# Patient Record
Sex: FEMALE | Race: WHITE | NOT HISPANIC OR LATINO | URBAN - METROPOLITAN AREA
[De-identification: names, ages, dates, MRNs, and addresses within clinical notes are randomized per-mention and may not be internally consistent; named-entity substitution may affect disease eponyms.]

---

## 2017-01-09 ENCOUNTER — OUTPATIENT (OUTPATIENT)
Dept: OUTPATIENT SERVICES | Facility: HOSPITAL | Age: 72
LOS: 1 days | Discharge: HOME | End: 2017-01-09

## 2017-06-27 DIAGNOSIS — R05 COUGH: ICD-10-CM

## 2018-01-10 ENCOUNTER — OUTPATIENT (OUTPATIENT)
Dept: OUTPATIENT SERVICES | Facility: HOSPITAL | Age: 73
LOS: 1 days | Discharge: HOME | End: 2018-01-10

## 2018-01-10 DIAGNOSIS — R05 COUGH: ICD-10-CM

## 2018-06-26 ENCOUNTER — OUTPATIENT (OUTPATIENT)
Dept: OUTPATIENT SERVICES | Facility: HOSPITAL | Age: 73
LOS: 1 days | Discharge: HOME | End: 2018-06-26

## 2018-06-26 DIAGNOSIS — J40 BRONCHITIS, NOT SPECIFIED AS ACUTE OR CHRONIC: ICD-10-CM

## 2018-10-20 PROBLEM — Z00.00 ENCOUNTER FOR PREVENTIVE HEALTH EXAMINATION: Status: ACTIVE | Noted: 2018-10-20

## 2018-11-19 ENCOUNTER — APPOINTMENT (OUTPATIENT)
Dept: BREAST CENTER | Facility: CLINIC | Age: 73
End: 2018-11-19
Payer: MEDICARE

## 2018-11-19 VITALS
OXYGEN SATURATION: 97 % | HEIGHT: 62 IN | DIASTOLIC BLOOD PRESSURE: 84 MMHG | BODY MASS INDEX: 39.2 KG/M2 | HEART RATE: 82 BPM | WEIGHT: 213 LBS | SYSTOLIC BLOOD PRESSURE: 138 MMHG

## 2018-11-19 DIAGNOSIS — Z80.49 FAMILY HISTORY OF MALIGNANT NEOPLASM OF OTHER GENITAL ORGANS: ICD-10-CM

## 2018-11-19 DIAGNOSIS — Z86.79 PERSONAL HISTORY OF OTHER DISEASES OF THE CIRCULATORY SYSTEM: ICD-10-CM

## 2018-11-19 DIAGNOSIS — Z86.39 PERSONAL HISTORY OF OTHER ENDOCRINE, NUTRITIONAL AND METABOLIC DISEASE: ICD-10-CM

## 2018-11-19 DIAGNOSIS — Z87.891 PERSONAL HISTORY OF NICOTINE DEPENDENCE: ICD-10-CM

## 2018-11-19 PROCEDURE — 99203 OFFICE O/P NEW LOW 30 MIN: CPT

## 2018-11-20 PROBLEM — Z86.39 HISTORY OF HYPOTHYROIDISM: Status: RESOLVED | Noted: 2018-11-20 | Resolved: 2018-11-20

## 2018-11-20 PROBLEM — Z86.79 HISTORY OF HYPERTENSION: Status: RESOLVED | Noted: 2018-11-20 | Resolved: 2018-11-20

## 2018-11-20 PROBLEM — Z87.891 FORMER SMOKER: Status: ACTIVE | Noted: 2018-11-20

## 2018-11-20 PROBLEM — Z80.49 FAMILY HISTORY OF MALIGNANT NEOPLASM OF UTERUS: Status: ACTIVE | Noted: 2018-11-20

## 2019-06-05 ENCOUNTER — APPOINTMENT (OUTPATIENT)
Dept: BREAST CENTER | Facility: CLINIC | Age: 74
End: 2019-06-05
Payer: MEDICARE

## 2019-06-05 VITALS
SYSTOLIC BLOOD PRESSURE: 150 MMHG | WEIGHT: 213 LBS | DIASTOLIC BLOOD PRESSURE: 82 MMHG | TEMPERATURE: 97.8 F | HEIGHT: 62 IN | BODY MASS INDEX: 39.2 KG/M2

## 2019-06-05 DIAGNOSIS — Z80.3 FAMILY HISTORY OF MALIGNANT NEOPLASM OF BREAST: ICD-10-CM

## 2019-06-05 DIAGNOSIS — N64.4 MASTODYNIA: ICD-10-CM

## 2019-06-05 PROCEDURE — 99213 OFFICE O/P EST LOW 20 MIN: CPT
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
## 2019-06-05 NOTE — PHYSICAL EXAM
[Normocephalic] : normocephalic [EOMI] : extra ocular movement intact [No Supraclavicular Adenopathy] : no supraclavicular adenopathy [No Cervical Adenopathy] : no cervical adenopathy [No dominant masses] : no dominant masses left breast [Examined in the supine and seated position] : examined in the supine and seated position [No dominant masses] : no dominant masses in right breast  [No Nipple Retraction] : no right nipple retraction [No Nipple Discharge] : no left nipple discharge [Soft] : abdomen soft [No Axillary Lymphadenopathy] : no left axillary lymphadenopathy [Not Tender] : non-tender [No Edema] : no edema [No Rashes] : no rashes [No Ulceration] : no ulceration [de-identified] : no suspicious masses were palpated in either breast

## 2019-06-05 NOTE — PAST MEDICAL HISTORY
[Menarche Age ____] : age at menarche was [unfilled] [Total Preg ___] : G[unfilled] [Menopause Age____] : age at menopause was [unfilled] [FreeTextEntry6] : denies [FreeTextEntry5] : R ovarian cystectomy  [History of Hormone Replacement Treatment] : has no history of hormone replacement treatment [FreeTextEntry8] : n.a [FreeTextEntry7] : denies

## 2019-06-05 NOTE — REVIEW OF SYSTEMS
[As Noted in HPI] : as noted in HPI [Breast Pain] : breast pain [Negative] : Heme/Lymph [Skin Lesions] : no skin lesions [Breast Lump] : no breast lump [Skin Wound] : no skin wound [Hot Flashes] : no hot flashes
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
## 2019-10-16 ENCOUNTER — APPOINTMENT (OUTPATIENT)
Dept: BREAST CENTER | Facility: CLINIC | Age: 74
End: 2019-10-16

## 2020-09-13 ENCOUNTER — OUTPATIENT (OUTPATIENT)
Dept: OUTPATIENT SERVICES | Facility: HOSPITAL | Age: 75
LOS: 1 days | Discharge: HOME | End: 2020-09-13

## 2020-09-13 DIAGNOSIS — Z11.59 ENCOUNTER FOR SCREENING FOR OTHER VIRAL DISEASES: ICD-10-CM

## 2020-09-16 ENCOUNTER — OUTPATIENT (OUTPATIENT)
Dept: OUTPATIENT SERVICES | Facility: HOSPITAL | Age: 75
LOS: 1 days | Discharge: HOME | End: 2020-09-16

## 2020-09-16 VITALS
DIASTOLIC BLOOD PRESSURE: 63 MMHG | WEIGHT: 199.96 LBS | TEMPERATURE: 96 F | SYSTOLIC BLOOD PRESSURE: 138 MMHG | OXYGEN SATURATION: 99 % | HEIGHT: 62 IN | HEART RATE: 72 BPM | RESPIRATION RATE: 17 BRPM

## 2020-09-16 VITALS — RESPIRATION RATE: 15 BRPM | DIASTOLIC BLOOD PRESSURE: 62 MMHG | HEART RATE: 73 BPM | SYSTOLIC BLOOD PRESSURE: 142 MMHG

## 2020-09-16 DIAGNOSIS — Z98.890 OTHER SPECIFIED POSTPROCEDURAL STATES: Chronic | ICD-10-CM

## 2020-09-16 DIAGNOSIS — Z87.19 PERSONAL HISTORY OF OTHER DISEASES OF THE DIGESTIVE SYSTEM: Chronic | ICD-10-CM

## 2020-09-16 DIAGNOSIS — Z90.49 ACQUIRED ABSENCE OF OTHER SPECIFIED PARTS OF DIGESTIVE TRACT: Chronic | ICD-10-CM

## 2020-09-16 NOTE — ASU PATIENT PROFILE, ADULT - PMH
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

## 2020-09-18 DIAGNOSIS — E03.9 HYPOTHYROIDISM, UNSPECIFIED: ICD-10-CM

## 2020-09-18 DIAGNOSIS — H25.12 AGE-RELATED NUCLEAR CATARACT, LEFT EYE: ICD-10-CM

## 2020-09-18 DIAGNOSIS — C81.77 OTHER HODGKIN LYMPHOMA, SPLEEN: ICD-10-CM

## 2020-12-29 PROBLEM — Z87.19 PERSONAL HISTORY OF OTHER DISEASES OF THE DIGESTIVE SYSTEM: Chronic | Status: ACTIVE | Noted: 2020-09-16

## 2020-12-29 PROBLEM — Z86.79 PERSONAL HISTORY OF OTHER DISEASES OF THE CIRCULATORY SYSTEM: Chronic | Status: ACTIVE | Noted: 2020-09-16

## 2020-12-29 PROBLEM — C81.77: Chronic | Status: ACTIVE | Noted: 2020-09-16

## 2020-12-29 PROBLEM — E03.9 HYPOTHYROIDISM, UNSPECIFIED: Chronic | Status: ACTIVE | Noted: 2020-09-16

## 2021-01-10 ENCOUNTER — OUTPATIENT (OUTPATIENT)
Dept: OUTPATIENT SERVICES | Facility: HOSPITAL | Age: 76
LOS: 1 days | Discharge: HOME | End: 2021-01-10

## 2021-01-10 DIAGNOSIS — Z87.19 PERSONAL HISTORY OF OTHER DISEASES OF THE DIGESTIVE SYSTEM: Chronic | ICD-10-CM

## 2021-01-10 DIAGNOSIS — Z90.49 ACQUIRED ABSENCE OF OTHER SPECIFIED PARTS OF DIGESTIVE TRACT: Chronic | ICD-10-CM

## 2021-01-10 DIAGNOSIS — Z98.890 OTHER SPECIFIED POSTPROCEDURAL STATES: Chronic | ICD-10-CM

## 2021-01-10 DIAGNOSIS — Z11.59 ENCOUNTER FOR SCREENING FOR OTHER VIRAL DISEASES: ICD-10-CM

## 2021-01-12 NOTE — ASU PATIENT PROFILE, ADULT - PMH
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

## 2021-01-12 NOTE — ASU PATIENT PROFILE, ADULT - PSH
H/O carpal tunnel repair  b/l  H/O cholecystitis  lap pedro  S/P appendectomy     H/O carpal tunnel repair  b/l  H/O cholecystitis  lap pedro  S/P appendectomy    S/P trigger finger release  2002

## 2021-01-13 ENCOUNTER — OUTPATIENT (OUTPATIENT)
Dept: OUTPATIENT SERVICES | Facility: HOSPITAL | Age: 76
LOS: 1 days | Discharge: HOME | End: 2021-01-13

## 2021-01-13 VITALS — DIASTOLIC BLOOD PRESSURE: 59 MMHG | SYSTOLIC BLOOD PRESSURE: 127 MMHG | RESPIRATION RATE: 17 BRPM | HEART RATE: 78 BPM

## 2021-01-13 VITALS
HEIGHT: 62 IN | RESPIRATION RATE: 18 BRPM | WEIGHT: 220.02 LBS | DIASTOLIC BLOOD PRESSURE: 64 MMHG | OXYGEN SATURATION: 98 % | HEART RATE: 87 BPM | TEMPERATURE: 97 F | SYSTOLIC BLOOD PRESSURE: 146 MMHG

## 2021-01-13 DIAGNOSIS — Z98.890 OTHER SPECIFIED POSTPROCEDURAL STATES: Chronic | ICD-10-CM

## 2021-01-13 DIAGNOSIS — Z90.49 ACQUIRED ABSENCE OF OTHER SPECIFIED PARTS OF DIGESTIVE TRACT: Chronic | ICD-10-CM

## 2021-01-13 DIAGNOSIS — Z87.19 PERSONAL HISTORY OF OTHER DISEASES OF THE DIGESTIVE SYSTEM: Chronic | ICD-10-CM

## 2021-01-13 RX ORDER — DULOXETINE HYDROCHLORIDE 30 MG/1
1 CAPSULE, DELAYED RELEASE ORAL
Qty: 0 | Refills: 0 | DISCHARGE

## 2021-01-13 RX ORDER — LEVOTHYROXINE SODIUM 125 MCG
1 TABLET ORAL
Qty: 0 | Refills: 0 | DISCHARGE

## 2021-01-13 NOTE — CHART NOTE - NSCHARTNOTEFT_GEN_A_CORE
PACU ANESTHESIA ADMISSION NOTE      Procedure: Cataract extraction   Post op diagnosis:  cataract    ____  Intubated  TV:______       Rate: ______      FiO2: ______    __x__  Patent Airway    __x__  Full return of protective reflexes    __x__  Full recovery from anesthesia / back to baseline status    Vitals:  T(C): 36.1 (01-13-21 @ 08:31), Max: 36.1 (01-13-21 @ 07:40)  HR: 87 (01-13-21 @ 08:31) (87 - 87)  BP: 146/64 (01-13-21 @ 08:31) (146/64 - 146/64)  RR: 18 (01-13-21 @ 08:31) (18 - 18)  SpO2: 98% (01-13-21 @ 08:31) (98% - 98%)    Mental Status:  __x__ Awake   ___x__ Alert   _____ Drowsy   _____ Sedated    Nausea/Vomiting:  __x__ NO  ______Yes,   See Post - Op Orders          Pain Scale (0-10):  __0___    Treatment: ____ None    __x__ See Post - Op/PCA Orders    Post - Operative Fluids:   ____ Oral   __x__ See Post - Op Orders    Plan: Discharge:   __x__Home       _____Floor     _____Critical Care    _____  Other:_________________    Comments: Patient had smooth intraoperative event, no anesthesia complication.  PACU Vital signs: HR:     71        BP:  98      /    47      RR:  18           O2 Sat:   100    %     Temp 36

## 2021-01-16 DIAGNOSIS — Z88.2 ALLERGY STATUS TO SULFONAMIDES: ICD-10-CM

## 2021-01-16 DIAGNOSIS — H25.11 AGE-RELATED NUCLEAR CATARACT, RIGHT EYE: ICD-10-CM

## 2021-01-16 DIAGNOSIS — Z85.71 PERSONAL HISTORY OF HODGKIN LYMPHOMA: ICD-10-CM

## 2021-01-16 DIAGNOSIS — Z87.891 PERSONAL HISTORY OF NICOTINE DEPENDENCE: ICD-10-CM

## 2021-01-16 DIAGNOSIS — H57.03 MIOSIS: ICD-10-CM

## 2021-01-16 DIAGNOSIS — K21.9 GASTRO-ESOPHAGEAL REFLUX DISEASE WITHOUT ESOPHAGITIS: ICD-10-CM

## 2021-01-16 DIAGNOSIS — I10 ESSENTIAL (PRIMARY) HYPERTENSION: ICD-10-CM

## 2021-01-16 DIAGNOSIS — E03.9 HYPOTHYROIDISM, UNSPECIFIED: ICD-10-CM

## 2021-03-18 NOTE — ASU PATIENT PROFILE, ADULT - FALL HARM RISK
Message fr yuanera      Caller's first and last name and relationship (if not the patient): Pt     Best contact number(s): [phone]     Whose call is being returned: unknown     Details to clarify the request: [address]

## 2022-04-10 ENCOUNTER — INPATIENT (INPATIENT)
Facility: HOSPITAL | Age: 77
LOS: 8 days | Discharge: NURSING, REHAB OR LONG-TERM CARE | End: 2022-04-19
Attending: PSYCHIATRY & NEUROLOGY | Admitting: PSYCHIATRY & NEUROLOGY
Payer: MEDICARE

## 2022-04-10 VITALS
HEIGHT: 62 IN | TEMPERATURE: 99 F | SYSTOLIC BLOOD PRESSURE: 155 MMHG | WEIGHT: 179.9 LBS | DIASTOLIC BLOOD PRESSURE: 81 MMHG | RESPIRATION RATE: 17 BRPM | HEART RATE: 97 BPM | OXYGEN SATURATION: 98 %

## 2022-04-10 DIAGNOSIS — Z98.890 OTHER SPECIFIED POSTPROCEDURAL STATES: Chronic | ICD-10-CM

## 2022-04-10 DIAGNOSIS — Z90.49 ACQUIRED ABSENCE OF OTHER SPECIFIED PARTS OF DIGESTIVE TRACT: Chronic | ICD-10-CM

## 2022-04-10 DIAGNOSIS — Z87.19 PERSONAL HISTORY OF OTHER DISEASES OF THE DIGESTIVE SYSTEM: Chronic | ICD-10-CM

## 2022-04-10 LAB
ALBUMIN SERPL ELPH-MCNC: 4.1 G/DL — SIGNIFICANT CHANGE UP (ref 3.5–5.2)
ALP SERPL-CCNC: 85 U/L — SIGNIFICANT CHANGE UP (ref 30–115)
ALT FLD-CCNC: 16 U/L — SIGNIFICANT CHANGE UP (ref 0–41)
ANION GAP SERPL CALC-SCNC: 11 MMOL/L — SIGNIFICANT CHANGE UP (ref 7–14)
APTT BLD: 26.3 SEC — LOW (ref 27–39.2)
AST SERPL-CCNC: 15 U/L — SIGNIFICANT CHANGE UP (ref 0–41)
BASOPHILS # BLD AUTO: 0.02 K/UL — SIGNIFICANT CHANGE UP (ref 0–0.2)
BASOPHILS NFR BLD AUTO: 0.3 % — SIGNIFICANT CHANGE UP (ref 0–1)
BILIRUB SERPL-MCNC: 0.6 MG/DL — SIGNIFICANT CHANGE UP (ref 0.2–1.2)
BLD GP AB SCN SERPL QL: SIGNIFICANT CHANGE UP
BUN SERPL-MCNC: 17 MG/DL — SIGNIFICANT CHANGE UP (ref 10–20)
CALCIUM SERPL-MCNC: 9.3 MG/DL — SIGNIFICANT CHANGE UP (ref 8.5–10.1)
CHLORIDE SERPL-SCNC: 99 MMOL/L — SIGNIFICANT CHANGE UP (ref 98–110)
CO2 SERPL-SCNC: 25 MMOL/L — SIGNIFICANT CHANGE UP (ref 17–32)
CREAT SERPL-MCNC: 0.9 MG/DL — SIGNIFICANT CHANGE UP (ref 0.7–1.5)
EGFR: 67 ML/MIN/1.73M2 — SIGNIFICANT CHANGE UP
EOSINOPHIL # BLD AUTO: 0.06 K/UL — SIGNIFICANT CHANGE UP (ref 0–0.7)
EOSINOPHIL NFR BLD AUTO: 0.9 % — SIGNIFICANT CHANGE UP (ref 0–8)
GLUCOSE SERPL-MCNC: 192 MG/DL — HIGH (ref 70–99)
HCT VFR BLD CALC: 36.7 % — LOW (ref 37–47)
HGB BLD-MCNC: 11.9 G/DL — LOW (ref 12–16)
IMM GRANULOCYTES NFR BLD AUTO: 0.3 % — SIGNIFICANT CHANGE UP (ref 0.1–0.3)
INR BLD: 1.03 RATIO — SIGNIFICANT CHANGE UP (ref 0.65–1.3)
LYMPHOCYTES # BLD AUTO: 0.83 K/UL — LOW (ref 1.2–3.4)
LYMPHOCYTES # BLD AUTO: 12.6 % — LOW (ref 20.5–51.1)
MCHC RBC-ENTMCNC: 26.3 PG — LOW (ref 27–31)
MCHC RBC-ENTMCNC: 32.4 G/DL — SIGNIFICANT CHANGE UP (ref 32–37)
MCV RBC AUTO: 81 FL — SIGNIFICANT CHANGE UP (ref 81–99)
MONOCYTES # BLD AUTO: 0.25 K/UL — SIGNIFICANT CHANGE UP (ref 0.1–0.6)
MONOCYTES NFR BLD AUTO: 3.8 % — SIGNIFICANT CHANGE UP (ref 1.7–9.3)
NEUTROPHILS # BLD AUTO: 5.41 K/UL — SIGNIFICANT CHANGE UP (ref 1.4–6.5)
NEUTROPHILS NFR BLD AUTO: 82.1 % — HIGH (ref 42.2–75.2)
NRBC # BLD: 0 /100 WBCS — SIGNIFICANT CHANGE UP (ref 0–0)
PLATELET # BLD AUTO: 205 K/UL — SIGNIFICANT CHANGE UP (ref 130–400)
POTASSIUM SERPL-MCNC: 4.5 MMOL/L — SIGNIFICANT CHANGE UP (ref 3.5–5)
POTASSIUM SERPL-SCNC: 4.5 MMOL/L — SIGNIFICANT CHANGE UP (ref 3.5–5)
PROT SERPL-MCNC: 5.8 G/DL — LOW (ref 6–8)
PROTHROM AB SERPL-ACNC: 11.8 SEC — SIGNIFICANT CHANGE UP (ref 9.95–12.87)
RBC # BLD: 4.53 M/UL — SIGNIFICANT CHANGE UP (ref 4.2–5.4)
RBC # FLD: 13.7 % — SIGNIFICANT CHANGE UP (ref 11.5–14.5)
SARS-COV-2 RNA SPEC QL NAA+PROBE: SIGNIFICANT CHANGE UP
SODIUM SERPL-SCNC: 135 MMOL/L — SIGNIFICANT CHANGE UP (ref 135–146)
TROPONIN T SERPL-MCNC: <0.01 NG/ML — SIGNIFICANT CHANGE UP
WBC # BLD: 6.59 K/UL — SIGNIFICANT CHANGE UP (ref 4.8–10.8)
WBC # FLD AUTO: 6.59 K/UL — SIGNIFICANT CHANGE UP (ref 4.8–10.8)

## 2022-04-10 PROCEDURE — 0042T: CPT

## 2022-04-10 PROCEDURE — 36224 PLACE CATH CAROTD ART: CPT | Mod: LT

## 2022-04-10 PROCEDURE — G0425: CPT | Mod: 95

## 2022-04-10 PROCEDURE — 70496 CT ANGIOGRAPHY HEAD: CPT | Mod: 26,MA

## 2022-04-10 PROCEDURE — 70450 CT HEAD/BRAIN W/O DYE: CPT | Mod: 26,59,MA

## 2022-04-10 PROCEDURE — 70498 CT ANGIOGRAPHY NECK: CPT | Mod: 26,MA

## 2022-04-10 PROCEDURE — 99291 CRITICAL CARE FIRST HOUR: CPT

## 2022-04-10 PROCEDURE — 70450 CT HEAD/BRAIN W/O DYE: CPT | Mod: 26,59,77

## 2022-04-10 RX ORDER — LABETALOL HCL 100 MG
5 TABLET ORAL ONCE
Refills: 0 | Status: COMPLETED | OUTPATIENT
Start: 2022-04-10 | End: 2022-04-10

## 2022-04-10 RX ORDER — LOSARTAN POTASSIUM 100 MG/1
1 TABLET, FILM COATED ORAL
Qty: 0 | Refills: 0 | DISCHARGE

## 2022-04-10 RX ORDER — FAMOTIDINE 10 MG/ML
40 INJECTION INTRAVENOUS AT BEDTIME
Refills: 0 | Status: DISCONTINUED | OUTPATIENT
Start: 2022-04-10 | End: 2022-04-19

## 2022-04-10 RX ORDER — DOCUSATE SODIUM 100 MG
1 CAPSULE ORAL
Qty: 0 | Refills: 0 | DISCHARGE

## 2022-04-10 RX ORDER — ALTEPLASE 100 MG
81 KIT INTRAVENOUS ONCE
Refills: 0 | Status: COMPLETED | OUTPATIENT
Start: 2022-04-10 | End: 2022-04-10

## 2022-04-10 RX ORDER — FAMOTIDINE 10 MG/ML
1 INJECTION INTRAVENOUS
Qty: 0 | Refills: 0 | DISCHARGE

## 2022-04-10 RX ORDER — LEVOTHYROXINE SODIUM 125 MCG
112 TABLET ORAL DAILY
Refills: 0 | Status: DISCONTINUED | OUTPATIENT
Start: 2022-04-10 | End: 2022-04-19

## 2022-04-10 RX ORDER — ALTEPLASE 100 MG
9 KIT INTRAVENOUS ONCE
Refills: 0 | Status: COMPLETED | OUTPATIENT
Start: 2022-04-10 | End: 2022-04-10

## 2022-04-10 RX ADMIN — Medication 5 MILLIGRAM(S): at 20:10

## 2022-04-10 RX ADMIN — ALTEPLASE 81 MILLIGRAM(S): KIT at 20:16

## 2022-04-10 RX ADMIN — ALTEPLASE 540 MILLIGRAM(S): KIT at 20:15

## 2022-04-10 RX ADMIN — Medication 5 MILLIGRAM(S): at 20:05

## 2022-04-10 NOTE — ED PROVIDER NOTE - CLINICAL SUMMARY MEDICAL DECISION MAKING FREE TEXT BOX
I personally evaluated the patient. I reviewed the Resident’s or Physician Assistant’s note (as assigned above), and agree with the findings and plan except as documented in my note. Patient evaluated for fall. Stroke code called upon initial provider assessment 2/2 R extremity weakness. Labs, ekg, cxr, CT head, ct perfusion, ct angio head/neck performed in the ED. Pt within tpa window, lkw 430 pm. TPA pushed, pt symptoms improving. Approved for stroke unit. Pt admitted to stroke unit for further evaluation and treatment.

## 2022-04-10 NOTE — ED ADULT NURSE NOTE - NSICDXPASTSURGICALHX_GEN_ALL_CORE_FT
PAST SURGICAL HISTORY:  H/O carpal tunnel repair b/l    H/O cholecystitis lap pedro    S/P appendectomy     S/P trigger finger release 2002

## 2022-04-10 NOTE — ED PROVIDER NOTE - PHYSICAL EXAMINATION
GENERAL: NAD but vomitus noted on shirt   SKIN: warm, dry  HEAD: Normocephalic; atraumatic.  EYES: PERRLA, EOMI  CARD: S1, S2 normal; no murmurs, gallops, or rubs. Regular rate and rhythm.   RESP: LCTAB; No wheezes, rales, rhonchi, or stridor.  ABD: soft, nontender, and nondistended  NEURO: A&O x 4 but noted difficulty finding words. Decreased strength RUE and RLE with drift in extremities. No facial droop. Finger nose finger and rapid alternating movements WNL.   MSK: No UE and LE TTP. No chest wall TTP or crepitus. No cervical, thoracic, or lumbar midline TTP.

## 2022-04-10 NOTE — ED ADULT NURSE NOTE - NSICDXPASTMEDICALHX_GEN_ALL_CORE_FT
PAST MEDICAL HISTORY:  Adult hypothyroidism     Cataract Right IOL    H/O gastroesophageal reflux (GERD)     H/O: HTN (hypertension)     Hodgkin's granuloma of spleen and non- hodgkins

## 2022-04-10 NOTE — ED ADULT NURSE REASSESSMENT NOTE - NS ED NURSE REASSESS COMMENT FT1
Pt left for IR @ raman 2210. Bedside report given to ORQUIDEA Kinsey. Remaining TPA infusion completed @raman 2209

## 2022-04-10 NOTE — ED ADULT NURSE REASSESSMENT NOTE - NSIMPLEMENTINTERV_GEN_ALL_ED
Implemented All Fall with Harm Risk Interventions:  [place] to call system. Call bell, personal items and telephone within reach. Instruct patient to call for assistance. Room bathroom lighting operational. Non-slip footwear when patient is off stretcher. Physically safe environment: no spills, clutter or unnecessary equipment. Stretcher in lowest position, wheels locked, appropriate side rails in place. Provide visual cue, wrist band, yellow gown, etc. Monitor gait and stability. Monitor for mental status changes and reorient to person, place, and time. Review medications for side effects contributing to fall risk. Reinforce activity limits and safety measures with patient and family. Provide visual clues: red socks.
Implemented All Fall Risk Interventions:  [place] to call system. Call bell, personal items and telephone within reach. Instruct patient to call for assistance. Room bathroom lighting operational. Non-slip footwear when patient is off stretcher. Physically safe environment: no spills, clutter or unnecessary equipment. Stretcher in lowest position, wheels locked, appropriate side rails in place. Provide visual cue, wrist band, yellow gown, etc. Monitor gait and stability. Monitor for mental status changes and reorient to person, place, and time. Review medications for side effects contributing to fall risk. Reinforce activity limits and safety measures with patient and family.

## 2022-04-10 NOTE — H&P ADULT - HISTORY OF PRESENT ILLNESS
75 yo female w/ PMH of HTN, hypothyroidism presents after fall this afternoon. At 16:30, started having R sided arm and leg weakness, and difficulty finding words.  Does not recall if sxs started before or after fall but was bending over, fell over, denies head trauma, LOC, blood thinners. Has vomited a couple times. Denies pain anywhere. Stroke code called upon presentation. CTA showed a left M2 occlusion. Patient received tPA but neurologically declined shortly after. CODE NI actual called and patient taken for emergent thrombectomy and to be transferred to NCCU following procedure.

## 2022-04-10 NOTE — CHART NOTE - NSCHARTNOTEFT_GEN_A_CORE
Evaluated patient during activation of stroke code. Patient is a 76 year old RHF with pmhx of HTN, HLD, Non-hodgkin's lymphoma in 2019 s/p chemo now in remission, last chemo was feb 2020 presented s/p fall at home. Patient states that she was having a usual day when she bent down to get something , felt dizzy and fell forward hitting her head without any LOC. Came to ED and was noted to have a right sided weakness and dysarthria and stroke code was activated at 7.08pm. NIHSS 7 on arrival. BP on arrival 155/80  . Stat CTH was negative for acute findings, there was no bleed. Patient was evaluated via telestroke by CTS attending on call Dr Villeda and patient is currently within the window for IV TPA. NIHSS at this time is 4. Patient was explained in detail about the risks/benefits and alternatives to treatment with tpa Including the 6% chance of bleeding and death. Patient consented for IV TPA but noted to have high bp of 189/100 . Labetolol 10mg total administered and patient responded with a BP of 170/80. Patient weight >220 lbs thus received the max dose. TPA bolus dose of 9ml administered over 1 min at 8.15pm followed by the infusion of 81ml initiated at 8.17pm. CTA H/N and perfusion scan resulted which revealed a left M2 occlusion with a core infarct of 7ml and ischemic pneumbra of 38 cc and a mismatch of 31 cc. Code NI alert activated at 9.10pm spoke to NI interventionalist Dr Butler at 9.42pm and code NI actual activated at 9.55pm. Patient and family consented for cerebral angiogram and possible left MCA m2 thrombectomy. Since arrival patients stroke scale fluctuated from 7--->4---->8---->6. Post procedurally patient will be taken care of in the ICU under neurocritical care team      NIHSS (on arrival)  7  LOC:       1a: 0    1b(Questions):   0        1c(Instructions): 0            Best Gaze: 0  Visual:  0  Motor:                 RUE: 1    RLE: 2    LUE: 0    LLE: 0    FACE: 1    Limb Ataxia: 2  Sensory:    0   Language:   0    Dysarthria:    1      Extinction and Inattention: 0        NIHSS (on telestroke evaluation with Dr Villeda)  4  LOC:       1a: 0    1b(Questions):   0        1c(Instructions): 0            Best Gaze: 0  Visual:  0  Motor:                 RUE: 1    RLE: 1    LUE: 0    LLE: 0    FACE: 1    Limb Ataxia: 0  Sensory:    0   Language:   0    Dysarthria:    1      Extinction and Inattention: 0        NIHSS (on re-exam post IV Thrombolysis)  8 -------*****stat cth obtained for worsening NIHSS at 9pm which was negative for bleed)  LOC:       1a: 0    1b(Questions):   0        1c(Instructions): 0            Best Gaze: 0  Visual:  0  Motor:                 RUE: 1    RLE: 1    LUE: 0    LLE: 0    FACE: 1    Limb Ataxia: 0  Sensory:    0   Language:   0    Dysarthria:    1      Extinction and Inattention: 0        NIHSS (Right prior to IR)  6  LOC:       1a: 0    1b(Questions):   0        1c(Instructions): 0            Best Gaze: 0  Visual:  0  Motor:                 RUE: 1    RLE: 1    LUE: 0    LLE: 0    FACE: 1    Limb Ataxia: 0  Sensory:    1   Language:   0    Dysarthria:    1      Extinction and Inattention: 1 R Evaluated patient during activation of stroke code. Patient is a 76 year old RHF with pmhx of HTN, HLD, Non-hodgkin's lymphoma in 2019 s/p chemo now in remission, last chemo was feb 2020 presented s/p fall at home. Patient states that she was having a usual day when she bent down to get something , felt dizzy and fell forward hitting her head without any LOC. Came to ED and was noted to have a right sided weakness and dysarthria and stroke code was activated at 7.08pm. NIHSS 7 on arrival. BP on arrival 155/80  . Stat CTH was negative for acute findings, there was no bleed. Patient was evaluated via telestroke by CTS attending on call Dr Villeda and patient is currently within the window for IV TPA. NIHSS at this time is 4. Patient was explained in detail about the risks/benefits and alternatives to treatment with tpa Including the 6% chance of bleeding and death. Patient consented for IV TPA but noted to have high bp of 189/100 . Labetolol 10mg total administered and patient responded with a BP of 170/80. Patient weight >220 lbs thus received the max dose. TPA bolus dose of 9ml administered over 1 min at 8.15pm followed by the infusion of 81ml initiated at 8.17pm. CTA H/N and perfusion scan resulted which revealed a left M2 occlusion with a core infarct of 7ml and ischemic pneumbra of 38 cc and a mismatch of 31 cc. Code NI alert activated at 9.10pm spoke to NI interventionalist Dr Butler at 9.42pm and code NI actual activated at 9.55pm. Patient and family consented for cerebral angiogram and possible left MCA m2 thrombectomy. Since arrival patients stroke scale fluctuated from 7--->4---->8---->6. Post procedurally patient will be taken care of in the ICU under neurocritical care team. Patient was endorsed to Neurocritical care ACP rima.      NIHSS (on arrival)  7  LOC:       1a: 0    1b(Questions):   0        1c(Instructions): 0            Best Gaze: 0  Visual:  0  Motor:                 RUE: 1    RLE: 2    LUE: 0    LLE: 0    FACE: 1    Limb Ataxia: 2  Sensory:    0   Language:   0    Dysarthria:    1      Extinction and Inattention: 0        NIHSS (on telestroke evaluation with Dr Villeda)  4  LOC:       1a: 0    1b(Questions):   0        1c(Instructions): 0            Best Gaze: 0  Visual:  0  Motor:                 RUE: 1    RLE: 1    LUE: 0    LLE: 0    FACE: 1    Limb Ataxia: 0  Sensory:    0   Language:   0    Dysarthria:    1      Extinction and Inattention: 0        NIHSS (on re-exam post IV Thrombolysis)  8 -------*****stat cth obtained for worsening NIHSS at 9pm which was negative for bleed)  LOC:       1a: 0    1b(Questions):   0        1c(Instructions): 0            Best Gaze: 0  Visual:  0  Motor:                 RUE: 1    RLE: 1    LUE: 0    LLE: 0    FACE: 1    Limb Ataxia: 0  Sensory:    0   Language:   0    Dysarthria:    1      Extinction and Inattention: 0        NIHSS (Right prior to IR)  6  LOC:       1a: 0    1b(Questions):   0        1c(Instructions): 0            Best Gaze: 0  Visual:  0  Motor:                 RUE: 1    RLE: 1    LUE: 0    LLE: 0    FACE: 1    Limb Ataxia: 0  Sensory:    1   Language:   0    Dysarthria:    1      Extinction and Inattention: 1 R Evaluated patient during activation of stroke code. Patient is a 76 year old RHF with pmhx of HTN, HLD, Non-hodgkin's lymphoma in 2019 s/p chemo now in remission, last chemo was feb 2020 presented s/p fall at home. Patient states that she was having a usual day when at 4.30pm she bent down to get something , felt dizzy and fell forward hitting her head without any LOC. Came to ED and was noted to have a right sided weakness and dysarthria and stroke code was activated at 7.08pm. NIHSS 7 on arrival. BP on arrival 155/80  . Stat CTH was negative for acute findings, there was no bleed. Patient was evaluated via telestroke by CTS attending on call Dr Villeda and patient is currently within the window for IV TPA. NIHSS at this time is 4. Patient was explained in detail about the risks/benefits and alternatives to treatment with tpa Including the 6% chance of bleeding and death. Patient consented for IV TPA but noted to have high bp of 189/100 . Labetolol 10mg total administered and patient responded with a BP of 170/80. Patient weight >220 lbs thus received the max dose. TPA bolus dose of 9ml administered over 1 min at 8.15pm at 4-4.5hour window, followed by the infusion of 81ml initiated at 8.17pm. CTA H/N and perfusion scan resulted which revealed a left M2 occlusion with a core infarct of 7ml and ischemic penumbra of 38 cc and a mismatch of 31 cc. Code NI alert activated at 9.10pm spoke to NI interventionalist Dr Butler at 9.42pm and code NI actual activated at 9.55pm. Patient and family consented for cerebral angiogram and possible left MCA m2 thrombectomy. Since arrival patients stroke scale fluctuated from 7--->4---->8---->6. Post procedurally patient will be taken care of in the ICU under neurocritical care team. Patient was endorsed to Neurocritical care ACP rima.         Delays: There was a delay in obtaining CTA/ P since team was unable to establish an IV line usg guided IV inserted twice.  Delay in administering IV TPA since patient needed bp stabilization prior to Administration of IV TPA.        NIHSS (on arrival)  7  LOC:       1a: 0    1b(Questions):   0        1c(Instructions): 0            Best Gaze: 0  Visual:  0  Motor:                 RUE: 1    RLE: 2    LUE: 0    LLE: 0    FACE: 1    Limb Ataxia: 2  Sensory:    0   Language:   0    Dysarthria:    1      Extinction and Inattention: 0        NIHSS (on telestroke evaluation with Dr Villeda)  4  LOC:       1a: 0    1b(Questions):   0        1c(Instructions): 0            Best Gaze: 0  Visual:  0  Motor:                 RUE: 1    RLE: 1    LUE: 0    LLE: 0    FACE: 1    Limb Ataxia: 0  Sensory:    0   Language:   0    Dysarthria:    1      Extinction and Inattention: 0        NIHSS (on re-exam post IV Thrombolysis)  8 -------*****stat cth obtained for worsening NIHSS at 9pm which was negative for bleed)  LOC:       1a: 0    1b(Questions):   0        1c(Instructions): 0            Best Gaze: 0  Visual:  0  Motor:                 RUE: 1    RLE: 1    LUE: 0    LLE: 0    FACE: 1    Limb Ataxia: 0  Sensory:    0   Language:   0    Dysarthria:    1      Extinction and Inattention: 0        NIHSS (Right prior to IR)  6  LOC:       1a: 0    1b(Questions):   0        1c(Instructions): 0            Best Gaze: 0  Visual:  0  Motor:                 RUE: 1    RLE: 1    LUE: 0    LLE: 0    FACE: 1    Limb Ataxia: 0  Sensory:    1   Language:   0    Dysarthria:    1      Extinction and Inattention: 1 R Evaluated patient during activation of stroke code. Patient is a 76 year old RHF with pmhx of HTN, HLD, Non-hodgkin's lymphoma in 2019 s/p chemo now in remission, last chemo was feb 2020 presented s/p fall at home. Patient states that she was having a usual day when at 4.30pm she bent down to get something , felt dizzy and fell forward hitting her head without any LOC. Came to ED and was noted to have a right sided weakness and dysarthria and stroke code was activated at 7.08pm. NIHSS 7 on arrival. BP on arrival 155/80  . Stat CTH was negative for acute findings, there was no bleed. Patient was evaluated via telestroke by CTS attending on call Dr Villeda and patient is currently within the window for IV TPA. NIHSS at this time is 4. Patient was explained in detail about the risks/benefits and alternatives to treatment with tpa Including the 6% chance of bleeding and death. Patient consented for IV TPA but noted to have high bp of 189/100 . Labetolol 10mg total administered and patient responded with a BP of 170/80. Patient weight >220 lbs thus received the max dose. TPA bolus dose of 9ml administered over 1 min at 8.15pm at 4-4.5hour window, followed by the infusion of 81ml initiated at 8.17pm. CTA H/N and perfusion scan resulted which revealed a left M2 occlusion with a core infarct of 7ml and ischemic penumbra of 38 cc and a mismatch of 31 cc. Code NI alert activated at 9.10pm spoke to NI interventionalist Dr Butler at 9.42pm and code NI actual activated at 9.55pm. Patient and family consented for cerebral angiogram and possible left MCA m2 thrombectomy. Since arrival patients stroke scale fluctuated from 7--->4---->8---->6. Post procedurally patient will be taken care of in the ICU under neurocritical care team. Patient was endorsed to Neurocritical care ACP rima.         Delays: There was a delay in obtaining CTA/ P since team was unable to establish an IV line.  Usg guided IV inserted twice.  Delay in administering IV TPA since patient needed bp stabilization prior to Administration of IV TPA.            NIHSS (on arrival)  7  LOC:       1a: 0    1b(Questions):   0        1c(Instructions): 0            Best Gaze: 0  Visual:  0  Motor:                 RUE: 1    RLE: 2    LUE: 0    LLE: 0    FACE: 1    Limb Ataxia: 2  Sensory:    0   Language:   0    Dysarthria:    1      Extinction and Inattention: 0        NIHSS (on telestroke evaluation with Dr Villeda)  4  LOC:       1a: 0    1b(Questions):   0        1c(Instructions): 0            Best Gaze: 0  Visual:  0  Motor:                 RUE: 1    RLE: 1    LUE: 0    LLE: 0    FACE: 1    Limb Ataxia: 0  Sensory:    0   Language:   0    Dysarthria:    1      Extinction and Inattention: 0        NIHSS (on re-exam post IV Thrombolysis)  8 -------*****stat cth obtained for worsening NIHSS at 9pm which was negative for bleed)  LOC:       1a: 0    1b(Questions):   0        1c(Instructions): 0            Best Gaze: 0  Visual:  0  Motor:                 RUE: 1    RLE: 1    LUE: 0    LLE: 0    FACE: 1    Limb Ataxia: 0  Sensory:    0   Language:   0    Dysarthria:    1      Extinction and Inattention: 0        NIHSS (Right prior to IR)  6  LOC:       1a: 0    1b(Questions):   0        1c(Instructions): 0            Best Gaze: 0  Visual:  0  Motor:                 RUE: 1    RLE: 1    LUE: 0    LLE: 0    FACE: 1    Limb Ataxia: 0  Sensory:    1   Language:   0    Dysarthria:    1      Extinction and Inattention: 1 R Evaluated patient during activation of stroke code. Patient is a 76 year old RHF with pmhx of HTN, HLD, Non-hodgkin's lymphoma in 2019 s/p chemo now in remission, last chemo was feb 2020 presented s/p fall at home. Patient states that she was having a usual day when at 4.30pm she bent down to get something , felt dizzy and fell forward hitting her head without any LOC. Came to ED and was noted to have a right sided weakness and dysarthria and stroke code was activated at 7.08pm. NIHSS 7 on arrival. BP on arrival 155/80  . Stat CTH was negative for acute findings, there was no bleed. Patient was evaluated via telestroke by CTS attending on call Dr Villeda and patient is currently within the window for IV TPA. NIHSS at this time is 4. Patient was explained in detail about the risks/benefits and alternatives to treatment with tpa Including the 6% chance of bleeding and death. Patient consented for IV TPA but noted to have high bp of 189/100 . Labetolol 10mg total administered and patient responded with a BP of 170/80. Patient weight >220 lbs thus received the max dose of IV tpa. TPA bolus dose of 9ml administered over 1 min at 8.15pm at 4-4.5hour window, followed by the infusion of 81ml initiated at 8.17pm. CTA H/N and perfusion scan resulted which revealed a left M2 occlusion with a core infarct of 7ml and ischemic penumbra of 38 cc and a mismatch of 31 cc. Code NI alert activated at 9.10pm spoke to NI interventionalist Dr Butler at 9.42pm and code NI actual activated at 9.55pm. Patient and family consented for cerebral angiogram and possible left MCA m2 thrombectomy. Since arrival patients stroke scale fluctuated from 7--->4---->8---->6. Post procedurally patient will be taken care of in the ICU under neurocritical care team. Patient was endorsed to Neurocritical care ACP rima.         Delays: There was a delay in obtaining Imaging since team was unable to establish an IV line.  Usg guided IV inserted twice.  Delay in administering IV TPA since patient needed bp stabilization prior to Administration of IV TPA.            NIHSS (on arrival)  7  LOC:       1a: 0    1b(Questions):   0        1c(Instructions): 0            Best Gaze: 0  Visual:  0  Motor:                 RUE: 1    RLE: 2    LUE: 0    LLE: 0    FACE: 1    Limb Ataxia: 2  Sensory:    0   Language:   0    Dysarthria:    1      Extinction and Inattention: 0        NIHSS (on telestroke evaluation with Dr Villeda)  4  LOC:       1a: 0    1b(Questions):   0        1c(Instructions): 0            Best Gaze: 0  Visual:  0  Motor:                 RUE: 1    RLE: 1    LUE: 0    LLE: 0    FACE: 1    Limb Ataxia: 0  Sensory:    0   Language:   0    Dysarthria:    1      Extinction and Inattention: 0        NIHSS (on re-exam post IV Thrombolysis)  8 -------*****stat cth obtained for worsening NIHSS at 9pm which was negative for bleed)  LOC:       1a: 0    1b(Questions):   0        1c(Instructions): 0            Best Gaze: 0  Visual:  0  Motor:                 RUE: 1    RLE: 1    LUE: 0    LLE: 0    FACE: 1    Limb Ataxia: 0  Sensory:    0   Language:   0    Dysarthria:    1      Extinction and Inattention: 0        NIHSS (Right prior to IR)  6  LOC:       1a: 0    1b(Questions):   0        1c(Instructions): 0            Best Gaze: 0  Visual:  0  Motor:                 RUE: 1    RLE: 1    LUE: 0    LLE: 0    FACE: 1    Limb Ataxia: 0  Sensory:    1   Language:   0    Dysarthria:    1      Extinction and Inattention: 1 R

## 2022-04-10 NOTE — H&P ADULT - ASSESSMENT
77 yo female w/ PMH of HTN, hypothyroidism presents after fall this afternoon. At 16:30, started having R sided arm and leg weakness, and difficulty finding words. Received tPA for a Left M2 occlusion, now admitted to [facility] following a thrombectomy.    left M2 occlusion     Plan:    Neurological:  - Neuro Checks Q1  - Daily Statin  - Repeat CT head tomorrow  - Stroke neurology following  - PT/OT  - Stroke labs: TSH, Lipid profile, a1c    Cardiovascular:  - SBP goal: <185/<110  - Normovolemia  - Echo  - EKG    Pulmonary:  - HOB 45  - Aspiration precautions    GI:  - SLP  - Diet: PO if she passes dysphagia eval  - Bowel Regimen: Senna/Mirilax     :  - Strict Is/Os    Electrolytes:  - Replete as needed.  - Sodium Goal: Normonatremia  - Mg > 2  - POCT, ISS,  -180    ID:  - Trend Fever curve and WBC  - Normothermia    Hematology:  - SCDs  - HOLD SQ      Code Status: F    Dispo: [facility]    Discussed with Attending Physician: Karen

## 2022-04-10 NOTE — H&P ADULT - NSHPPHYSICALEXAM_GEN_ALL_CORE
PHYSICAL EXAM:  GENERAL: well built, well nourished  HEAD:  Atraumatic, Normocephalic  EYES: EOMI, PERRLA, conjunctiva and sclera clear  ENT: No tonsillar erythema, exudates, or enlargement; Moist mucous membranes, Good dentition, No lesions  NECK: Supple, No JVD, Normal thyroid, no enlarged nodes  CHEST/LUNG: B/L good air entry; No rales, rhonchi, or wheezing  HEART: S1S2 normal, no S3, Regular rate and rhythm; No murmurs, rubs, or gallops  ABDOMEN: Soft, Nontender, Nondistended; Bowel sounds present  EXTREMITIES:  2+ Peripheral Pulses, No clubbing, cyanosis, or edema  LYMPH: No lymphadenopathy noted  SKIN: No rashes or lesions    Neurological:  Mental Status: Alert and Oriented to person, place, and time, cooperative, pleasant.    Cranial Nerves:  I: Deferred but appears grossly intact  II, III, IV, VI: PERRLA, EOM intact. Ernandez intact by confrontation. No cranial nerve palsy or nystagmus noted.  V: facial sensation intact; masseter/ temporal muscles intact, corneal reflex intact bilaterally  VII: face symmetrical at rest and with expression  VIII: Deferred but appears grossly intact  IX and X: +gag/cough  XI: Deferred  XII: moderate dysarthria/ expressive aphasia    Motor: Normal muscle bulk/tone. Good posture. Strength LUE/RUE 5+/5+, RUE/RLE 4/5   Sensory: Sensation to light touch and noxious stimuli throughout.  Cerebellar Function: Mild pronator drift on LUE  Reflexes: No clonus    NIHSS: 6

## 2022-04-10 NOTE — ED PROVIDER NOTE - NS ED ROS FT
Constitutional: No fevers, chills, or malaise.  HEENT: No headache, visual changes   Cardiac:  No chest pain, SOB, leg edema, or leg pain.  Respiratory:  No cough, respiratory distress, or hemoptysis.  GI:  Reports N/V that resolved. No diarrhea, or abdominal pain.  :  No dysuria, frequency, or urgency.  MS:  No myalgias, joint pain or back pain.  Neuro: Reports right arm and leg weakness. No dizziness, LOC.   Skin:  No skin rash.   Endocrine: No polyuria, polyphagia, or polydipsia.

## 2022-04-10 NOTE — ED ADULT NURSE REASSESSMENT NOTE - NS ED NURSE REASSESS COMMENT FT1
Pt reassessed @ 2100. Pt NIHSS increased to 11 from 7. R AC ultrasound guided noted infiltrated, with TPA infusing. Neuro NP Minos x 6010 notified. Pt. brought emergently to CT scan. NI Alert code initiated.    Pt left for CT @raman 2105.   Pt returned from CT @raman 2115    New US IV placed by MD Dunham in left upper arm 18g. TPA restarted @2130. NI actual initiated.

## 2022-04-10 NOTE — ED ADULT TRIAGE NOTE - CHIEF COMPLAINT QUOTE
pt s.p fall at assisted living. fall was unwitnessed and no injury noted. pt denies head trauma or loc. no anticoagulants noted.  pt is ao*4 in triage. pt noted to be altered at residence but is no longer feeling that way. spoke to md lucas and cleared from crit area

## 2022-04-10 NOTE — H&P ADULT - NSHPLABSRESULTS_GEN_ALL_CORE
11.9   6.59  )-----------( 205      ( 10 Apr 2022 20:15 )             36.7   04-10    135  |  99  |  17  ----------------------------<  192<H>  4.5   |  25  |  0.9    Ca    9.3      10 Apr 2022 20:15    TPro  5.8<L>  /  Alb  4.1  /  TBili  0.6  /  DBili  x   /  AST  15  /  ALT  16  /  AlkPhos  85  04-10    PT/INR - ( 10 Apr 2022 20:15 )   PT: 11.80 sec;   INR: 1.03 ratio         PTT - ( 10 Apr 2022 20:15 )  PTT:26.3 sec    < from: CT Head No Cont (04.10.22 @ 21:33) >      ******PRELIMINARY REPORT******      ******PRELIMINARY REPORT******       ACC: [id number] EXAM:  CT BRAIN                          PROCEDURE DATE:  04/10/2022    ******PRELIMINARY REPORT******      ******PRELIMINARY REPORT******     < end of copied text >    < from: CT Head No Cont (04.10.22 @ 21:33) >    IMPRESSION:    No intervale changes on this short term examination. No evidence of   intracranial hemorrhage.    Preliminary findings discussed with NILES Lucero on 4/10/2022 at 9:41 PM   with readback.    < end of copied text >    < from: CT Angio Head w/ IV Cont (04.10.22 @ 20:18) >      ******PRELIMINARY REPORT******      ******PRELIMINARY REPORT******       ACC: [id number] EXAM:  CT ANGIO BRAIN (W)AW IC                        ACC: [id number] EXAM:  CT ANGIO NECK (W)AW IC                        ACC: [id number] EXAM:  CT PERFUSION W MAPSIC                          PROCEDURE DATE:  04/10/2022    ******PRELIMINARY REPORT******      ******PRELIMINARY REPORT******     < end of copied text >    < from: CT Angio Head w/ IV Cont (04.10.22 @ 20:18) >    IMPRESSION:    Poor contrast timing limits evaluation as described above.    CT PERFUSION:  Area of or hyperperfusion of 38 cc involving the left frontoparietal lobe   and brainstem. Core infarction involving the left parietal region of the   7 cc.    CTA HEAD/NECK:  Occlusion of the LEFT M2 segment (602-100).    < end of copied text >

## 2022-04-10 NOTE — ED ADULT NURSE NOTE - OBJECTIVE STATEMENT
Pt presented to the ED status post fall at assisted living.  Stroke code initiated on. Right sided deficits noted to pt. Right arm and leg weakness. Right sided facial droop and slurring of speech. Initial NIHSS 7

## 2022-04-10 NOTE — ED PROVIDER NOTE - ATTENDING CONTRIBUTION TO CARE
77 yo F pmh htn, hypothyroidism pw fall. Pt bent over to pick something up and fell. NO head trauma, no loc. After trying to get up noted that her R arm and leg were weaker. R sided upper and lower extremity drift noted on exam, stroke code called upon initial provider assessment. NO n/v/d, no other trauma/fall, no neck pain, no ha, no seizure like activity, no ac use, no cp, no sob, no f/c.     CONSTITUTIONAL: Well-developed; well-nourished; in no acute distress. Sitting up and providing appropriate history and physical examination  SKIN: skin exam is warm and dry, no acute rash.  HEAD: Normocephalic; atraumatic.  EYES: PERRL, 3 mm bilateral, no nystagmus, EOM intact; conjunctiva and sclera clear.  ENT: No nasal discharge; airway clear.  NECK: Supple; non tender. + full passive ROM in all directions. No JVD  CARD: S1, S2 normal; no murmurs, gallops, or rubs. Regular rate and rhythm. + Symmetric Strong Pulses  RESP: No wheezes, rales or rhonchi. Good air movement bilaterally  ABD: soft; non-distended; non-tender. No Rebound, No Guarding, No signs of peritonitis, No CVA tenderness. No pulsatile abdominal mass. + Strong and Symmetric Pulses  EXT: Normal ROM. No clubbing, cyanosis or edema. Dp and Pt Pulses intact. Cap refill less than 3 seconds  NEURO: R facial droop, R upper and lower extremity drift, slurred speech, R upper extremity ataxia. Pt awake, alert, oriented x4. GCS 15. NIH 7  PSYCH: Cooperative, appropriate.

## 2022-04-10 NOTE — ED ADULT NURSE REASSESSMENT NOTE - NS ED NURSE REASSESS COMMENT FT1
Pt received from outgoing shift at [facility] 2000. Stroke code initiated and pt received CT scan prior to arrival to crit. Pt arrived with one IV to the R upper arm 18g. Pt received from outgoing shift at [facility] 2000. Stroke code initiated and pt received CT scan prior to arrival to crit. Pt arrived with one IV to the R upper arm 18g placed by MD Prescott. No labs sent at this time. Will require another IV access and labs. Pt received from outgoing shift at [facility] 2000. Pt alert and oriented x4. No s/s respiratory distress noted. Pt currently has complaints of right sided weakness, right facial droop and slurring of speech. Stroke code initiated and pt received CT scan prior to arrival to crit. Pt arrived with one IV to the R upper arm 18g placed by MD Prescott. No labs sent at this time. Will require another IV access and labs.

## 2022-04-10 NOTE — ED PROVIDER NOTE - PROGRESS NOTE DETAILS
Pt endorsing fall without weakness in triage. Upon provider initial assessment, patient noted to have R upper and lower extremity drift. Pt endorsing no head trauma, no ac use. Stroke code called at 1850.  pm, patient in tPA window. Neuro NP Minos at bedside. tpa pushed at 2015, patient symptoms began improving.

## 2022-04-10 NOTE — ED PROVIDER NOTE - OBJECTIVE STATEMENT
75 yo female w/ PMH of HTN, hypothyroidism presents after fall this afternoon. At 16:30, started having R sided arm and leg weakness, and difficulty finding words.  Does not recall if sxs started before or after fall but was bending over, fell over, denies head trauma, LOC, blood thinners. Has vomited a couple times. Denies pain anywhere.

## 2022-04-11 PROBLEM — H26.9 UNSPECIFIED CATARACT: Chronic | Status: ACTIVE | Noted: 2020-09-16

## 2022-04-11 LAB
A1C WITH ESTIMATED AVERAGE GLUCOSE RESULT: 7.5 % — HIGH (ref 4–5.6)
ALBUMIN SERPL ELPH-MCNC: 4.3 G/DL — SIGNIFICANT CHANGE UP (ref 3.5–5.2)
ALP SERPL-CCNC: 84 U/L — SIGNIFICANT CHANGE UP (ref 30–115)
ALT FLD-CCNC: 18 U/L — SIGNIFICANT CHANGE UP (ref 0–41)
ANION GAP SERPL CALC-SCNC: 11 MMOL/L — SIGNIFICANT CHANGE UP (ref 7–14)
ANION GAP SERPL CALC-SCNC: 12 MMOL/L — SIGNIFICANT CHANGE UP (ref 7–14)
APPEARANCE UR: CLEAR — SIGNIFICANT CHANGE UP
AST SERPL-CCNC: 19 U/L — SIGNIFICANT CHANGE UP (ref 0–41)
BACTERIA # UR AUTO: ABNORMAL
BILIRUB SERPL-MCNC: 0.6 MG/DL — SIGNIFICANT CHANGE UP (ref 0.2–1.2)
BILIRUB UR-MCNC: NEGATIVE — SIGNIFICANT CHANGE UP
BUN SERPL-MCNC: 14 MG/DL — SIGNIFICANT CHANGE UP (ref 10–20)
BUN SERPL-MCNC: 18 MG/DL — SIGNIFICANT CHANGE UP (ref 10–20)
CALCIUM SERPL-MCNC: 8.7 MG/DL — SIGNIFICANT CHANGE UP (ref 8.5–10.1)
CALCIUM SERPL-MCNC: 9.4 MG/DL — SIGNIFICANT CHANGE UP (ref 8.5–10.1)
CHLORIDE SERPL-SCNC: 100 MMOL/L — SIGNIFICANT CHANGE UP (ref 98–110)
CHLORIDE SERPL-SCNC: 108 MMOL/L — SIGNIFICANT CHANGE UP (ref 98–110)
CO2 SERPL-SCNC: 22 MMOL/L — SIGNIFICANT CHANGE UP (ref 17–32)
CO2 SERPL-SCNC: 24 MMOL/L — SIGNIFICANT CHANGE UP (ref 17–32)
COLOR SPEC: YELLOW — SIGNIFICANT CHANGE UP
CREAT SERPL-MCNC: 0.9 MG/DL — SIGNIFICANT CHANGE UP (ref 0.7–1.5)
CREAT SERPL-MCNC: 0.9 MG/DL — SIGNIFICANT CHANGE UP (ref 0.7–1.5)
DIFF PNL FLD: ABNORMAL
EGFR: 67 ML/MIN/1.73M2 — SIGNIFICANT CHANGE UP
EGFR: 67 ML/MIN/1.73M2 — SIGNIFICANT CHANGE UP
EPI CELLS # UR: 2 /HPF — SIGNIFICANT CHANGE UP (ref 0–5)
ESTIMATED AVERAGE GLUCOSE: 169 MG/DL — HIGH (ref 68–114)
GLUCOSE BLDC GLUCOMTR-MCNC: 179 MG/DL — HIGH (ref 70–99)
GLUCOSE SERPL-MCNC: 160 MG/DL — HIGH (ref 70–99)
GLUCOSE SERPL-MCNC: 244 MG/DL — HIGH (ref 70–99)
GLUCOSE UR QL: SIGNIFICANT CHANGE UP
HCT VFR BLD CALC: 36.1 % — LOW (ref 37–47)
HCV AB S/CO SERPL IA: 0.04 COI — SIGNIFICANT CHANGE UP
HCV AB SERPL-IMP: SIGNIFICANT CHANGE UP
HGB BLD-MCNC: 11.7 G/DL — LOW (ref 12–16)
HYALINE CASTS # UR AUTO: 2 /LPF — SIGNIFICANT CHANGE UP (ref 0–7)
KETONES UR-MCNC: NEGATIVE — SIGNIFICANT CHANGE UP
LEUKOCYTE ESTERASE UR-ACNC: NEGATIVE — SIGNIFICANT CHANGE UP
MCHC RBC-ENTMCNC: 26.5 PG — LOW (ref 27–31)
MCHC RBC-ENTMCNC: 32.4 G/DL — SIGNIFICANT CHANGE UP (ref 32–37)
MCV RBC AUTO: 81.9 FL — SIGNIFICANT CHANGE UP (ref 81–99)
NITRITE UR-MCNC: NEGATIVE — SIGNIFICANT CHANGE UP
NRBC # BLD: 0 /100 WBCS — SIGNIFICANT CHANGE UP (ref 0–0)
PH UR: 6 — SIGNIFICANT CHANGE UP (ref 5–8)
PLATELET # BLD AUTO: 212 K/UL — SIGNIFICANT CHANGE UP (ref 130–400)
POTASSIUM SERPL-MCNC: 4.5 MMOL/L — SIGNIFICANT CHANGE UP (ref 3.5–5)
POTASSIUM SERPL-MCNC: 5.4 MMOL/L — HIGH (ref 3.5–5)
POTASSIUM SERPL-SCNC: 4.5 MMOL/L — SIGNIFICANT CHANGE UP (ref 3.5–5)
POTASSIUM SERPL-SCNC: 5.4 MMOL/L — HIGH (ref 3.5–5)
PROT SERPL-MCNC: 5.8 G/DL — LOW (ref 6–8)
PROT UR-MCNC: ABNORMAL
RBC # BLD: 4.41 M/UL — SIGNIFICANT CHANGE UP (ref 4.2–5.4)
RBC # FLD: 13.8 % — SIGNIFICANT CHANGE UP (ref 11.5–14.5)
RBC CASTS # UR COMP ASSIST: 50 /HPF — HIGH (ref 0–4)
SODIUM SERPL-SCNC: 136 MMOL/L — SIGNIFICANT CHANGE UP (ref 135–146)
SODIUM SERPL-SCNC: 141 MMOL/L — SIGNIFICANT CHANGE UP (ref 135–146)
SP GR SPEC: 1.04 — HIGH (ref 1.01–1.03)
UROBILINOGEN FLD QL: SIGNIFICANT CHANGE UP
WBC # BLD: 7.99 K/UL — SIGNIFICANT CHANGE UP (ref 4.8–10.8)
WBC # FLD AUTO: 7.99 K/UL — SIGNIFICANT CHANGE UP (ref 4.8–10.8)
WBC UR QL: 1 /HPF — SIGNIFICANT CHANGE UP (ref 0–5)

## 2022-04-11 PROCEDURE — 93970 EXTREMITY STUDY: CPT | Mod: 26

## 2022-04-11 PROCEDURE — 70450 CT HEAD/BRAIN W/O DYE: CPT | Mod: 26

## 2022-04-11 PROCEDURE — 70450 CT HEAD/BRAIN W/O DYE: CPT | Mod: 26,77

## 2022-04-11 PROCEDURE — 99291 CRITICAL CARE FIRST HOUR: CPT

## 2022-04-11 PROCEDURE — 93306 TTE W/DOPPLER COMPLETE: CPT | Mod: 26

## 2022-04-11 RX ORDER — ATORVASTATIN CALCIUM 80 MG/1
80 TABLET, FILM COATED ORAL AT BEDTIME
Refills: 0 | Status: DISCONTINUED | OUTPATIENT
Start: 2022-04-11 | End: 2022-04-19

## 2022-04-11 RX ORDER — PHENYLEPHRINE HYDROCHLORIDE 10 MG/ML
0.1 INJECTION INTRAVENOUS
Qty: 40 | Refills: 0 | Status: DISCONTINUED | OUTPATIENT
Start: 2022-04-11 | End: 2022-04-11

## 2022-04-11 RX ORDER — POLYETHYLENE GLYCOL 3350 17 G/17G
17 POWDER, FOR SOLUTION ORAL
Refills: 0 | Status: DISCONTINUED | OUTPATIENT
Start: 2022-04-11 | End: 2022-04-17

## 2022-04-11 RX ORDER — SODIUM CHLORIDE 9 MG/ML
1000 INJECTION, SOLUTION INTRAVENOUS
Refills: 0 | Status: DISCONTINUED | OUTPATIENT
Start: 2022-04-11 | End: 2022-04-15

## 2022-04-11 RX ORDER — SODIUM CHLORIDE 9 MG/ML
1000 INJECTION INTRAMUSCULAR; INTRAVENOUS; SUBCUTANEOUS ONCE
Refills: 0 | Status: COMPLETED | OUTPATIENT
Start: 2022-04-11 | End: 2022-04-11

## 2022-04-11 RX ORDER — NOREPINEPHRINE BITARTRATE/D5W 8 MG/250ML
0.05 PLASTIC BAG, INJECTION (ML) INTRAVENOUS
Qty: 8 | Refills: 0 | Status: DISCONTINUED | OUTPATIENT
Start: 2022-04-11 | End: 2022-04-14

## 2022-04-11 RX ORDER — DEXTROSE 50 % IN WATER 50 %
25 SYRINGE (ML) INTRAVENOUS ONCE
Refills: 0 | Status: DISCONTINUED | OUTPATIENT
Start: 2022-04-11 | End: 2022-04-15

## 2022-04-11 RX ORDER — INSULIN LISPRO 100/ML
VIAL (ML) SUBCUTANEOUS EVERY 6 HOURS
Refills: 0 | Status: DISCONTINUED | OUTPATIENT
Start: 2022-04-11 | End: 2022-04-17

## 2022-04-11 RX ORDER — GLUCAGON INJECTION, SOLUTION 0.5 MG/.1ML
1 INJECTION, SOLUTION SUBCUTANEOUS ONCE
Refills: 0 | Status: DISCONTINUED | OUTPATIENT
Start: 2022-04-11 | End: 2022-04-15

## 2022-04-11 RX ORDER — SODIUM CHLORIDE 9 MG/ML
1000 INJECTION INTRAMUSCULAR; INTRAVENOUS; SUBCUTANEOUS
Refills: 0 | Status: DISCONTINUED | OUTPATIENT
Start: 2022-04-11 | End: 2022-04-14

## 2022-04-11 RX ORDER — SODIUM ZIRCONIUM CYCLOSILICATE 10 G/10G
10 POWDER, FOR SUSPENSION ORAL ONCE
Refills: 0 | Status: COMPLETED | OUTPATIENT
Start: 2022-04-11 | End: 2022-04-11

## 2022-04-11 RX ORDER — PHENYLEPHRINE HYDROCHLORIDE 10 MG/ML
0.1 INJECTION INTRAVENOUS
Qty: 160 | Refills: 0 | Status: DISCONTINUED | OUTPATIENT
Start: 2022-04-11 | End: 2022-04-14

## 2022-04-11 RX ORDER — SENNA PLUS 8.6 MG/1
2 TABLET ORAL AT BEDTIME
Refills: 0 | Status: DISCONTINUED | OUTPATIENT
Start: 2022-04-11 | End: 2022-04-17

## 2022-04-11 RX ORDER — DEXTROSE 50 % IN WATER 50 %
15 SYRINGE (ML) INTRAVENOUS ONCE
Refills: 0 | Status: DISCONTINUED | OUTPATIENT
Start: 2022-04-11 | End: 2022-04-15

## 2022-04-11 RX ORDER — CHLORHEXIDINE GLUCONATE 213 G/1000ML
1 SOLUTION TOPICAL
Refills: 0 | Status: DISCONTINUED | OUTPATIENT
Start: 2022-04-11 | End: 2022-04-19

## 2022-04-11 RX ORDER — DEXTROSE 50 % IN WATER 50 %
12.5 SYRINGE (ML) INTRAVENOUS ONCE
Refills: 0 | Status: DISCONTINUED | OUTPATIENT
Start: 2022-04-11 | End: 2022-04-15

## 2022-04-11 RX ADMIN — SENNA PLUS 2 TABLET(S): 8.6 TABLET ORAL at 22:06

## 2022-04-11 RX ADMIN — ATORVASTATIN CALCIUM 80 MILLIGRAM(S): 80 TABLET, FILM COATED ORAL at 22:07

## 2022-04-11 RX ADMIN — SODIUM ZIRCONIUM CYCLOSILICATE 10 GRAM(S): 10 POWDER, FOR SUSPENSION ORAL at 05:30

## 2022-04-11 RX ADMIN — FAMOTIDINE 40 MILLIGRAM(S): 10 INJECTION INTRAVENOUS at 22:06

## 2022-04-11 RX ADMIN — Medication 2: at 13:01

## 2022-04-11 RX ADMIN — SODIUM CHLORIDE 1000 MILLILITER(S): 9 INJECTION INTRAMUSCULAR; INTRAVENOUS; SUBCUTANEOUS at 02:00

## 2022-04-11 RX ADMIN — SODIUM CHLORIDE 1000 MILLILITER(S): 9 INJECTION INTRAMUSCULAR; INTRAVENOUS; SUBCUTANEOUS at 01:00

## 2022-04-11 RX ADMIN — POLYETHYLENE GLYCOL 3350 17 GRAM(S): 17 POWDER, FOR SOLUTION ORAL at 17:34

## 2022-04-11 RX ADMIN — Medication 10.1 MICROGRAM(S)/KG/MIN: at 05:34

## 2022-04-11 RX ADMIN — Medication 4: at 05:31

## 2022-04-11 RX ADMIN — SODIUM CHLORIDE 100 MILLILITER(S): 9 INJECTION INTRAMUSCULAR; INTRAVENOUS; SUBCUTANEOUS at 05:38

## 2022-04-11 RX ADMIN — Medication 2: at 17:33

## 2022-04-11 RX ADMIN — CHLORHEXIDINE GLUCONATE 1 APPLICATION(S): 213 SOLUTION TOPICAL at 06:30

## 2022-04-11 RX ADMIN — Medication 112 MICROGRAM(S): at 05:30

## 2022-04-11 NOTE — OCCUPATIONAL THERAPY INITIAL EVALUATION ADULT - RANGE OF MOTION EXAMINATION, UPPER EXTREMITY
RUE: shoulder WFL in gravity eliminated ~2/3 sitting upright, elbow ~3/4 AROM, wrist/digits WFL, all PROM WNL/Left UE Active ROM was WNL (within normal limits)

## 2022-04-11 NOTE — CHART NOTE - NSCHARTNOTEFT_GEN_A_CORE
Patient admitted to NCCU following thrombectomy. Upon exam, patient severely dysarthric and aphasic with increased RUE drift. Concerning for hemorrhagic conversion. Stroke Code called. CT head negative for acute bleed. Patient recently underwent multiple angiographies, so no CTA/CTP at this time. Patient given 1 L of NS to increase BP. Upon return to the NCCU, , patient lifting RUE AG and aphasia resolved.     Plan:    1 L of NS bolus   - 150  Q1H NC    D/W: Karen and Sacho

## 2022-04-11 NOTE — OCCUPATIONAL THERAPY INITIAL EVALUATION ADULT - NS ASR FOLLOW COMMAND OT EVAL
noted with word-finding difficulty/aphasia at times/100% of the time/able to follow single-step instructions

## 2022-04-11 NOTE — PROVIDER CONTACT NOTE (EICU) - SITUATION
Patient has b/l UA IV infiltrates, SHREYAS swollen, red, warm w/ blisters. ELLIOTT is swollen, red and warm. Previous RN Patti documented them under wounds in A&I at the time pt came to ICU @ 1215 am today. I contacted Patti RN to find out if anything was infusing through both those IV sites and she said no pt was IVL and was not told in report about those IVs infiltrating. Pt was not started on Levophed until after these IVs were removed and replaced. I notified NILES Brandt about this.

## 2022-04-11 NOTE — OCCUPATIONAL THERAPY INITIAL EVALUATION ADULT - PERTINENT HX OF CURRENT PROBLEM, REHAB EVAL
75 yo female w/ PMH of HTN, hypothyroidism presents after fall this afternoon. At 16:30, started having R sided arm and leg weakness, and difficulty finding words.  CTA showed a left M2 occlusion. Patient received tPA but neurologically declined shortly after. CODE NI actual called and patient taken for emergent thrombectomy

## 2022-04-11 NOTE — CONSULT NOTE ADULT - ASSESSMENT
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 / dysarthria / s/p thrombectomy    PRECAUTIONS: [   ] Cardiac  [   ] Respiratory  [   ] Seizures [   ] Contact Isolation  [   ] Droplet Isolation  [   ] Other    Weight Bearing Status:     RECOMMENDATION:    Out of Bed to Chair     DVT/Decubiti Prophylaxis    REHAB PLAN:     [ x   ] Bedside P/T 3-5 times a week   [ x   ]   Bedside O/T  2-3 times a week             [   x ] Speech Therapy               [    ]  No Rehab Therapy Indicated   Conditioning/ROM                                    ADL  Bed Mobility                                               Conditioning/ROM  Transfers                                                     Bed Mobility  Sitting /Standing Balance                         Transfers                                        Gait Training                                               Sitting/Standing Balance  Stair Training [   ]Applicable                    Home equipment Eval                                                                        Splinting  [   ] Only      GOALS:   ADL   [  x  ]   Independent                    Transfers  [  x  ] Independent                          Ambulation  [ x   ] Independent     [ x    ] With device                            [    ]  CG                                                         [    ]  CG                                                                  [    ] CG                            [    ] Min A                                                   [    ] Min A                                                              [    ] Min  A          DISCHARGE PLAN:   [    ]  Good candidate for Intensive Rehabilitation/Hospital based                                             Will tolerate 3hrs Intensive Rehab Daily                                       [     ]  Short Term Rehab in Skilled Nursing Facility                                       [     ]  Home with Outpatient or VN services                                         [ x    ]  Possible Candidate for Intensive Hospital based Rehab

## 2022-04-11 NOTE — PATIENT PROFILE ADULT - FALL HARM RISK - HARM RISK INTERVENTIONS
Assistance with ambulation/Assistance OOB with selected safe patient handling equipment/Communicate Risk of Fall with Harm to all staff/Monitor gait and stability/Reinforce activity limits and safety measures with patient and family/Sit up slowly, dangle for a short time, stand at bedside before walking/Tailored Fall Risk Interventions/Visual Cue: Yellow wristband and red socks/Bed in lowest position, wheels locked, appropriate side rails in place/Call bell, personal items and telephone in reach/Instruct patient to call for assistance before getting out of bed or chair/Non-slip footwear when patient is out of bed/[place] to call system/Physically safe environment - no spills, clutter or unnecessary equipment/Purposeful Proactive Rounding/Room/bathroom lighting operational, light cord in reach

## 2022-04-11 NOTE — PROGRESS NOTE ADULT - SUBJECTIVE AND OBJECTIVE BOX
SUMMARY:    75 yo female w/ PMH of HTN, hypothyroidism presents after fall this afternoon. At 16:30, started having R sided arm and leg weakness, and difficulty finding words.  Does not recall if sxs started before or after fall but was bending over, fell over, denies head trauma, LOC, blood thinners. Has vomited a couple times. Denies pain anywhere. Stroke code called upon presentation. CTA showed a left M2 occlusion. Patient received tPA but neurologically declined shortly after. CODE NI actual called and patient taken for emergent thrombectomy and to be transferred to NCCU following procedure.  (10 Apr 2022 22:53)      OVERNIGHT EVENTS: Stroke code called for worsening exam, improved with BP augmentation, CT head stable    ALLERGIES: Allergies    adhesives (Blisters; Rash)  copper (Hives; Rash)  gold (Hives; Rash)  nickel (Hives; Rash)  silver (Hives; Rash)  Sulfacet-R (Rash)    Intolerances        ADMISSION SCORES:   GCS: 15    NIHSS: 7      REVIEW OF SYSTEMS: Negative except for that of HPI    VITALS: [x] Reviewed  ICU Vital Signs Last 24 Hrs  T(C): 36.6 (11 Apr 2022 04:00), Max: 37 (10 Apr 2022 18:19)  T(F): 97.8 (11 Apr 2022 04:00), Max: 98.6 (10 Apr 2022 18:19)  HR: 80 (11 Apr 2022 07:30) (74 - 102)  BP: 111/62 (11 Apr 2022 07:30) (111/62 - 170/82)  BP(mean): 82 (11 Apr 2022 07:30) (66 - 112)  ABP: --  ABP(mean): --  RR: 26 (11 Apr 2022 07:30) (17 - 31)  SpO2: 99% (11 Apr 2022 07:30) (93% - 100%)      04-10-22 @ 07:01  -  04-11-22 @ 07:00  --------------------------------------------------------  IN: 1854 mL / OUT: 0 mL / NET: 1854 mL          LABS:  Na: 136 (04-11 @ 01:00), 135 (04-10 @ 20:15)  K: 5.4 (04-11 @ 01:00), 4.5 (04-10 @ 20:15)  Cl: 100 (04-11 @ 01:00), 99 (04-10 @ 20:15)  CO2: 24 (04-11 @ 01:00), 25 (04-10 @ 20:15)  BUN: 18 (04-11 @ 01:00), 17 (04-10 @ 20:15)  Cr: 0.9 (04-11 @ 01:00), 0.9 (04-10 @ 20:15)  Glu: 244(04-11 @ 01:00), 192(04-10 @ 20:15)    Hgb: 11.7 (04-11 @ 01:00), 11.9 (04-10 @ 20:15)  Hct: 36.1 (04-11 @ 01:00), 36.7 (04-10 @ 20:15)  WBC: 7.99 (04-11 @ 01:00), 6.59 (04-10 @ 20:15)  Plt: 212 (04-11 @ 01:00), 205 (04-10 @ 20:15)    INR: 1.03 04-10-22 @ 20:15  PTT: 26.3 04-10-22 @ 20:15          LIVER FUNCTIONS - ( 11 Apr 2022 01:00 )  Alb: 4.3 g/dL / Pro: 5.8 g/dL / ALK PHOS: 84 U/L / ALT: 18 U/L / AST: 19 U/L / GGT: x                 MEDICATIONS  (STANDING):  chlorhexidine 4% Liquid 1 Application(s) Topical <User Schedule>  dextrose 5%. 1000 milliLiter(s) (50 mL/Hr) IV Continuous <Continuous>  dextrose 5%. 1000 milliLiter(s) (100 mL/Hr) IV Continuous <Continuous>  dextrose 50% Injectable 25 Gram(s) IV Push once  dextrose 50% Injectable 12.5 Gram(s) IV Push once  dextrose 50% Injectable 25 Gram(s) IV Push once  famotidine  Oral Tab/Cap - Peds 40 milliGRAM(s) Oral at bedtime  glucagon  Injectable 1 milliGRAM(s) IntraMuscular once  insulin lispro (ADMELOG) corrective regimen sliding scale   SubCutaneous every 6 hours  levothyroxine 112 MICROGram(s) Oral daily  norepinephrine Infusion 0.05 MICROgram(s)/kG/Min (10.1 mL/Hr) IV Continuous <Continuous>  phenylephrine    Infusion 0.1 MICROgram(s)/kG/Min (2.02 mL/Hr) IV Continuous <Continuous>  sodium chloride 0.9%. 1000 milliLiter(s) (100 mL/Hr) IV Continuous <Continuous>    MEDICATIONS  (PRN):  dextrose Oral Gel 15 Gram(s) Oral once PRN Blood Glucose LESS THAN 70 milliGRAM(s)/deciliter      IMAGING/DATA: [x] Reviewed    EXAMINATION:  General: No acute distress  HEENT: Anicteric sclerae  Cardiac: [id number]  Lungs: Clear  Abdomen: Soft, non-tender, +BS  Extremities: No c/c/e  Skin/Incision Site: Clean, dry and intact  Neurologic: Awake, alert, fully oriented, follows commands, PERRL, VFFtc, EOMI, face symmetric, tongue midline, no drift, full strength      DEVICES:   [] Restraints [] PIVs [] ET tube [] central line [] PICC [] arterial line [] turner [] NGT/OGT [] EVD [] LD [] SANCHEZ/HMV [] LiCOX [] ICP monitor [] Trach [] PEG [] Chest Tube [] other:     SUMMARY:    75 yo female w/ PMH of HTN, hypothyroidism presents after fall this afternoon. At 16:30, started having R sided arm and leg weakness, and difficulty finding words.  Does not recall if sxs started before or after fall but was bending over, fell over, denies head trauma, LOC, blood thinners. Has vomited a couple times. Denies pain anywhere. Stroke code called upon presentation. CTA showed a left M2 occlusion. Patient received tPA but neurologically declined shortly after. CODE NI actual called and patient taken for emergent thrombectomy and to be transferred to NCCU following procedure.  (10 Apr 2022 22:53)    OVERNIGHT EVENTS: Stroke code called for worsening exam, improved with BP augmentation, CT head stable    ALLERGIES: Allergies    adhesives (Blisters; Rash)  copper (Hives; Rash)  gold (Hives; Rash)  nickel (Hives; Rash)  silver (Hives; Rash)  Sulfacet-R (Rash)    ADMISSION SCORES:   GCS: 15    NIHSS: 7    REVIEW OF SYSTEMS: Negative except for that of HPI    VITALS: [x] Reviewed  ICU Vital Signs Last 24 Hrs  T(C): 36.6 (11 Apr 2022 04:00), Max: 37 (10 Apr 2022 18:19)  T(F): 97.8 (11 Apr 2022 04:00), Max: 98.6 (10 Apr 2022 18:19)  HR: 80 (11 Apr 2022 07:30) (74 - 102)  BP: 111/62 (11 Apr 2022 07:30) (111/62 - 170/82)  BP(mean): 82 (11 Apr 2022 07:30) (66 - 112)  ABP: --  ABP(mean): --  RR: 26 (11 Apr 2022 07:30) (17 - 31)  SpO2: 99% (11 Apr 2022 07:30) (93% - 100%)      04-10-22 @ 07:01  -  04-11-22 @ 07:00  --------------------------------------------------------  IN: 1854 mL / OUT: 0 mL / NET: 1854 mL          LABS:  Na: 136 (04-11 @ 01:00), 135 (04-10 @ 20:15)  K: 5.4 (04-11 @ 01:00), 4.5 (04-10 @ 20:15)  Cl: 100 (04-11 @ 01:00), 99 (04-10 @ 20:15)  CO2: 24 (04-11 @ 01:00), 25 (04-10 @ 20:15)  BUN: 18 (04-11 @ 01:00), 17 (04-10 @ 20:15)  Cr: 0.9 (04-11 @ 01:00), 0.9 (04-10 @ 20:15)  Glu: 244(04-11 @ 01:00), 192(04-10 @ 20:15)    Hgb: 11.7 (04-11 @ 01:00), 11.9 (04-10 @ 20:15)  Hct: 36.1 (04-11 @ 01:00), 36.7 (04-10 @ 20:15)  WBC: 7.99 (04-11 @ 01:00), 6.59 (04-10 @ 20:15)  Plt: 212 (04-11 @ 01:00), 205 (04-10 @ 20:15)    INR: 1.03 04-10-22 @ 20:15  PTT: 26.3 04-10-22 @ 20:15      LIVER FUNCTIONS - ( 11 Apr 2022 01:00 )  Alb: 4.3 g/dL / Pro: 5.8 g/dL / ALK PHOS: 84 U/L / ALT: 18 U/L / AST: 19 U/L / GGT: x             MEDICATIONS  (STANDING):  chlorhexidine 4% Liquid 1 Application(s) Topical <User Schedule>  dextrose 5%. 1000 milliLiter(s) (50 mL/Hr) IV Continuous <Continuous>  dextrose 5%. 1000 milliLiter(s) (100 mL/Hr) IV Continuous <Continuous>  dextrose 50% Injectable 25 Gram(s) IV Push once  dextrose 50% Injectable 12.5 Gram(s) IV Push once  dextrose 50% Injectable 25 Gram(s) IV Push once  famotidine  Oral Tab/Cap - Peds 40 milliGRAM(s) Oral at bedtime  glucagon  Injectable 1 milliGRAM(s) IntraMuscular once  insulin lispro (ADMELOG) corrective regimen sliding scale   SubCutaneous every 6 hours  levothyroxine 112 MICROGram(s) Oral daily  norepinephrine Infusion 0.05 MICROgram(s)/kG/Min (10.1 mL/Hr) IV Continuous <Continuous>  phenylephrine    Infusion 0.1 MICROgram(s)/kG/Min (2.02 mL/Hr) IV Continuous <Continuous>  sodium chloride 0.9%. 1000 milliLiter(s) (100 mL/Hr) IV Continuous <Continuous>    MEDICATIONS  (PRN):  dextrose Oral Gel 15 Gram(s) Oral once PRN Blood Glucose LESS THAN 70 milliGRAM(s)/deciliter      IMAGING/DATA: [x] Reviewed    EXAMINATION:  General: No acute distress  HEENT: Anicteric sclerae  Cardiac: [id number]  Lungs: Clear  Abdomen: Soft, non-tender, +BS  Extremities: No c/c/e  Skin/Incision Site: Clean, dry and intact  Neurologic: Awake, alert, fully oriented, follows commands, PERRL, VFFtc, EOMI, right facial, dysarthric, tongue midline, no drift, full strength. RUE 4/5. LUE 5/5, B/l LE AG     DEVICES:   [] Restraints [] PIVs [] ET tube [] central line [] PICC [] arterial line [] turner [] NGT/OGT [] EVD [] LD [] SANCHEZ/HMV [] LiCOX [] ICP monitor [] Trach [] PEG [] Chest Tube [] other:     SUMMARY:    77 yo female w/ PMH of HTN, hypothyroidism presents after fall this afternoon. At 16:30, started having R sided arm and leg weakness, and difficulty finding words.  Does not recall if sxs started before or after fall but was bending over, fell over, denies head trauma, LOC, blood thinners. Has vomited a couple times. Denies pain anywhere. Stroke code called upon presentation. CTA showed a left M2 occlusion. Patient received tPA but neurologically declined shortly after. CODE NI actual called and patient taken for emergent thrombectomy and to be transferred to NCCU following procedure.  (10 Apr 2022 22:53)    OVERNIGHT EVENTS: Stroke code called for worsening exam, improved with BP augmentation, CT head stable    ALLERGIES: Allergies    adhesives (Blisters; Rash)  copper (Hives; Rash)  gold (Hives; Rash)  nickel (Hives; Rash)  silver (Hives; Rash)  Sulfacet-R (Rash)    ADMISSION SCORES:   GCS: 15    NIHSS: 7, unchanged today    REVIEW OF SYSTEMS: Negative except for that of HPI    VITALS: [x] Reviewed  ICU Vital Signs Last 24 Hrs  T(C): 36.6 (11 Apr 2022 04:00), Max: 37 (10 Apr 2022 18:19)  T(F): 97.8 (11 Apr 2022 04:00), Max: 98.6 (10 Apr 2022 18:19)  HR: 80 (11 Apr 2022 07:30) (74 - 102)  BP: 111/62 (11 Apr 2022 07:30) (111/62 - 170/82)  BP(mean): 82 (11 Apr 2022 07:30) (66 - 112)  ABP: --  ABP(mean): --  RR: 26 (11 Apr 2022 07:30) (17 - 31)  SpO2: 99% (11 Apr 2022 07:30) (93% - 100%)      04-10-22 @ 07:01  -  04-11-22 @ 07:00  --------------------------------------------------------  IN: 1854 mL / OUT: 0 mL / NET: 1854 mL          LABS:  Na: 136 (04-11 @ 01:00), 135 (04-10 @ 20:15)  K: 5.4 (04-11 @ 01:00), 4.5 (04-10 @ 20:15)  Cl: 100 (04-11 @ 01:00), 99 (04-10 @ 20:15)  CO2: 24 (04-11 @ 01:00), 25 (04-10 @ 20:15)  BUN: 18 (04-11 @ 01:00), 17 (04-10 @ 20:15)  Cr: 0.9 (04-11 @ 01:00), 0.9 (04-10 @ 20:15)  Glu: 244(04-11 @ 01:00), 192(04-10 @ 20:15)    Hgb: 11.7 (04-11 @ 01:00), 11.9 (04-10 @ 20:15)  Hct: 36.1 (04-11 @ 01:00), 36.7 (04-10 @ 20:15)  WBC: 7.99 (04-11 @ 01:00), 6.59 (04-10 @ 20:15)  Plt: 212 (04-11 @ 01:00), 205 (04-10 @ 20:15)    INR: 1.03 04-10-22 @ 20:15  PTT: 26.3 04-10-22 @ 20:15      LIVER FUNCTIONS - ( 11 Apr 2022 01:00 )  Alb: 4.3 g/dL / Pro: 5.8 g/dL / ALK PHOS: 84 U/L / ALT: 18 U/L / AST: 19 U/L / GGT: x             MEDICATIONS  (STANDING):  chlorhexidine 4% Liquid 1 Application(s) Topical <User Schedule>  dextrose 5%. 1000 milliLiter(s) (50 mL/Hr) IV Continuous <Continuous>  dextrose 5%. 1000 milliLiter(s) (100 mL/Hr) IV Continuous <Continuous>  dextrose 50% Injectable 25 Gram(s) IV Push once  dextrose 50% Injectable 12.5 Gram(s) IV Push once  dextrose 50% Injectable 25 Gram(s) IV Push once  famotidine  Oral Tab/Cap - Peds 40 milliGRAM(s) Oral at bedtime  glucagon  Injectable 1 milliGRAM(s) IntraMuscular once  insulin lispro (ADMELOG) corrective regimen sliding scale   SubCutaneous every 6 hours  levothyroxine 112 MICROGram(s) Oral daily  norepinephrine Infusion 0.05 MICROgram(s)/kG/Min (10.1 mL/Hr) IV Continuous <Continuous>  phenylephrine    Infusion 0.1 MICROgram(s)/kG/Min (2.02 mL/Hr) IV Continuous <Continuous>  sodium chloride 0.9%. 1000 milliLiter(s) (100 mL/Hr) IV Continuous <Continuous>    MEDICATIONS  (PRN):  dextrose Oral Gel 15 Gram(s) Oral once PRN Blood Glucose LESS THAN 70 milliGRAM(s)/deciliter      IMAGING/DATA: [x] Reviewed    EXAMINATION:  General: No acute distress  HEENT: Anicteric sclerae  Cardiac: [id number]  Lungs: Clear  Abdomen: Soft, non-tender, +BS  Extremities: No c/c/e  Skin/Incision Site: Clean, dry and intact  Neurologic: Awake, alert, fully oriented, follows commands, PERRL, VFFtc, EOMI, right facial, dysarthric, tongue midline, no drift, full strength. RUE 4/5. LUE 5/5, B/l LE AG     DEVICES:   [] Restraints [] PIVs [] ET tube [] central line [] PICC [] arterial line [] turner [] NGT/OGT [] EVD [] LD [] SANCHEZ/HMV [] LiCOX [] ICP monitor [] Trach [] PEG [] Chest Tube [] other:     SUMMARY:    75 yo female w/ PMH of HTN, hypothyroidism presents after fall this afternoon. At 16:30, started having R sided arm and leg weakness, and difficulty finding words.  Does not recall if sxs started before or after fall but was bending over, fell over, denies head trauma, LOC, blood thinners. Has vomited a couple times. Denies pain anywhere. Stroke code called upon presentation. CTA showed a left M2 occlusion. Patient received tPA but neurologically declined shortly after. CODE NI actual called and patient taken for emergent thrombectomy and to be transferred to NCCU following procedure.  (10 Apr 2022 22:53)    OVERNIGHT EVENTS: Stroke code called for worsening exam, improved with BP augmentation, CT head stable    ALLERGIES: Allergies    adhesives (Blisters; Rash)  copper (Hives; Rash)  gold (Hives; Rash)  nickel (Hives; Rash)  silver (Hives; Rash)  Sulfacet-R (Rash)    ADMISSION SCORES:   GCS: 15    NIHSS: 7, unchanged today    REVIEW OF SYSTEMS: Negative except for that of HPI    VITALS: [x] Reviewed  ICU Vital Signs Last 24 Hrs  T(C): 36.6 (11 Apr 2022 04:00), Max: 37 (10 Apr 2022 18:19)  T(F): 97.8 (11 Apr 2022 04:00), Max: 98.6 (10 Apr 2022 18:19)  HR: 80 (11 Apr 2022 07:30) (74 - 102)  BP: 111/62 (11 Apr 2022 07:30) (111/62 - 170/82)  BP(mean): 82 (11 Apr 2022 07:30) (66 - 112)  ABP: --  ABP(mean): --  RR: 26 (11 Apr 2022 07:30) (17 - 31)  SpO2: 99% (11 Apr 2022 07:30) (93% - 100%)      04-10-22 @ 07:01  -  04-11-22 @ 07:00  --------------------------------------------------------  IN: 1854 mL / OUT: 0 mL / NET: 1854 mL          LABS:  Na: 136 (04-11 @ 01:00), 135 (04-10 @ 20:15)  K: 5.4 (04-11 @ 01:00), 4.5 (04-10 @ 20:15)  Cl: 100 (04-11 @ 01:00), 99 (04-10 @ 20:15)  CO2: 24 (04-11 @ 01:00), 25 (04-10 @ 20:15)  BUN: 18 (04-11 @ 01:00), 17 (04-10 @ 20:15)  Cr: 0.9 (04-11 @ 01:00), 0.9 (04-10 @ 20:15)  Glu: 244(04-11 @ 01:00), 192(04-10 @ 20:15)    Hgb: 11.7 (04-11 @ 01:00), 11.9 (04-10 @ 20:15)  Hct: 36.1 (04-11 @ 01:00), 36.7 (04-10 @ 20:15)  WBC: 7.99 (04-11 @ 01:00), 6.59 (04-10 @ 20:15)  Plt: 212 (04-11 @ 01:00), 205 (04-10 @ 20:15)    INR: 1.03 04-10-22 @ 20:15  PTT: 26.3 04-10-22 @ 20:15      LIVER FUNCTIONS - ( 11 Apr 2022 01:00 )  Alb: 4.3 g/dL / Pro: 5.8 g/dL / ALK PHOS: 84 U/L / ALT: 18 U/L / AST: 19 U/L / GGT: x             MEDICATIONS  (STANDING):  chlorhexidine 4% Liquid 1 Application(s) Topical <User Schedule>  dextrose 5%. 1000 milliLiter(s) (50 mL/Hr) IV Continuous <Continuous>  dextrose 5%. 1000 milliLiter(s) (100 mL/Hr) IV Continuous <Continuous>  dextrose 50% Injectable 25 Gram(s) IV Push once  dextrose 50% Injectable 12.5 Gram(s) IV Push once  dextrose 50% Injectable 25 Gram(s) IV Push once  famotidine  Oral Tab/Cap - Peds 40 milliGRAM(s) Oral at bedtime  glucagon  Injectable 1 milliGRAM(s) IntraMuscular once  insulin lispro (ADMELOG) corrective regimen sliding scale   SubCutaneous every 6 hours  levothyroxine 112 MICROGram(s) Oral daily  norepinephrine Infusion 0.05 MICROgram(s)/kG/Min (10.1 mL/Hr) IV Continuous <Continuous>  phenylephrine    Infusion 0.1 MICROgram(s)/kG/Min (2.02 mL/Hr) IV Continuous <Continuous>  sodium chloride 0.9%. 1000 milliLiter(s) (100 mL/Hr) IV Continuous <Continuous>    MEDICATIONS  (PRN):  dextrose Oral Gel 15 Gram(s) Oral once PRN Blood Glucose LESS THAN 70 milliGRAM(s)/deciliter      IMAGING/DATA: [x] Reviewed    EXAMINATION:  General: No acute distress  HEENT: Anicteric sclerae  Cardiac: [id number]  Lungs: Clear  Abdomen: Soft, non-tender, +BS  Extremities: No c/c/e  Skin/Incision Site: Clean, dry and intact  Neurologic: Awake, alert, fully oriented, follows commands, PERRL, VFFtc, EOMI, right facial, dysarthric, tongue midline, full strength. RUE 4/5. LUE 5/5, B/l LE AG     DEVICES:   [] Restraints [] PIVs [] ET tube [] central line [] PICC [] arterial line [] turner [] NGT/OGT [] EVD [] LD [] SANCHEZ/HMV [] LiCOX [] ICP monitor [] Trach [] PEG [] Chest Tube [] other:

## 2022-04-11 NOTE — PROGRESS NOTE ADULT - ASSESSMENT
75 yo female w/ PMH of HTN, hypothyroidism presents after fall this afternoon. At 16:30, started having R sided arm and leg weakness, and difficulty finding words. Received tPA for a Left M2 occlusion, now admitted to [facility]U following a thrombectomy.    left M2 occlusion     Plan:    Neurological:  - Neuro Checks Q1  - Daily Statin  - Repeat CT head tomorrow  - Stroke neurology following  - PT/OT  - Stroke labs: TSH, Lipid profile, a1c    Cardiovascular:  - SBP goal: 130 - 150  - Normovolemia  - Echo  - EKG    Pulmonary:  - HOB 45  - Aspiration precautions    GI:  - SLP  - Diet: PO if she passes dysphagia eval  - Bowel Regimen: Senna/Mirilax     :  - Strict Is/Os    Electrolytes:  - Replete as needed.  - Sodium Goal: Normonatremia  - Mg > 2  - POCT, ISS,  -180    ID:  - Trend Fever curve and WBC  - Normothermia    Hematology:  - SCDs  - HOLD   77 yo female w/ PMH of HTN, hypothyroidism presents after fall this afternoon. At 16:30, started having R sided arm and leg weakness, and difficulty finding words. Received tPA for a Left M2 occlusion, now admitted to [facility]U following a thrombectomy.    left M2 occlusion     Plan:    Neurological:  - Neuro Checks Q1  - Daily Statin  - Repeat CT head tonight--> if no hemorrhagic transformation, will add Lovenox prophylactic dose and aspirin  - Stroke neurology following  - PT/OT  - Stroke labs: TSH, Lipid profile, a1c ordered    Cardiovascular:  - SBP goal: 130 - 150  - Normovolemia  - Echo  - EKG: NSR as of now    Pulmonary:  - HOB 45  - Aspiration precautions    GI:  - SLP  - Diet: PO with regular diet  - Bowel Regimen: Senna/Mirilax     :  - Strict Is/Os    Electrolytes:  - Replete as needed.  - Sodium Goal: Normonatremia  - Mg > 2  - POCT, ISS,  -180    ID:  - Trend Fever curve and WBC  - Normothermia    Hematology:  - SCDs for now; might add later at night Lovenox and aspirin  77 yo female w/ PMH of HTN, hypothyroidism presents after fall this afternoon. At 16:30, started having R sided arm and leg weakness, and difficulty finding words. Received tPA for a Left M2 occlusion, now admitted to [facility]U following a thrombectomy.    left M2 occlusion     Plan:    Neurological:  - Neuro Checks Q1  - Daily Statin  - Repeat CT head tonight--> if no hemorrhagic transformation, will add Lovenox prophylactic dose and aspirin  - Stroke neurology following  - PT/OT  - Stroke labs: TSH, Lipid profile, a1c ordered    Cardiovascular:  - SBP goal: 130 - 150  - Normovolemia; maintain a BP between 130 and 150 mmHg with levophed, stop phenylephrine of now  - Echo  - EKG: NSR as of now    Pulmonary:  - HOB 45  - Aspiration precautions    GI:  - SLP  - Diet: PO with regular diet  - Bowel Regimen: Senna/Mirilax     :  - Strict Is/Os    Electrolytes:  - Replete as needed.  - Sodium Goal: Normonatremia  - Mg > 2  - POCT, ISS,  -180    ID:  - Trend Fever curve and WBC  - Normothermia    Hematology:  - SCDs for now; might add later at night Lovenox and aspirin

## 2022-04-11 NOTE — OCCUPATIONAL THERAPY INITIAL EVALUATION ADULT - SHORT TERM MEMORY, REHAB EVAL
minimally to moderately impaired; repeats 3 out of 3 words, recalls 2 out of 3 post 1 minute and 1 out of 3 post 5 minutes with choices provided/impaired

## 2022-04-11 NOTE — PROVIDER CONTACT NOTE (EICU) - ASSESSMENT
SHREYAS is red, swollen, warm, with blisters. Pt has minimal sensation of her R side. Patient also has an allergy to adhesives. ELLIOTT is red, swollen, and warm. No blisters on ELLIOTT. Paper tape utilized rather than plastic tape.

## 2022-04-11 NOTE — CONSULT NOTE ADULT - SUBJECTIVE AND OBJECTIVE BOX
HPI:   77 yo female w/ PMH of HTN, hypothyroidism presents after fall this afternoon. At 16:30, started having R sided arm and leg weakness, and difficulty finding words.  Does not recall if sxs started before or after fall but was bending over, fell over, denies head trauma, LOC, blood thinners. Has vomited a couple times. Denies pain anywhere. Stroke code called upon presentation. CTA showed a left M2 occlusion. Patient received tPA but neurologically declined shortly after. CODE NI actual called and patient taken for emergent thrombectomy and to be transferred to NCCU following procedure.       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 1000 milliLiter(s) (100 mL/Hr) IV Continuous <Continuous>  dextrose 5%. 1000 milliLiter(s) (50 mL/Hr) IV Continuous <Continuous>  dextrose 50% Injectable 25 Gram(s) IV Push once  dextrose 50% Injectable 12.5 Gram(s) IV Push once  dextrose 50% Injectable 25 Gram(s) IV Push once  famotidine  Oral Tab/Cap - Peds 40 milliGRAM(s) Oral at bedtime  glucagon  Injectable 1 milliGRAM(s) IntraMuscular once  insulin lispro (ADMELOG) corrective regimen sliding scale   SubCutaneous every 6 hours  levothyroxine 112 MICROGram(s) Oral daily  norepinephrine Infusion 0.05 MICROgram(s)/kG/Min (10.1 mL/Hr) IV Continuous <Continuous>  phenylephrine    Infusion 0.1 MICROgram(s)/kG/Min (2.02 mL/Hr) IV Continuous <Continuous>  polyethylene glycol 3350 17 Gram(s) Oral two times a day  senna 2 Tablet(s) Oral at bedtime  sodium chloride 0.9%. 1000 milliLiter(s) (100 mL/Hr) IV Continuous <Continuous>    MEDICATIONS  (PRN):  dextrose Oral Gel 15 Gram(s) Oral once PRN Blood Glucose LESS THAN 70 milliGRAM(s)/deciliter      FAMILY HISTORY:      Allergies    adhesives (Blisters; Rash)  copper (Hives; Rash)  gold (Hives; Rash)  nickel (Hives; Rash)  silver (Hives; Rash)  Sulfacet-R (Rash)    Intolerances        SOCIAL HISTORY:    [  ] Etoh  [  ] Smoking  [  ] Substance abuse     Home Environment:  [   ] Home Alone  [ x  ] Lives with Family  [   ] Home Health Aid    Dwelling:  [   ] Apartment  [ x  ] Private House  [   ] Adult Home  [   ] Skilled Nursing Facility      [   ] Short Term  [   ] Long Term  [  x ] Stairs       Elevator [   ]    FUNCTIONAL STATUS PTA: (Check all that apply)  Ambulation: [  x  ]Independent    [   ] Dependent     [   ] Non-Ambulatory  Assistive Device: [   ] SA Cane  [   ]  Q Cane  [   ] Walker  [   ]  Wheelchair  ADL : [  x ] Independent  [    ]  Dependent       Vital Signs Last 24 Hrs  T(C): 36.3 (2022 12:00), Max: 37 (10 Apr 2022 18:19)  T(F): 97.3 (2022 12:00), Max: 98.6 (10 Apr 2022 18:19)  HR: 78 (2022 15:15) (74 - 102)  BP: 138/56 (2022 15:15) (111/62 - 170/82)  BP(mean): 84 (2022 15:15) (55 - 119)  RR: 25 (2022 15:15) (9 - 36)  SpO2: 100% (2022 15:15) (88% - 100%)      PHYSICAL EXAM: Awake & Alert  GENERAL: NAD  HEAD:  Normocephalic  CHEST/LUNG: Clear   HEART: S1S2+  ABDOMEN: Soft, Nontender  EXTREMITIES:  no calf tenderness    NERVOUS SYSTEM:  Cranial Nerves 2-12 intact [   ] Abnormal  [  x ]right facial droop / dysarthria  ROM: WFL all extremities [   ]  Abnormal [ x  ]limited right side  Motor Strength: WFL all extremities  [   ]  Abnormal [ x  ]0-1/5 right side  Sensation: intact to light touch [   ] Abnormal [  x ]    FUNCTIONAL STATUS:  Bed Mobility: Independent [   ]  Supervision [   ]  Needs Assistance [ x  ]  N/A [   ]  Transfers: Independent [   ]  Supervision [   ]  Needs Assistance [   ]  N/A [   ]   Ambulation: Independent [   ]  Supervision [   ]  Needs Assistance [   ]  N/A [   ]  ADL: Independent [   ] Requires Assistance [   ] N/A [   ]      LABS:                        11.7   7.99  )-----------( 212      ( 2022 01:00 )             36.1     04-11    136  |  100  |  18  ----------------------------<  244<H>  5.4<H>   |  24  |  0.9    Ca    9.4      2022 01:00    TPro  5.8<L>  /  Alb  4.3  /  TBili  0.6  /  DBili  x   /  AST  19  /  ALT  18  /  AlkPhos  84  04-11    PT/INR - ( 10 Apr 2022 20:15 )   PT: 11.80 sec;   INR: 1.03 ratio         PTT - ( 10 Apr 2022 20:15 )  PTT:26.3 sec  Urinalysis Basic - ( 2022 08:33 )    Color: Yellow / Appearance: Clear / S.039 / pH: x  Gluc: x / Ketone: Negative  / Bili: Negative / Urobili: <2 mg/dL   Blood: x / Protein: 30 mg/dL / Nitrite: Negative   Leuk Esterase: Negative / RBC: 50 /HPF / WBC 1 /HPF   Sq Epi: x / Non Sq Epi: 2 /HPF / Bacteria: Occasional        RADIOLOGY & ADDITIONAL STUDIES:

## 2022-04-11 NOTE — PROGRESS NOTE ADULT - SUBJECTIVE AND OBJECTIVE BOX
Neuroendovascular Progress Note:     Interval History:    The patient is a 76-year-old female with a PMHx of hypothyroidism who presented after a fall with two episodes of emesis, right-sided leg weakness and word finding difficulties. The patient denies having taken anticoagulents. The patient was a stroke code on admission. Left M2 occlusion with a 7cc core infarct was reported on CTA/CTP and the patient was a stroke code and NI actual. The patient was given tPA and taken for an emergent mechanical thrombectomy, but this was not completed due to dislodgement of clot at the time of procedure. Patient's dysarthria and LLE weakness briefly worsened, but subsequently improved again. On exam this morning the arteriotomy site was c/d/i, h/h remain stable, and the patient was able to move all extremities. She has mild dysarthria and word-finding difficulties. The patient denies abdominal pain, flank pain, and pain or tenderness near the arteriotomy site.    PMHx:  H/O: HTN (hypertension)  Cataract  Adult hypothyroidism  Hodgkin&#x27;s granuloma of spleen  H/O gastroesophageal reflux (GERD)    24-Hour Events: None    Medication:  atorvastatin 80 milliGRAM(s) Oral at bedtime  chlorhexidine 4% Liquid 1 Application(s) Topical <User Schedule>  dextrose 5%. 1000 milliLiter(s) IV Continuous <Continuous>  dextrose 5%. 1000 milliLiter(s) IV Continuous <Continuous>  dextrose 50% Injectable 25 Gram(s) IV Push once  dextrose 50% Injectable 12.5 Gram(s) IV Push once  dextrose 50% Injectable 25 Gram(s) IV Push once  famotidine  Oral Tab/Cap - Peds 40 milliGRAM(s) Oral at bedtime  glucagon  Injectable 1 milliGRAM(s) IntraMuscular once  insulin lispro (ADMELOG) corrective regimen sliding scale   SubCutaneous every 6 hours  levothyroxine 112 MICROGram(s) Oral daily  norepinephrine Infusion 0.05 MICROgram(s)/kG/Min IV Continuous <Continuous>  phenylephrine    Infusion 0.1 MICROgram(s)/kG/Min IV Continuous <Continuous>  sodium chloride 0.9%. 1000 milliLiter(s) IV Continuous <Continuous>    Allergies:  adhesives (Blisters; Rash)  copper (Hives; Rash)  gold (Hives; Rash)  nickel (Hives; Rash)  silver (Hives; Rash)  Sulfacet-R (Rash)    Recent Vitals:  T(F): 97.3 (22 @ 12:00), Max: 98.6 (04-10-22 @ 18:19)  HR: 80 (22 @ 14:30) (74 - 102)  BP: 129/57 (22 @ 14:30) (111/62 - 170/82)  RR: 22 (22 @ 14:30) (9 - 31)  SpO2: 99% (22 @ 14:30) (88% - 100%)    COVID-19 PCR: NotDetec (10 Apr 2022 19:10)    Recent Labs:                        11.7   7.99  )-----------( 212      ( 2022 01:00 )             36.1     04-    136  |  100  |  18  ----------------------------<  244<H>  5.4<H>   |  24  |  0.9    Ca    9.4      2022 01:00    TPro  5.8<L>  /  Alb  4.3  /  TBili  0.6  /  DBili  x   /  AST  19  /  ALT  18  /  AlkPhos  84  04-11    PT/INR - ( 10 Apr 2022 20:15 )   PT: 11.80 sec;   INR: 1.03 ratio      PTT - ( 10 Apr 2022 20:15 )  PTT:26.3 sec  Urinalysis Basic - ( 2022 08:33 )    Color: Yellow / Appearance: Clear / S.039 / pH: x  Gluc: x / Ketone: Negative  / Bili: Negative / Urobili: <2 mg/dL   Blood: x / Protein: 30 mg/dL / Nitrite: Negative   Leuk Esterase: Negative / RBC: 50 /HPF / WBC 1 /HPF   Sq Epi: x / Non Sq Epi: 2 /HPF / Bacteria: Occasional    LIVER FUNCTIONS - ( 2022 01:00 )  Alb: 4.3 g/dL / Pro: 5.8 g/dL / ALK PHOS: 84 U/L / ALT: 18 U/L / AST: 19 U/L / GGT: x           Exam:  General: NAD  Mental Status: AAO x3, +mild dysarthria, +word finding difficulties (could not come up with word for "jacket" or "blue' initially, but recalled both later.   CN: PERRL, full visual fields, no nystagmus, EOMI, +mild facial asymmetry, tongue midline.  Motor: Right UE/LE +drift, Left UE/LE AG. No abnormal movements.   Sensation: +Decreased sensation to light touch on RUE/RLE.  Coordination: No dysmetria on finger-to-nose.  NIHSS: 6    Radiology:   CT Head No Cont:   ACC: [id number] EXAM:  CT BRAIN                          PROCEDURE DATE:  04/10/2022      INTERPRETATION:  Clinical History / Reason for exam: Stroke code, status   post tPA administration.    Technique: Noncontrast head CT.  Contiguous unenhanced CT axial images of   the head from the base to the vertex.    Comparison: CT head same day 7:22 PM    FINDINGS/  IMPRESSION:    No significant interval changes on short-term follow-up. Specifically, no   evidence of intracranial hemorrhage.    Redemonstration of left frontal lobe hypodensity, essentially unchanged   from comparison.    Preliminary findings discussed with NILES Lucero on 4/10/2022 at 9:41 PM   with readback.    --- End of Report ---    KISHAN DUNN DO; Resident Radiologist  This document has been electronically signed.  JAYMIE TOBIN MD; Attending Radiologist  This document has been electronically signed. 2022  2:52AM (04-10-22 @ 21:33)    Assessment:   The patient is a 76-year-old female stroke code/NI actual. Left M2 occlusion with a 7cc core infarct reported on CTA/CTP. The patient was given tPA and taken for an emergent MT, which was not completed due to dislodgement of clot at time of procedure. On exam this morning the arteriotomy site was c/d/i, h/h remain stable, and the patient was able to move all extremities. CT Head  reporting no significant interval change and no IPH.     Suggestions:  - Please continue to monitor arteriotomy site, abdomen, and flank for tenderness, induration, or swelling.  - Management per Neuro ICU.   - [phone].

## 2022-04-11 NOTE — CHART NOTE - NSCHARTNOTEFT_GEN_A_CORE
ANESTHESIA to ICU NOTE      ____ Intubated  TV:______       Rate: ______      FiO2: ______    _x___ Patent Airway    _x___ Full return of protective reflexes    _x___ Full recovery from anesthesia / sedation to baseline status    Vitals:  HR 85  /75  RR 18  O2sat. 98%  Temp: 36.3C      Mental Status:  __x__ Awake   __x__ Alert   _____ Drowsy   _____ Sedated    Nausea/Vomiting: ____ Yes, See Post - Op Orders      _x___ No    Pain Scale (0-10): _____    Treatment: __x__ None    ____ See Post - Op/PCA Orders    Post - Operative Fluids:   ____ Oral   __x__ See Post - Op Orders    Plan:  Discharge to:   ____Home       _____Floor      __x___Critical Care    _____ Other:_________________    Comments: s/p diagnostic angiogram under MAC. No anesthesia complications. Pt is transferred directly to ICU after IR, fully monitored. Pt's condition is stable in ICU. Full report is given to RN at the bedside. HgA1c 6.3. on metformin and glipizide at home  - Continue HISS for now.  - Monitor FS qAC, qHS.  - Hypoglycemia protocol.  - Will need close follow up with Dr. Zheng Joya (PMD) [phone]

## 2022-04-11 NOTE — PROGRESS NOTE ADULT - CRITICAL CARE ATTENDING COMMENT
76 year old lady with PMHx as outlined above presented with distal left MCA syndrome, received IV rtPA followed by attempted IMS. There was brief worsening of NIHSS from 7 to 11, that responded to BP augmentation via pressors.  I agree and approved assesment and plan of care as outlined above.

## 2022-04-11 NOTE — OCCUPATIONAL THERAPY INITIAL EVALUATION ADULT - LEVEL OF INDEPENDENCE: BED TO CHAIR, REHAB EVAL
not performed on IE 2* to decreased balance and 12 hours post TPA/thrombectomy returned ot bed at RN request and placed in bed in chair mode to improve sitting tolerance and in prep for OOB to chair

## 2022-04-11 NOTE — PROVIDER CONTACT NOTE (EICU) - BACKGROUND
Pt s/p TPA in ER & cerebral angiogram in IR. Pt now has a SHREYAS 18g IV that was placed by US overnight, which Levophed is infusing through now. This is is WDL and has brisk blood return and flushes without difficulty.

## 2022-04-12 LAB
ALBUMIN SERPL ELPH-MCNC: 3.8 G/DL — SIGNIFICANT CHANGE UP (ref 3.5–5.2)
ALP SERPL-CCNC: 71 U/L — SIGNIFICANT CHANGE UP (ref 30–115)
ALT FLD-CCNC: 12 U/L — SIGNIFICANT CHANGE UP (ref 0–41)
ANION GAP SERPL CALC-SCNC: 12 MMOL/L — SIGNIFICANT CHANGE UP (ref 7–14)
APTT BLD: 40 SEC — HIGH (ref 27–39.2)
AST SERPL-CCNC: 10 U/L — SIGNIFICANT CHANGE UP (ref 0–41)
BILIRUB SERPL-MCNC: 0.8 MG/DL — SIGNIFICANT CHANGE UP (ref 0.2–1.2)
BUN SERPL-MCNC: 11 MG/DL — SIGNIFICANT CHANGE UP (ref 10–20)
CALCIUM SERPL-MCNC: 8.5 MG/DL — SIGNIFICANT CHANGE UP (ref 8.5–10.1)
CHLORIDE SERPL-SCNC: 110 MMOL/L — SIGNIFICANT CHANGE UP (ref 98–110)
CHOLEST SERPL-MCNC: 130 MG/DL — SIGNIFICANT CHANGE UP
CO2 SERPL-SCNC: 21 MMOL/L — SIGNIFICANT CHANGE UP (ref 17–32)
CREAT SERPL-MCNC: 0.8 MG/DL — SIGNIFICANT CHANGE UP (ref 0.7–1.5)
EGFR: 77 ML/MIN/1.73M2 — SIGNIFICANT CHANGE UP
GLUCOSE BLDC GLUCOMTR-MCNC: 145 MG/DL — HIGH (ref 70–99)
GLUCOSE BLDC GLUCOMTR-MCNC: 172 MG/DL — HIGH (ref 70–99)
GLUCOSE BLDC GLUCOMTR-MCNC: 174 MG/DL — HIGH (ref 70–99)
GLUCOSE BLDC GLUCOMTR-MCNC: 208 MG/DL — HIGH (ref 70–99)
GLUCOSE SERPL-MCNC: 216 MG/DL — HIGH (ref 70–99)
HCT VFR BLD CALC: 31.2 % — LOW (ref 37–47)
HDLC SERPL-MCNC: 35 MG/DL — LOW
HGB BLD-MCNC: 10.2 G/DL — LOW (ref 12–16)
INR BLD: 1.04 RATIO — SIGNIFICANT CHANGE UP (ref 0.65–1.3)
LIPID PNL WITH DIRECT LDL SERPL: 80 MG/DL — SIGNIFICANT CHANGE UP
MAGNESIUM SERPL-MCNC: 1.9 MG/DL — SIGNIFICANT CHANGE UP (ref 1.8–2.4)
MCHC RBC-ENTMCNC: 26.9 PG — LOW (ref 27–31)
MCHC RBC-ENTMCNC: 32.7 G/DL — SIGNIFICANT CHANGE UP (ref 32–37)
MCV RBC AUTO: 82.3 FL — SIGNIFICANT CHANGE UP (ref 81–99)
NON HDL CHOLESTEROL: 95 MG/DL — SIGNIFICANT CHANGE UP
NRBC # BLD: 0 /100 WBCS — SIGNIFICANT CHANGE UP (ref 0–0)
PHOSPHATE SERPL-MCNC: 3 MG/DL — SIGNIFICANT CHANGE UP (ref 2.1–4.9)
PLATELET # BLD AUTO: 195 K/UL — SIGNIFICANT CHANGE UP (ref 130–400)
POTASSIUM SERPL-MCNC: 4.1 MMOL/L — SIGNIFICANT CHANGE UP (ref 3.5–5)
POTASSIUM SERPL-SCNC: 4.1 MMOL/L — SIGNIFICANT CHANGE UP (ref 3.5–5)
PROT SERPL-MCNC: 5.1 G/DL — LOW (ref 6–8)
PROTHROM AB SERPL-ACNC: 12 SEC — SIGNIFICANT CHANGE UP (ref 9.95–12.87)
RBC # BLD: 3.79 M/UL — LOW (ref 4.2–5.4)
RBC # FLD: 14 % — SIGNIFICANT CHANGE UP (ref 11.5–14.5)
SODIUM SERPL-SCNC: 143 MMOL/L — SIGNIFICANT CHANGE UP (ref 135–146)
TRIGL SERPL-MCNC: 122 MG/DL — SIGNIFICANT CHANGE UP
TSH SERPL-MCNC: 1.07 UIU/ML — SIGNIFICANT CHANGE UP (ref 0.27–4.2)
WBC # BLD: 7 K/UL — SIGNIFICANT CHANGE UP (ref 4.8–10.8)
WBC # FLD AUTO: 7 K/UL — SIGNIFICANT CHANGE UP (ref 4.8–10.8)

## 2022-04-12 PROCEDURE — 99291 CRITICAL CARE FIRST HOUR: CPT

## 2022-04-12 PROCEDURE — 70496 CT ANGIOGRAPHY HEAD: CPT | Mod: 26

## 2022-04-12 PROCEDURE — 0042T: CPT

## 2022-04-12 PROCEDURE — 70498 CT ANGIOGRAPHY NECK: CPT | Mod: 26

## 2022-04-12 RX ORDER — SODIUM CHLORIDE 9 MG/ML
500 INJECTION INTRAMUSCULAR; INTRAVENOUS; SUBCUTANEOUS ONCE
Refills: 0 | Status: COMPLETED | OUTPATIENT
Start: 2022-04-12 | End: 2022-04-12

## 2022-04-12 RX ORDER — HEPARIN SODIUM 5000 [USP'U]/ML
1200 INJECTION INTRAVENOUS; SUBCUTANEOUS
Qty: 25000 | Refills: 0 | Status: DISCONTINUED | OUTPATIENT
Start: 2022-04-12 | End: 2022-04-12

## 2022-04-12 RX ORDER — ASPIRIN/CALCIUM CARB/MAGNESIUM 324 MG
81 TABLET ORAL DAILY
Refills: 0 | Status: DISCONTINUED | OUTPATIENT
Start: 2022-04-12 | End: 2022-04-12

## 2022-04-12 RX ORDER — MAGNESIUM SULFATE 500 MG/ML
2 VIAL (ML) INJECTION ONCE
Refills: 0 | Status: COMPLETED | OUTPATIENT
Start: 2022-04-12 | End: 2022-04-12

## 2022-04-12 RX ORDER — ENOXAPARIN SODIUM 100 MG/ML
40 INJECTION SUBCUTANEOUS EVERY 24 HOURS
Refills: 0 | Status: DISCONTINUED | OUTPATIENT
Start: 2022-04-12 | End: 2022-04-12

## 2022-04-12 RX ORDER — ACETAMINOPHEN 500 MG
650 TABLET ORAL EVERY 6 HOURS
Refills: 0 | Status: DISCONTINUED | OUTPATIENT
Start: 2022-04-12 | End: 2022-04-19

## 2022-04-12 RX ORDER — MULTIVIT WITH MIN/MFOLATE/K2 340-15/3 G
1 POWDER (GRAM) ORAL ONCE
Refills: 0 | Status: DISCONTINUED | OUTPATIENT
Start: 2022-04-12 | End: 2022-04-12

## 2022-04-12 RX ADMIN — Medication 2: at 18:39

## 2022-04-12 RX ADMIN — Medication 650 MILLIGRAM(S): at 19:18

## 2022-04-12 RX ADMIN — Medication 4: at 08:33

## 2022-04-12 RX ADMIN — Medication 10.1 MICROGRAM(S)/KG/MIN: at 07:59

## 2022-04-12 RX ADMIN — Medication 2: at 12:24

## 2022-04-12 RX ADMIN — FAMOTIDINE 40 MILLIGRAM(S): 10 INJECTION INTRAVENOUS at 22:32

## 2022-04-12 RX ADMIN — POLYETHYLENE GLYCOL 3350 17 GRAM(S): 17 POWDER, FOR SOLUTION ORAL at 05:14

## 2022-04-12 RX ADMIN — SODIUM CHLORIDE 1000 MILLILITER(S): 9 INJECTION INTRAMUSCULAR; INTRAVENOUS; SUBCUTANEOUS at 02:45

## 2022-04-12 RX ADMIN — Medication 112 MICROGRAM(S): at 05:13

## 2022-04-12 RX ADMIN — CHLORHEXIDINE GLUCONATE 1 APPLICATION(S): 213 SOLUTION TOPICAL at 05:13

## 2022-04-12 RX ADMIN — POLYETHYLENE GLYCOL 3350 17 GRAM(S): 17 POWDER, FOR SOLUTION ORAL at 18:40

## 2022-04-12 RX ADMIN — SENNA PLUS 2 TABLET(S): 8.6 TABLET ORAL at 22:32

## 2022-04-12 RX ADMIN — Medication 2: at 00:55

## 2022-04-12 RX ADMIN — Medication 25 GRAM(S): at 07:58

## 2022-04-12 NOTE — PROGRESS NOTE ADULT - SUBJECTIVE AND OBJECTIVE BOX
SUMMARY:    77 yo female w/ PMH of HTN, hypothyroidism presents after fall this afternoon. At 16:30, started having R sided arm and leg weakness, and difficulty finding words.  Does not recall if sxs started before or after fall but was bending over, fell over, denies head trauma, LOC, blood thinners. Has vomited a couple times. Denies pain anywhere. Stroke code called upon presentation. CTA showed a left M2 occlusion. Patient received tPA but neurologically declined shortly after. CODE NI actual called and patient taken for emergent thrombectomy and to be transferred to NCCU following procedure.  (10 Apr 2022 22:53)    OVERNIGHT EVENTS: 24-hour CT head completed, pending official read. Remains on Levophed for BP augementation; s/p 500cc NS bolus    ALLERGIES: Allergies    adhesives (Blisters; Rash)  copper (Hives; Rash)  gold (Hives; Rash)  nickel (Hives; Rash)  silver (Hives; Rash)  Sulfacet-R (Rash)    ADMISSION SCORES:   GCS: 15    NIHSS: 7, unchanged today    REVIEW OF SYSTEMS: Negative except for that of HPI    VITALS: [x] Reviewed  ICU Vital Signs Last 24 Hrs  T(C): 36.6 (11 Apr 2022 04:00), Max: 37 (10 Apr 2022 18:19)  T(F): 97.8 (11 Apr 2022 04:00), Max: 98.6 (10 Apr 2022 18:19)  HR: 80 (11 Apr 2022 07:30) (74 - 102)  BP: 111/62 (11 Apr 2022 07:30) (111/62 - 170/82)  BP(mean): 82 (11 Apr 2022 07:30) (66 - 112)  ABP: --  ABP(mean): --  RR: 26 (11 Apr 2022 07:30) (17 - 31)  SpO2: 99% (11 Apr 2022 07:30) (93% - 100%)      04-10-22 @ 07:01  -  04-11-22 @ 07:00  --------------------------------------------------------  IN: 1854 mL / OUT: 0 mL / NET: 1854 mL          LABS:  Na: 136 (04-11 @ 01:00), 135 (04-10 @ 20:15)  K: 5.4 (04-11 @ 01:00), 4.5 (04-10 @ 20:15)  Cl: 100 (04-11 @ 01:00), 99 (04-10 @ 20:15)  CO2: 24 (04-11 @ 01:00), 25 (04-10 @ 20:15)  BUN: 18 (04-11 @ 01:00), 17 (04-10 @ 20:15)  Cr: 0.9 (04-11 @ 01:00), 0.9 (04-10 @ 20:15)  Glu: 244(04-11 @ 01:00), 192(04-10 @ 20:15)    Hgb: 11.7 (04-11 @ 01:00), 11.9 (04-10 @ 20:15)  Hct: 36.1 (04-11 @ 01:00), 36.7 (04-10 @ 20:15)  WBC: 7.99 (04-11 @ 01:00), 6.59 (04-10 @ 20:15)  Plt: 212 (04-11 @ 01:00), 205 (04-10 @ 20:15)    INR: 1.03 04-10-22 @ 20:15  PTT: 26.3 04-10-22 @ 20:15      LIVER FUNCTIONS - ( 11 Apr 2022 01:00 )  Alb: 4.3 g/dL / Pro: 5.8 g/dL / ALK PHOS: 84 U/L / ALT: 18 U/L / AST: 19 U/L / GGT: x             MEDICATIONS  (STANDING):  chlorhexidine 4% Liquid 1 Application(s) Topical <User Schedule>  dextrose 5%. 1000 milliLiter(s) (50 mL/Hr) IV Continuous <Continuous>  dextrose 5%. 1000 milliLiter(s) (100 mL/Hr) IV Continuous <Continuous>  dextrose 50% Injectable 25 Gram(s) IV Push once  dextrose 50% Injectable 12.5 Gram(s) IV Push once  dextrose 50% Injectable 25 Gram(s) IV Push once  famotidine  Oral Tab/Cap - Peds 40 milliGRAM(s) Oral at bedtime  glucagon  Injectable 1 milliGRAM(s) IntraMuscular once  insulin lispro (ADMELOG) corrective regimen sliding scale   SubCutaneous every 6 hours  levothyroxine 112 MICROGram(s) Oral daily  norepinephrine Infusion 0.05 MICROgram(s)/kG/Min (10.1 mL/Hr) IV Continuous <Continuous>  phenylephrine    Infusion 0.1 MICROgram(s)/kG/Min (2.02 mL/Hr) IV Continuous <Continuous>  sodium chloride 0.9%. 1000 milliLiter(s) (100 mL/Hr) IV Continuous <Continuous>    MEDICATIONS  (PRN):  dextrose Oral Gel 15 Gram(s) Oral once PRN Blood Glucose LESS THAN 70 milliGRAM(s)/deciliter      IMAGING/DATA: [x] Reviewed    EXAMINATION:  General: No acute distress  HEENT: Anicteric sclerae  Cardiac: [id number]  Lungs: Clear  Abdomen: Soft, non-tender, +BS  Extremities: No c/c/e  Skin/Incision Site: Clean, dry and intact  Neurologic: Awake, alert, fully oriented, follows commands, PERRL, VFFtc, EOMI, right facial, dysarthric, tongue midline, full strength. RUE 4/5. LUE 5/5, B/l LE AG     DEVICES:   [] Restraints [] PIVs [] ET tube [] central line [] PICC [] arterial line [] turner [] NGT/OGT [] EVD [] LD [] SACNHEZ/HMV [] LiCOX [] ICP monitor [] Trach [] PEG [] Chest Tube [] other:     SUMMARY:    75 yo female w/ PMH of HTN, hypothyroidism presents after fall this afternoon. At 16:30, started having R sided arm and leg weakness, and difficulty finding words.  Does not recall if sxs started before or after fall but was bending over, fell over, denies head trauma, LOC, blood thinners. Has vomited a couple times. Denies pain anywhere. Stroke code called upon presentation. CTA showed a left M2 occlusion. Patient received tPA but neurologically declined shortly after. CODE NI actual called and patient taken for emergent thrombectomy and to be transferred to NCCU following procedure.  (10 Apr 2022 22:53)    OVERNIGHT EVENTS: 24-hour CT head completed, pending official read. Remains on Levophed for BP augementation; s/p 500cc NS bolus    ALLERGIES: Allergies    adhesives (Blisters; Rash)  copper (Hives; Rash)  gold (Hives; Rash)  nickel (Hives; Rash)  silver (Hives; Rash)  Sulfacet-R (Rash)    ADMISSION SCORES:   GCS: 15    NIHSS: 7, unchanged today    REVIEW OF SYSTEMS: Negative except for that of HPI    VITALS: [x] Reviewed    IMAGING/DATA: [x] Reviewed    EXAMINATION:  General: No acute distress  HEENT: Anicteric sclerae  Cardiac: [id number]  Lungs: Clear  Abdomen: Soft, non-tender, +BS  Extremities: No c/c/e  Skin/Incision Site: Clean, dry and intact, RUE wound from possible IV infiltration, superficial layer of the dermis sloughed off but otherwise red and moist  Neurologic: Awake, alert, fully oriented, follows commands, PERRL, VFFtc, EOMI, right facial, dysarthric but improved, tongue midline, full strength. RUE 4-/5. LUE 5/5, B/l LE AG 3/5    DEVICES:   [] Restraints [] PIVs [] ET tube [] central line [] PICC [] arterial line [] turner [] NGT/OGT [] EVD [] LD [] SANCHEZ/HMV [] LiCOX [] ICP monitor [] Trach [] PEG [] Chest Tube [] other:     SUMMARY:    75 yo female w/ PMH of HTN, hypothyroidism, splenic CA s/p chemo, presents after fall this afternoon. At 16:30, started having R sided arm and leg weakness, and difficulty finding words.  Does not recall if sxs started before or after fall but was bending over, fell over, denies head trauma, LOC, blood thinners. Has vomited a couple times. Denies pain anywhere. Stroke code called upon presentation. CTA showed a left M2 occlusion. Patient received tPA but neurologically declined shortly after. CODE NI actual called and patient taken for emergent thrombectomy and to be transferred to NCCU following procedure.  (10 Apr 2022 22:53)    OVERNIGHT EVENTS: 24-hour CT head completed, pending official read. Remains on Levophed for BP augementation; s/p 500cc NS bolus    ALLERGIES: Allergies    adhesives (Blisters; Rash)  copper (Hives; Rash)  gold (Hives; Rash)  nickel (Hives; Rash)  silver (Hives; Rash)  Sulfacet-R (Rash)    ADMISSION SCORES:   GCS: 15    NIHSS: 7, unchanged today    REVIEW OF SYSTEMS: Negative except for that of HPI    VITALS: [x] Reviewed    IMAGING/DATA: [x] Reviewed    EXAMINATION:  General: No acute distress  HEENT: Anicteric sclerae  Cardiac: [id number]  Lungs: Clear  Abdomen: Soft, non-tender, +BS  Extremities: No c/c/e  Skin/Incision Site: Clean, dry and intact, RUE wound from possible IV infiltration, superficial layer of the dermis sloughed off but otherwise red and moist  Neurologic: Awake, alert, fully oriented, follows commands, PERRL, VFFtc, EOMI, right facial, dysarthric but improved, tongue midline, full strength. RUE 4-/5. LUE 5/5, B/l LE AG 3/5    DEVICES:   [] Restraints [] PIVs [] ET tube [] central line [] PICC [] arterial line [] turner [] NGT/OGT [] EVD [] LD [] SANCHEZ/HMV [] LiCOX [] ICP monitor [] Trach [] PEG [] Chest Tube [] other:

## 2022-04-12 NOTE — STROKE CODE NOTE - NIH STROKE SCALE: 10. DYSARTHRIA, QM
(2) Severe dysarthria; patients speech is so slurred as to be unintelligible in the absence of or out of proportion to any dysphasia, or is mute/anarthric
(1) Mild-to-moderate dysarthria; patient slurs at least some words and, at worst, can be understood with some difficulty
(2) Severe dysarthria; patients speech is so slurred as to be unintelligible in the absence of or out of proportion to any dysphasia, or is mute/anarthric

## 2022-04-12 NOTE — PROGRESS NOTE ADULT - CRITICAL CARE ATTENDING COMMENT
76 year old lady with PMHx as outlined above presented with distal left MCA syndrome, received IV rtPA followed by attempted IMS. There was brief worsening of NIHSS from 7 to 11, that responded to BP augmentation via pressors.  I agree and approved assesment and plan of care as outlined above. 76 year old lady with PMHx as outlined above presented with distal left MCA syndrome, received IV rtPA followed by attempted IMS. There was brief worsening of NIHSS from 7 to 11, that responded to BP augmentation via pressors.  4/12  Repeat CT confirmed a small embolic stroke L MCA perisylvian. Exam unchanged  Continue to require low dose pressors to keep BP in desirable ranges Reduce goal BP   LE DVT +: Give h/o malignancy, will start full A/C with IV heparin, continue neurochecks q 1 hr  I agree and approved assesment and plan of care as outlined above.

## 2022-04-12 NOTE — STROKE CODE NOTE - NIH STROKE SCALE: 8. SENSORY, QM
(0) Normal; no sensory loss
(1) Mild-to-moderate sensory loss; patient feels pinprick is less sharp or is dull on the affected side; or there is a loss of superficial pain with pinprick, but patient is aware of being touched
(0) Normal; no sensory loss

## 2022-04-12 NOTE — PHYSICAL THERAPY INITIAL EVALUATION ADULT - GENERAL OBSERVATIONS, REHAB EVAL
130-215 pm Chart reviewed. Pt. seen semirecline in bed , in No apparent distress , + IV lock , IV meds ongoing + pressors (Levophed), heparin drip,  Prima fit device  , turner catheter , expressive aphasia, right hemiparesis, orineted X 3, brother present at bedside .

## 2022-04-12 NOTE — CHART NOTE - NSCHARTNOTEFT_GEN_A_CORE
Called by bedside RN @ 2100 for increased NIHSS 4 --> 14 with significant severe dysarthria, severe aphasia, and unable to lift RUE/RLE AG with known right facial droop and impaired sensation right side. /54, on Levophed for -150 parameters. Patient started on Heparin gtt for (+) right DVT. Dr. Sal, NCC Attending notified. Stroke code activated.        CTH:   FINDINGS:  Mildly increased left frontotemporoparietal hypodensity consistent with evolving left MCA territory stroke.  Parenchymal volume is appropriate for patient age. No hydrocephalus.  Periventricular and subcortical white matter hypodensities compatible with unchanged chronic microvascular ischemic disease.  No evidence of interval intracranial hemorrhage, extra axial fluid collection, or midline shift.  Status post bilateral cataract surgery. Visualized intraorbital contents, soft tissues, and osseous structures evidence no acute abnormality.  Redemonstration of mucosal thickening within the ethmoid air cells, maxillary sinuses, and right sphenoid sinuses.  Remainder of the visualized paranasal sinuses and mastoid air cavities evidence no acute disease.  IMPRESSION:  Mildly increased left frontotemporoparietal hypodensity consistent with evolving left MCA territory stroke.    CTA head/neck/CTP:  FINDINGS:  PERFUSION:  Patient motion mildly limits evaluation.  CBF less than 30% volume: 0 cc  Tmax greater than 6 seconds: 18 cc  Mismatch volume: 18 cc  Mismatch ratio: Infinite.  There is a small mismatch defect of 18 cc corresponding to the region of MCA territory stroke visualized on CT head. There is no associated core infarct.    HEAD CTA:  Again seen is calcified plaque within the carotid siphons without significant stenosis.  Bilateral MAURICE and right MCA branches are grossly patent.  Redemonstration of left superior M2 segment occlusion versus severe stenosis ((key image 1) with minimal opacification/distal reconstitution of its distal branches.  Distal vertebral arteries, basilar artery, and posterior cerebral arteries are grossly patent. Redemonstration of normal variant hypoplastic P1 segment of the left PCA.  Visualized dural venous sinuses are within normal limits.    NECK CTA:  Bovine aortic arch. Origins of the arch vessels are unremarkable.  Bilateral common, internal, and external carotid arteries are patent without evidence of significant stenosis, unchanged.  Again seen is medial retropharyngeal course of the right ICA.  Bilateral vertebral arteries evident unremarkable origins and are patent bilaterally.  Enlarged thyroid with mild substernal extension.  Right IJ central line with tip in appropriate position.    IMPRESSION:  PERFUSION:  Small volume mismatch defect of 18 cc corresponding to the region of MCA territory stroke visualized on CT head. There is no associated core infarct.    CTA HEAD:  Redemonstration of superior left MCA M2 segment occlusion versus severe stenosis ((key image 1) with minimal opacification/distal reconstitution of its distal branches.    CTA NECK:  No evidence of greater than 50% carotid or vertebral artery stenosis. Unchanged examination.          Code NI alert called. After BP augmentation and 500cc NS bolus, NIHSS 14 --> 9 with marked improvement in dysarthria and aphasia, some effort AG RUE, and able to lift AG RLE with drift, spoke with Dr. Sal, NCC Attending. Code NI cancelled. Not a candidate for tPA 2/2 evolving Left MCA territory infarct. Continue to augment -150. Continue to monitor.         Crystal An, [facility]  [phone] Called by bedside RN @ 2100 for increased NIHSS 4 --> 14 with significant severe dysarthria, severe aphasia, and unable to lift RUE/RLE AG with known right facial droop and impaired sensation right side. /54, on Levophed for -150 parameters. Patient started on Heparin gtt for (+) right DVT. Dr. Sal, NCC Attending notified. Stroke code activated.        CTH:   FINDINGS:  Mildly increased left frontotemporoparietal hypodensity consistent with evolving left MCA territory stroke.  Parenchymal volume is appropriate for patient age. No hydrocephalus.  Periventricular and subcortical white matter hypodensities compatible with unchanged chronic microvascular ischemic disease.  No evidence of interval intracranial hemorrhage, extra axial fluid collection, or midline shift.  Status post bilateral cataract surgery. Visualized intraorbital contents, soft tissues, and osseous structures evidence no acute abnormality.  Redemonstration of mucosal thickening within the ethmoid air cells, maxillary sinuses, and right sphenoid sinuses.  Remainder of the visualized paranasal sinuses and mastoid air cavities evidence no acute disease.  IMPRESSION:  Mildly increased left frontotemporoparietal hypodensity consistent with evolving left MCA territory stroke.    CTA head/neck/CTP:  FINDINGS:  PERFUSION:  Patient motion mildly limits evaluation.  CBF less than 30% volume: 0 cc  Tmax greater than 6 seconds: 18 cc  Mismatch volume: 18 cc  Mismatch ratio: Infinite.  There is a small mismatch defect of 18 cc corresponding to the region of MCA territory stroke visualized on CT head. There is no associated core infarct.    HEAD CTA:  Again seen is calcified plaque within the carotid siphons without significant stenosis.  Bilateral MAURICE and right MCA branches are grossly patent.  Redemonstration of left superior M2 segment occlusion versus severe stenosis ((key image 1) with minimal opacification/distal reconstitution of its distal branches.  Distal vertebral arteries, basilar artery, and posterior cerebral arteries are grossly patent. Redemonstration of normal variant hypoplastic P1 segment of the left PCA.  Visualized dural venous sinuses are within normal limits.    NECK CTA:  Bovine aortic arch. Origins of the arch vessels are unremarkable.  Bilateral common, internal, and external carotid arteries are patent without evidence of significant stenosis, unchanged.  Again seen is medial retropharyngeal course of the right ICA.  Bilateral vertebral arteries evident unremarkable origins and are patent bilaterally.  Enlarged thyroid with mild substernal extension.  Right IJ central line with tip in appropriate position.    IMPRESSION:  PERFUSION:  Small volume mismatch defect of 18 cc corresponding to the region of MCA territory stroke visualized on CT head. There is no associated core infarct.    CTA HEAD:  Redemonstration of superior left MCA M2 segment occlusion versus severe stenosis ((key image 1) with minimal opacification/distal reconstitution of its distal branches.    CTA NECK:  No evidence of greater than 50% carotid or vertebral artery stenosis. Unchanged examination.          Heparin gtt D/C. Code NI alert called. After BP augmentation and 500cc NS bolus, NIHSS 14 --> 9 with marked improvement in dysarthria and aphasia, some effort AG RUE, and able to lift AG RLE with drift, spoke with Dr. Sla, NCC Attending. Code NI cancelled. Not a candidate for tPA 2/2 evolving Left MCA territory infarct. Continue to augment -150. Continue to monitor.         Crystal An, [facility]-BC  [phone]

## 2022-04-12 NOTE — STROKE CODE NOTE - NIH STROKE SCALE: 9. BEST LANGUAGE, QM
(2) Severe aphasia; all communication is through fragmentary expression; great need for inference, questioning, and guessing by the listener. Range of information that can be exchanged is limited; listener carries burden of communication. Examiner cannot identify materials provided from patient response.
(0) No aphasia; normal
(2) Severe aphasia; all communication is through fragmentary expression; great need for inference, questioning, and guessing by the listener. Range of information that can be exchanged is limited; listener carries burden of communication. Examiner cannot identify materials provided from patient response.

## 2022-04-12 NOTE — CONSULT NOTE ADULT - SUBJECTIVE AND OBJECTIVE BOX
Patient is a 76y old  Female who presents with a chief complaint of right m2 occlusion (12 Apr 2022 05:44)      HPI:   75 yo female w/ PMH of HTN, hypothyroidism presents after fall this afternoon. At 16:30, started having R sided arm and leg weakness, and difficulty finding words.  Does not recall if sxs started before or after fall but was bending over, fell over, denies head trauma, LOC, blood thinners. Has vomited a couple times. Denies pain anywhere. Stroke code called upon presentation. CTA showed a left M2 occlusion. Patient received tPA but neurologically declined shortly after. CODE NI actual called and patient taken for emergent thrombectomy and to be transferred to NCCU following procedure.  (10 Apr 2022 22:53)    Patient seen in medical ICU today.  Patient is well known to me since 2019. 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 She is in complete remission since 2019.  Now, she is admitted with acute CVA as mentioned above and also she has DVT of right peroneal vein and a branch of the  right posterior tibial vein.   Today, she is alert, oriented, comfortable. She still has slurred speech with difficulty in word recognition. She has right lower leg weakness and numbness of the right arm and right leg.       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 1000 milliLiter(s) (100 mL/Hr) IV Continuous <Continuous>  dextrose 5%. 1000 milliLiter(s) (50 mL/Hr) IV Continuous <Continuous>  dextrose 50% Injectable 25 Gram(s) IV Push once  dextrose 50% Injectable 12.5 Gram(s) IV Push once  dextrose 50% Injectable 25 Gram(s) IV Push once  famotidine  Oral Tab/Cap - Peds 40 milliGRAM(s) Oral at bedtime  glucagon  Injectable 1 milliGRAM(s) IntraMuscular once  heparin  Infusion 1200 Unit(s)/Hr (12 mL/Hr) IV Continuous <Continuous>  insulin lispro (ADMELOG) corrective regimen sliding scale   SubCutaneous every 6 hours  levothyroxine 112 MICROGram(s) Oral daily  norepinephrine Infusion 0.05 MICROgram(s)/kG/Min (10.1 mL/Hr) IV Continuous <Continuous>  phenylephrine    Infusion 0.1 MICROgram(s)/kG/Min (2.02 mL/Hr) IV Continuous <Continuous>  polyethylene glycol 3350 17 Gram(s) Oral two times a day  senna 2 Tablet(s) Oral at bedtime  sodium chloride 0.9%. 1000 milliLiter(s) (100 mL/Hr) IV Continuous <Continuous>    MEDICATIONS  (PRN):  dextrose Oral Gel 15 Gram(s) Oral once PRN Blood Glucose LESS THAN 70 milliGRAM(s)/deciliter      Allergies    adhesives (Blisters; Rash)  copper (Hives; Rash)  gold (Hives; Rash)  nickel (Hives; Rash)  silver (Hives; Rash)  Sulfacet-R (Rash)    Intolerances        Vital Signs Last 24 Hrs  T(C): 36.8 (12 Apr 2022 08:00), Max: 37.7 (11 Apr 2022 20:00)  T(F): 98.2 (12 Apr 2022 08:00), Max: 99.8 (11 Apr 2022 20:00)  HR: 70 (12 Apr 2022 10:45) (70 - 92)  BP: 131/49 (12 Apr 2022 10:45) (106/49 - 166/66)  BP(mean): 73 (12 Apr 2022 10:45) (55 - 123)  RR: 25 (12 Apr 2022 10:45) (9 - 39)  SpO2: 100% (12 Apr 2022 10:45) (97% - 100%)    PHYSICAL EXAM  General: adult in NAD  HEENT: clear oropharynx, anicteric sclera, pink conjunctiva  Neck: supple  CV: normal S1/S2 with no murmur rubs or gallops  Lungs: positive air movement b/l ant lungs,clear to auscultation, no wheezes, no rales  Abdomen: soft non-tender non-distended, no hepatosplenomegaly  Ext: Weakness,  numbness right arm and right leg  Skin: no rashes and no petechiae  Neuro: alert and oriented slurred speech      LABS:                          10.2   7.00  )-----------( 195      ( 12 Apr 2022 05:00 )             31.2         Mean Cell Volume : 82.3 fL  Mean Cell Hemoglobin : 26.9 pg  Mean Cell Hemoglobin Concentration : 32.7 g/dL  Auto Neutrophil # : x  Auto Lymphocyte # : x  Auto Monocyte # : x  Auto Eosinophil # : x  Auto Basophil # : x  Auto Neutrophil % : x  Auto Lymphocyte % : x  Auto Monocyte % : x  Auto Eosinophil % : x  Auto Basophil % : x      Serial CBC's  04-12 @ 05:00  Hct-31.2 / Hgb-10.2 / Plat-195 / RBC-3.79 / WBC-7.00  Serial CBC's  04-11 @ 01:00  Hct-36.1 / Hgb-11.7 / Plat-212 / RBC-4.41 / WBC-7.99  Serial CBC's  04-10 @ 20:15  Hct-36.7 / Hgb-11.9 / Plat-205 / RBC-4.53 / WBC-6.59      04-12    143  |  110  |  11  ----------------------------<  216<H>  4.1   |  21  |  0.8    Ca    8.5      12 Apr 2022 05:00  Phos  3.0     04-12  Mg     1.9     04-12    TPro  5.1<L>  /  Alb  3.8  /  TBili  0.8  /  DBili  x   /  AST  10  /  ALT  12  /  AlkPhos  71  04-12      PT/INR - ( 10 Apr 2022 20:15 )   PT: 11.80 sec;   INR: 1.03 ratio         PTT - ( 10 Apr 2022 20:15 )  PTT:26.3 sec                BLOOD SMEAR INTERPRETATION:       RADIOLOGY & ADDITIONAL STUDIES:

## 2022-04-12 NOTE — SPEECH LANGUAGE PATHOLOGY EVALUATION - SLP PERTINENT HISTORY OF CURRENT PROBLEM
77 yo female w/ PMH of HTN, hypothyroidism presents after fall this afternoon. At 16:30, started having R sided arm and leg weakness, and difficulty finding words. Received tPA for a Left M2 occlusion, now admitted to NCCU following a thrombectomy.

## 2022-04-12 NOTE — PHYSICAL THERAPY INITIAL EVALUATION ADULT - LEVEL OF INDEPENDENCE: SIT/STAND, REHAB EVAL
Unable to proceed due to poor sitting balance; as per RN , pt remains on pressors ( Levophed)./unable to perform

## 2022-04-12 NOTE — STROKE CODE NOTE - NIH STROKE SCALE: 6B. MOTOR LEG, RIGHT, QM
(0) No drift; leg holds 30 degree position for full 5 secs
(3) No effort against gravity; leg falls to bed immediately
(2) Some effort against gravity; leg falls to bed by 5 secs, but has some effort against gravity

## 2022-04-12 NOTE — STROKE CODE NOTE - NIH STROKE SCALE: 11. EXTINCTION AND INATTENTION, QM
(0) No abnormality
(1) Visual, tactile, auditory, spatial, or personal inattention or extinction to bilateral simultaneous stimulation in one of the sensory modalities
(0) No abnormality

## 2022-04-12 NOTE — PROGRESS NOTE ADULT - ASSESSMENT
75 yo female w/ PMH of HTN, hypothyroidism presents after fall this afternoon. At 16:30, started having R sided arm and leg weakness, and difficulty finding words. Received tPA for a Left M2 occlusion, now admitted to NCCU following a thrombectomy.    left M2 occlusion     Plan:    Neurological:  - Neuro Checks Q1  - Daily Statin  - Repeat CT head tonight--> if no hemorrhagic transformation, will add Lovenox prophylactic dose and aspirin  - Stroke neurology following  - PT/OT  - Stroke labs: TSH, Lipid profile, a1c ordered    Cardiovascular:  - SBP goal: 130 - 150  - Normovolemia; maintain a BP between 130 and 150 mmHg with levophed, stop phenylephrine of now  - Echo  - EKG: NSR as of now    Pulmonary:  - HOB 45  - Aspiration precautions    GI:  - SLP  - Diet: PO with regular diet  - Bowel Regimen: Senna/Mirilax     :  - Strict Is/Os    Electrolytes:  - Replete as needed.  - Sodium Goal: Normonatremia  - Mg > 2  - POCT, ISS,  -180    ID:  - Trend Fever curve and WBC  - Normothermia    Hematology:  - SCDs for now; might add later at night Lovenox and aspirin  77 yo female w/ PMH of HTN, hypothyroidism presents after fall this afternoon. At 16:30, started having R sided arm and leg weakness, and difficulty finding words. Received tPA for a Left M2 occlusion, now admitted to NCCU following a thrombectomy.    left M2 occlusion     Plan:    Neurological:  - Neuro Checks Q1  - Daily Statin  - Repeat CT head tonight--> left perisilvian infarct  - Stroke neurology following  - PT/OT  - Stroke labs: completed  - repeat Ct head when ptt is therapeutic on heparin    Cardiovascular:  - SBP goal: 120 - 150  - Normovolemia; maintain a BP between 130 and 150 mmHg with levophed  - Echo completed  - EKG: NSR as of now    Pulmonary:  - HOB 45  - Aspiration precautions    GI:  - Diabetic diet  - Bowel Regimen: Senna/Mirilax   - Mg citrate    :  - Strict Is/Os    Electrolytes:  - Replete as needed.  - Sodium Goal: Normonatremia  - Mg > 2  - POCT, ISS,  -180    ID:  - Trend Fever curve and WBC  - Normothermia    Hematology:  - HOLD AP  - Heparin drip - goal ptt 50 - 60    77 yo female w/ PMH of HTN, hypothyroidism presents after fall this afternoon. At 16:30, started having R sided arm and leg weakness, and difficulty finding words. Received tPA for a Left M2 occlusion, now admitted to NCCU following a thrombectomy.    left M2 occlusion     Plan:    Neurological:  - Neuro Checks Q1  - Daily Statin  - Repeat CT head tonight--> left perisilvian infarct  - Stroke neurology following  - PT/OT  - Stroke labs: completed  - repeat Ct head when ptt is therapeutic on heparin    Cardiovascular:  - SBP goal: 120 - 150  - Normovolemia  - Echo completed  - EKG: NSR as of now    Pulmonary:  - HOB 45  - Aspiration precautions    GI:  - Diabetic diet  - Bowel Regimen: Senna/Mirilax   - Mg citrate    :  - Strict Is/Os    Electrolytes:  - Replete as needed.  - Sodium Goal: Normonatremia  - Mg > 2  - POCT, ISS,  -180    ID:  - Trend Fever curve and WBC  - Normothermia    Hematology:  - HOLD AP  - Heparin drip - goal ptt 50 - 60

## 2022-04-12 NOTE — STROKE CODE NOTE - NIH STROKE SCALE: 1B. LOC QUESTIONS, QM
(0) Answers both questions correctly
(0) Answers both questions correctly
(2) Answers neither question correctly

## 2022-04-12 NOTE — STROKE CODE NOTE - NSSTROKETPAEXCLABS_HEADCT
Head CT suggesting an established acute cerebral infarction as evidenced by elroy hypodensity, regardless of size

## 2022-04-12 NOTE — STROKE CODE NOTE - CT READING
Mildly increased left frontotemporoparietal hypodensity consistent with evolving Left MCA territory CVA
No acute findings

## 2022-04-12 NOTE — CONSULT NOTE ADULT - ASSESSMENT
Acute left CVA, S/P thrombectomy with persistent weakness of right side and slurred speech.  Acute DVT of peroneal and tibial vein.  History of hypertension  History of hypothyroidism    As per neurology.  Patient can be started on anti coagulation for DVT of right leg.  Close ICU follow up.

## 2022-04-12 NOTE — PHYSICAL THERAPY INITIAL EVALUATION ADULT - PERTINENT HX OF CURRENT PROBLEM, REHAB EVAL
77 yo female w/ PMH of HTN, hypothyroidism presents after fall this afternoon. At 16:30, started having R sided arm and leg weakness, and difficulty finding words.  Does not recall if sxs started before or after fall but was bending over, fell over, denies head trauma, LOC, blood thinners.CTA showed a left M2 occlusion. Patient received tPA but neurologically declined shortly after. CODE NI actual called and patient taken for emergent thrombectomy, Pt. referred to PT for eval and tx.

## 2022-04-12 NOTE — STROKE CODE NOTE - NIH STROKE SCALE: 5B. MOTOR ARM, RIGHT, QM
(1) Drift; limb holds 90 (or 45) degrees, but drifts down before full 10 secs; does not hit bed or other support
(3) No effort against gravity; limb falls
(3) No effort against gravity; limb falls

## 2022-04-12 NOTE — STROKE CODE NOTE - NIH STROKE SCALE: 4. FACIAL PALSY, QM
(1) Minor paralysis (flattened nasolabial fold, asymmetry on smiling)
(0) Normal symmetrical movements
(1) Minor paralysis (flattened nasolabial fold, asymmetry on smiling)

## 2022-04-13 LAB
ALBUMIN SERPL ELPH-MCNC: 3.5 G/DL — SIGNIFICANT CHANGE UP (ref 3.5–5.2)
ALP SERPL-CCNC: 66 U/L — SIGNIFICANT CHANGE UP (ref 30–115)
ALT FLD-CCNC: 11 U/L — SIGNIFICANT CHANGE UP (ref 0–41)
ANION GAP SERPL CALC-SCNC: 9 MMOL/L — SIGNIFICANT CHANGE UP (ref 7–14)
APTT BLD: 29.8 SEC — SIGNIFICANT CHANGE UP (ref 27–39.2)
APTT BLD: 46 SEC — HIGH (ref 27–39.2)
APTT BLD: 63.6 SEC — HIGH (ref 27–39.2)
AST SERPL-CCNC: 11 U/L — SIGNIFICANT CHANGE UP (ref 0–41)
BASOPHILS # BLD AUTO: 0.03 K/UL — SIGNIFICANT CHANGE UP (ref 0–0.2)
BASOPHILS NFR BLD AUTO: 0.5 % — SIGNIFICANT CHANGE UP (ref 0–1)
BILIRUB SERPL-MCNC: 0.8 MG/DL — SIGNIFICANT CHANGE UP (ref 0.2–1.2)
BUN SERPL-MCNC: 10 MG/DL — SIGNIFICANT CHANGE UP (ref 10–20)
CALCIUM SERPL-MCNC: 8.5 MG/DL — SIGNIFICANT CHANGE UP (ref 8.5–10.1)
CHLORIDE SERPL-SCNC: 110 MMOL/L — SIGNIFICANT CHANGE UP (ref 98–110)
CO2 SERPL-SCNC: 22 MMOL/L — SIGNIFICANT CHANGE UP (ref 17–32)
CREAT SERPL-MCNC: 0.8 MG/DL — SIGNIFICANT CHANGE UP (ref 0.7–1.5)
EGFR: 77 ML/MIN/1.73M2 — SIGNIFICANT CHANGE UP
EOSINOPHIL # BLD AUTO: 0.19 K/UL — SIGNIFICANT CHANGE UP (ref 0–0.7)
EOSINOPHIL NFR BLD AUTO: 2.9 % — SIGNIFICANT CHANGE UP (ref 0–8)
GLUCOSE BLDC GLUCOMTR-MCNC: 127 MG/DL — HIGH (ref 70–99)
GLUCOSE BLDC GLUCOMTR-MCNC: 131 MG/DL — HIGH (ref 70–99)
GLUCOSE BLDC GLUCOMTR-MCNC: 138 MG/DL — HIGH (ref 70–99)
GLUCOSE BLDC GLUCOMTR-MCNC: 151 MG/DL — HIGH (ref 70–99)
GLUCOSE BLDC GLUCOMTR-MCNC: 172 MG/DL — HIGH (ref 70–99)
GLUCOSE BLDC GLUCOMTR-MCNC: 241 MG/DL — HIGH (ref 70–99)
GLUCOSE SERPL-MCNC: 147 MG/DL — HIGH (ref 70–99)
HCT VFR BLD CALC: 29.5 % — LOW (ref 37–47)
HGB BLD-MCNC: 9.3 G/DL — LOW (ref 12–16)
IMM GRANULOCYTES NFR BLD AUTO: 0.6 % — HIGH (ref 0.1–0.3)
INR BLD: 1.03 RATIO — SIGNIFICANT CHANGE UP (ref 0.65–1.3)
LYMPHOCYTES # BLD AUTO: 1.65 K/UL — SIGNIFICANT CHANGE UP (ref 1.2–3.4)
LYMPHOCYTES # BLD AUTO: 24.8 % — SIGNIFICANT CHANGE UP (ref 20.5–51.1)
MAGNESIUM SERPL-MCNC: 2.2 MG/DL — SIGNIFICANT CHANGE UP (ref 1.8–2.4)
MCHC RBC-ENTMCNC: 26.3 PG — LOW (ref 27–31)
MCHC RBC-ENTMCNC: 31.5 G/DL — LOW (ref 32–37)
MCV RBC AUTO: 83.3 FL — SIGNIFICANT CHANGE UP (ref 81–99)
MONOCYTES # BLD AUTO: 0.45 K/UL — SIGNIFICANT CHANGE UP (ref 0.1–0.6)
MONOCYTES NFR BLD AUTO: 6.8 % — SIGNIFICANT CHANGE UP (ref 1.7–9.3)
NEUTROPHILS # BLD AUTO: 4.3 K/UL — SIGNIFICANT CHANGE UP (ref 1.4–6.5)
NEUTROPHILS NFR BLD AUTO: 64.4 % — SIGNIFICANT CHANGE UP (ref 42.2–75.2)
NRBC # BLD: 0 /100 WBCS — SIGNIFICANT CHANGE UP (ref 0–0)
PHOSPHATE SERPL-MCNC: 2.8 MG/DL — SIGNIFICANT CHANGE UP (ref 2.1–4.9)
PLATELET # BLD AUTO: 188 K/UL — SIGNIFICANT CHANGE UP (ref 130–400)
POTASSIUM SERPL-MCNC: 4.2 MMOL/L — SIGNIFICANT CHANGE UP (ref 3.5–5)
POTASSIUM SERPL-SCNC: 4.2 MMOL/L — SIGNIFICANT CHANGE UP (ref 3.5–5)
PROT SERPL-MCNC: 4.9 G/DL — LOW (ref 6–8)
PROTHROM AB SERPL-ACNC: 11.8 SEC — SIGNIFICANT CHANGE UP (ref 9.95–12.87)
RBC # BLD: 3.54 M/UL — LOW (ref 4.2–5.4)
RBC # FLD: 13.9 % — SIGNIFICANT CHANGE UP (ref 11.5–14.5)
SODIUM SERPL-SCNC: 141 MMOL/L — SIGNIFICANT CHANGE UP (ref 135–146)
WBC # BLD: 6.66 K/UL — SIGNIFICANT CHANGE UP (ref 4.8–10.8)
WBC # FLD AUTO: 6.66 K/UL — SIGNIFICANT CHANGE UP (ref 4.8–10.8)

## 2022-04-13 PROCEDURE — 99291 CRITICAL CARE FIRST HOUR: CPT

## 2022-04-13 PROCEDURE — 70553 MRI BRAIN STEM W/O & W/DYE: CPT | Mod: 26

## 2022-04-13 RX ORDER — SODIUM CHLORIDE 9 MG/ML
1000 INJECTION INTRAMUSCULAR; INTRAVENOUS; SUBCUTANEOUS ONCE
Refills: 0 | Status: COMPLETED | OUTPATIENT
Start: 2022-04-13 | End: 2022-04-13

## 2022-04-13 RX ORDER — HEPARIN SODIUM 5000 [USP'U]/ML
1400 INJECTION INTRAVENOUS; SUBCUTANEOUS
Qty: 25000 | Refills: 0 | Status: DISCONTINUED | OUTPATIENT
Start: 2022-04-13 | End: 2022-04-14

## 2022-04-13 RX ADMIN — Medication 2: at 17:22

## 2022-04-13 RX ADMIN — Medication 4: at 11:05

## 2022-04-13 RX ADMIN — CHLORHEXIDINE GLUCONATE 1 APPLICATION(S): 213 SOLUTION TOPICAL at 06:52

## 2022-04-13 RX ADMIN — FAMOTIDINE 40 MILLIGRAM(S): 10 INJECTION INTRAVENOUS at 21:13

## 2022-04-13 RX ADMIN — ATORVASTATIN CALCIUM 80 MILLIGRAM(S): 80 TABLET, FILM COATED ORAL at 21:13

## 2022-04-13 RX ADMIN — POLYETHYLENE GLYCOL 3350 17 GRAM(S): 17 POWDER, FOR SOLUTION ORAL at 05:38

## 2022-04-13 RX ADMIN — SODIUM CHLORIDE 1000 MILLILITER(S): 9 INJECTION INTRAMUSCULAR; INTRAVENOUS; SUBCUTANEOUS at 13:41

## 2022-04-13 RX ADMIN — HEPARIN SODIUM 14 UNIT(S)/HR: 5000 INJECTION INTRAVENOUS; SUBCUTANEOUS at 10:54

## 2022-04-13 RX ADMIN — Medication 112 MICROGRAM(S): at 05:37

## 2022-04-13 RX ADMIN — Medication 1 MILLIGRAM(S): at 18:44

## 2022-04-13 RX ADMIN — Medication 2: at 01:24

## 2022-04-13 RX ADMIN — ATORVASTATIN CALCIUM 80 MILLIGRAM(S): 80 TABLET, FILM COATED ORAL at 01:23

## 2022-04-13 RX ADMIN — Medication 10.1 MICROGRAM(S)/KG/MIN: at 07:46

## 2022-04-13 RX ADMIN — SODIUM CHLORIDE 100 MILLILITER(S): 9 INJECTION INTRAMUSCULAR; INTRAVENOUS; SUBCUTANEOUS at 07:46

## 2022-04-13 NOTE — PROGRESS NOTE ADULT - ASSESSMENT
Imp: rehab of left CVA / right hemiparesis, weakness improving / dysarthria   Plan: continue bedside PT/OT/Speech as tolerated          will follow

## 2022-04-13 NOTE — PROGRESS NOTE ADULT - SUBJECTIVE AND OBJECTIVE BOX
SUMMARY:    75 yo female w/ PMH of HTN, hypothyroidism, splenic CA s/p chemo, presents after fall this afternoon. At 16:30, started having R sided arm and leg weakness, and difficulty finding words.  Does not recall if sxs started before or after fall but was bending over, fell over, denies head trauma, LOC, blood thinners. Has vomited a couple times. Denies pain anywhere. Stroke code called upon presentation. CTA showed a left M2 occlusion. Patient received tPA but neurologically declined shortly after. CODE NI actual called and patient taken for emergent thrombectomy and to be transferred to [facility] following procedure.  (10 Apr 2022 22:53)    OVERNIGHT EVENTS: Stroke code activated for worsening NIHSS 4 --> 14, with severe dysarthria, severe aphasia, and right hemiplegia. CTH revealed evolving left MCA infarct. CTP revealed 18cc penumbra with no core infarct. Symptoms improved with BP augmentation and 500cc NS bolus. Remains on Levophed for BP augmentation    ALLERGIES: Allergies    adhesives (Blisters; Rash)  copper (Hives; Rash)  gold (Hives; Rash)  nickel (Hives; Rash)  silver (Hives; Rash)  Sulfacet-R (Rash)    ADMISSION SCORES:   GCS: 15    NIHSS: 7, unchanged today    REVIEW OF SYSTEMS: Negative except for that of HPI    VITALS: [x] Reviewed    IMAGING/DATA: [x] Reviewed    EXAMINATION:  General: No acute distress  HEENT: Anicteric sclerae  Cardiac: [id number]  Lungs: Clear  Abdomen: Soft, non-tender, +BS  Extremities: No c/c/e  Skin/Incision Site: Clean, dry and intact, RUE wound from possible IV infiltration, superficial layer of the dermis sloughed off but otherwise red and moist  Neurologic: Awake, alert, fully oriented, follows commands, PERRL, VFFtc, EOMI, right facial, dysarthric but improved, tongue midline, full strength. RUE 4-/5. LUE 5/5, B/l LE AG 3/5    DEVICES:   [] Restraints [] PIVs [] ET tube [] central line [] PICC [] arterial line [] turner [] NGT/OGT [] EVD [] LD [] SANCHEZ/HMV [] LiCOX [] ICP monitor [] Trach [] PEG [] Chest Tube [] other:    ICU Vital Signs Last 24 Hrs  T(C): 36.9 (13 Apr 2022 04:00), Max: 38.2 (12 Apr 2022 18:30)  T(F): 98.5 (13 Apr 2022 04:00), Max: 100.7 (12 Apr 2022 18:30)  HR: 78 (13 Apr 2022 05:30) (64 - 98)  BP: 105/57 (13 Apr 2022 05:30) (105/57 - 165/56)  BP(mean): 83 (13 Apr 2022 05:30) (63 - 127)  ABP: --  ABP(mean): --  RR: 24 (13 Apr 2022 05:30) (16 - 40)  SpO2: 99% (13 Apr 2022 05:30) (83% - 100%)      04-11-22 @ 07:01  -  04-12-22 @ 07:00  --------------------------------------------------------  IN: 3671.3 mL / OUT: 1925 mL / NET: 1746.3 mL    04-12-22 @ 07:01  -  04-13-22 @ 05:35  --------------------------------------------------------  IN: 2518.9 mL / OUT: 1300 mL / NET: 1218.9 mL            acetaminophen     Tablet .. 650 milliGRAM(s) Oral every 6 hours PRN  atorvastatin 80 milliGRAM(s) Oral at bedtime  chlorhexidine 4% Liquid 1 Application(s) Topical <User Schedule>  dextrose 5%. 1000 milliLiter(s) (50 mL/Hr) IV Continuous <Continuous>  dextrose 5%. 1000 milliLiter(s) (100 mL/Hr) IV Continuous <Continuous>  dextrose 50% Injectable 25 Gram(s) IV Push once  dextrose 50% Injectable 12.5 Gram(s) IV Push once  dextrose 50% Injectable 25 Gram(s) IV Push once  dextrose Oral Gel 15 Gram(s) Oral once PRN  famotidine  Oral Tab/Cap - Peds 40 milliGRAM(s) Oral at bedtime  glucagon  Injectable 1 milliGRAM(s) IntraMuscular once  insulin lispro (ADMELOG) corrective regimen sliding scale   SubCutaneous every 6 hours  levothyroxine 112 MICROGram(s) Oral daily  norepinephrine Infusion 0.05 MICROgram(s)/kG/Min (10.1 mL/Hr) IV Continuous <Continuous>  phenylephrine    Infusion 0.1 MICROgram(s)/kG/Min (2.02 mL/Hr) IV Continuous <Continuous>  polyethylene glycol 3350 17 Gram(s) Oral two times a day  senna 2 Tablet(s) Oral at bedtime  sodium chloride 0.9% Bolus 500 milliLiter(s) IV Bolus once  sodium chloride 0.9%. 1000 milliLiter(s) (100 mL/Hr) IV Continuous <Continuous>      LABS:  Na: 143 (04-12 @ 05:00), 141 (04-11 @ 19:30), 136 (04-11 @ 01:00), 135 (04-10 @ 20:15)  K: 4.1 (04-12 @ 05:00), 4.5 (04-11 @ 19:30), 5.4 (04-11 @ 01:00), 4.5 (04-10 @ 20:15)  Cl: 110 (04-12 @ 05:00), 108 (04-11 @ 19:30), 100 (04-11 @ 01:00), 99 (04-10 @ 20:15)  CO2: 21 (04-12 @ 05:00), 22 (04-11 @ 19:30), 24 (04-11 @ 01:00), 25 (04-10 @ 20:15)  BUN: 11 (04-12 @ 05:00), 14 (04-11 @ 19:30), 18 (04-11 @ 01:00), 17 (04-10 @ 20:15)  Cr: 0.8 (04-12 @ 05:00), 0.9 (04-11 @ 19:30), 0.9 (04-11 @ 01:00), 0.9 (04-10 @ 20:15)  Glu: 216(04-12 @ 05:00), 160(04-11 @ 19:30), 244(04-11 @ 01:00), 192(04-10 @ 20:15)    Hgb: 10.2 (04-12 @ 05:00), 11.7 (04-11 @ 01:00), 11.9 (04-10 @ 20:15)  Hct: 31.2 (04-12 @ 05:00), 36.1 (04-11 @ 01:00), 36.7 (04-10 @ 20:15)  WBC: 7.00 (04-12 @ 05:00), 7.99 (04-11 @ 01:00), 6.59 (04-10 @ 20:15)  Plt: 195 (04-12 @ 05:00), 212 (04-11 @ 01:00), 205 (04-10 @ 20:15)    INR: 1.04 04-12-22 @ 16:00, 1.03 04-10-22 @ 20:15  PTT: 40.0 04-12-22 @ 16:00, 26.3 04-10-22 @ 20:15          LIVER FUNCTIONS - ( 12 Apr 2022 05:00 )  Alb: 3.8 g/dL / Pro: 5.1 g/dL / ALK PHOS: 71 U/L / ALT: 12 U/L / AST: 10 U/L / GGT: x                SUMMARY:    77 yo female w/ PMH of HTN, hypothyroidism, splenic CA s/p chemo, presents after fall this afternoon. At 16:30, started having R sided arm and leg weakness, and difficulty finding words.  Does not recall if sxs started before or after fall but was bending over, fell over, denies head trauma, LOC, blood thinners. Has vomited a couple times. Denies pain anywhere. Stroke code called upon presentation. CTA showed a left M2 occlusion. Patient received tPA but neurologically declined shortly after. CODE NI actual called and patient taken for emergent thrombectomy and to be transferred to [facility] following procedure.  (10 Apr 2022 22:53)    OVERNIGHT EVENTS: Stroke code activated for worsening NIHSS 4 --> 14, with severe dysarthria, severe aphasia, and right hemiplegia. CTH revealed evolving left MCA infarct. CTP revealed 18cc penumbra with no core infarct. Symptoms improved with BP augmentation and 500cc NS bolus. Remains on Levophed for BP augmentation    ALLERGIES: Allergies    adhesives (Blisters; Rash)  copper (Hives; Rash)  gold (Hives; Rash)  nickel (Hives; Rash)  silver (Hives; Rash)  Sulfacet-R (Rash)    ADMISSION SCORES:   GCS: 15    NIHSS: 7, unchanged today    REVIEW OF SYSTEMS: Negative except for that of HPI    VITALS: [x] Reviewed    IMAGING/DATA: [x] Reviewed    EXAMINATION:  General: No acute distress  HEENT: Anicteric sclerae  Cardiac: [id number]  Lungs: Clear  Abdomen: Soft, non-tender, +BS  Extremities: No c/c/e  Skin/Incision Site: Clean, dry and intact, RUE wound from possible IV infiltration, superficial layer of the dermis sloughed off but otherwise red and moist  Neurologic: Awake, alert, fully oriented, follows commands, some expressive aphasia, PERRL, VFFtc, EOMI, right facial, dysarthric but improved, tongue midline, full strength. RUE 4-/5. LUE 5/5, B/l LE AG 3/5          ICU Vital Signs Last 24 Hrs  T(C): 36.2 (13 Apr 2022 08:00), Max: 38.2 (12 Apr 2022 18:30)  T(F): 97.1 (13 Apr 2022 08:00), Max: 100.7 (12 Apr 2022 18:30)  HR: 84 (13 Apr 2022 08:00) (64 - 98)  BP: 144/74 (13 Apr 2022 08:00) (105/57 - 159/69)  BP(mean): 96 (13 Apr 2022 08:00) (63 - 127)  ABP: --  ABP(mean): --  RR: 29 (13 Apr 2022 08:00) (11 - 40)  SpO2: 98% (13 Apr 2022 08:00) (83% - 100%)      04-12-22 @ 07:01  -  04-13-22 @ 07:00  --------------------------------------------------------  IN: 2854.9 mL / OUT: 1900 mL / NET: 954.9 mL    04-13-22 @ 07:01  -  04-13-22 @ 08:33  --------------------------------------------------------  IN: 208 mL / OUT: 0 mL / NET: 208 mL            acetaminophen     Tablet .. 650 milliGRAM(s) Oral every 6 hours PRN  atorvastatin 80 milliGRAM(s) Oral at bedtime  chlorhexidine 4% Liquid 1 Application(s) Topical <User Schedule>  dextrose 5%. 1000 milliLiter(s) (100 mL/Hr) IV Continuous <Continuous>  dextrose 5%. 1000 milliLiter(s) (50 mL/Hr) IV Continuous <Continuous>  dextrose 50% Injectable 25 Gram(s) IV Push once  dextrose 50% Injectable 12.5 Gram(s) IV Push once  dextrose 50% Injectable 25 Gram(s) IV Push once  dextrose Oral Gel 15 Gram(s) Oral once PRN  famotidine  Oral Tab/Cap - Peds 40 milliGRAM(s) Oral at bedtime  glucagon  Injectable 1 milliGRAM(s) IntraMuscular once  insulin lispro (ADMELOG) corrective regimen sliding scale   SubCutaneous every 6 hours  levothyroxine 112 MICROGram(s) Oral daily  norepinephrine Infusion 0.05 MICROgram(s)/kG/Min (10.1 mL/Hr) IV Continuous <Continuous>  phenylephrine    Infusion 0.1 MICROgram(s)/kG/Min (2.02 mL/Hr) IV Continuous <Continuous>  polyethylene glycol 3350 17 Gram(s) Oral two times a day  senna 2 Tablet(s) Oral at bedtime  sodium chloride 0.9% Bolus 500 milliLiter(s) IV Bolus once  sodium chloride 0.9%. 1000 milliLiter(s) (100 mL/Hr) IV Continuous <Continuous>      LABS:  Na: 141 (04-13 @ 06:00), 143 (04-12 @ 05:00), 141 (04-11 @ 19:30), 136 (04-11 @ 01:00), 135 (04-10 @ 20:15)  K: 4.2 (04-13 @ 06:00), 4.1 (04-12 @ 05:00), 4.5 (04-11 @ 19:30), 5.4 (04-11 @ 01:00), 4.5 (04-10 @ 20:15)  Cl: 110 (04-13 @ 06:00), 110 (04-12 @ 05:00), 108 (04-11 @ 19:30), 100 (04-11 @ 01:00), 99 (04-10 @ 20:15)  CO2: 22 (04-13 @ 06:00), 21 (04-12 @ 05:00), 22 (04-11 @ 19:30), 24 (04-11 @ 01:00), 25 (04-10 @ 20:15)  BUN: 10 (04-13 @ 06:00), 11 (04-12 @ 05:00), 14 (04-11 @ 19:30), 18 (04-11 @ 01:00), 17 (04-10 @ 20:15)  Cr: 0.8 (04-13 @ 06:00), 0.8 (04-12 @ 05:00), 0.9 (04-11 @ 19:30), 0.9 (04-11 @ 01:00), 0.9 (04-10 @ 20:15)  Glu: 147(04-13 @ 06:00), 216(04-12 @ 05:00), 160(04-11 @ 19:30), 244(04-11 @ 01:00), 192(04-10 @ 20:15)    Hgb: 9.3 (04-13 @ 06:00), 10.2 (04-12 @ 05:00), 11.7 (04-11 @ 01:00), 11.9 (04-10 @ 20:15)  Hct: 29.5 (04-13 @ 06:00), 31.2 (04-12 @ 05:00), 36.1 (04-11 @ 01:00), 36.7 (04-10 @ 20:15)  WBC: 6.66 (04-13 @ 06:00), 7.00 (04-12 @ 05:00), 7.99 (04-11 @ 01:00), 6.59 (04-10 @ 20:15)  Plt: 188 (04-13 @ 06:00), 195 (04-12 @ 05:00), 212 (04-11 @ 01:00), 205 (04-10 @ 20:15)    INR: 1.03 04-13-22 @ 06:00, 1.04 04-12-22 @ 16:00, 1.03 04-10-22 @ 20:15  PTT: 29.8 04-13-22 @ 06:00, 40.0 04-12-22 @ 16:00, 26.3 04-10-22 @ 20:15          LIVER FUNCTIONS - ( 13 Apr 2022 06:00 )  Alb: 3.5 g/dL / Pro: 4.9 g/dL / ALK PHOS: 66 U/L / ALT: 11 U/L / AST: 11 U/L / GGT: x                      SUMMARY:    77 yo female w/ PMH of HTN, hypothyroidism, splenic CA s/p chemo, presents after fall this afternoon. At 16:30, started having R sided arm and leg weakness, and difficulty finding words.  Does not recall if sxs started before or after fall but was bending over, fell over, denies head trauma, LOC, blood thinners. Has vomited a couple times. Denies pain anywhere. Stroke code called upon presentation. CTA showed a left M2 occlusion. Patient received tPA but neurologically declined shortly after. CODE NI actual called and patient taken for emergent thrombectomy and to be transferred to [facility] following procedure.  (10 Apr 2022 22:53)    OVERNIGHT EVENTS: Stroke code activated for worsening NIHSS 4 --> 14, with severe dysarthria, severe aphasia, and right hemiplegia. CTH revealed evolving left MCA infarct. CTP revealed 18cc penumbra with no core infarct. Symptoms improved with BP augmentation and 500cc NS bolus. Remains on Levophed for BP augmentation    ALLERGIES: Allergies    adhesives (Blisters; Rash)  copper (Hives; Rash)  gold (Hives; Rash)  nickel (Hives; Rash)  silver (Hives; Rash)  Sulfacet-R (Rash)    ADMISSION SCORES:   GCS: 15    NIHSS: 7, unchanged today    REVIEW OF SYSTEMS: Negative except for that of HPI    VITALS: [x] Reviewed    IMAGING/DATA: [x] Reviewed    EXAMINATION:  General: No acute distress  HEENT: Anicteric sclerae  Cardiac: [id number]  Lungs: Clear  Abdomen: Soft, non-tender, +BS  Extremities: No c/c/e  Skin/Incision Site: Clean, dry and intact, RUE wound from possible IV infiltration, superficial layer of the dermis sloughed off but otherwise red and moist  Neurologic: Awake, alert, fully oriented, follows commands, naming intact, impaired repetition PERRL, VFFtc, EOMI, right facial, dysarthric but improved, tongue midline, full strength. RUE 4-/5. LUE 5/5, B/l LE AG 3/5          ICU Vital Signs Last 24 Hrs  T(C): 36.2 (13 Apr 2022 08:00), Max: 38.2 (12 Apr 2022 18:30)  T(F): 97.1 (13 Apr 2022 08:00), Max: 100.7 (12 Apr 2022 18:30)  HR: 84 (13 Apr 2022 08:00) (64 - 98)  BP: 144/74 (13 Apr 2022 08:00) (105/57 - 159/69)  BP(mean): 96 (13 Apr 2022 08:00) (63 - 127)  ABP: --  ABP(mean): --  RR: 29 (13 Apr 2022 08:00) (11 - 40)  SpO2: 98% (13 Apr 2022 08:00) (83% - 100%)      04-12-22 @ 07:01  -  04-13-22 @ 07:00  --------------------------------------------------------  IN: 2854.9 mL / OUT: 1900 mL / NET: 954.9 mL    04-13-22 @ 07:01  -  04-13-22 @ 08:33  --------------------------------------------------------  IN: 208 mL / OUT: 0 mL / NET: 208 mL            acetaminophen     Tablet .. 650 milliGRAM(s) Oral every 6 hours PRN  atorvastatin 80 milliGRAM(s) Oral at bedtime  chlorhexidine 4% Liquid 1 Application(s) Topical <User Schedule>  dextrose 5%. 1000 milliLiter(s) (100 mL/Hr) IV Continuous <Continuous>  dextrose 5%. 1000 milliLiter(s) (50 mL/Hr) IV Continuous <Continuous>  dextrose 50% Injectable 25 Gram(s) IV Push once  dextrose 50% Injectable 12.5 Gram(s) IV Push once  dextrose 50% Injectable 25 Gram(s) IV Push once  dextrose Oral Gel 15 Gram(s) Oral once PRN  famotidine  Oral Tab/Cap - Peds 40 milliGRAM(s) Oral at bedtime  glucagon  Injectable 1 milliGRAM(s) IntraMuscular once  insulin lispro (ADMELOG) corrective regimen sliding scale   SubCutaneous every 6 hours  levothyroxine 112 MICROGram(s) Oral daily  norepinephrine Infusion 0.05 MICROgram(s)/kG/Min (10.1 mL/Hr) IV Continuous <Continuous>  phenylephrine    Infusion 0.1 MICROgram(s)/kG/Min (2.02 mL/Hr) IV Continuous <Continuous>  polyethylene glycol 3350 17 Gram(s) Oral two times a day  senna 2 Tablet(s) Oral at bedtime  sodium chloride 0.9% Bolus 500 milliLiter(s) IV Bolus once  sodium chloride 0.9%. 1000 milliLiter(s) (100 mL/Hr) IV Continuous <Continuous>      LABS:  Na: 141 (04-13 @ 06:00), 143 (04-12 @ 05:00), 141 (04-11 @ 19:30), 136 (04-11 @ 01:00), 135 (04-10 @ 20:15)  K: 4.2 (04-13 @ 06:00), 4.1 (04-12 @ 05:00), 4.5 (04-11 @ 19:30), 5.4 (04-11 @ 01:00), 4.5 (04-10 @ 20:15)  Cl: 110 (04-13 @ 06:00), 110 (04-12 @ 05:00), 108 (04-11 @ 19:30), 100 (04-11 @ 01:00), 99 (04-10 @ 20:15)  CO2: 22 (04-13 @ 06:00), 21 (04-12 @ 05:00), 22 (04-11 @ 19:30), 24 (04-11 @ 01:00), 25 (04-10 @ 20:15)  BUN: 10 (04-13 @ 06:00), 11 (04-12 @ 05:00), 14 (04-11 @ 19:30), 18 (04-11 @ 01:00), 17 (04-10 @ 20:15)  Cr: 0.8 (04-13 @ 06:00), 0.8 (04-12 @ 05:00), 0.9 (04-11 @ 19:30), 0.9 (04-11 @ 01:00), 0.9 (04-10 @ 20:15)  Glu: 147(04-13 @ 06:00), 216(04-12 @ 05:00), 160(04-11 @ 19:30), 244(04-11 @ 01:00), 192(04-10 @ 20:15)    Hgb: 9.3 (04-13 @ 06:00), 10.2 (04-12 @ 05:00), 11.7 (04-11 @ 01:00), 11.9 (04-10 @ 20:15)  Hct: 29.5 (04-13 @ 06:00), 31.2 (04-12 @ 05:00), 36.1 (04-11 @ 01:00), 36.7 (04-10 @ 20:15)  WBC: 6.66 (04-13 @ 06:00), 7.00 (04-12 @ 05:00), 7.99 (04-11 @ 01:00), 6.59 (04-10 @ 20:15)  Plt: 188 (04-13 @ 06:00), 195 (04-12 @ 05:00), 212 (04-11 @ 01:00), 205 (04-10 @ 20:15)    INR: 1.03 04-13-22 @ 06:00, 1.04 04-12-22 @ 16:00, 1.03 04-10-22 @ 20:15  PTT: 29.8 04-13-22 @ 06:00, 40.0 04-12-22 @ 16:00, 26.3 04-10-22 @ 20:15          LIVER FUNCTIONS - ( 13 Apr 2022 06:00 )  Alb: 3.5 g/dL / Pro: 4.9 g/dL / ALK PHOS: 66 U/L / ALT: 11 U/L / AST: 11 U/L / GGT: x

## 2022-04-13 NOTE — PROGRESS NOTE ADULT - SUBJECTIVE AND OBJECTIVE BOX
Patient is a 76y old  Female who presents with a chief complaint of right m2 occlusion      HPI:   75 yo female w/ PMH of HTN, hypothyroidism presents after fall this afternoon. At 16:30, started having R sided arm and leg weakness, and difficulty finding words.  Does not recall if sxs started before or after fall but was bending over, fell over, denies head trauma, LOC, blood thinners. Has vomited a couple times. Denies pain anywhere. Stroke code called upon presentation. CTA showed a left M2 occlusion. Patient received tPA but neurologically declined shortly after. CODE NI actual called and patient taken for emergent thrombectomy and to be transferred to [facility]U following procedure.   Stroke code activated for worsening NIHSS 4 --> 14, with severe dysarthria, severe aphasia, and right hemiplegia. CTH revealed evolving left MCA infarct. CTP revealed 18cc penumbra with no core infarct. Symptoms improved with BP augmentation and 500cc NS bolus. Remains on Levophed for BP augmentation        PHYSICAL EXAM    Vital Signs Last 24 Hrs  T(C): 36.2 (13 Apr 2022 08:00), Max: 38.2 (12 Apr 2022 18:30)  T(F): 97.1 (13 Apr 2022 08:00), Max: 100.7 (12 Apr 2022 18:30)  HR: 80 (13 Apr 2022 11:00) (64 - 98)  BP: 139/59 (13 Apr 2022 11:00) (105/57 - 161/62)  BP(mean): 88 (13 Apr 2022 11:00) (63 - 127)  RR: 21 (13 Apr 2022 11:00) (11 - 40)  SpO2: 98% (13 Apr 2022 11:00) (83% - 100%)    Constitutional - NAD, + dysarthria  Chest - CTA  Cardiovascular - S1S2+  Abdomen -  Soft  Extremities -  No calf tenderness, 2-3/5 motor - right side   Function : bed mobility max A                 transfer unable    acetaminophen     Tablet .. 650 milliGRAM(s) Oral every 6 hours PRN  atorvastatin 80 milliGRAM(s) Oral at bedtime  chlorhexidine 4% Liquid 1 Application(s) Topical <User Schedule>  dextrose 5%. 1000 milliLiter(s) IV Continuous <Continuous>  dextrose 5%. 1000 milliLiter(s) IV Continuous <Continuous>  dextrose 50% Injectable 25 Gram(s) IV Push once  dextrose 50% Injectable 12.5 Gram(s) IV Push once  dextrose 50% Injectable 25 Gram(s) IV Push once  dextrose Oral Gel 15 Gram(s) Oral once PRN  famotidine  Oral Tab/Cap - Peds 40 milliGRAM(s) Oral at bedtime  glucagon  Injectable 1 milliGRAM(s) IntraMuscular once  heparin  Infusion 1400 Unit(s)/Hr IV Continuous <Continuous>  insulin lispro (ADMELOG) corrective regimen sliding scale   SubCutaneous every 6 hours  levothyroxine 112 MICROGram(s) Oral daily  norepinephrine Infusion 0.05 MICROgram(s)/kG/Min IV Continuous <Continuous>  phenylephrine    Infusion 0.1 MICROgram(s)/kG/Min IV Continuous <Continuous>  polyethylene glycol 3350 17 Gram(s) Oral two times a day  senna 2 Tablet(s) Oral at bedtime  sodium chloride 0.9%. 1000 milliLiter(s) IV Continuous <Continuous>      RECENT LABS/IMAGING                        9.3    6.66  )-----------( 188      ( 13 Apr 2022 06:00 )             29.5     04-13    141  |  110  |  10  ----------------------------<  147<H>  4.2   |  22  |  0.8    Ca    8.5      13 Apr 2022 06:00  Phos  2.8     04-13  Mg     2.2     04-13    TPro  4.9<L>  /  Alb  3.5  /  TBili  0.8  /  DBili  x   /  AST  11  /  ALT  11  /  AlkPhos  66  04-13    PT/INR - ( 13 Apr 2022 06:00 )   PT: 11.80 sec;   INR: 1.03 ratio         PTT - ( 13 Apr 2022 06:00 )  PTT:29.8 sec

## 2022-04-13 NOTE — ADVANCED PRACTICE NURSE CONSULT - ASSESSMENT
77 yo female w/ PMH of HTN, hypothyroidism presents (4/10) after fall this afternoon. At 16:30, started having R sided arm and leg weakness, and difficulty finding words.  Does not recall if sxs started before or after fall but was bending over, fell over, denies head trauma, LOC, blood thinners. Has vomited a couple times. Denies pain anywhere. Stroke code called upon presentation. CTA showed a left M2 occlusion. Patient received tPA but neurologically declined shortly after. CODE NI actual called and patient taken for emergent thrombectomy and to be transferred to NCCU following procedure.   Currently admitted to critical care/neuro ICU, today is hospital day-3d; in Neuro ICU, being managed for left M2 occlusion, RLE DVT.    Received patient in ICU, laying supine in bed, turned to right side (pillow under left side & underneath RUE elevating arm), HOB elevated 30 degrees. Pt awake, A&Ox3, primary RN Sumaya at bedside, both made aware of purpose of WOCN visit, agreeable to consult.     Xeroform, gauze & Kerlex dressing to RUE in place, dressing removed.     Type of wound: Partial thickness ulceration presenting as ruptured blister -s/p previous IV infiltration   Location: medial RUE  Wound measurements: ~5cm x 4cm x 0.1cm  Tunneling/Undermining: none  Wound bed: dark pink tissue   Wound edges: attached, flush, irregular   Periwound: edematous & with blanchable erythema   Wound exudate: none  Wound odor: none  Induration, crepitus, warmth: none   Wound pain: denies & no s/s pain present on assessment     With assistance from RN, turned patient to left side for sacral skin assessment.   Sacrum, coccyx & b/l buttock skin intact. Bilateral heels intact. No pressure injuries present.      Patient bedbound, very limited mobility in bed. Incontinent of urine and moderate amount of mushy stool during WOCN visit. Ordered for pureed diabetic diet, probably inadequate intake as per reported Bravo score.

## 2022-04-13 NOTE — PROGRESS NOTE ADULT - ASSESSMENT
PATIENT INSTRUCTIONS    Treatment:  Cast Care Instructions Cast Care English DO's of CAST CARE  Elevate your injured arm or leg above your heart by propping it up on pillows or some other support  Exercise the fingers or toes to decrease swelling and prevent stiffness.  Every hour, tighten the muscles in the arm or leg; hold to the count of 5 and relax.  Be sure to exercise the unaffected joint above and below the cast by bending and straightening the joint, if possible.  File down rough spots that develop with an emery board or wrap edge with moleskin.  To ease itching, blow cool air inside the cast with a hair dryer.  Check circulation by pressing on the nail bed.  The nail should turn pale when pressed, but normal color should return quickly when the pressure on the nail is removed.  If this does not happen, contact our office.  If fingers or toes remain cool/or numb, contact our office.  If your skin becomes red or raw around the cast, contact our office.  If your cast becomes cracked or develops soft spots, contact our office.  Keep dirt, sand, and powder away from the cast.  Weight Bearing Instructions: Non weight bearing     DON'Ts of CAST CARE  DO NOT get your cast wet!  Use a shower bag for bathing.  DO NOT insert anything such as coat , rulers, knitting needles, pens, ect. into the cast to relieve itching.  Instead, use the cool setting on a hair dryer to blow air into the cast.  DO NOT apply any powders or deodorants to itching skin.  If itching persists, contact our office.  DO NOT pull out the cast padding.  It protects your skin.  DO NOT break or trim the cast edges  Orthopaedic Department   [facility] [phone]       Follow-Up:  Please make an appointment with  Dr. Mayra Hameed in 3 weeks                  Time left: 6/28/2021 10:36 AM         Please note: 24 hour notice for cancellation of appointment is required.    You may receive a survey in the mail, or via the e-mail address  that you have provided.  We would appreciate if you could fill out the survey and provide us with any feedback on your experience regarding your visit today. Thank you for allowing us to provide you with your health care needs.     Do not hesitate to call if you are experiencing severe pain, worsening or change in your pain, have symptoms of infection (fever, warmth, redness, increased drainage), or have any other problem that concerns you ~ [phone] (or [phone] after hours).    Please remember when requesting refills on pain medication that the request should be made by Thursday at the latest. [facility] is open Monday-Friday, 8am-5pm, and closed on the weekends.  No narcotic refills will be filled after hours.    Additional Educational Resources:  For additional resources regarding your symptoms, diagnosis, or further health information, please visit the Health Resources section on [email or web address] or the Online Health Resources section in [facility].          75 yo female w/ PMH of HTN, hypothyroidism presents after fall this afternoon. At 16:30, started having R sided arm and leg weakness, and difficulty finding words. Received tPA for a Left M2 occlusion, now admitted to [facility]U following a thrombectomy.    left M2 occlusion     Plan:    Neurological:  - Neuro Checks Q1  - Daily Statin  - Repeat CT head tonight--> left perisilvian infarct  - Stroke neurology following  - PT/OT  - Stroke labs: completed  - repeat Ct head when ptt is therapeutic on heparin    Cardiovascular:  - SBP goal: 120 - 150  - Normovolemia  - Echo completed  - EKG: NSR as of now    Pulmonary:  - HOB 45  - Aspiration precautions    GI:  - Diabetic diet  - Bowel Regimen: Senna/Mirilax   - Mg citrate    :  - Strict Is/Os    Electrolytes:  - Replete as needed.  - Sodium Goal: Normonatremia  - Mg > 2  - POCT, ISS,  -180    ID:  - Trend Fever curve and WBC  - Normothermia    Hematology:  - HOLD AP  - Heparin drip - goal ptt 50 - 60    77 yo female w/ PMH of HTN, hypothyroidism presents after fall this afternoon. At 16:30, started having R sided arm and leg weakness, and difficulty finding words. Received tPA for a Left M2 occlusion, now admitted to [facility]U following a thrombectomy.    left M2 occlusion  RLE DVT    Plan:    Neurological:  - Neuro Checks Q1  - Daily Statin  - Repeat CT head --> evolving left MCA stroke  - Stroke neurology following  - PT/OT  - Stroke labs: completed  - repeat Ct head when ptt is therapeutic on heparin    Cardiovascular:  - SBP goal: 120 - 150  - Levo for BP augmentation  - Normovolemia  - Echo completed  - EKG: NSR as of now    Pulmonary:  - HOB 45  - Aspiration precautions    GI:  - Diabetic diet  - Bowel Regimen: Senna/Mirilax   - Pepcid    :  - Strict Is/Os    Electrolytes:  - Replete as needed.  - Sodium Goal: Normonatremia  - Mg > 2  - POCT, ISS,  -180    ID:  - Trend Fever curve and WBC  - Normothermia    Hematology:  - HOLD AP  - Heparin drip - goal ptt 50 - 60    75 yo female w/ PMH of HTN, hypothyroidism presents after fall this afternoon. At 16:30, started having R sided arm and leg weakness, and difficulty finding words. Received tPA for a Left M2 occlusion, now admitted to [facility]U following a thrombectomy.    left M2 occlusion  RLE DVT    Plan:    Neurological:  - Neuro Checks Q1  - Daily Statin  - Repeat CT head --> evolving left MCA stroke  - Stroke neurology following  - PT/OT  - Stroke labs: completed  - repeat Ct head when ptt is therapeutic on heparin    Cardiovascular:  - SBP goal: 120 - 150  - Levo for BP augmentation  - Normovolemia  - Echo completed  - EKG: NSR as of now    Pulmonary:  - HOB 45  - Aspiration precautions    GI:  - Diabetic diet- pureed/mildly thick  - Speech following  - Bowel Regimen: Senna/Mirilax   - Pepcid    :  - Strict Is/Os    Electrolytes:  - Replete as needed.  - Sodium Goal: Normonatremia  - Mg > 2  - POCT, ISS,  -180    ID:  - Trend Fever curve and WBC  - Normothermia    Hematology:  - HOLD AP  - Heparin drip - goal ptt 50 - 60

## 2022-04-13 NOTE — PROGRESS NOTE ADULT - CRITICAL CARE ATTENDING COMMENT
76 year old lady with PMHx as outlined above presented with distal left MCA syndrome, received IV rtPA followed by attempted IMS. There was brief worsening of NIHSS from 7 to 11, that responded to BP augmentation via pressors.  4/12  Repeat CT confirmed a small embolic stroke L MCA perisylvian. Exam unchanged  Continue to require low dose pressors to keep BP in desirable ranges Reduce goal BP   LE DVT +: Give h/o malignancy, will start full A/C with IV heparin, continue neurochecks q 1 hr  I agree and approved assesment and plan of care as outlined above. 76 year old lady with PMHx as outlined above presented with distal left MCA syndrome, received IV rtPA followed by attempted IMS. There was brief worsening of NIHSS from 7 to 11, that responded to BP augmentation via pressors.    4/12.   Repeat CT confirmed a small embolic stroke L MCA perisylvian. Exam unchanged  Continue to require low dose pressors to keep BP in desirable ranges Reduce goal BP   LE DVT +: Give h/o malignancy, will start full A/C with IV heparin, continue neurochecks q 1 hr  I agree and approved assesment and plan of care as outlined above.  Later evening, worsening of NIHSS, CTA continued L M2 high grade stenosis, no further intervention was done as NIHSS started to improve.

## 2022-04-14 LAB
ALBUMIN SERPL ELPH-MCNC: 3.4 G/DL — LOW (ref 3.5–5.2)
ALP SERPL-CCNC: 68 U/L — SIGNIFICANT CHANGE UP (ref 30–115)
ALT FLD-CCNC: 15 U/L — SIGNIFICANT CHANGE UP (ref 0–41)
ANION GAP SERPL CALC-SCNC: 9 MMOL/L — SIGNIFICANT CHANGE UP (ref 7–14)
APTT BLD: 36.8 SEC — SIGNIFICANT CHANGE UP (ref 27–39.2)
APTT BLD: 88.2 SEC — CRITICAL HIGH (ref 27–39.2)
AST SERPL-CCNC: 16 U/L — SIGNIFICANT CHANGE UP (ref 0–41)
BILIRUB SERPL-MCNC: 1.1 MG/DL — SIGNIFICANT CHANGE UP (ref 0.2–1.2)
BUN SERPL-MCNC: 8 MG/DL — LOW (ref 10–20)
CALCIUM SERPL-MCNC: 8.6 MG/DL — SIGNIFICANT CHANGE UP (ref 8.5–10.1)
CHLORIDE SERPL-SCNC: 111 MMOL/L — HIGH (ref 98–110)
CO2 SERPL-SCNC: 22 MMOL/L — SIGNIFICANT CHANGE UP (ref 17–32)
CREAT SERPL-MCNC: 0.8 MG/DL — SIGNIFICANT CHANGE UP (ref 0.7–1.5)
EGFR: 77 ML/MIN/1.73M2 — SIGNIFICANT CHANGE UP
GLUCOSE BLDC GLUCOMTR-MCNC: 149 MG/DL — HIGH (ref 70–99)
GLUCOSE BLDC GLUCOMTR-MCNC: 165 MG/DL — HIGH (ref 70–99)
GLUCOSE BLDC GLUCOMTR-MCNC: 245 MG/DL — HIGH (ref 70–99)
GLUCOSE SERPL-MCNC: 149 MG/DL — HIGH (ref 70–99)
HCT VFR BLD CALC: 29 % — LOW (ref 37–47)
HGB BLD-MCNC: 9.3 G/DL — LOW (ref 12–16)
MAGNESIUM SERPL-MCNC: 2 MG/DL — SIGNIFICANT CHANGE UP (ref 1.8–2.4)
MCHC RBC-ENTMCNC: 26.3 PG — LOW (ref 27–31)
MCHC RBC-ENTMCNC: 32.1 G/DL — SIGNIFICANT CHANGE UP (ref 32–37)
MCV RBC AUTO: 81.9 FL — SIGNIFICANT CHANGE UP (ref 81–99)
NRBC # BLD: 0 /100 WBCS — SIGNIFICANT CHANGE UP (ref 0–0)
PLATELET # BLD AUTO: 177 K/UL — SIGNIFICANT CHANGE UP (ref 130–400)
POTASSIUM SERPL-MCNC: 3.9 MMOL/L — SIGNIFICANT CHANGE UP (ref 3.5–5)
POTASSIUM SERPL-SCNC: 3.9 MMOL/L — SIGNIFICANT CHANGE UP (ref 3.5–5)
PROT SERPL-MCNC: 4.9 G/DL — LOW (ref 6–8)
RBC # BLD: 3.54 M/UL — LOW (ref 4.2–5.4)
RBC # FLD: 14 % — SIGNIFICANT CHANGE UP (ref 11.5–14.5)
SODIUM SERPL-SCNC: 142 MMOL/L — SIGNIFICANT CHANGE UP (ref 135–146)
WBC # BLD: 4.7 K/UL — LOW (ref 4.8–10.8)
WBC # FLD AUTO: 4.7 K/UL — LOW (ref 4.8–10.8)

## 2022-04-14 PROCEDURE — 70450 CT HEAD/BRAIN W/O DYE: CPT | Mod: 26

## 2022-04-14 PROCEDURE — 99222 1ST HOSP IP/OBS MODERATE 55: CPT

## 2022-04-14 PROCEDURE — 99291 CRITICAL CARE FIRST HOUR: CPT

## 2022-04-14 RX ORDER — HEPARIN SODIUM 5000 [USP'U]/ML
1050 INJECTION INTRAVENOUS; SUBCUTANEOUS
Qty: 25000 | Refills: 0 | Status: DISCONTINUED | OUTPATIENT
Start: 2022-04-14 | End: 2022-04-14

## 2022-04-14 RX ORDER — SODIUM CHLORIDE 9 MG/ML
1000 INJECTION INTRAMUSCULAR; INTRAVENOUS; SUBCUTANEOUS
Refills: 0 | Status: DISCONTINUED | OUTPATIENT
Start: 2022-04-14 | End: 2022-04-14

## 2022-04-14 RX ORDER — APIXABAN 2.5 MG/1
10 TABLET, FILM COATED ORAL EVERY 12 HOURS
Refills: 0 | Status: DISCONTINUED | OUTPATIENT
Start: 2022-04-14 | End: 2022-04-19

## 2022-04-14 RX ORDER — AMLODIPINE BESYLATE 2.5 MG/1
5 TABLET ORAL ONCE
Refills: 0 | Status: COMPLETED | OUTPATIENT
Start: 2022-04-14 | End: 2022-04-14

## 2022-04-14 RX ADMIN — ATORVASTATIN CALCIUM 80 MILLIGRAM(S): 80 TABLET, FILM COATED ORAL at 21:45

## 2022-04-14 RX ADMIN — Medication 4: at 17:43

## 2022-04-14 RX ADMIN — AMLODIPINE BESYLATE 5 MILLIGRAM(S): 2.5 TABLET ORAL at 22:34

## 2022-04-14 RX ADMIN — CHLORHEXIDINE GLUCONATE 1 APPLICATION(S): 213 SOLUTION TOPICAL at 06:13

## 2022-04-14 RX ADMIN — FAMOTIDINE 40 MILLIGRAM(S): 10 INJECTION INTRAVENOUS at 21:45

## 2022-04-14 RX ADMIN — Medication 112 MICROGRAM(S): at 06:13

## 2022-04-14 RX ADMIN — SODIUM CHLORIDE 50 MILLILITER(S): 9 INJECTION INTRAMUSCULAR; INTRAVENOUS; SUBCUTANEOUS at 11:59

## 2022-04-14 RX ADMIN — APIXABAN 10 MILLIGRAM(S): 2.5 TABLET, FILM COATED ORAL at 18:23

## 2022-04-14 RX ADMIN — HEPARIN SODIUM 13.5 UNIT(S)/HR: 5000 INJECTION INTRAVENOUS; SUBCUTANEOUS at 01:26

## 2022-04-14 RX ADMIN — Medication 2: at 11:58

## 2022-04-14 RX ADMIN — HEPARIN SODIUM 10.5 UNIT(S)/HR: 5000 INJECTION INTRAVENOUS; SUBCUTANEOUS at 11:59

## 2022-04-14 NOTE — PROGRESS NOTE ADULT - SUBJECTIVE AND OBJECTIVE BOX
SUMMARY:   77 yo female w/ PMH of HTN, hypothyroidism, splenic CA s/p chemo, presents after fall this afternoon. At 16:30, started having R sided arm and leg weakness, and difficulty finding words.  Does not recall if sxs started before or after fall but was bending over, fell over, denies head trauma, LOC, blood thinners. Has vomited a couple times. Denies pain anywhere. Stroke code called upon presentation. CTA showed a left M2 occlusion. Patient received tPA but neurologically declined shortly after. CODE NI actual called and patient taken for emergent thrombectomy and to be transferred to [facility]U following procedure.  (10 Apr 2022 22:53)    OVERNIGHT EVENTS: no acute issues overnight, pt remains on heparin drip     ALLERGIES: Allergies    adhesives (Blisters; Rash)  copper (Hives; Rash)  gold (Hives; Rash)  nickel (Hives; Rash)  silver (Hives; Rash)  Sulfacet-R (Rash)    ADMISSION SCORES:   GCS: 15    NIHSS: 7, unchanged today    REVIEW OF SYSTEMS: Negative except for that of HPI    VITALS: [x] Reviewed    IMAGING/DATA: [x] Reviewed    EXAMINATION:  General: No acute distress  HEENT: Anicteric sclerae  Cardiac: [id number]  Lungs: Clear  Abdomen: Soft, non-tender, +BS  Extremities: No c/c/e  Skin/Incision Site: Clean, dry and intact, RUE wound from possible IV infiltration, superficial layer of the dermis sloughed off but otherwise red and moist  Neurologic: Awake, alert, fully oriented, follows commands, naming intact, impaired repetition PERRL, VFFtc, EOMI, right facial, dysarthric but improved, tongue midline, full strength. RUE 4-/5. LUE 5/5, B/l LE AG 3/5      ICU Vital Signs Last 24 Hrs  T(C): 36.9 (14 Apr 2022 06:00), Max: 37.3 (14 Apr 2022 02:00)  T(F): 98.4 (14 Apr 2022 06:00), Max: 99.1 (14 Apr 2022 02:00)  HR: 90 (14 Apr 2022 06:00) (66 - 94)  BP: 154/62 (14 Apr 2022 06:00) (120/70 - 168/61)  BP(mean): 95 (14 Apr 2022 06:00) (71 - 112)  ABP: --  ABP(mean): --  RR: 25 (14 Apr 2022 06:00) (11 - 36)  SpO2: 98% (14 Apr 2022 06:00) (95% - 99%)      04-12-22 @ 07:01  -  04-13-22 @ 07:00  --------------------------------------------------------  IN: 2854.9 mL / OUT: 1900 mL / NET: 954.9 mL    04-13-22 @ 07:01  -  04-14-22 @ 06:26  --------------------------------------------------------  IN: 2817 mL / OUT: 1200 mL / NET: 1617 mL      acetaminophen     Tablet .. 650 milliGRAM(s) Oral every 6 hours PRN  atorvastatin 80 milliGRAM(s) Oral at bedtime  chlorhexidine 4% Liquid 1 Application(s) Topical <User Schedule>  dextrose 5%. 1000 milliLiter(s) (50 mL/Hr) IV Continuous <Continuous>  dextrose 5%. 1000 milliLiter(s) (100 mL/Hr) IV Continuous <Continuous>  dextrose 50% Injectable 25 Gram(s) IV Push once  dextrose 50% Injectable 12.5 Gram(s) IV Push once  dextrose 50% Injectable 25 Gram(s) IV Push once  dextrose Oral Gel 15 Gram(s) Oral once PRN  famotidine  Oral Tab/Cap - Peds 40 milliGRAM(s) Oral at bedtime  glucagon  Injectable 1 milliGRAM(s) IntraMuscular once  heparin  Infusion 1400 Unit(s)/Hr (13.5 mL/Hr) IV Continuous <Continuous>  insulin lispro (ADMELOG) corrective regimen sliding scale   SubCutaneous every 6 hours  levothyroxine 112 MICROGram(s) Oral daily  norepinephrine Infusion 0.05 MICROgram(s)/kG/Min (10.1 mL/Hr) IV Continuous <Continuous>  phenylephrine    Infusion 0.1 MICROgram(s)/kG/Min (2.02 mL/Hr) IV Continuous <Continuous>  polyethylene glycol 3350 17 Gram(s) Oral two times a day  senna 2 Tablet(s) Oral at bedtime  sodium chloride 0.9%. 1000 milliLiter(s) (100 mL/Hr) IV Continuous <Continuous>      LABS:  Na: 141 (04-13 @ 06:00), 143 (04-12 @ 05:00), 141 (04-11 @ 19:30)  K: 4.2 (04-13 @ 06:00), 4.1 (04-12 @ 05:00), 4.5 (04-11 @ 19:30)  Cl: 110 (04-13 @ 06:00), 110 (04-12 @ 05:00), 108 (04-11 @ 19:30)  CO2: 22 (04-13 @ 06:00), 21 (04-12 @ 05:00), 22 (04-11 @ 19:30)  BUN: 10 (04-13 @ 06:00), 11 (04-12 @ 05:00), 14 (04-11 @ 19:30)  Cr: 0.8 (04-13 @ 06:00), 0.8 (04-12 @ 05:00), 0.9 (04-11 @ 19:30)  Glu: 147(04-13 @ 06:00), 216(04-12 @ 05:00), 160(04-11 @ 19:30)    Hgb: 9.3 (04-13 @ 06:00), 10.2 (04-12 @ 05:00)  Hct: 29.5 (04-13 @ 06:00), 31.2 (04-12 @ 05:00)  WBC: 6.66 (04-13 @ 06:00), 7.00 (04-12 @ 05:00)  Plt: 188 (04-13 @ 06:00), 195 (04-12 @ 05:00)    INR: 1.03 04-13-22 @ 06:00, 1.04 04-12-22 @ 16:00  PTT: 63.6 04-13-22 @ 22:46, 46.0 04-13-22 @ 16:00, 29.8 04-13-22 @ 06:00, 40.0 04-12-22 @ 16:00      LIVER FUNCTIONS - ( 13 Apr 2022 06:00 )  Alb: 3.5 g/dL / Pro: 4.9 g/dL / ALK PHOS: 66 U/L / ALT: 11 U/L / AST: 11 U/L / GGT: x                            SUMMARY:   77 yo female w/ PMH of HTN, hypothyroidism, splenic CA s/p chemo, presents after fall this afternoon. At 16:30, started having R sided arm and leg weakness, and difficulty finding words.  Does not recall if sxs started before or after fall but was bending over, fell over, denies head trauma, LOC, blood thinners. Has vomited a couple times. Denies pain anywhere. Stroke code called upon presentation. CTA showed a left M2 occlusion. Patient received tPA but neurologically declined shortly after. CODE NI actual called and patient taken for emergent thrombectomy and to be transferred to [facility]U following procedure.  (10 Apr 2022 22:53)    OVERNIGHT EVENTS: no acute issues overnight, pt remains on heparin drip     ALLERGIES: Allergies    adhesives (Blisters; Rash)  copper (Hives; Rash)  gold (Hives; Rash)  nickel (Hives; Rash)  silver (Hives; Rash)  Sulfacet-R (Rash)    ADMISSION SCORES:   GCS: 15    NIHSS: 7, unchanged today    REVIEW OF SYSTEMS: Negative except for that of HPI    VITALS: [x] Reviewed    IMAGING/DATA: [x] Reviewed    EXAMINATION:  General: No acute distress  HEENT: Anicteric sclerae  Cardiac: [id number]  Lungs: Clear  Abdomen: Soft, non-tender, +BS  Extremities: No c/c/e  Skin/Incision Site: Clean, dry and intact, RUE wound from possible IV infiltration, superficial layer of the dermis sloughed off but otherwise red and moist  Neurologic: Awake, alert, fully oriented, follows commands, naming intact, impaired repetition PERRL, VFFtc, EOMI, right facial, dysarthric but improved, tongue midline, full strength. RUE 4-/5. LUE 5/5, B/l LE AG 3/5, sensation decreased on right side but improved since prior exam      ICU Vital Signs Last 24 Hrs  T(C): 36.4 (14 Apr 2022 08:00), Max: 37.3 (14 Apr 2022 02:00)  T(F): 97.6 (14 Apr 2022 08:00), Max: 99.1 (14 Apr 2022 02:00)  HR: 82 (14 Apr 2022 08:00) (70 - 94)  BP: 149/68 (14 Apr 2022 08:00) (120/70 - 168/61)  BP(mean): 94 (14 Apr 2022 08:00) (71 - 112)  ABP: --  ABP(mean): --  RR: 27 (14 Apr 2022 08:00) (20 - 36)  SpO2: 98% (14 Apr 2022 08:00) (95% - 99%)      04-13-22 @ 07:01  -  04-14-22 @ 07:00  --------------------------------------------------------  IN: 3271 mL / OUT: 1500 mL / NET: 1771 mL    04-14-22 @ 07:01  -  04-14-22 @ 08:34  --------------------------------------------------------  IN: 113.5 mL / OUT: 50 mL / NET: 63.5 mL          LABS:  Na: 141 (04-13 @ 06:00), 143 (04-12 @ 05:00), 141 (04-11 @ 19:30)  K: 4.2 (04-13 @ 06:00), 4.1 (04-12 @ 05:00), 4.5 (04-11 @ 19:30)  Cl: 110 (04-13 @ 06:00), 110 (04-12 @ 05:00), 108 (04-11 @ 19:30)  CO2: 22 (04-13 @ 06:00), 21 (04-12 @ 05:00), 22 (04-11 @ 19:30)  BUN: 10 (04-13 @ 06:00), 11 (04-12 @ 05:00), 14 (04-11 @ 19:30)  Cr: 0.8 (04-13 @ 06:00), 0.8 (04-12 @ 05:00), 0.9 (04-11 @ 19:30)  Glu: 147(04-13 @ 06:00), 216(04-12 @ 05:00), 160(04-11 @ 19:30)    Hgb: 9.3 (04-13 @ 06:00), 10.2 (04-12 @ 05:00)  Hct: 29.5 (04-13 @ 06:00), 31.2 (04-12 @ 05:00)  WBC: 6.66 (04-13 @ 06:00), 7.00 (04-12 @ 05:00)  Plt: 188 (04-13 @ 06:00), 195 (04-12 @ 05:00)    INR: 1.03 04-13-22 @ 06:00, 1.04 04-12-22 @ 16:00  PTT: 63.6 04-13-22 @ 22:46, 46.0 04-13-22 @ 16:00, 29.8 04-13-22 @ 06:00, 40.0 04-12-22 @ 16:00    LIVER FUNCTIONS - ( 13 Apr 2022 06:00 )  Alb: 3.5 g/dL / Pro: 4.9 g/dL / ALK PHOS: 66 U/L / ALT: 11 U/L / AST: 11 U/L / GGT: x           MEDICATIONS  (STANDING):  atorvastatin 80 milliGRAM(s) Oral at bedtime  chlorhexidine 4% Liquid 1 Application(s) Topical <User Schedule>  dextrose 5%. 1000 milliLiter(s) (100 mL/Hr) IV Continuous <Continuous>  dextrose 5%. 1000 milliLiter(s) (50 mL/Hr) IV Continuous <Continuous>  dextrose 50% Injectable 25 Gram(s) IV Push once  dextrose 50% Injectable 12.5 Gram(s) IV Push once  dextrose 50% Injectable 25 Gram(s) IV Push once  famotidine  Oral Tab/Cap - Peds 40 milliGRAM(s) Oral at bedtime  glucagon  Injectable 1 milliGRAM(s) IntraMuscular once  heparin  Infusion 1400 Unit(s)/Hr (13.5 mL/Hr) IV Continuous <Continuous>  insulin lispro (ADMELOG) corrective regimen sliding scale   SubCutaneous every 6 hours  levothyroxine 112 MICROGram(s) Oral daily  norepinephrine Infusion 0.05 MICROgram(s)/kG/Min (10.1 mL/Hr) IV Continuous <Continuous>  phenylephrine    Infusion 0.1 MICROgram(s)/kG/Min (2.02 mL/Hr) IV Continuous <Continuous>  polyethylene glycol 3350 17 Gram(s) Oral two times a day  senna 2 Tablet(s) Oral at bedtime  sodium chloride 0.9%. 1000 milliLiter(s) (100 mL/Hr) IV Continuous <Continuous>    MEDICATIONS  (PRN):  acetaminophen     Tablet .. 650 milliGRAM(s) Oral every 6 hours PRN Temp greater or equal to 38C (100.4F)  dextrose Oral Gel 15 Gram(s) Oral once PRN Blood Glucose LESS THAN 70 milliGRAM(s)/deciliter

## 2022-04-14 NOTE — PROGRESS NOTE ADULT - ASSESSMENT
77 yo female w/ PMH of HTN, hypothyroidism presents after fall this afternoon. At 16:30, started having R sided arm and leg weakness, and difficulty finding words. Received tPA for a Left M2 occlusion, now admitted to [facility]U following a thrombectomy.    left M2 occlusion  RLE DVT    Plan:    Neurological:  - Neuro Checks Q1  - Daily Statin  - Repeat CT head --> evolving left MCA stroke  - Stroke neurology following  - PT/OT  - Stroke labs: completed  - repeat Ct head when ptt is therapeutic on heparin    Cardiovascular:  - SBP goal: 120 - 150  - Levo for BP augmentation  - Normovolemia  - Echo completed  - EKG: NSR as of now    Pulmonary:  - HOB 45  - Aspiration precautions    GI:  - Diabetic diet- pureed/mildly thick  - Speech following  - Bowel Regimen: Senna/Mirilax   - Pepcid    :  - Strict Is/Os    Electrolytes:  - Replete as needed.  - Sodium Goal: Normonatremia  - Mg > 2  - POCT, ISS,  -180    ID:  - Trend Fever curve and WBC  - Normothermia    Hematology:  - HOLD AP  - Heparin drip - goal ptt 50 - 60    77 yo female w/ PMH of HTN, hypothyroidism presents after fall this afternoon. At 16:30, started having R sided arm and leg weakness, and difficulty finding words. Received tPA for a Left M2 occlusion, now admitted to [facility]U following a thrombectomy.    left M2 occlusion  RLE DVT    Plan:    Neurological:  - Neuro Checks Q1  - CT head today--> swtich to Eliquis if negative  - Daily Statin  - Stroke neurology following  - PT/OT  - Stroke labs: completed      Cardiovascular:  - SBP goal: 120 - 150  - Normovolemia  - Echo completed  - EKG: NSR as of now    Pulmonary:  - HOB 45  - Aspiration precautions    GI:  - Diabetic diet- pureed/mildly thick  - Speech following  - Bowel Regimen: Senna/Mirilax   - Pepcid    :  - Strict Is/Os    Electrolytes:  - Replete as needed.  - Sodium Goal: Normonatremia  - Mg > 2  - POCT, ISS,  -180    ID:  - Trend Fever curve and WBC  - Normothermia    Hematology:  - HOLD AP  - Heparin drip - goal ptt 50 - 60

## 2022-04-14 NOTE — SWALLOW FEES ASSESSMENT ADULT - SLP PERTINENT HISTORY OF CURRENT PROBLEM
77 y/o F w/ PMHx: HTN, hypothyroidism, splenic CA s/p chemo, presents after fall. After fall, pt started having R sided arm and leg weakness, and difficulty finding words; Stroke code called upon presentation. CTA showed a left M2 occlusion. Patient received tPA but neurologically declined shortly after. CODE NI actual called and patient taken for emergent thrombectomy and to be transferred to NCCU following procedure. MRI-> Acute left MCA territory infarct as detailed above as well as additional focal infarct involving the right temporal cortical region.

## 2022-04-14 NOTE — CONSULT NOTE ADULT - ASSESSMENT
76y Female with PMH of HTN, HoTD, Splenix CA s/p chemo presents from home with difficulty finding words, RUE and RLE weakness after sustaining a fall. Found to have left M2 occlusion on CTA head+neck s/p tPA and unsuccessful thrombectomy. TTE negative for LA enlargement. EF 60%. No sevever structural defects. LE duplex + for DVT in R peroneal and PTV. Cardiology team consulted for JOEL evaluation to rule out PFO as mechanism of stroke    TTE unremarkable    Acute bihemispheric ischemic strokes  Acute DVT in R popliteal and posterior tibial veins    - Will schedule for JOEL  - Continue heparin drip    ***This is a medical resident Assessment and Plan. Please see attending attestation/note for the final Assessment and Plan*** 76y Female with PMH of HTN, HoTD, Splenix CA s/p chemo presents from home with difficulty finding words, RUE and RLE weakness after sustaining a fall. Found to have left M2 occlusion on CTA head+neck s/p tPA and unsuccessful thrombectomy. TTE negative for LA enlargement. EF 60%. No sevever structural defects. LE duplex + for DVT in R peroneal and PTV. Cardiology team consulted for JOEL evaluation to rule out PFO as mechanism of stroke    TTE unremarkable    Acute bihemispheric ischemic strokes  Acute DVT in R popliteal and posterior tibial veins    - Will schedule for JOEL tomorrow. NPO after MN  - Continue heparin drip    ***This is a medical resident Assessment and Plan. Please see attending attestation/note for the final Assessment and Plan***

## 2022-04-14 NOTE — CONSULT NOTE ADULT - ATTENDING COMMENTS
Seen / examined and above reviewed.    No cardiac history.  Risk factors include HTN.    Comorbidities as above.    Acute embolic CVA s/p TPA / thrombectomy.  Residual hemiparesis.  Etiology uncertain.  Acute DVT /  concern for paradoxical embolism.    Hemodynamics stable.    JOEL requested for further evaluation.  Scheduled for Friday.  Continue anticoagulation if bleeding risk acceptable.

## 2022-04-14 NOTE — SWALLOW FEES ASSESSMENT ADULT - PRELIMINARY ENDOSCOPIC EXAMINATIONS
Interarytenoid/post-commissure edema/Interarytenoid/Arytenoid edema/Interarytenoid/Arytenoid erythema/Vocal fold adduction/abduction

## 2022-04-14 NOTE — SWALLOW FEES ASSESSMENT ADULT - ROSENBEK'S PENETRATION ASPIRATION SCALE
(4) material contacts vocal cords, no residue remains (penetration) (1) no aspiration, material does not enter airway

## 2022-04-14 NOTE — PROGRESS NOTE ADULT - CRITICAL CARE ATTENDING COMMENT
76 year old lady with PMHx as outlined above presented with distal left MCA syndrome, received IV rtPA followed by attempted IMS. There was brief worsening of NIHSS from 7 to 11, that responded to BP augmentation via pressors.    4/12.   Repeat CT confirmed a small embolic stroke L MCA perisylvian. Exam unchanged  Continue to require low dose pressors to keep BP in desirable ranges Reduce goal BP   LE DVT +: Give h/o malignancy, will start full A/C with IV heparin, continue neurochecks q 1 hr  I agree and approved assesment and plan of care as outlined above.  Later evening, worsening of NIHSS, CTA continued L M2 high grade stenosis, no further intervention was done as NIHSS started to improve. 76 year old lady with PMHx as outlined above presented with distal left MCA syndrome, received IV rtPA followed by attempted IMS. There was brief worsening of NIHSS from 7 to 11, that responded to BP augmentation via pressors.    4/12.   Repeat CT confirmed a small embolic stroke L MCA perisylvian. Exam unchanged  Continue to require low dose pressors to keep BP in desirable ranges Reduce goal BP   LE DVT +: Give h/o malignancy, will start full A/C with IV heparin, continue neurochecks q 1 hr  I agree and approved assesment and plan of care as outlined above.  Later evening, worsening of NIHSS, CTA continued L M2 high grade stenosis, no further intervention was done as NIHSS started to improve.  4/13: IV heparin switched to eliquis: MRI showed b/l shower of emboli L>R  scheduled for JOEL, off pressors, neurologic exam stable at SBP of 118

## 2022-04-14 NOTE — SWALLOW FEES ASSESSMENT ADULT - DIAGNOSTIC IMPRESSIONS
mild pharyngeal dysphagia for puree, easy to chew, and mildly thick liquids; moderate pharyngeal dysphagia for thin liquids

## 2022-04-14 NOTE — CONSULT NOTE ADULT - SUBJECTIVE AND OBJECTIVE BOX
Cardiology HPI  76y Female with PMH of HTN, HoTD, [place] CA s/p chemo presents from home with difficulty finding words, RUE and RLE weakness after sustaining a fall. Found to have left M2 occlusion on CTA head+neck s/p tPA and unsuccessful thrombectomy. TTE negative for LA enlargement. EF 60%. No sevever structural defects. LE duplex + for DVT in R peroneal and PTV. 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    H/O gastroesophageal reflux (GERD)    H/O cholecystitis  lap pedro    S/P appendectomy    H/O carpal tunnel repair  b/l    S/P trigger finger release  2002      HOME MEDICATIONS:  docusate sodium 100 mg oral capsule: 1 cap(s) orally 3 times a day (10 Apr 2022 22:58)  DULoxetine 30 mg oral delayed release capsule: 1 cap(s) orally 2 times a day (13 Jan 2021 07:56)  levothyroxine 112 mcg (0.112 mg) oral tablet: 1 tab(s) orally once a day (13 Jan 2021 07:56)  losartan 100 mg oral tablet: 1 tab(s) orally once a day (10 Apr 2022 22:57)  Pepcid 40 mg oral tablet: 1 tab(s) orally once a day (at bedtime) (10 Apr 2022 22:58)    MEDICATIONS  (STANDING):  atorvastatin 80 milliGRAM(s) Oral at bedtime  chlorhexidine 4% Liquid 1 Application(s) Topical <User Schedule>  dextrose 5%. 1000 milliLiter(s) (100 mL/Hr) IV Continuous <Continuous>  dextrose 5%. 1000 milliLiter(s) (50 mL/Hr) IV Continuous <Continuous>  dextrose 50% Injectable 25 Gram(s) IV Push once  dextrose 50% Injectable 12.5 Gram(s) IV Push once  dextrose 50% Injectable 25 Gram(s) IV Push once  famotidine  Oral Tab/Cap - Peds 40 milliGRAM(s) Oral at bedtime  glucagon  Injectable 1 milliGRAM(s) IntraMuscular once  heparin  Infusion 1050 Unit(s)/Hr (10.5 mL/Hr) IV Continuous <Continuous>  insulin lispro (ADMELOG) corrective regimen sliding scale   SubCutaneous every 6 hours  levothyroxine 112 MICROGram(s) Oral daily  polyethylene glycol 3350 17 Gram(s) Oral two times a day  senna 2 Tablet(s) Oral at bedtime  sodium chloride 0.9%. 1000 milliLiter(s) (50 mL/Hr) IV Continuous <Continuous>    MEDICATIONS  (PRN):  acetaminophen     Tablet .. 650 milliGRAM(s) Oral every 6 hours PRN Temp greater or equal to 38C (100.4F)  dextrose Oral Gel 15 Gram(s) Oral once PRN Blood Glucose LESS THAN 70 milliGRAM(s)/deciliter    FAMILY HISTORY:    SOCIAL HISTORY:  TOBACCO:  ALCOHOL:  OTHER:    Vital Signs Last 24 Hrs  T(C): 36.8 (14 Apr 2022 12:00), Max: 37.3 (14 Apr 2022 02:00)  T(F): 98.2 (14 Apr 2022 12:00), Max: 99.1 (14 Apr 2022 02:00)  HR: 0 (14 Apr 2022 14:00) (0 - 94)  BP: 157/68 (14 Apr 2022 14:00) (121/54 - 168/61)  BP(mean): 97 (14 Apr 2022 14:00) (71 - 112)  RR: 36 (14 Apr 2022 14:00) (20 - 36)  SpO2: 98% (14 Apr 2022 14:00) (97% - 99%)    LABS:                        9.3    4.70  )-----------( 177      ( 14 Apr 2022 08:56 )             29.0     04-14    142  |  111<H>  |  8<L>  ----------------------------<  149<H>  3.9   |  22  |  0.8    Ca    8.6      14 Apr 2022 08:56  Phos  2.8     04-13  Mg     2.0     04-14    TPro  4.9<L>  /  Alb  3.4<L>  /  TBili  1.1  /  DBili  x   /  AST  16  /  ALT  15  /  AlkPhos  68  04-14    PT/INR - ( 13 Apr 2022 06:00 )   PT: 11.80 sec;   INR: 1.03 ratio         PTT - ( 14 Apr 2022 08:56 )  PTT:88.2 sec    I&O's Detail    13 Apr 2022 07:01  -  14 Apr 2022 07:00  --------------------------------------------------------  IN:    Heparin: 263 mL    IV PiggyBack: 1000 mL    Norepinephrine: 8 mL    sodium chloride 0.9%: 2000 mL  Total IN: 3271 mL    OUT:    Intermittent Catheterization - Urethral (mL): 1450 mL    Voided (mL): 50 mL  Total OUT: 1500 mL    Total NET: 1771 mL      14 Apr 2022 07:01  -  14 Apr 2022 15:04  --------------------------------------------------------  IN:    Heparin: 40.5 mL    Heparin: 42 mL    sodium chloride 0.9%: 100 mL    sodium chloride 0.9%: 200 mL  Total IN: 382.5 mL    OUT:    Voided (mL): 300 mL  Total OUT: 300 mL    Total NET: 82.5 mL            MR Head w/wo IV Cont:   ACC: [id number] EXAM:  MR BRAIN WAW IC                          PROCEDURE DATE:  04/13/2022          INTERPRETATION:  CLINICAL INDICATION: Stroke.. Worsening right   hemiparesis and dysarthria..    TECHNIQUE: MRI of the brain was performed without and with contrast using   the following sequences: Axial DWI/ADC map, axial gradient echo,    sagittal T1 FLAIR, axial T2 FLAIR, axial T2 FSE. Postcontrast   axial/coronal/sagittal. 10 cc Gadavist was administered. 0 cc was   discarded.    COMPARISON: CT head dated 4/12/2022    FINDINGS:    Examination is limited due to motion.    There are acute infarcts involving the left MCA territory predominantly   involving the insula as well as the corona radiata with additional   scattered punctate infarct involving the frontal and parietal cortical   region. There is an additional focal cortical infarct involving the right   temporal lobe, series 3 image 18.    There is no evidence of intracranial hemorrhage, mass effect or midline   shift.    There is linear susceptibility weighted artifact noted within the left   superior temporal region likely related to chronic hemosiderin deposition.    There is nonenhancing periventricular white matter T2 and FLAIR signal   hyperintensity.    There is no abnormal intracranial enhancement.    Ventricles and sulci are age-appropriate in size.    There is no extra-axial fluid collection.    Major intracranial flow-voids are preserved.    The orbits, sellar and suprasellar structures, and craniocervical   junction are unremarkable. There has been bilateral cataract surgery.    The calvarium demonstrates normal signal intensity.    The visualized paranasal sinuses and tympanomastoid cavities are clear.    IMPRESSION:    Acute left MCA territory infarct as detailed above as well as additional   focal infarct involving the right temporal cortical region. No   hemorrhagic transformation.    Mild chronic microvascular type changes.    --- End of Report ---                  PROCEDURE DATE:  04/10/2022        INTERPRETATION:  CLINICAL INDICATION: Stroke code.    Technique: CT angiogram of the head and neck including perfusion study of   the brain.  Contiguous CT axial images of the head and neck were obtained   following the bolus intravenous administration of 120 cc Optiray with   multiple 3-D and MIP reformats with perfusion mapping performed on a   separate 3D workstation (RAPID).    Correlation made with accompanying noncontrast head CT.    Findings:    CT PERFUSION: Moderate areas of hypoperfusion  involving the left   frontal/parietal lobes and brainstem. Small area of core infarction of   the left temporal/parietal lobes.    CBF <30: 7 ml  Tmax > 6s: Total of 38 ml  Mismatch volume: 31 ml  Mismatch ratio: 5.4    CTA neck-  The study is somewhat limited due to suboptimal contrast bolus.    The visualized aortic arch and the great vessel origins are patent. There   is a common origin of the brachiocephalic and left common carotid   arteries, normal variation (bovine configuration).    The common,internal and external carotid arteries are patent laterally,   without significant stenosis. Medial retropharyngeal course of the right   ICA is noted.    The vertebral arteries are grossly patent bilaterally.    CTA head-  There is calcific plaque of the carotid siphons without significant   stenosis.    The right proximal MAURICE and MCA branches are grossly patent.    The left proximal MAURICE and MCA branches are grossly patent. There is   occlusion of the superior M2 segment (602-100).    The distalvertebral arteries, basilar artery and proximal posterior   cerebral arteries are grossly patent. Hypoplastic P1 segment of the left   PCA, normal variant.    The distal vessels are suboptimally assessed due to suboptimal contrast   bolus.    OTHER:  Polypoid mucosal thickening within scattered portions of the paranasal   sinuses. Degenerative changes of the spine. Dilated pulmonary trunk up to   3.1 cc. Right chest wall Mediport.    IMPRESSION:    1.  CT perfusion- Areas of hypoperfusion within the left frontal/parietal   lobes and brainstem (total Tmax >6s vol 38 cc). Evidence for small area   of core infarct in the left frontal/parietal lobes (CBF <30% vol 7 cc).    2.  CTA head/neck- Limited study due to suboptimal contrast bolus.   Occlusion of a left MCA M2 segment (602-100).    Preliminary findings discussed with NILES Lucero on 4/10/2022, 9:05 PM with   readback.    --- End of Report ---      KISHAN DUNN DO; Resident Radiologist  This document has been electronically signed.  MUSA MARTE MD; Attending Radiologist  This document has been electronically signed. Apr 11 2022  9:58AM (04-10-22 @ 20:18)    PHYSICAL EXAM:  GEN: No acute distress  HEENT: Normocephalic  NECK: Supple  LUNGS: Decreased breathe sounds  HEART: Regular  ABD: Soft, non-tender, non-distended  EXT: No pitting edema  NEURO: AAOX3  PSYCH: Mood is appropriate, following commands Cardiology HPI  76y Female with PMH of HTN, HoTD, [place] CA s/p chemo presents from home with difficulty finding words, RUE and RLE weakness after sustaining a fall. Found to have left M2 occlusion on CTA head+neck s/p tPA and unsuccessful thrombectomy. TTE negative for LA enlargement. EF 60%. No sevever structural defects. LE duplex + for DVT in R peroneal and PTV. 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    H/O gastroesophageal reflux (GERD)    H/O cholecystitis  lap pedro    S/P appendectomy    H/O carpal tunnel repair  b/l    S/P trigger finger release  2002      HOME MEDICATIONS:  docusate sodium 100 mg oral capsule: 1 cap(s) orally 3 times a day (10 Apr 2022 22:58)  DULoxetine 30 mg oral delayed release capsule: 1 cap(s) orally 2 times a day (13 Jan 2021 07:56)  levothyroxine 112 mcg (0.112 mg) oral tablet: 1 tab(s) orally once a day (13 Jan 2021 07:56)  losartan 100 mg oral tablet: 1 tab(s) orally once a day (10 Apr 2022 22:57)  Pepcid 40 mg oral tablet: 1 tab(s) orally once a day (at bedtime) (10 Apr 2022 22:58)    MEDICATIONS  (STANDING):  atorvastatin 80 milliGRAM(s) Oral at bedtime  chlorhexidine 4% Liquid 1 Application(s) Topical <User Schedule>  dextrose 5%. 1000 milliLiter(s) (100 mL/Hr) IV Continuous <Continuous>  dextrose 5%. 1000 milliLiter(s) (50 mL/Hr) IV Continuous <Continuous>  dextrose 50% Injectable 25 Gram(s) IV Push once  dextrose 50% Injectable 12.5 Gram(s) IV Push once  dextrose 50% Injectable 25 Gram(s) IV Push once  famotidine  Oral Tab/Cap - Peds 40 milliGRAM(s) Oral at bedtime  glucagon  Injectable 1 milliGRAM(s) IntraMuscular once  heparin  Infusion 1050 Unit(s)/Hr (10.5 mL/Hr) IV Continuous <Continuous>  insulin lispro (ADMELOG) corrective regimen sliding scale   SubCutaneous every 6 hours  levothyroxine 112 MICROGram(s) Oral daily  polyethylene glycol 3350 17 Gram(s) Oral two times a day  senna 2 Tablet(s) Oral at bedtime  sodium chloride 0.9%. 1000 milliLiter(s) (50 mL/Hr) IV Continuous <Continuous>    MEDICATIONS  (PRN):  acetaminophen     Tablet .. 650 milliGRAM(s) Oral every 6 hours PRN Temp greater or equal to 38C (100.4F)  dextrose Oral Gel 15 Gram(s) Oral once PRN Blood Glucose LESS THAN 70 milliGRAM(s)/deciliter    FAMILY HISTORY:    SOCIAL HISTORY:  TOBACCO:  ALCOHOL:  OTHER:    Vital Signs Last 24 Hrs  T(C): 36.8 (14 Apr 2022 12:00), Max: 37.3 (14 Apr 2022 02:00)  T(F): 98.2 (14 Apr 2022 12:00), Max: 99.1 (14 Apr 2022 02:00)  HR: 0 (14 Apr 2022 14:00) (0 - 94)  BP: 157/68 (14 Apr 2022 14:00) (121/54 - 168/61)  BP(mean): 97 (14 Apr 2022 14:00) (71 - 112)  RR: 36 (14 Apr 2022 14:00) (20 - 36)  SpO2: 98% (14 Apr 2022 14:00) (97% - 99%)    LABS:                        9.3    4.70  )-----------( 177      ( 14 Apr 2022 08:56 )             29.0     04-14    142  |  111<H>  |  8<L>  ----------------------------<  149<H>  3.9   |  22  |  0.8    Ca    8.6      14 Apr 2022 08:56  Phos  2.8     04-13  Mg     2.0     04-14    TPro  4.9<L>  /  Alb  3.4<L>  /  TBili  1.1  /  DBili  x   /  AST  16  /  ALT  15  /  AlkPhos  68  04-14    PT/INR - ( 13 Apr 2022 06:00 )   PT: 11.80 sec;   INR: 1.03 ratio         PTT - ( 14 Apr 2022 08:56 )  PTT:88.2 sec    I&O's Detail    13 Apr 2022 07:01  -  14 Apr 2022 07:00  --------------------------------------------------------  IN:    Heparin: 263 mL    IV PiggyBack: 1000 mL    Norepinephrine: 8 mL    sodium chloride 0.9%: 2000 mL  Total IN: 3271 mL    OUT:    Intermittent Catheterization - Urethral (mL): 1450 mL    Voided (mL): 50 mL  Total OUT: 1500 mL    Total NET: 1771 mL      14 Apr 2022 07:01  -  14 Apr 2022 15:04  --------------------------------------------------------  IN:    Heparin: 40.5 mL    Heparin: 42 mL    sodium chloride 0.9%: 100 mL    sodium chloride 0.9%: 200 mL  Total IN: 382.5 mL    OUT:    Voided (mL): 300 mL  Total OUT: 300 mL    Total NET: 82.5 mL            MR Head w/wo IV Cont:   ACC: [id number] EXAM:  MR BRAIN WAW IC                          PROCEDURE DATE:  04/13/2022          INTERPRETATION:  CLINICAL INDICATION: Stroke.. Worsening right   hemiparesis and dysarthria..    TECHNIQUE: MRI of the brain was performed without and with contrast using   the following sequences: Axial DWI/ADC map, axial gradient echo,    sagittal T1 FLAIR, axial T2 FLAIR, axial T2 FSE. Postcontrast   axial/coronal/sagittal. 10 cc Gadavist was administered. 0 cc was   discarded.    COMPARISON: CT head dated 4/12/2022    FINDINGS:    Examination is limited due to motion.    There are acute infarcts involving the left MCA territory predominantly   involving the insula as well as the corona radiata with additional   scattered punctate infarct involving the frontal and parietal cortical   region. There is an additional focal cortical infarct involving the right   temporal lobe, series 3 image 18.    There is no evidence of intracranial hemorrhage, mass effect or midline   shift.    There is linear susceptibility weighted artifact noted within the left   superior temporal region likely related to chronic hemosiderin deposition.    There is nonenhancing periventricular white matter T2 and FLAIR signal   hyperintensity.    There is no abnormal intracranial enhancement.    Ventricles and sulci are age-appropriate in size.    There is no extra-axial fluid collection.    Major intracranial flow-voids are preserved.    The orbits, sellar and suprasellar structures, and craniocervical   junction are unremarkable. There has been bilateral cataract surgery.    The calvarium demonstrates normal signal intensity.    The visualized paranasal sinuses and tympanomastoid cavities are clear.    IMPRESSION:    Acute left MCA territory infarct as detailed above as well as additional   focal infarct involving the right temporal cortical region. No   hemorrhagic transformation.    Mild chronic microvascular type changes.    --- End of Report ---                  PROCEDURE DATE:  04/10/2022        INTERPRETATION:  CLINICAL INDICATION: Stroke code.    Technique: CT angiogram of the head and neck including perfusion study of   the brain.  Contiguous CT axial images of the head and neck were obtained   following the bolus intravenous administration of 120 cc Optiray with   multiple 3-D and MIP reformats with perfusion mapping performed on a   separate 3D workstation (RAPID).    Correlation made with accompanying noncontrast head CT.    Findings:    CT PERFUSION: Moderate areas of hypoperfusion  involving the left   frontal/parietal lobes and brainstem. Small area of core infarction of   the left temporal/parietal lobes.    CBF <30: 7 ml  Tmax > 6s: Total of 38 ml  Mismatch volume: 31 ml  Mismatch ratio: 5.4    CTA neck-  The study is somewhat limited due to suboptimal contrast bolus.    The visualized aortic arch and the great vessel origins are patent. There   is a common origin of the brachiocephalic and left common carotid   arteries, normal variation (bovine configuration).    The common,internal and external carotid arteries are patent laterally,   without significant stenosis. Medial retropharyngeal course of the right   ICA is noted.    The vertebral arteries are grossly patent bilaterally.    CTA head-  There is calcific plaque of the carotid siphons without significant   stenosis.    The right proximal MAURICE and MCA branches are grossly patent.    The left proximal MAURICE and MCA branches are grossly patent. There is   occlusion of the superior M2 segment (602-100).    The distalvertebral arteries, basilar artery and proximal posterior   cerebral arteries are grossly patent. Hypoplastic P1 segment of the left   PCA, normal variant.    The distal vessels are suboptimally assessed due to suboptimal contrast   bolus.    OTHER:  Polypoid mucosal thickening within scattered portions of the paranasal   sinuses. Degenerative changes of the spine. Dilated pulmonary trunk up to   3.1 cc. Right chest wall Mediport.    IMPRESSION:    1.  CT perfusion- Areas of hypoperfusion within the left frontal/parietal   lobes and brainstem (total Tmax >6s vol 38 cc). Evidence for small area   of core infarct in the left frontal/parietal lobes (CBF <30% vol 7 cc).    2.  CTA head/neck- Limited study due to suboptimal contrast bolus.   Occlusion of a left MCA M2 segment (602-100).    Preliminary findings discussed with NILES Lucero on 4/10/2022, 9:05 PM with   readback.    --- End of Report ---      KISHAN DUNN DO; Resident Radiologist  This document has been electronically signed.  MUSA MARTE MD; Attending Radiologist  This document has been electronically signed. Apr 11 2022  9:58AM (04-10-22 @ 20:18)    PHYSICAL EXAM:  GEN: No acute distress  HEENT: Normocephalic  NECK: Supple  LUNGS: Decreased breathe sounds  HEART: Regular  ABD: Soft, non-tender, non-distended  EXT: No pitting edema  NEURO: AAOX3. RUE strength 4/5. RLE strength 2/5  PSYCH: Mood is appropriate, following commands

## 2022-04-15 LAB
ANION GAP SERPL CALC-SCNC: 12 MMOL/L — SIGNIFICANT CHANGE UP (ref 7–14)
BASOPHILS # BLD AUTO: 0.02 K/UL — SIGNIFICANT CHANGE UP (ref 0–0.2)
BASOPHILS NFR BLD AUTO: 0.4 % — SIGNIFICANT CHANGE UP (ref 0–1)
BUN SERPL-MCNC: 9 MG/DL — LOW (ref 10–20)
CALCIUM SERPL-MCNC: 8.8 MG/DL — SIGNIFICANT CHANGE UP (ref 8.5–10.1)
CHLORIDE SERPL-SCNC: 110 MMOL/L — SIGNIFICANT CHANGE UP (ref 98–110)
CO2 SERPL-SCNC: 22 MMOL/L — SIGNIFICANT CHANGE UP (ref 17–32)
CREAT SERPL-MCNC: 0.8 MG/DL — SIGNIFICANT CHANGE UP (ref 0.7–1.5)
EGFR: 77 ML/MIN/1.73M2 — SIGNIFICANT CHANGE UP
EOSINOPHIL # BLD AUTO: 0.16 K/UL — SIGNIFICANT CHANGE UP (ref 0–0.7)
EOSINOPHIL NFR BLD AUTO: 3 % — SIGNIFICANT CHANGE UP (ref 0–8)
GLUCOSE BLDC GLUCOMTR-MCNC: 124 MG/DL — HIGH (ref 70–99)
GLUCOSE BLDC GLUCOMTR-MCNC: 124 MG/DL — HIGH (ref 70–99)
GLUCOSE BLDC GLUCOMTR-MCNC: 136 MG/DL — HIGH (ref 70–99)
GLUCOSE BLDC GLUCOMTR-MCNC: 142 MG/DL — HIGH (ref 70–99)
GLUCOSE BLDC GLUCOMTR-MCNC: 143 MG/DL — HIGH (ref 70–99)
GLUCOSE BLDC GLUCOMTR-MCNC: 223 MG/DL — HIGH (ref 70–99)
GLUCOSE SERPL-MCNC: 117 MG/DL — HIGH (ref 70–99)
HCT VFR BLD CALC: 28.6 % — LOW (ref 37–47)
HGB BLD-MCNC: 9.3 G/DL — LOW (ref 12–16)
IMM GRANULOCYTES NFR BLD AUTO: 0.6 % — HIGH (ref 0.1–0.3)
LYMPHOCYTES # BLD AUTO: 1.18 K/UL — LOW (ref 1.2–3.4)
LYMPHOCYTES # BLD AUTO: 22.4 % — SIGNIFICANT CHANGE UP (ref 20.5–51.1)
MAGNESIUM SERPL-MCNC: 2 MG/DL — SIGNIFICANT CHANGE UP (ref 1.8–2.4)
MCHC RBC-ENTMCNC: 26.7 PG — LOW (ref 27–31)
MCHC RBC-ENTMCNC: 32.5 G/DL — SIGNIFICANT CHANGE UP (ref 32–37)
MCV RBC AUTO: 82.2 FL — SIGNIFICANT CHANGE UP (ref 81–99)
MONOCYTES # BLD AUTO: 0.32 K/UL — SIGNIFICANT CHANGE UP (ref 0.1–0.6)
MONOCYTES NFR BLD AUTO: 6.1 % — SIGNIFICANT CHANGE UP (ref 1.7–9.3)
NEUTROPHILS # BLD AUTO: 3.56 K/UL — SIGNIFICANT CHANGE UP (ref 1.4–6.5)
NEUTROPHILS NFR BLD AUTO: 67.5 % — SIGNIFICANT CHANGE UP (ref 42.2–75.2)
NRBC # BLD: 0 /100 WBCS — SIGNIFICANT CHANGE UP (ref 0–0)
PHOSPHATE SERPL-MCNC: 3.3 MG/DL — SIGNIFICANT CHANGE UP (ref 2.1–4.9)
PLATELET # BLD AUTO: 185 K/UL — SIGNIFICANT CHANGE UP (ref 130–400)
POTASSIUM SERPL-MCNC: 3.8 MMOL/L — SIGNIFICANT CHANGE UP (ref 3.5–5)
POTASSIUM SERPL-SCNC: 3.8 MMOL/L — SIGNIFICANT CHANGE UP (ref 3.5–5)
RBC # BLD: 3.48 M/UL — LOW (ref 4.2–5.4)
RBC # FLD: 14 % — SIGNIFICANT CHANGE UP (ref 11.5–14.5)
SARS-COV-2 RNA SPEC QL NAA+PROBE: SIGNIFICANT CHANGE UP
SODIUM SERPL-SCNC: 144 MMOL/L — SIGNIFICANT CHANGE UP (ref 135–146)
WBC # BLD: 5.27 K/UL — SIGNIFICANT CHANGE UP (ref 4.8–10.8)
WBC # FLD AUTO: 5.27 K/UL — SIGNIFICANT CHANGE UP (ref 4.8–10.8)

## 2022-04-15 PROCEDURE — 93312 ECHO TRANSESOPHAGEAL: CPT | Mod: 26,XU

## 2022-04-15 PROCEDURE — 93325 DOPPLER ECHO COLOR FLOW MAPG: CPT | Mod: 26

## 2022-04-15 PROCEDURE — 99232 SBSQ HOSP IP/OBS MODERATE 35: CPT

## 2022-04-15 PROCEDURE — 93320 DOPPLER ECHO COMPLETE: CPT | Mod: 26

## 2022-04-15 RX ORDER — INSULIN GLARGINE 100 [IU]/ML
5 INJECTION, SOLUTION SUBCUTANEOUS AT BEDTIME
Refills: 0 | Status: DISCONTINUED | OUTPATIENT
Start: 2022-04-15 | End: 2022-04-19

## 2022-04-15 RX ADMIN — ATORVASTATIN CALCIUM 80 MILLIGRAM(S): 80 TABLET, FILM COATED ORAL at 21:15

## 2022-04-15 RX ADMIN — CHLORHEXIDINE GLUCONATE 1 APPLICATION(S): 213 SOLUTION TOPICAL at 06:24

## 2022-04-15 RX ADMIN — FAMOTIDINE 40 MILLIGRAM(S): 10 INJECTION INTRAVENOUS at 21:15

## 2022-04-15 RX ADMIN — APIXABAN 10 MILLIGRAM(S): 2.5 TABLET, FILM COATED ORAL at 17:37

## 2022-04-15 RX ADMIN — Medication 112 MICROGRAM(S): at 05:34

## 2022-04-15 RX ADMIN — APIXABAN 10 MILLIGRAM(S): 2.5 TABLET, FILM COATED ORAL at 05:34

## 2022-04-15 RX ADMIN — Medication 4: at 00:25

## 2022-04-15 RX ADMIN — INSULIN GLARGINE 5 UNIT(S): 100 INJECTION, SOLUTION SUBCUTANEOUS at 21:15

## 2022-04-15 NOTE — CHART NOTE - NSCHARTNOTEFT_GEN_A_CORE
77 yo F patient with a PMH of HTN and hyperthyroidism presented for right-sided upper and lower extremities weakness, as well as dysarthria, for a couple of hours.  CTH showed a left sided frontal hypodensity, patient received tPA, did not neurologically improve.  Thrombectomy was tempted, but occlusion was distal, they did not do it.    Patient was transferred to the ICU for monitoring, BP was low despite boluses of NS, then it increased with christa then levo.  Levophed titrated, and rate of NS decreased.    Repeat CTH did not reveal any hemorrhage, patient was receiving heparin drip for right sided lower DVT, and now switched to Eliquis 10 BID, 5 mg BID starting 4/22.    Patient will be undergoing a JOEL to check for a PFO, that might have been responsible of embolization to the brain, and she is going to be downgraded to stroke unit

## 2022-04-15 NOTE — PROGRESS NOTE ADULT - SUBJECTIVE AND OBJECTIVE BOX
SUBJECTIVE:    Patient is a 76y old Female who presents with a chief complaint of right M2 occlusion (15 Apr 2022 15:49)    Overnight Events: No overnight events. Patient's CT scan came back negative for bleeding. Patient was switched from heparin to Eliquis.  Patient's NIH score remains unchanged. She is scheduled to have a JOEL today, to check for PFO, ready to be discharged.     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     ALLERGIES:  adhesives (Blisters; Rash)  copper (Hives; Rash)  gold (Hives; Rash)  nickel (Hives; Rash)  silver (Hives; Rash)  Sulfacet-R (Rash)    MEDICATIONS:  STANDING MEDICATIONS  apixaban 10 milliGRAM(s) Oral every 12 hours  atorvastatin 80 milliGRAM(s) Oral at bedtime  chlorhexidine 4% Liquid 1 Application(s) Topical <User Schedule>  famotidine  Oral Tab/Cap - Peds 40 milliGRAM(s) Oral at bedtime  insulin glargine Injectable (LANTUS) 5 Unit(s) SubCutaneous at bedtime  insulin lispro (ADMELOG) corrective regimen sliding scale   SubCutaneous every 6 hours  levothyroxine 112 MICROGram(s) Oral daily  polyethylene glycol 3350 17 Gram(s) Oral two times a day  senna 2 Tablet(s) Oral at bedtime    PRN MEDICATIONS  acetaminophen     Tablet .. 650 milliGRAM(s) Oral every 6 hours PRN    VITALS:   T(F): 98.1, Max: 99.6 (04-14-22 @ 20:00)  HR: 87 (72 - 90)  BP: 119/52 (119/52 - 185/83)  RR: 41 (24 - 41)  SpO2: 100% (96% - 100%)    LABS:                        9.3    5.27  )-----------( 185      ( 15 Apr 2022 04:30 )             28.6     04-15    144  |  110  |  9<L>  ----------------------------<  117<H>  3.8   |  22  |  0.8    Ca    8.8      15 Apr 2022 04:30  Phos  3.3     04-15  Mg     2.0     04-15    TPro  4.9<L>  /  Alb  3.4<L>  /  TBili  1.1  /  DBili  x   /  AST  16  /  ALT  15  /  AlkPhos  68  04-14    PTT - ( 14 Apr 2022 16:00 )  PTT:36.8 sec                  04-14-22 @ 07:01  -  04-15-22 @ 07:00  --------------------------------------------------------  IN: 1014.5 mL / OUT: 850 mL / NET: 164.5 mL    04-15-22 @ 07:01  -  04-15-22 @ 19:08  --------------------------------------------------------  IN: 0 mL / OUT: 800 mL / NET: -800 mL          IMAGING/EKG:    PHYSICAL EXAM:  GEN: NAD, comfortable  LUNGS: CTAB  HEART: RRR  ABD: soft, NT/ND, +BS  EXT: no edema, PP b/l  NEURO: AAO x3, motor strength= 4/5 over the right upper and lower extremity, 5/5 over the left upper and lower extremity

## 2022-04-15 NOTE — PROGRESS NOTE ADULT - ASSESSMENT
Left CVA with right hemiparesis.  Diabetes mellitus  Hypothyroidism  S/P composite lymphoma in 2019  Right leg DVT    Continue ICU care and neurology and cardiology follow up

## 2022-04-15 NOTE — PACU DISCHARGE NOTE - COMMENTS
Procedure completed with no event. Patient return to baseline, AOx3, no complaints. No apparent complications. rest of care per primary team    Post op vitals: 120/59, 81, 12, 99%

## 2022-04-15 NOTE — PROGRESS NOTE ADULT - ATTENDING COMMENTS
Pt is a 77 yo F with PMHx of HTN, s/p composite lymphoma, hypothyroidism, who presented with aphasia and right arm weakness. S/p IV tPA on 4/10. NIHSS 7.     Impr: acute ischemic stroke of left insular/frontoparietal region and small ischemic stroke of right temporal lobe.   CTA head/neck with left M2 occlusion (EVT unsuccessful, migrated to M3)  Also found to have a RL DVT, started on eliquis 10mg BID  Plan for JOEL, possible paradoxical emboli  Continue AC/statin  PT/OT/ST, tele, q4 neurochecks, -160

## 2022-04-15 NOTE — PROGRESS NOTE ADULT - ASSESSMENT
75 yo female w/ PMH of HTN, hypothyroidism presents after fall this afternoon. At 16:30, started having R sided arm and leg weakness, and difficulty finding words. Received tPA for a Left M2 occlusion, now admitted to [facility] following a thrombectomy.    left M2 occlusion  RLE DVT    Plan:    Neurological:  - Neuro Checks Q4h  - CT head yesterday --> switched to Eliquis  - Daily Statin  - Stroke neurology following  - PT/OT        Cardiovascular:  - SBP goal: 120 - 150  - Normovolemia  - Echo completed  - EKG: NSR as of now    Pulmonary:  - HOB 45  - Aspiration precautions    GI:  - Diabetic diet- pureed/mildly thick  - Speech following  - Bowel Regimen: Senna/Mirilax   - Pepcid    :  - Strict Is/Os    Electrolytes:  - Replete as needed.  - Sodium Goal: Normonatremia  - Mg > 2  - POCT, ISS,  -180    ID:  - Trend Fever curve and WBC  - Normothermia    Hematology:  - On Eliquis 10 mg BID until 4/21

## 2022-04-15 NOTE — PROGRESS NOTE ADULT - SUBJECTIVE AND OBJECTIVE BOX
INTERVAL HPI/OVERNIGHT EVENTS:  Patient S&E at bedside. Patient comfortable in bed. Continues to have slurred speech and has difficulty in finding words. Patient has hemiparesis of right upper arm and right lower leg. She is awaiting JOEL today She is on IV heparin for right leg DVT.      VITAL SIGNS:  T(F): 97.1 (04-15-22 @ 04:00)  HR: 84 (04-15-22 @ 07:00)  BP: 158/59 (04-15-22 @ 07:00)  RR: 25 (04-15-22 @ 07:00)  SpO2: 99% (04-15-22 @ 07:00)  Wt(kg): --    PHYSICAL EXAM:    Constitutional: NAD  Eyes: EOMI, sclera non-icteric  Neck: supple, no masses, no JVD  Respiratory: CTA b/l, good air entry b/l  Cardiovascular: RRR, no M/R/G  Gastrointestinal: soft, NTND, no masses palpable, + BS, no hepatosplenomegaly  Extremities: no c/c/e  Neurological: Right hemeparesis      MEDICATIONS  (STANDING):  apixaban 10 milliGRAM(s) Oral every 12 hours  atorvastatin 80 milliGRAM(s) Oral at bedtime  chlorhexidine 4% Liquid 1 Application(s) Topical <User Schedule>  famotidine  Oral Tab/Cap - Peds 40 milliGRAM(s) Oral at bedtime  insulin lispro (ADMELOG) corrective regimen sliding scale   SubCutaneous every 6 hours  levothyroxine 112 MICROGram(s) Oral daily  polyethylene glycol 3350 17 Gram(s) Oral two times a day  senna 2 Tablet(s) Oral at bedtime    MEDICATIONS  (PRN):  acetaminophen     Tablet .. 650 milliGRAM(s) Oral every 6 hours PRN Temp greater or equal to 38C (100.4F)      Allergies    adhesives (Blisters; Rash)  copper (Hives; Rash)  gold (Hives; Rash)  nickel (Hives; Rash)  silver (Hives; Rash)  Sulfacet-R (Rash)    Intolerances        LABS:                        9.3    5.27  )-----------( 185      ( 15 Apr 2022 04:30 )             28.6     04-15    144  |  110  |  9<L>  ----------------------------<  117<H>  3.8   |  22  |  0.8    Ca    8.8      15 Apr 2022 04:30  Phos  3.3     04-15  Mg     2.0     04-15    TPro  4.9<L>  /  Alb  3.4<L>  /  TBili  1.1  /  DBili  x   /  AST  16  /  ALT  15  /  AlkPhos  68  04-14    PTT - ( 14 Apr 2022 16:00 )  PTT:36.8 sec      RADIOLOGY & ADDITIONAL TESTS:  Studies reviewed.

## 2022-04-15 NOTE — PROGRESS NOTE ADULT - ASSESSMENT
75 yo female w/ PMH of HTN, hypothyroidism presents after fall this afternoon. At 16:30, started having R sided arm and leg weakness, and difficulty finding words. Received tPA for a Left M2 occlusion, followed by attempted thrombectomy, she also had an episode of worsening NIHSS from 7 to 11 after that which was responded to BP augmentations.   Ptn also had a LE DVT given the Hx of malignancy, hem/onc is on board. patient is currently on Apixaban 10mg BID will be tapered to 5mg BID.   left M2 occlusion  RLE DVT    Plan:    Neurological:  - Neuro Checks Q1  - CT head today--> swtich to Eliquis if negative  - Daily Statin  - Stroke neurology following  - PT/OT  - Stroke labs: completed      Cardiovascular:  - SBP goal: 120 - 150  - Normovolemia  - Echo completed  - EKG: NSR as of now    Pulmonary:  - HOB 45  - Aspiration precautions    GI:  - Diabetic diet- pureed/mildly thick  - Speech following  - Bowel Regimen: Senna/Mirilax   - Pepcid    :  - Strict Is/Os    Electrolytes:  - Replete as needed.  - Sodium Goal: Normonatremia  - Mg > 2  - POCT, ISS,  -180    ID:  - Trend Fever curve and WBC  - Normothermia    Hematology:  - HOLD AP  - Heparin drip - goal ptt 50 - 60 77 yo female w/ PMH of HTN, hypothyroidism presents after fall this afternoon. At 16:30, started having R sided arm and leg weakness, and difficulty finding words. Received tPA for a Left M2 occlusion, followed by attempted thrombectomy, she also had an episode of worsening NIHSS from 7 to 11 after that which was responded to BP augmentations.   Ptn also had a LE DVT given the Hx of malignancy, hem/onc is on board. patient is currently on Apixaban 10mg BID will be tapered to 5mg BID.   left M2 occlusion  RLE DVT    Neuro  #CVA workup  - continue with Eliquis 10mg bid for 10 days then 5mg bid.   - continue atorvastatin 80mg daily  - q4hr stroke neuro checks and vitals  - MRI Brain without contrast Acute left MCA territory infarct and right temporal cortical  - Stroke education    Cards  #HTN  #DVT:  -  Goal -150  - hold home blood pressure medication for now  - JOEL this afternoon.   - deep vein thrombosis of the right peroneal vein and a branch of the   right posterior tibial vein.    #HLD  - high dose statin as above in CVA  - LDL results: 80    Pulm  - call provider if SPO2 < 94%    GI  #Nutrition/Fluids/Electrolytes   - replete K<4 and Mg <2  - Diet: Npo until after JOEL  - IVF:     Renal  - Creat 0.8    Infectious Disease  - Stroke Code CXR results:     Endocrine  #DM  - A1C results: 7.5  - ISS On.  - TSH results: 1.7    DVT Prophylaxis  - SCDs for DVT prophylaxis on the L leg, R has DVT.    Discussed daily hospital plans and goals with patient and family at bedside.  Discussed with Neurology Attending 77 yo female w/ PMH of HTN, hypothyroidism presents after fall this afternoon. At 16:30, started having R sided arm and leg weakness, and difficulty finding words. Received tPA for a Left M2 occlusion, followed by attempted thrombectomy, she also had an episode of worsening NIHSS from 7 to 11 after that which was responded to BP augmentations.   Ptn also had a LE DVT given the Hx of malignancy, hem/onc is on board. patient is currently on Apixaban 10mg BID will be tapered to 5mg BID.   left M2 occlusion  RLE DVT    Neuro  #CVA workup  - continue with Eliquis 10mg bid for 10 days then 5mg bid.   - continue atorvastatin 80mg daily  - q4hr stroke neuro checks and vitals  - MRI Brain without contrast Acute left MCA territory infarct and right temporal cortical  - Stroke education    Cards  #HTN  #DVT:  -  Goal -150  - hold home blood pressure medication for now  - JOEL this afternoon.   - deep vein thrombosis of the right peroneal vein and a branch of the   right posterior tibial vein.    #HLD  - high dose statin as above in CVA  - LDL results: 80    Pulm  - call provider if SPO2 < 94%    GI  #Nutrition/Fluids/Electrolytes   - replete K<4 and Mg <2  - Diet: Npo until after JOEL  - IVF:     Renal  - Creat 0.8    Infectious Disease  - Stroke Code CXR results:     Endocrine  #DM  - A1C results: 7.5  - ISS On.  - TSH results: 1.7    DVT Prophylaxis  - SCDs for DVT prophylaxis on the L leg, R has DVT.    Discussed daily hospital plans and goals with patient and family at bedside.  Discussed with Neurology Attending  Dispo: Possible candidate for IPR 77 yo female w/ PMH of HTN, hypothyroidism presents after fall this afternoon. At 16:30, started having R sided arm and leg weakness, and difficulty finding words. Received tPA for a Left M2 occlusion, followed by attempted thrombectomy, she also had an episode of worsening NIHSS from 7 to 11 after that which was responded to BP augmentations.   Ptn also had a LE DVT given the Hx of malignancy, hem/onc is on board. patient is currently on Apixaban 10mg BID will be tapered to 5mg BID.   left M2 occlusion  RLE DVT    Neuro  #Ischemic stroke workup  - continue with Eliquis 10mg bid for 10 days then 5mg bid.   - continue atorvastatin 80mg daily  - q4hr stroke neuro checks and vitals  - MRI Brain without contrast Acute left MCA territory infarct and right temporal cortical  - Stroke education    Cards  #HTN  #DVT:  -  Goal -150  - hold home blood pressure medication for now  - JOEL this afternoon.   - deep vein thrombosis of the right peroneal vein and a branch of the   right posterior tibial vein.    #HLD  - high dose statin as above in CVA  - LDL results: 80    Pulm  - call provider if SPO2 < 94%    GI  #Nutrition/Fluids/Electrolytes   - replete K<4 and Mg <2  - Diet: Npo until after JOEL  - IVF:     Renal  - Creat 0.8    Infectious Disease  - Stroke Code CXR results:     Endocrine  #DM  - A1C results: 7.5  - ISS On.  - TSH results: 1.7    DVT Prophylaxis  - SCDs for DVT prophylaxis on the L leg, R has DVT.    Discussed daily hospital plans and goals with patient and family at bedside.  Discussed with Neurology Attending  Dispo: Possible candidate for IPR

## 2022-04-16 LAB
ALBUMIN SERPL ELPH-MCNC: 3.6 G/DL — SIGNIFICANT CHANGE UP (ref 3.5–5.2)
ALP SERPL-CCNC: 72 U/L — SIGNIFICANT CHANGE UP (ref 30–115)
ALT FLD-CCNC: 33 U/L — SIGNIFICANT CHANGE UP (ref 0–41)
ANION GAP SERPL CALC-SCNC: 12 MMOL/L — SIGNIFICANT CHANGE UP (ref 7–14)
AST SERPL-CCNC: 34 U/L — SIGNIFICANT CHANGE UP (ref 0–41)
BASOPHILS # BLD AUTO: 0.02 K/UL — SIGNIFICANT CHANGE UP (ref 0–0.2)
BASOPHILS NFR BLD AUTO: 0.4 % — SIGNIFICANT CHANGE UP (ref 0–1)
BILIRUB SERPL-MCNC: 1.4 MG/DL — HIGH (ref 0.2–1.2)
BUN SERPL-MCNC: 12 MG/DL — SIGNIFICANT CHANGE UP (ref 10–20)
CALCIUM SERPL-MCNC: 8.6 MG/DL — SIGNIFICANT CHANGE UP (ref 8.5–10.1)
CHLORIDE SERPL-SCNC: 111 MMOL/L — HIGH (ref 98–110)
CO2 SERPL-SCNC: 22 MMOL/L — SIGNIFICANT CHANGE UP (ref 17–32)
CREAT SERPL-MCNC: 0.8 MG/DL — SIGNIFICANT CHANGE UP (ref 0.7–1.5)
EGFR: 77 ML/MIN/1.73M2 — SIGNIFICANT CHANGE UP
EOSINOPHIL # BLD AUTO: 0.21 K/UL — SIGNIFICANT CHANGE UP (ref 0–0.7)
EOSINOPHIL NFR BLD AUTO: 3.9 % — SIGNIFICANT CHANGE UP (ref 0–8)
GLUCOSE BLDC GLUCOMTR-MCNC: 121 MG/DL — HIGH (ref 70–99)
GLUCOSE BLDC GLUCOMTR-MCNC: 126 MG/DL — HIGH (ref 70–99)
GLUCOSE BLDC GLUCOMTR-MCNC: 129 MG/DL — HIGH (ref 70–99)
GLUCOSE BLDC GLUCOMTR-MCNC: 130 MG/DL — HIGH (ref 70–99)
GLUCOSE BLDC GLUCOMTR-MCNC: 156 MG/DL — HIGH (ref 70–99)
GLUCOSE SERPL-MCNC: 116 MG/DL — HIGH (ref 70–99)
HCT VFR BLD CALC: 28.2 % — LOW (ref 37–47)
HGB BLD-MCNC: 8.9 G/DL — LOW (ref 12–16)
IMM GRANULOCYTES NFR BLD AUTO: 0.6 % — HIGH (ref 0.1–0.3)
LYMPHOCYTES # BLD AUTO: 1.11 K/UL — LOW (ref 1.2–3.4)
LYMPHOCYTES # BLD AUTO: 20.6 % — SIGNIFICANT CHANGE UP (ref 20.5–51.1)
MAGNESIUM SERPL-MCNC: 1.9 MG/DL — SIGNIFICANT CHANGE UP (ref 1.8–2.4)
MCHC RBC-ENTMCNC: 26.4 PG — LOW (ref 27–31)
MCHC RBC-ENTMCNC: 31.6 G/DL — LOW (ref 32–37)
MCV RBC AUTO: 83.7 FL — SIGNIFICANT CHANGE UP (ref 81–99)
MONOCYTES # BLD AUTO: 0.38 K/UL — SIGNIFICANT CHANGE UP (ref 0.1–0.6)
MONOCYTES NFR BLD AUTO: 7 % — SIGNIFICANT CHANGE UP (ref 1.7–9.3)
NEUTROPHILS # BLD AUTO: 3.65 K/UL — SIGNIFICANT CHANGE UP (ref 1.4–6.5)
NEUTROPHILS NFR BLD AUTO: 67.5 % — SIGNIFICANT CHANGE UP (ref 42.2–75.2)
NRBC # BLD: 0 /100 WBCS — SIGNIFICANT CHANGE UP (ref 0–0)
PLATELET # BLD AUTO: 198 K/UL — SIGNIFICANT CHANGE UP (ref 130–400)
POTASSIUM SERPL-MCNC: 3.8 MMOL/L — SIGNIFICANT CHANGE UP (ref 3.5–5)
POTASSIUM SERPL-SCNC: 3.8 MMOL/L — SIGNIFICANT CHANGE UP (ref 3.5–5)
PROT SERPL-MCNC: 4.9 G/DL — LOW (ref 6–8)
RBC # BLD: 3.37 M/UL — LOW (ref 4.2–5.4)
RBC # FLD: 14.2 % — SIGNIFICANT CHANGE UP (ref 11.5–14.5)
SODIUM SERPL-SCNC: 145 MMOL/L — SIGNIFICANT CHANGE UP (ref 135–146)
WBC # BLD: 5.4 K/UL — SIGNIFICANT CHANGE UP (ref 4.8–10.8)
WBC # FLD AUTO: 5.4 K/UL — SIGNIFICANT CHANGE UP (ref 4.8–10.8)

## 2022-04-16 PROCEDURE — 99232 SBSQ HOSP IP/OBS MODERATE 35: CPT

## 2022-04-16 PROCEDURE — 99233 SBSQ HOSP IP/OBS HIGH 50: CPT

## 2022-04-16 RX ADMIN — APIXABAN 10 MILLIGRAM(S): 2.5 TABLET, FILM COATED ORAL at 17:18

## 2022-04-16 RX ADMIN — ATORVASTATIN CALCIUM 80 MILLIGRAM(S): 80 TABLET, FILM COATED ORAL at 21:53

## 2022-04-16 RX ADMIN — Medication 112 MICROGRAM(S): at 05:07

## 2022-04-16 RX ADMIN — APIXABAN 10 MILLIGRAM(S): 2.5 TABLET, FILM COATED ORAL at 05:07

## 2022-04-16 RX ADMIN — INSULIN GLARGINE 5 UNIT(S): 100 INJECTION, SOLUTION SUBCUTANEOUS at 21:54

## 2022-04-16 RX ADMIN — CHLORHEXIDINE GLUCONATE 1 APPLICATION(S): 213 SOLUTION TOPICAL at 05:11

## 2022-04-16 RX ADMIN — FAMOTIDINE 40 MILLIGRAM(S): 10 INJECTION INTRAVENOUS at 21:54

## 2022-04-16 RX ADMIN — Medication 2: at 12:02

## 2022-04-16 NOTE — PROGRESS NOTE ADULT - ASSESSMENT
Imp : rehab of L CVA / right hemiparesis / dysarthria  Plan : continue bedside therapy as tolerated           will follow

## 2022-04-16 NOTE — PROGRESS NOTE ADULT - SUBJECTIVE AND OBJECTIVE BOX
Patient is a 76y old  Female who presents with a chief complaint of right m2 occlusion       HPI:   75 yo female w/ PMH of HTN, hypothyroidism presents after fall this afternoon. At 16:30, started having R sided arm and leg weakness, and difficulty finding words.  Does not recall if sxs started before or after fall but was bending over, fell over, denies head trauma, LOC, blood thinners. Has vomited a couple times. Denies pain anywhere. Stroke code called upon presentation. CTA showed a left M2 occlusion. Patient received tPA but neurologically declined shortly after. CODE NI actual called and patient taken for emergent thrombectomy and to be transferred to NCCU following procedure.    Repeat CTH did not reveal any hemorrhage, patient was receiving heparin drip for right sided lower DVT, and now switched to Eliquis 10 BID, 5 mg BID starting 4/22.    Patient will be undergoing a JOEL to check for a PFO, that might have been responsible of embolization to the brain, and she is going to be downgraded to stroke unit.          PHYSICAL EXAM    Vital Signs Last 24 Hrs  T(C): 36.3 (16 Apr 2022 08:04), Max: 36.7 (15 Apr 2022 14:00)  T(F): 97.3 (16 Apr 2022 08:04), Max: 98.1 (15 Apr 2022 14:00)  HR: 87 (16 Apr 2022 08:04) (70 - 87)  BP: 147/58 (16 Apr 2022 08:04) (119/46 - 151/59)  BP(mean): 87 (16 Apr 2022 08:04) (66 - 125)  RR: 18 (16 Apr 2022 08:04) (18 - 41)  SpO2: 98% (16 Apr 2022 08:04) (95% - 100%)    Constitutional - NAD  Chest - CTA  Cardiovascular - S1S2+  Abdomen -  Soft  Extremities -  No calf tenderness   Function : bed mobility max A / transfer unable       acetaminophen     Tablet .. 650 milliGRAM(s) Oral every 6 hours PRN  apixaban 10 milliGRAM(s) Oral every 12 hours  atorvastatin 80 milliGRAM(s) Oral at bedtime  chlorhexidine 4% Liquid 1 Application(s) Topical <User Schedule>  famotidine  Oral Tab/Cap - Peds 40 milliGRAM(s) Oral at bedtime  insulin glargine Injectable (LANTUS) 5 Unit(s) SubCutaneous at bedtime  insulin lispro (ADMELOG) corrective regimen sliding scale   SubCutaneous every 6 hours  levothyroxine 112 MICROGram(s) Oral daily  polyethylene glycol 3350 17 Gram(s) Oral two times a day  senna 2 Tablet(s) Oral at bedtime      RECENT LABS/IMAGING                        8.9    5.40  )-----------( 198      ( 16 Apr 2022 04:30 )             28.2     04-16    145  |  111<H>  |  12  ----------------------------<  116<H>  3.8   |  22  |  0.8    Ca    8.6      16 Apr 2022 04:30  Phos  3.3     04-15  Mg     1.9     04-16    TPro  4.9<L>  /  Alb  3.6  /  TBili  1.4<H>  /  DBili  x   /  AST  34  /  ALT  33  /  AlkPhos  72  04-16    PTT - ( 14 Apr 2022 16:00 )  PTT:36.8 sec

## 2022-04-16 NOTE — PROGRESS NOTE ADULT - ASSESSMENT
PARISH SANTOYO 76y Female  MRN#: [id number]   CODE STATUS:Full code       SUBJECTIVE  Patient is a 76y old Female who presents with a chief complaint of right m2 occlusion (16 Apr 2022 10:08)  Currently admitted to medicine with the primary diagnosis of CVA (cerebral vascular accident)  Today is hospital day 6d, and this morning she is resting in bed and reports no overnight events.     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    H/O gastroesophageal reflux (GERD)    H/O cholecystitis  lap pedro    S/P appendectomy    H/O carpal tunnel repair  b/l    S/P trigger finger release  2002      ALLERGIES:  adhesives (Blisters; Rash)  copper (Hives; Rash)  gold (Hives; Rash)  nickel (Hives; Rash)  silver (Hives; Rash)  Sulfacet-R (Rash)    MEDICATIONS:  STANDING MEDICATIONS  apixaban 10 milliGRAM(s) Oral every 12 hours  atorvastatin 80 milliGRAM(s) Oral at bedtime  chlorhexidine 4% Liquid 1 Application(s) Topical <User Schedule>  famotidine  Oral Tab/Cap - Peds 40 milliGRAM(s) Oral at bedtime  insulin glargine Injectable (LANTUS) 5 Unit(s) SubCutaneous at bedtime  insulin lispro (ADMELOG) corrective regimen sliding scale   SubCutaneous every 6 hours  levothyroxine 112 MICROGram(s) Oral daily  polyethylene glycol 3350 17 Gram(s) Oral two times a day  senna 2 Tablet(s) Oral at bedtime    PRN MEDICATIONS  acetaminophen     Tablet .. 650 milliGRAM(s) Oral every 6 hours PRN      VITAL SIGNS: Last 24 Hours  T(C): 36.6 (16 Apr 2022 11:46), Max: 36.7 (15 Apr 2022 14:00)  T(F): 97.8 (16 Apr 2022 11:46), Max: 98.1 (15 Apr 2022 14:00)  HR: 84 (16 Apr 2022 11:46) (70 - 87)  BP: 146/64 (16 Apr 2022 11:46) (119/46 - 151/59)  BP(mean): 81 (16 Apr 2022 11:46) (66 - 125)  RR: 19 (16 Apr 2022 11:46) (18 - 41)  SpO2: 99% (16 Apr 2022 11:46) (95% - 100%)    LABS:                        8.9    5.40  )-----------( 198      ( 16 Apr 2022 04:30 )             28.2     04-16    145  |  111<H>  |  12  ----------------------------<  116<H>  3.8   |  22  |  0.8    Ca    8.6      16 Apr 2022 04:30  Phos  3.3     04-15  Mg     1.9     04-16    TPro  4.9<L>  /  Alb  3.6  /  TBili  1.4<H>  /  DBili  x   /  AST  34  /  ALT  33  /  AlkPhos  72  04-16    PTT - ( 14 Apr 2022 16:00 )  PTT:36.8 sec              RADIOLOGY:      PHYSICAL EXAM:    GENERAL: NAD, well-developed, AAOx3  HEENT:  Atraumatic, Normocephalic. EOMI, PERRLA, conjunctiva and sclera clear, No JVD  PULMONARY: Clear to auscultation bilaterally; No wheeze  CARDIOVASCULAR: Regular rate and rhythm; No murmurs, rubs, or gallops  GASTROINTESTINAL: Soft, Nontender, Nondistended; Bowel sounds present  MUSCULOSKELETAL:  2+ Peripheral Pulses, No clubbing, cyanosis, or edema  NEUROLOGY: non-focal  SKIN: No rashes or lesions    ASSESSMENT & PLAN  77 yo F w/ PMHx of HTN, hypothyroidism presents after fall this afternoon. At 16:30, started having R sided arm and leg weakness, and difficulty finding words. Received tPA for a Left M2 occlusion, followed by attempted thrombectomy, she also had an episode of worsening NIHSS from 7 to 11 after that which was responded to BP augmentations.      #Ischemic stroke workup  #HTN  #HLD  # deep vein thrombosis of the right peroneal vein and a branch of the right posterior tibial vein.    - on Eliquis  - continue atorvastatin 80mg daily  - q4hr stroke neuro checks and vitals  - MRI Brain without contrast Acute left MCA territory infarct and right temporal cortical  - Stroke education  - Goal -150  - hold home blood pressure medication for now  - JOEL EF 65%, no PFO  - Bowel regimen  - repeat KUB   - high dose statin as above in CVA  - LDL results: 80  - A1C results: 7.5  - TSH results: 1.7  - PT/OT/S/S      Dispo: Possible candidate for Inpatient Rehab, JOEL no PFO.

## 2022-04-16 NOTE — PROGRESS NOTE ADULT - SUBJECTIVE AND OBJECTIVE BOX
Neurology Progress Note    Interval History:    Overnight ptn had episode of diarrhea, no acute event. Tolerated JOEL well.   From Transfer note:   77 yo F patient with a PMH of HTN and hyperthyroidism presented for right-sided upper and lower extremities weakness, as well as dysarthria, for a couple of hours.  CTH showed a left sided frontal hypodensity, patient received tPA, did not neurologically improve.  Thrombectomy was tempted, but occlusion was distal, they did not do it.    Patient was transferred to the ICU for monitoring, BP was low despite boluses of NS, then it increased with christa then levo.  Levophed titrated, and rate of NS decreased.    Repeat CTH did not reveal any hemorrhage, patient was receiving heparin drip for right sided lower DVT, and now switched to Eliquis 10 BID, 5 mg BID starting 4/22.    Patient will be undergoing a JOEL to check for a PFO, that might have been responsible of embolization to the brain, and she is going to be downgraded to stroke unit.    Medications:  acetaminophen     Tablet .. 650 milliGRAM(s) Oral every 6 hours PRN  apixaban 10 milliGRAM(s) Oral every 12 hours  atorvastatin 80 milliGRAM(s) Oral at bedtime  chlorhexidine 4% Liquid 1 Application(s) Topical <User Schedule>  famotidine  Oral Tab/Cap - Peds 40 milliGRAM(s) Oral at bedtime  insulin glargine Injectable (LANTUS) 5 Unit(s) SubCutaneous at bedtime  insulin lispro (ADMELOG) corrective regimen sliding scale   SubCutaneous every 6 hours  levothyroxine 112 MICROGram(s) Oral daily  polyethylene glycol 3350 17 Gram(s) Oral two times a day  senna 2 Tablet(s) Oral at bedtime      Vital Signs Last 24 Hrs  T(C): 36.6 (15 Apr 2022 23:43), Max: 36.7 (15 Apr 2022 08:00)  T(F): 97.9 (15 Apr 2022 23:43), Max: 98.1 (15 Apr 2022 14:00)  HR: 82 (15 Apr 2022 23:43) (72 - 87)  BP: 151/59 (15 Apr 2022 23:43) (119/52 - 158/59)  BP(mean): 89 (15 Apr 2022 23:43) (75 - 125)  RR: 22 (15 Apr 2022 23:43) (21 - 41)  SpO2: 96% (15 Apr 2022 23:43) (96% - 100%)    Neurological Examination:  EXAMINATION:  General: No acute distress,   HEENT: Anicteric sclerae  Cardiac: [id number]  Lungs: Clear  Abdomen: abdomen distended, nontender, +BS.   Extremities: No c/c/e  Skin/Incision Site: Clean, dry and intact, RUE wound from possible IV infiltration, superficial layer of the dermis sloughed off but otherwise red and moist  Neurologic: Awake, alert, fully oriented, follows commands, naming intact, impaired repetition PERRL, VFFtc, EOMI, right facial, dysarthric but improved, tongue midline, full strength. RUE 4-/5. LUE 5/5, LLE 5/5, RLE 4/5, sensation decreased on right side  Gait: Deferred.     Labs:  CBC Full  -  ( 15 Apr 2022 04:30 )  WBC Count : 5.27 K/uL  RBC Count : 3.48 M/uL  Hemoglobin : 9.3 g/dL  Hematocrit : 28.6 %  Platelet Count - Automated : 185 K/uL  Mean Cell Volume : 82.2 fL  Mean Cell Hemoglobin : 26.7 pg  Mean Cell Hemoglobin Concentration : 32.5 g/dL  Auto Neutrophil # : 3.56 K/uL  Auto Lymphocyte # : 1.18 K/uL  Auto Monocyte # : 0.32 K/uL  Auto Eosinophil # : 0.16 K/uL  Auto Basophil # : 0.02 K/uL  Auto Neutrophil % : 67.5 %  Auto Lymphocyte % : 22.4 %  Auto Monocyte % : 6.1 %  Auto Eosinophil % : 3.0 %  Auto Basophil % : 0.4 %    04-15    144  |  110  |  9<L>  ----------------------------<  117<H>  3.8   |  22  |  0.8    Ca    8.8      15 Apr 2022 04:30  Phos  3.3     04-15  Mg     2.0     04-15    TPro  4.9<L>  /  Alb  3.4<L>  /  TBili  1.1  /  DBili  x   /  AST  16  /  ALT  15  /  AlkPhos  68  04-14    LIVER FUNCTIONS - ( 14 Apr 2022 08:56 )  Alb: 3.4 g/dL / Pro: 4.9 g/dL / ALK PHOS: 68 U/L / ALT: 15 U/L / AST: 16 U/L / GGT: x           PTT - ( 14 Apr 2022 16:00 )  PTT:36.8 sec      RADIOLOGY & ADDITIONAL TESTS:  < from: Transesophageal Echocardiogram (04.15.22 @ 16:09) >   1. Left ventricular ejection fraction, by visual estimation, is 60 to   65%.   2. Normal global left ventricular systolic function.   3. Mild mitral annular calcification.   4. Mild thickening and calcification of the anterior and posterior   mitral valve leaflets.   5. Moderate mitral valve regurgitation.   6. Saline contrast bubble study was negative, with no evidence of any   intracardiac shunt.   7. Lipomatous hypertrophy of the intra-atrial septum.   8. No left atrial appendage thrombus.    < end of copied text >  < from: MR Head w/wo IV Cont (04.13.22 @ 19:40) >    IMPRESSION:    Acute left MCA territory infarct as detailed above as well as additional   focal infarct involving the right temporal cortical region. No   hemorrhagic transformation.    Mild chronic microvascular type changes.    < end of copied text >   Neurology Progress Note    HPI:  75 yo F patient with a PMH of HTN and hyperthyroidism presented for right-sided upper and lower extremities weakness, as well as dysarthria, for a couple of hours. CTH showed a left sided frontal hypodensity, patient received tPA, did not neurologically improve. Thrombectomy was attempted, but occlusion was distal, they did not do it. Patient was transferred to the ICU for monitoring, BP was low despite boluses of NS, then it increased with christa then levo. Levophed titrated, and rate of NS decreased. Repeat CTH did not reveal any hemorrhage, patient was receiving heparin drip for right sided lower DVT, and now switched to Eliquis 10 BID, 5 mg BID starting 4/22. Patient underwent a JOEL to check for a PFO, was downgraded to stroke unit.      Interval History:    Overnight pt had multiple episodes of diarrhea after laxative given, no acute events overnight. Tolerated JOEL well. Patient states she feels okay        Medications:  acetaminophen     Tablet .. 650 milliGRAM(s) Oral every 6 hours PRN  apixaban 10 milliGRAM(s) Oral every 12 hours  atorvastatin 80 milliGRAM(s) Oral at bedtime  chlorhexidine 4% Liquid 1 Application(s) Topical <User Schedule>  famotidine  Oral Tab/Cap - Peds 40 milliGRAM(s) Oral at bedtime  insulin glargine Injectable (LANTUS) 5 Unit(s) SubCutaneous at bedtime  insulin lispro (ADMELOG) corrective regimen sliding scale   SubCutaneous every 6 hours  levothyroxine 112 MICROGram(s) Oral daily  polyethylene glycol 3350 17 Gram(s) Oral two times a day  senna 2 Tablet(s) Oral at bedtime      Vital Signs Last 24 Hrs  T(C): 36.6 (15 Apr 2022 23:43), Max: 36.7 (15 Apr 2022 08:00)  T(F): 97.9 (15 Apr 2022 23:43), Max: 98.1 (15 Apr 2022 14:00)  HR: 82 (15 Apr 2022 23:43) (72 - 87)  BP: 151/59 (15 Apr 2022 23:43) (119/52 - 158/59)  BP(mean): 89 (15 Apr 2022 23:43) (75 - 125)  RR: 22 (15 Apr 2022 23:43) (21 - 41)  SpO2: 96% (15 Apr 2022 23:43) (96% - 100%)      EXAMINATION:  General: No acute distress,   HEENT: Anicteric sclerae  Cardiac: [id number]  Lungs: Clear  Abdomen: abdomen distended, nontender, +BS.   Extremities: No c/c/e  Skin/Incision Site: Clean, dry and intact, RUE wound from possible IV infiltration, superficial layer of the dermis sloughed off but otherwise red and moist  Neurologic: Awake, alert, fully oriented, follows commands, naming intact, impaired repetition PERRL, VFFtc, EOMI, right facial, dysarthric but improved, tongue midline, full strength. RUE 4-/5. LUE 5/5, LLE 5/5, RLE 4/5, sensation decreased on right side  Gait: Deferred.       Labs:  CBC Full  -  ( 15 Apr 2022 04:30 )  WBC Count : 5.27 K/uL  RBC Count : 3.48 M/uL  Hemoglobin : 9.3 g/dL  Hematocrit : 28.6 %  Platelet Count - Automated : 185 K/uL  Mean Cell Volume : 82.2 fL  Mean Cell Hemoglobin : 26.7 pg  Mean Cell Hemoglobin Concentration : 32.5 g/dL  Auto Neutrophil # : 3.56 K/uL  Auto Lymphocyte # : 1.18 K/uL  Auto Monocyte # : 0.32 K/uL  Auto Eosinophil # : 0.16 K/uL  Auto Basophil # : 0.02 K/uL  Auto Neutrophil % : 67.5 %  Auto Lymphocyte % : 22.4 %  Auto Monocyte % : 6.1 %  Auto Eosinophil % : 3.0 %  Auto Basophil % : 0.4 %    04-15    144  |  110  |  9<L>  ----------------------------<  117<H>  3.8   |  22  |  0.8    Ca    8.8      15 Apr 2022 04:30  Phos  3.3     04-15  Mg     2.0     04-15    TPro  4.9<L>  /  Alb  3.4<L>  /  TBili  1.1  /  DBili  x   /  AST  16  /  ALT  15  /  AlkPhos  68  04-14    LIVER FUNCTIONS - ( 14 Apr 2022 08:56 )  Alb: 3.4 g/dL / Pro: 4.9 g/dL / ALK PHOS: 68 U/L / ALT: 15 U/L / AST: 16 U/L / GGT: x           PTT - ( 14 Apr 2022 16:00 )  PTT:36.8 sec      RADIOLOGY & ADDITIONAL TESTS:  < from: Transesophageal Echocardiogram (04.15.22 @ 16:09) >   1. Left ventricular ejection fraction, by visual estimation, is 60 to   65%.   2. Normal global left ventricular systolic function.   3. Mild mitral annular calcification.   4. Mild thickening and calcification of the anterior and posterior   mitral valve leaflets.   5. Moderate mitral valve regurgitation.   6. Saline contrast bubble study was negative, with no evidence of any   intracardiac shunt.   7. Lipomatous hypertrophy of the intra-atrial septum.   8. No left atrial appendage thrombus.    < end of copied text >  < from: MR Head w/wo IV Cont (04.13.22 @ 19:40) >    IMPRESSION:    Acute left MCA territory infarct as detailed above as well as additional   focal infarct involving the right temporal cortical region. No   hemorrhagic transformation.    Mild chronic microvascular type changes.    < end of copied text >   Neurology Progress Note    HPI:  75 yo F patient with a PMH of HTN and hyperthyroidism presented for right-sided upper and lower extremities weakness, as well as dysarthria, for a couple of hours. CTH showed a left sided frontal hypodensity, patient received tPA, did not neurologically improve. Thrombectomy was attempted, but occlusion was distal, they did not do it. Patient was transferred to the ICU for monitoring, BP was low despite boluses of NS, then it increased with christa then levo. Levophed titrated, and rate of NS decreased. Repeat CTH did not reveal any hemorrhage, patient was receiving heparin drip for right sided lower DVT, and now switched to Eliquis 10 BID, 5 mg BID starting 4/22. Patient underwent a JOEL to check for a PFO, was downgraded to stroke unit.      Interval History:    Overnight, the patient had multiple episodes of diarrhea after laxative given, no other acute events overnight. Tolerated JOEL well. Patient states she feels okay and that diarrhea has resolved, would like to go home. Denied any complaints.       Medications:  acetaminophen     Tablet .. 650 milliGRAM(s) Oral every 6 hours PRN  apixaban 10 milliGRAM(s) Oral every 12 hours  atorvastatin 80 milliGRAM(s) Oral at bedtime  chlorhexidine 4% Liquid 1 Application(s) Topical <User Schedule>  famotidine  Oral Tab/Cap - Peds 40 milliGRAM(s) Oral at bedtime  insulin glargine Injectable (LANTUS) 5 Unit(s) SubCutaneous at bedtime  insulin lispro (ADMELOG) corrective regimen sliding scale   SubCutaneous every 6 hours  levothyroxine 112 MICROGram(s) Oral daily  polyethylene glycol 3350 17 Gram(s) Oral two times a day  senna 2 Tablet(s) Oral at bedtime      Vital Signs Last 24 Hrs  T(C): 36.6 (16 Apr 2022 11:46), Max: 36.7 (15 Apr 2022 14:00)  T(F): 97.8 (16 Apr 2022 11:46), Max: 98.1 (15 Apr 2022 14:00)  HR: 84 (16 Apr 2022 11:46) (70 - 87)  BP: 146/64 (16 Apr 2022 11:46) (119/46 - 151/59)  BP(mean): 81 (16 Apr 2022 11:46) (66 - 125)  RR: 19 (16 Apr 2022 11:46) (18 - 41)  SpO2: 99% (16 Apr 2022 11:46) (95% - 100%)    Neurological Exam:   Mental status: Awake, alert and oriented x3. Moderate dysarthria, no aphasia.   Cranial nerves: Pupils equally round and reactive to light,  no nystagmus, extraocular muscles intact, V1 through V3 intact bilaterally and symmetric, face asymmetric on R, hearing intact to finger rub, palate elevation symmetric, tongue was midline.  Motor:  MRC grading RUE 4-/5, RLE 4/5, LUE/LLE 5/5.   strength 5/5.  Normal tone and bulk.  No abnormal movements.    Sensation: Decreased sensation on the right side, improving. Intact to light touch, proprioception, and pinprick otherwise.   Coordination: No dysmetria on finger-to-nose and heel-to-shin.   Reflexes: 2+ in bilateral UE/LE, downgoing toes bilaterally.  Gait: Deferred.      Labs:  CBC Full  -  ( 16 Apr 2022 04:30 )  WBC Count : 5.40 K/uL  RBC Count : 3.37 M/uL  Hemoglobin : 8.9 g/dL  Hematocrit : 28.2 %  Platelet Count - Automated : 198 K/uL  Mean Cell Volume : 83.7 fL  Mean Cell Hemoglobin : 26.4 pg  Mean Cell Hemoglobin Concentration : 31.6 g/dL  Auto Neutrophil # : 3.65 K/uL  Auto Lymphocyte # : 1.11 K/uL  Auto Monocyte # : 0.38 K/uL  Auto Eosinophil # : 0.21 K/uL  Auto Basophil # : 0.02 K/uL  Auto Neutrophil % : 67.5 %  Auto Lymphocyte % : 20.6 %  Auto Monocyte % : 7.0 %  Auto Eosinophil % : 3.9 %  Auto Basophil % : 0.4 %    04-16    145  |  111<H>  |  12  ----------------------------<  116<H>  3.8   |  22  |  0.8    Ca    8.6      16 Apr 2022 04:30  Phos  3.3     04-15  Mg     1.9     04-16    TPro  4.9<L>  /  Alb  3.6  /  TBili  1.4<H>  /  DBili  x   /  AST  34  /  ALT  33  /  AlkPhos  72  04-16    LIVER FUNCTIONS - ( 16 Apr 2022 04:30 )  Alb: 3.6 g/dL / Pro: 4.9 g/dL / ALK PHOS: 72 U/L / ALT: 33 U/L / AST: 34 U/L / GGT: x           PTT - ( 14 Apr 2022 16:00 )  PTT:36.8 sec      RADIOLOGY & ADDITIONAL TESTS:  < from: Transesophageal Echocardiogram (04.15.22 @ 16:09) >   1. Left ventricular ejection fraction, by visual estimation, is 60 to   65%.   2. Normal global left ventricular systolic function.   3. Mild mitral annular calcification.   4. Mild thickening and calcification of the anterior and posterior   mitral valve leaflets.   5. Moderate mitral valve regurgitation.   6. Saline contrast bubble study was negative, with no evidence of any   intracardiac shunt.   7. Lipomatous hypertrophy of the intra-atrial septum.   8. No left atrial appendage thrombus.    < end of copied text >  < from: MR Head w/wo IV Cont (04.13.22 @ 19:40) >    IMPRESSION:    Acute left MCA territory infarct as detailed above as well as additional   focal infarct involving the right temporal cortical region. No   hemorrhagic transformation.    Mild chronic microvascular type changes.    < end of copied text >   Neurology Progress Note    HPI:  75 yo F patient with a PMH of HTN and hyperthyroidism presented for right-sided upper and lower extremities weakness, as well as dysarthria, for a couple of hours. CTH showed a left sided frontal hypodensity, patient received tPA, did not neurologically improve. Thrombectomy was attempted, but occlusion was distal, they did not do it. Patient was transferred to the ICU for monitoring, BP was low despite boluses of NS, then it increased with christa then levo. Levophed titrated, and rate of NS decreased. Repeat CTH did not reveal any hemorrhage, patient was receiving heparin drip for right sided lower DVT, and now switched to Eliquis 10 BID, 5 mg BID starting 4/22. Patient underwent a JOEL to check for a PFO, was downgraded to stroke unit.      Interval History:    Overnight, the patient had multiple episodes of diarrhea after laxative given, no other acute events overnight. Tolerated JOEL well. Patient states she feels okay and that diarrhea has resolved, would like to go home. Denied any complaints.       Medications:  acetaminophen     Tablet .. 650 milliGRAM(s) Oral every 6 hours PRN  apixaban 10 milliGRAM(s) Oral every 12 hours  atorvastatin 80 milliGRAM(s) Oral at bedtime  chlorhexidine 4% Liquid 1 Application(s) Topical <User Schedule>  famotidine  Oral Tab/Cap - Peds 40 milliGRAM(s) Oral at bedtime  insulin glargine Injectable (LANTUS) 5 Unit(s) SubCutaneous at bedtime  insulin lispro (ADMELOG) corrective regimen sliding scale   SubCutaneous every 6 hours  levothyroxine 112 MICROGram(s) Oral daily  polyethylene glycol 3350 17 Gram(s) Oral two times a day  senna 2 Tablet(s) Oral at bedtime      Vital Signs Last 24 Hrs  T(C): 36.6 (16 Apr 2022 11:46), Max: 36.7 (15 Apr 2022 14:00)  T(F): 97.8 (16 Apr 2022 11:46), Max: 98.1 (15 Apr 2022 14:00)  HR: 84 (16 Apr 2022 11:46) (70 - 87)  BP: 146/64 (16 Apr 2022 11:46) (119/46 - 151/59)  BP(mean): 81 (16 Apr 2022 11:46) (66 - 125)  RR: 19 (16 Apr 2022 11:46) (18 - 41)  SpO2: 99% (16 Apr 2022 11:46) (95% - 100%)      Neurological Exam:   Mental status: Awake, alert and oriented x3. Moderate dysarthria, no aphasia.   Cranial nerves: Pupils equally round and reactive to light,  no nystagmus, extraocular muscles intact, V1 through V3 intact bilaterally and symmetric, face asymmetric on R, hearing intact to finger rub, palate elevation symmetric, tongue was midline.  Motor:  MRC grading RUE 4-/5, RLE 4/5, LUE/LLE 5/5.   strength 5/5.  Normal tone and bulk.  No abnormal movements.    Sensation: Decreased sensation on the right side, improving. Intact to light touch, proprioception, and pinprick otherwise.   Coordination: No dysmetria on finger-to-nose and heel-to-shin.   Reflexes: 2+ in bilateral UE/LE, downgoing toes bilaterally.  Gait: Deferred.      Labs:  CBC Full  -  ( 16 Apr 2022 04:30 )  WBC Count : 5.40 K/uL  RBC Count : 3.37 M/uL  Hemoglobin : 8.9 g/dL  Hematocrit : 28.2 %  Platelet Count - Automated : 198 K/uL  Mean Cell Volume : 83.7 fL  Mean Cell Hemoglobin : 26.4 pg  Mean Cell Hemoglobin Concentration : 31.6 g/dL  Auto Neutrophil # : 3.65 K/uL  Auto Lymphocyte # : 1.11 K/uL  Auto Monocyte # : 0.38 K/uL  Auto Eosinophil # : 0.21 K/uL  Auto Basophil # : 0.02 K/uL  Auto Neutrophil % : 67.5 %  Auto Lymphocyte % : 20.6 %  Auto Monocyte % : 7.0 %  Auto Eosinophil % : 3.9 %  Auto Basophil % : 0.4 %    04-16    145  |  111<H>  |  12  ----------------------------<  116<H>  3.8   |  22  |  0.8    Ca    8.6      16 Apr 2022 04:30  Phos  3.3     04-15  Mg     1.9     04-16    TPro  4.9<L>  /  Alb  3.6  /  TBili  1.4<H>  /  DBili  x   /  AST  34  /  ALT  33  /  AlkPhos  72  04-16    LIVER FUNCTIONS - ( 16 Apr 2022 04:30 )  Alb: 3.6 g/dL / Pro: 4.9 g/dL / ALK PHOS: 72 U/L / ALT: 33 U/L / AST: 34 U/L / GGT: x           PTT - ( 14 Apr 2022 16:00 )  PTT:36.8 sec      RADIOLOGY & ADDITIONAL TESTS:  < from: Transesophageal Echocardiogram (04.15.22 @ 16:09) >   1. Left ventricular ejection fraction, by visual estimation, is 60 to   65%.   2. Normal global left ventricular systolic function.   3. Mild mitral annular calcification.   4. Mild thickening and calcification of the anterior and posterior   mitral valve leaflets.   5. Moderate mitral valve regurgitation.   6. Saline contrast bubble study was negative, with no evidence of any   intracardiac shunt.   7. Lipomatous hypertrophy of the intra-atrial septum.   8. No left atrial appendage thrombus.    < end of copied text >  < from: MR Head w/wo IV Cont (04.13.22 @ 19:40) >    IMPRESSION:    Acute left MCA territory infarct as detailed above as well as additional   focal infarct involving the right temporal cortical region. No   hemorrhagic transformation.    Mild chronic microvascular type changes.    < end of copied text >   Neurology Progress Note    HPI:  77 yo F patient with a PMH of HTN and hyperthyroidism presented for right-sided upper and lower extremities weakness, as well as dysarthria, for a couple of hours. CTH showed a left sided frontal hypodensity, patient received tPA, did not neurologically improve. Thrombectomy was attempted, but occlusion was distal, they did not do it. Patient was transferred to the ICU for monitoring, BP was low despite boluses of NS, then it increased with christa then levo. Levophed titrated, and rate of NS decreased. Repeat CTH did not reveal any hemorrhage, patient was receiving heparin drip for right sided lower DVT, and now switched to Eliquis 10 BID, 5 mg BID starting 4/22. Patient underwent a JOEL to check for a PFO, was downgraded to stroke unit.      Interval History:    Overnight, the patient had multiple episodes of diarrhea after laxative given, no other acute events overnight. Tolerated JOEL well. Patient states she feels okay and that diarrhea has resolved, would like to go home. Denied any complaints.       Medications:  acetaminophen     Tablet .. 650 milliGRAM(s) Oral every 6 hours PRN  apixaban 10 milliGRAM(s) Oral every 12 hours  atorvastatin 80 milliGRAM(s) Oral at bedtime  chlorhexidine 4% Liquid 1 Application(s) Topical <User Schedule>  famotidine  Oral Tab/Cap - Peds 40 milliGRAM(s) Oral at bedtime  insulin glargine Injectable (LANTUS) 5 Unit(s) SubCutaneous at bedtime  insulin lispro (ADMELOG) corrective regimen sliding scale   SubCutaneous every 6 hours  levothyroxine 112 MICROGram(s) Oral daily  polyethylene glycol 3350 17 Gram(s) Oral two times a day  senna 2 Tablet(s) Oral at bedtime      Vital Signs Last 24 Hrs  T(C): 36.6 (16 Apr 2022 11:46), Max: 36.7 (15 Apr 2022 14:00)  T(F): 97.8 (16 Apr 2022 11:46), Max: 98.1 (15 Apr 2022 14:00)  HR: 84 (16 Apr 2022 11:46) (70 - 87)  BP: 146/64 (16 Apr 2022 11:46) (119/46 - 151/59)  BP(mean): 81 (16 Apr 2022 11:46) (66 - 125)  RR: 19 (16 Apr 2022 11:46) (18 - 41)  SpO2: 99% (16 Apr 2022 11:46) (95% - 100%)      Neurological Exam:   Mental status: Awake, alert and oriented x3. Moderate dysarthria, no aphasia.   Cranial nerves: Pupils equally round and reactive to light,  no nystagmus, extraocular muscles intact, V1 through V3 intact bilaterally and symmetric, face asymmetric on R, hearing intact to finger rub, palate elevation symmetric, tongue was midline.  Motor:  MRC grading RUE 4-/5, RLE 4/5, LUE/LLE 5/5.   strength 5/5.  Normal tone and bulk.  No abnormal movements.    Sensation: Decreased sensation on the right side, improving. Intact to light touch, proprioception, and pinprick otherwise.   Coordination: No dysmetria on finger-to-nose and heel-to-shin.   Reflexes: 2+ in bilateral UE/LE, downgoing toes bilaterally.  Gait: Deferred.    NIHSS: 4 (1 point for dysarthria, 1 point for facial asymmetry on R, 1 point for RUE, 1 point for RLE)      Labs:  CBC Full  -  ( 16 Apr 2022 04:30 )  WBC Count : 5.40 K/uL  RBC Count : 3.37 M/uL  Hemoglobin : 8.9 g/dL  Hematocrit : 28.2 %  Platelet Count - Automated : 198 K/uL  Mean Cell Volume : 83.7 fL  Mean Cell Hemoglobin : 26.4 pg  Mean Cell Hemoglobin Concentration : 31.6 g/dL  Auto Neutrophil # : 3.65 K/uL  Auto Lymphocyte # : 1.11 K/uL  Auto Monocyte # : 0.38 K/uL  Auto Eosinophil # : 0.21 K/uL  Auto Basophil # : 0.02 K/uL  Auto Neutrophil % : 67.5 %  Auto Lymphocyte % : 20.6 %  Auto Monocyte % : 7.0 %  Auto Eosinophil % : 3.9 %  Auto Basophil % : 0.4 %    04-16    145  |  111<H>  |  12  ----------------------------<  116<H>  3.8   |  22  |  0.8    Ca    8.6      16 Apr 2022 04:30  Phos  3.3     04-15  Mg     1.9     04-16    TPro  4.9<L>  /  Alb  3.6  /  TBili  1.4<H>  /  DBili  x   /  AST  34  /  ALT  33  /  AlkPhos  72  04-16    LIVER FUNCTIONS - ( 16 Apr 2022 04:30 )  Alb: 3.6 g/dL / Pro: 4.9 g/dL / ALK PHOS: 72 U/L / ALT: 33 U/L / AST: 34 U/L / GGT: x           PTT - ( 14 Apr 2022 16:00 )  PTT:36.8 sec      RADIOLOGY & ADDITIONAL TESTS:  < from: Transesophageal Echocardiogram (04.15.22 @ 16:09) >   1. Left ventricular ejection fraction, by visual estimation, is 60 to   65%.   2. Normal global left ventricular systolic function.   3. Mild mitral annular calcification.   4. Mild thickening and calcification of the anterior and posterior   mitral valve leaflets.   5. Moderate mitral valve regurgitation.   6. Saline contrast bubble study was negative, with no evidence of any   intracardiac shunt.   7. Lipomatous hypertrophy of the intra-atrial septum.   8. No left atrial appendage thrombus.    < end of copied text >  < from: MR Head w/wo IV Cont (04.13.22 @ 19:40) >    IMPRESSION:    Acute left MCA territory infarct as detailed above as well as additional   focal infarct involving the right temporal cortical region. No   hemorrhagic transformation.    Mild chronic microvascular type changes.    < end of copied text >

## 2022-04-16 NOTE — PROGRESS NOTE ADULT - ATTENDING COMMENTS
I have personally seen and examined this patient on 4/16.  I have fully participated in the care of this patient.  I have reviewed all pertinent clinical information, including history, physical exam, plan and note. Stable overnight. On AC for DVT. JOEL showed no PFO but reported atrium septum lipomatous hypertrophy which increases the risk to cardiac arrythmia. Recommend Loop recorder upon discharge.     I have reviewed all pertinent clinical information and reviewed all relevant imaging and diagnostic studies personally.  Recommendations as above.  Agree with above assessment except as noted.

## 2022-04-16 NOTE — PROGRESS NOTE ADULT - ASSESSMENT
75 yo female w/ PMH of HTN, hypothyroidism presents after fall this afternoon. At 16:30, started having R sided arm and leg weakness, and difficulty finding words. Received tPA for a Left M2 occlusion, followed by attempted thrombectomy, she also had an episode of worsening NIHSS from 7 to 11 after that which was responded to BP augmentations.   Ptn also had a LE DVT given the Hx of malignancy, hem/onc is on board. Patient is currently on Apixaban 10mg BID will be tapered to 5mg BID. Completed JOEL and MRI.   left M2 occlusion  RLE DVT    Neuro  #Ischemic stroke workup  - continue with Eliquis 10mg bid for 10 days then 5mg bid.   - continue atorvastatin 80mg daily  - q4hr stroke neuro checks and vitals  - MRI Brain without contrast Acute left MCA territory infarct and right temporal cortical  - Stroke education    Cards  #HTN  #DVT:  -  Goal -150  - hold home blood pressure medication for now  - JOEL EF 65%, no PFO  - deep vein thrombosis of the right peroneal vein and a branch of the   right posterior tibial vein.    #HLD  - high dose statin as above in CVA  - LDL results: 80    Pulm  - call provider if SPO2 < 94%    GI  #Nutrition/Fluids/Electrolytes   - replete K<4 and Mg <2  - Diet: On  - Abdominal distention, nontender, abdominal Xray requested.     Renal  - Creat 0.8    Infectious Disease  - Stroke Code CXR results:     Endocrine  #DM  - A1C results: 7.5  - ISS On.  - TSH results: 1.7    DVT Prophylaxis  - SCDs for DVT prophylaxis on the L leg, R has DVT.    Discussed daily hospital plans and goals with patient and family at bedside.  Discussed with Neurology Attending  Dispo: Possible candidate for IPR, JOEL no PFO.    This is a 77 yo F w/ PMHx of HTN, hypothyroidism presents after fall this afternoon. At 16:30, started having R sided arm and leg weakness, and difficulty finding words. Received tPA for a Left M2 occlusion, followed by attempted thrombectomy, she also had an episode of worsening NIHSS from 7 to 11 after that which was responded to BP augmentations. Pt also had a LE DVT given the Hx of malignancy, hem/onc is on board. Patient is currently on Apixaban 10mg BID will be tapered to 5mg BID. Completed JOEL and MRI.       Neuro  #Ischemic stroke workup  - continue with Eliquis 10mg bid for 10 days then 5mg bid.   - continue atorvastatin 80mg daily  - q4hr stroke neuro checks and vitals  - MRI Brain without contrast Acute left MCA territory infarct and right temporal cortical  - Stroke education    Cards  #HTN  - Goal -150  - hold home blood pressure medication for now  - JOEL EF 65%, no PFO  - deep vein thrombosis of the right peroneal vein and a branch of the right posterior tibial vein.    #HLD  - high dose statin as above in CVA  - LDL results: 80    Pulm  - call provider if SPO2 < 94%    GI  #Nutrition/Fluids/Electrolytes   - replete K<4 and Mg <2  - Diet: On  - Abdominal distention, nontender, abdominal X-ray requested.     Renal  - Creat 0.8    Endocrine  #DM  - A1C results: 7.5  - ISS On.  - TSH results: 1.7    DVT Prophylaxis  - SCDs for DVT prophylaxis on the L leg, R has DVT.    Discussed daily hospital plans and goals with patient and family at bedside.  Discussed with Neurology Attending  Dispo: Possible candidate for Inpatient Rehab, JOEL no PFO.    This is a 75 yo F w/ PMHx of HTN, hypothyroidism presents after fall, had R sided arm and leg weakness, and difficulty finding words. Received tpa for a Left M2 occlusion on CTA H/N, followed by attempted thrombectomy which was aborted, she also had an episode of worsening NIHSS from 7 to 11 which was responded to BP augmentations. Pt also had a LE DVT given the Hx of malignancy, hem/onc is on board. Patient is currently on Apixaban 10mg BID will be tapered to 5mg BID. Completed JOEL and MRI.     Plan:  Neuro  - MRI Brain w/o contrast showed acute left MCA territory infarct and right temporal cortical infarct  - Continue with Eliquis 10 mg BID  - Continue Atorvastatin 80mg daily  - q4hr stroke neuro checks and vitals  - Stroke education    Cardio  #HTN  - Goal -150  - Continue to hold home blood pressure medication for now  - JOEL completed, LVEF 65%, no PFO  - EPS consult for ILR placement    #HLD  - Continue high dose statin as above in CVA  - LDL results: 80    Pulm  - Place call provider if SpO2 < 92%    GI  #Nutrition/Fluids/Electrolytes   - Replete K<4 and Mg <2  - Diet: Consistent carbohydrate diet    Renal  - Creat 0.8    Endocrine  #DM  - A1C results: 7.5%  - Cont ISS  - TSH results: 1.7    Heme/Onc  - US Duplex showed +DVT of the right peroneal vein and a branch of the right posterior tibial vein  - Continue with Eliquis 10 mg BID    DVT Prophylaxis  - SCDs for DVT prophylaxis on the L leg, R has +DVT.      Dispo: Possible candidate for Inpatient Rehab   This is a 75 yo F w/ PMHx of HTN, hypothyroidism presents after fall, had R sided arm and leg weakness, and difficulty finding words. Received tpa for a Left M2 occlusion on CTA H/N, followed by attempted thrombectomy which was aborted, she also had an episode of worsening NIHSS from 7 to 11 which was responded to BP augmentations. Pt also had a LE DVT given the Hx of malignancy, hem/onc is on board. Patient is currently on Apixaban 10mg BID will be tapered to 5mg BID. Completed JOEL and MRI.     Plan:  Neuro  - MRI Brain w/o contrast showed acute left MCA territory infarct and right temporal cortical infarct  - Continue with Eliquis 10 mg BID  - Continue Atorvastatin 80 mg daily  - q4hr stroke neuro checks and vitals  - Stroke education    Cardio  #HTN  - Goal -150  - Continue to hold home blood pressure medication for now  - JOEL completed, LVEF 65%, no PFO  - EPS consult for ILR placement    #HLD  - Continue high dose statin as above in CVA  - LDL results: 80    Pulm  - Place call provider if SpO2 < 92%    GI  #Nutrition/Fluids/Electrolytes   - Replete K<4 and Mg <2  - Diet: Consistent carbohydrate diet    Renal  - Creat 0.8    Endocrine  #DM  - A1C results: 7.5%  - Cont ISS  - TSH results: 1.7    Heme/Onc  - US Duplex showed +DVT of the right peroneal vein and a branch of the right posterior tibial vein  - Continue with Eliquis 10 mg BID    DVT Prophylaxis  - SCDs for DVT prophylaxis on the L leg, R has +DVT.      Dispo: Possible candidate for Inpatient Rehab   This is a 77 yo F w/ PMHx of HTN, hypothyroidism presents after fall, had R sided arm and leg weakness, and difficulty finding words. Received tpa for a Left M2 occlusion on CTA H/N, followed by attempted thrombectomy which was aborted, and also had an episode of worsening NIHSS from 7 to 11 which was responded to BP augmentation. NIHSS 4, remains in stroke unit for monitoring.    Plan:  Neuro  - MRI Brain w/o contrast showed acute left MCA territory infarct and right temporal cortical infarct  - Continue with Eliquis 10 mg BID  - Continue Atorvastatin 80 mg daily  - q4hr stroke neuro checks and vitals  - Stroke education    Cardio  #HTN  - Goal -150  - Continue to hold home blood pressure medication for now  - JOEL completed, LVEF 65%, no PFO  - EPS consult for ILR placement    #HLD  - Continue high dose statin as above in CVA  - LDL results: 80    Pulm  - Place call provider if SpO2 < 92%    GI  #Nutrition/Fluids/Electrolytes   - Replete K<4 and Mg <2  - Diet: Consistent carbohydrate diet    Renal  - Creat 0.8    Endocrine  #DM  - A1C results: 7.5%  - Cont ISS  - TSH results: 1.7  - Continue synthroid    Heme/Onc  - US Duplex showed +DVT of the right peroneal vein and a branch of the right posterior tibial vein  - Continue with Eliquis 10 mg BID    DVT Prophylaxis  - SCDs for DVT prophylaxis on the L leg, R has +DVT.      Dispo: Possible candidate for Inpatient Rehab   This is a 75 yo F w/ PMHx of HTN, hypothyroidism presents after fall, had R sided arm and leg weakness, and difficulty finding words. Received tpa for a Left M2 occlusion on CTA H/N, followed by attempted thrombectomy which was aborted, and also had an episode of worsening NIHSS from 7 to 11 which was responded to BP augmentation. NIHSS 4, remains in stroke unit for monitoring.    Plan:  Neuro  - MRI Brain w/o contrast showed acute left MCA territory infarct and right temporal cortical infarct  - Continue with Eliquis 10 mg BID  - Continue Atorvastatin 80 mg daily  - q4hr stroke neuro checks and vitals  - Stroke education  - JOEL showed no PFO or intracardiac thrombosis.   - ILR on Monday     Cardio  #HTN  - Goal -150  - Continue to hold home blood pressure medication for now  - JOEL completed, LVEF 65%, no PFO  - EPS consult for ILR placement    #HLD  - Continue high dose statin as above in CVA  - LDL results: 80    Pulm  - Place call provider if SpO2 < 92%    GI  #Nutrition/Fluids/Electrolytes   - Replete K<4 and Mg <2  - Diet: Consistent carbohydrate diet    Renal  - Creat 0.8    Endocrine  #DM  - A1C results: 7.5%  - Cont ISS  - TSH results: 1.7  - Continue synthroid    Heme/Onc  - US Duplex showed +DVT of the right peroneal vein and a branch of the right posterior tibial vein  - Continue with Eliquis 10 mg BID    DVT Prophylaxis  - SCDs for DVT prophylaxis on the L leg, R has +DVT.      Dispo: Possible candidate for Inpatient Rehab

## 2022-04-17 LAB
APPEARANCE UR: ABNORMAL
BACTERIA # UR AUTO: ABNORMAL
BILIRUB UR-MCNC: NEGATIVE — SIGNIFICANT CHANGE UP
COLOR SPEC: YELLOW — SIGNIFICANT CHANGE UP
DIFF PNL FLD: ABNORMAL
EPI CELLS # UR: 10 /HPF — HIGH (ref 0–5)
GLUCOSE BLDC GLUCOMTR-MCNC: 127 MG/DL — HIGH (ref 70–99)
GLUCOSE BLDC GLUCOMTR-MCNC: 132 MG/DL — HIGH (ref 70–99)
GLUCOSE BLDC GLUCOMTR-MCNC: 136 MG/DL — HIGH (ref 70–99)
GLUCOSE BLDC GLUCOMTR-MCNC: 149 MG/DL — HIGH (ref 70–99)
GLUCOSE UR QL: NEGATIVE — SIGNIFICANT CHANGE UP
KETONES UR-MCNC: ABNORMAL
LEUKOCYTE ESTERASE UR-ACNC: ABNORMAL
NITRITE UR-MCNC: POSITIVE
PH UR: 6 — SIGNIFICANT CHANGE UP (ref 5–8)
PROT UR-MCNC: NEGATIVE — SIGNIFICANT CHANGE UP
RBC CASTS # UR COMP ASSIST: 10 /HPF — HIGH (ref 0–4)
SP GR SPEC: 1.03 — SIGNIFICANT CHANGE UP (ref 1.01–1.03)
UROBILINOGEN FLD QL: SIGNIFICANT CHANGE UP
WBC UR QL: 50 /HPF — HIGH (ref 0–5)

## 2022-04-17 PROCEDURE — 99233 SBSQ HOSP IP/OBS HIGH 50: CPT

## 2022-04-17 RX ORDER — POLYETHYLENE GLYCOL 3350 17 G/17G
17 POWDER, FOR SOLUTION ORAL DAILY
Refills: 0 | Status: DISCONTINUED | OUTPATIENT
Start: 2022-04-17 | End: 2022-04-19

## 2022-04-17 RX ORDER — INSULIN LISPRO 100/ML
VIAL (ML) SUBCUTANEOUS
Refills: 0 | Status: DISCONTINUED | OUTPATIENT
Start: 2022-04-17 | End: 2022-04-19

## 2022-04-17 RX ADMIN — Medication 112 MICROGRAM(S): at 05:08

## 2022-04-17 RX ADMIN — APIXABAN 10 MILLIGRAM(S): 2.5 TABLET, FILM COATED ORAL at 05:08

## 2022-04-17 RX ADMIN — INSULIN GLARGINE 5 UNIT(S): 100 INJECTION, SOLUTION SUBCUTANEOUS at 21:27

## 2022-04-17 RX ADMIN — Medication 0: at 16:21

## 2022-04-17 RX ADMIN — ATORVASTATIN CALCIUM 80 MILLIGRAM(S): 80 TABLET, FILM COATED ORAL at 21:26

## 2022-04-17 RX ADMIN — APIXABAN 10 MILLIGRAM(S): 2.5 TABLET, FILM COATED ORAL at 17:44

## 2022-04-17 RX ADMIN — FAMOTIDINE 40 MILLIGRAM(S): 10 INJECTION INTRAVENOUS at 21:26

## 2022-04-17 RX ADMIN — CHLORHEXIDINE GLUCONATE 1 APPLICATION(S): 213 SOLUTION TOPICAL at 05:48

## 2022-04-17 NOTE — PROGRESS NOTE ADULT - ASSESSMENT
75 yo F w/ PMHx of HTN, hypothyroidism presents after fall, had R sided arm and leg weakness, and difficulty finding words. Received tpa for a Left M2 occlusion on CTA H/N, followed by attempted thrombectomy which was aborted, and also had an episode of worsening NIHSS from 7 to 11 which was responded to BP augmentation. NIHSS 4, remains in stroke unit for monitoring.    Plan:  Neuro  - MRI Brain w/o contrast showed acute left MCA territory infarct and right temporal cortical infarct  - Continue with Eliquis 10 mg BID  - Continue Atorvastatin 80 mg daily  - q4hr stroke neuro checks and vitals  - Stroke education  - JOEL showed no PFO or intracardiac thrombosis.   - ILR on Monday     Cardio  #HTN  - Goal -150  - Continue to hold home blood pressure medication for now  - JOEL completed, LVEF 65%, no PFO  - EPS consult for ILR placement    #HLD  - Continue high dose statin as above in CVA  - LDL results: 80    Pulm  - Place call provider if SpO2 < 92%    GI  #Nutrition/Fluids/Electrolytes   - Replete K<4 and Mg <2  - Diet: Consistent carbohydrate diet    Renal  - Creat 0.8    Endocrine  #DM  - A1C results: 7.5%  - Cont ISS  - TSH results: 1.7  - Continue synthroid    Heme/Onc  - US Duplex showed +DVT of the right peroneal vein and a branch of the right posterior tibial vein  - Continue with Eliquis 10 mg BID    DVT Prophylaxis  - SCDs for DVT prophylaxis on the L leg, R has +DVT.      Dispo: Possible candidate for Inpatient Rehab

## 2022-04-17 NOTE — PROGRESS NOTE ADULT - ASSESSMENT
PARISH SANTOYO 76y Female  MRN#: [id number]   CODE STATUS:full code       SUBJECTIVE  Patient is a 76y old Female who presents with a chief complaint of right m2 occlusion (2022 09:39)  Currently admitted to medicine with the primary diagnosis of CVA (cerebral vascular accident)  Today is hospital day 7d, and this morning she is resting in bed and reports no overnight events. 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    H/O gastroesophageal reflux (GERD)    H/O cholecystitis  lap pedro    S/P appendectomy    H/O carpal tunnel repair  b/l    S/P trigger finger release        ALLERGIES:  adhesives (Blisters; Rash)  copper (Hives; Rash)  gold (Hives; Rash)  nickel (Hives; Rash)  silver (Hives; Rash)  Sulfacet-R (Rash)    MEDICATIONS:  STANDING MEDICATIONS  apixaban 10 milliGRAM(s) Oral every 12 hours  atorvastatin 80 milliGRAM(s) Oral at bedtime  chlorhexidine 4% Liquid 1 Application(s) Topical <User Schedule>  famotidine  Oral Tab/Cap - Peds 40 milliGRAM(s) Oral at bedtime  insulin glargine Injectable (LANTUS) 5 Unit(s) SubCutaneous at bedtime  insulin lispro (ADMELOG) corrective regimen sliding scale   SubCutaneous three times a day before meals  levothyroxine 112 MICROGram(s) Oral daily  polyethylene glycol 3350 17 Gram(s) Oral two times a day  senna 2 Tablet(s) Oral at bedtime    PRN MEDICATIONS  acetaminophen     Tablet .. 650 milliGRAM(s) Oral every 6 hours PRN      VITAL SIGNS: Last 24 Hours  T(C): 36.7 (2022 08:00), Max: 36.9 (2022 00:03)  T(F): 98.1 (2022 08:00), Max: 98.4 (2022 00:03)  HR: 80 (2022 08:30) (70 - 85)  BP: 154/60 (2022 08:30) (118/50 - 157/56)  BP(mean): 89 (2022 08:30) (67 - 93)  RR: 22 (2022 08:30) (18 - 22)  SpO2: 97% (2022 08:30) (97% - 99%)    LABS:                        8.9    5.40  )-----------( 198      ( 2022 04:30 )             28.2     04-16    145  |  111<H>  |  12  ----------------------------<  116<H>  3.8   |  22  |  0.8    Ca    8.6      2022 04:30  Mg     1.9     04-16    TPro  4.9<L>  /  Alb  3.6  /  TBili  1.4<H>  /  DBili  x   /  AST  34  /  ALT  33  /  AlkPhos  72  04-16      Urinalysis Basic - ( 2022 05:16 )    Color: Yellow / Appearance: Turbid / S.030 / pH: x  Gluc: x / Ketone: Moderate  / Bili: Negative / Urobili: <2 mg/dL   Blood: x / Protein: Negative / Nitrite: Positive   Leuk Esterase: Moderate / RBC: 10 /HPF / WBC 50 /HPF   Sq Epi: x / Non Sq Epi: 10 /HPF / Bacteria: Many    RADIOLOGY:      PHYSICAL EXAM:  GENERAL: NAD, well-developed, AAOx3  HEENT:  Atraumatic, Normocephalic. EOMI, PERRLA, conjunctiva and sclera clear, No JVD  PULMONARY: Clear to auscultation bilaterally; No wheeze  CARDIOVASCULAR: Regular rate and rhythm; No murmurs, rubs, or gallops  GASTROINTESTINAL: Soft, Nontender, Nondistended; Bowel sounds present  MUSCULOSKELETAL:  2+ Peripheral Pulses, No clubbing, cyanosis, or edema  NEUROLOGY: non-focal  SKIN: No rashes or lesions    ASSESSMENT & PLAN  77 yo F w/ PMHx of HTN, hypothyroidism presents after fall this afternoon. At 16:30, started having R sided arm and leg weakness, and difficulty finding words. Received tPA for a Left M2 occlusion, followed by attempted thrombectomy, she also had an episode of worsening NIHSS from 7 to 11 after that which was responded to BP augmentations.      #Ischemic stroke workup  #HTN  #HLD  # deep vein thrombosis of the right peroneal vein and a branch of the right posterior tibial vein.    - on Eliquis  - continue atorvastatin 80mg daily  - q4hr stroke neuro checks and vitals  - MRI Brain without contrast Acute left MCA territory infarct and right temporal cortical  - Stroke education  - Goal -150  - hold home blood pressure medication for now  - JOEL EF 65%, no PFO  - Bowel regimen hold for diarrhea   - high dose statin as above in CVA  - LDL results: 80  - A1C results: 7.5  - TSH results: 1.7  - PT/OT/S/S      Dispo: Possible candidate for Inpatient Rehab

## 2022-04-17 NOTE — PROGRESS NOTE ADULT - SUBJECTIVE AND OBJECTIVE BOX
Neurology Progress Note    HPI:  77 yo F patient with a PMH of HTN and hyperthyroidism presented for right-sided upper and lower extremities weakness, as well as dysarthria, for a couple of hours. CTH showed a left sided frontal hypodensity, patient received tPA, did not neurologically improve. Thrombectomy was attempted, but occlusion was distal, they did not do it. Patient was transferred to the ICU for monitoring, BP was low despite boluses of NS, then it increased with christa then levo. Levophed titrated, and rate of NS decreased. Repeat CTH did not reveal any hemorrhage, patient was receiving heparin drip for right sided lower DVT, and now switched to Eliquis 10 BID, 5 mg BID starting 4/22. Patient underwent a JOEL to check for a PFO, was downgraded to stroke unit.      Interval History:    no acute overnight events       Medications:  acetaminophen     Tablet .. 650 milliGRAM(s) Oral every 6 hours PRN  apixaban 10 milliGRAM(s) Oral every 12 hours  atorvastatin 80 milliGRAM(s) Oral at bedtime  chlorhexidine 4% Liquid 1 Application(s) Topical <User Schedule>  famotidine  Oral Tab/Cap - Peds 40 milliGRAM(s) Oral at bedtime  insulin glargine Injectable (LANTUS) 5 Unit(s) SubCutaneous at bedtime  insulin lispro (ADMELOG) corrective regimen sliding scale   SubCutaneous every 6 hours  levothyroxine 112 MICROGram(s) Oral daily  polyethylene glycol 3350 17 Gram(s) Oral two times a day  senna 2 Tablet(s) Oral at bedtime      Vital Signs Last 24 Hrs    ICU Vital Signs Last 24 Hrs  T(C): 36.7 (17 Apr 2022 08:00), Max: 36.9 (17 Apr 2022 00:03)  T(F): 98.1 (17 Apr 2022 08:00), Max: 98.4 (17 Apr 2022 00:03)  HR: 80 (17 Apr 2022 08:30) (70 - 85)  BP: 154/60 (17 Apr 2022 08:30) (118/50 - 157/56)  BP(mean): 89 (17 Apr 2022 08:30) (67 - 93)  RR: 22 (17 Apr 2022 08:30) (18 - 22)  SpO2: 97% (17 Apr 2022 08:30) (97% - 99%)        Neurological Exam:   Mental status: Awake, alert and oriented x3. Moderate dysarthria, no aphasia.   Cranial nerves: Pupils equally round and reactive to light,  no nystagmus, extraocular muscles intact, V1 through V3 intact bilaterally and symmetric, face asymmetric on R, hearing intact to finger rub, palate elevation symmetric, tongue was midline.  Motor:  MRC grading RUE 4-/5, RLE 4/5, LUE/LLE 5/5.   strength 5/5.  Normal tone and bulk.  No abnormal movements.    Sensation: Decreased sensation on the right side, improving. Intact to light touch, proprioception, and pinprick otherwise.   Coordination: No dysmetria on finger-to-nose and heel-to-shin.   Reflexes: 2+ in bilateral UE/LE, downgoing toes bilaterally.  Gait: Deferred.    NIHSS: 4 (1 point for dysarthria, 1 point for facial asymmetry on R, 1 point for RUE, 1 point for RLE)      Labs:      LABS:  cret                        8.9    5.40  )-----------( 198      ( 16 Apr 2022 04:30 )             28.2     04-16    145  |  111<H>  |  12  ----------------------------<  116<H>  3.8   |  22  |  0.8    Ca    8.6      16 Apr 2022 04:30  Mg     1.9     04-16    TPro  4.9<L>  /  Alb  3.6  /  TBili  1.4<H>  /  DBili  x   /  AST  34  /  ALT  33  /  AlkPhos  72  04-16        04-16    145  |  111<H>  |  12  ----------------------------<  116<H>  3.8   |  22  |  0.8    Ca    8.6      16 Apr 2022 04:30  Phos  3.3     04-15  Mg     1.9     04-16    TPro  4.9<L>  /  Alb  3.6  /  TBili  1.4<H>  /  DBili  x   /  AST  34  /  ALT  33  /  AlkPhos  72  04-16    LIVER FUNCTIONS - ( 16 Apr 2022 04:30 )  Alb: 3.6 g/dL / Pro: 4.9 g/dL / ALK PHOS: 72 U/L / ALT: 33 U/L / AST: 34 U/L / GGT: x           PTT - ( 14 Apr 2022 16:00 )  PTT:36.8 sec      RADIOLOGY & ADDITIONAL TESTS:  < from: Transesophageal Echocardiogram (04.15.22 @ 16:09) >   1. Left ventricular ejection fraction, by visual estimation, is 60 to   65%.   2. Normal global left ventricular systolic function.   3. Mild mitral annular calcification.   4. Mild thickening and calcification of the anterior and posterior   mitral valve leaflets.   5. Moderate mitral valve regurgitation.   6. Saline contrast bubble study was negative, with no evidence of any   intracardiac shunt.   7. Lipomatous hypertrophy of the intra-atrial septum.   8. No left atrial appendage thrombus.    < end of copied text >  < from: MR Head w/wo IV Cont (04.13.22 @ 19:40) >    IMPRESSION:    Acute left MCA territory infarct as detailed above as well as additional   focal infarct involving the right temporal cortical region. No   hemorrhagic transformation.    Mild chronic microvascular type changes.    < end of copied text >

## 2022-04-18 LAB
ALBUMIN SERPL ELPH-MCNC: 3.5 G/DL — SIGNIFICANT CHANGE UP (ref 3.5–5.2)
ALP SERPL-CCNC: 61 U/L — SIGNIFICANT CHANGE UP (ref 30–115)
ALT FLD-CCNC: 27 U/L — SIGNIFICANT CHANGE UP (ref 0–41)
ANION GAP SERPL CALC-SCNC: 10 MMOL/L — SIGNIFICANT CHANGE UP (ref 7–14)
AST SERPL-CCNC: 18 U/L — SIGNIFICANT CHANGE UP (ref 0–41)
BASOPHILS # BLD AUTO: 0.02 K/UL — SIGNIFICANT CHANGE UP (ref 0–0.2)
BASOPHILS NFR BLD AUTO: 0.4 % — SIGNIFICANT CHANGE UP (ref 0–1)
BILIRUB SERPL-MCNC: 1.3 MG/DL — HIGH (ref 0.2–1.2)
BUN SERPL-MCNC: 18 MG/DL — SIGNIFICANT CHANGE UP (ref 10–20)
CALCIUM SERPL-MCNC: 9 MG/DL — SIGNIFICANT CHANGE UP (ref 8.5–10.1)
CHLORIDE SERPL-SCNC: 110 MMOL/L — SIGNIFICANT CHANGE UP (ref 98–110)
CO2 SERPL-SCNC: 25 MMOL/L — SIGNIFICANT CHANGE UP (ref 17–32)
CREAT SERPL-MCNC: 0.7 MG/DL — SIGNIFICANT CHANGE UP (ref 0.7–1.5)
EGFR: 91 ML/MIN/1.73M2 — SIGNIFICANT CHANGE UP
EOSINOPHIL # BLD AUTO: 0.16 K/UL — SIGNIFICANT CHANGE UP (ref 0–0.7)
EOSINOPHIL NFR BLD AUTO: 3.4 % — SIGNIFICANT CHANGE UP (ref 0–8)
GLUCOSE BLDC GLUCOMTR-MCNC: 133 MG/DL — HIGH (ref 70–99)
GLUCOSE BLDC GLUCOMTR-MCNC: 143 MG/DL — HIGH (ref 70–99)
GLUCOSE BLDC GLUCOMTR-MCNC: 144 MG/DL — HIGH (ref 70–99)
GLUCOSE BLDC GLUCOMTR-MCNC: 159 MG/DL — HIGH (ref 70–99)
GLUCOSE SERPL-MCNC: 133 MG/DL — HIGH (ref 70–99)
HCT VFR BLD CALC: 28.2 % — LOW (ref 37–47)
HGB BLD-MCNC: 8.8 G/DL — LOW (ref 12–16)
IMM GRANULOCYTES NFR BLD AUTO: 0.4 % — HIGH (ref 0.1–0.3)
LYMPHOCYTES # BLD AUTO: 1.24 K/UL — SIGNIFICANT CHANGE UP (ref 1.2–3.4)
LYMPHOCYTES # BLD AUTO: 26.1 % — SIGNIFICANT CHANGE UP (ref 20.5–51.1)
MAGNESIUM SERPL-MCNC: 1.9 MG/DL — SIGNIFICANT CHANGE UP (ref 1.8–2.4)
MCHC RBC-ENTMCNC: 26.1 PG — LOW (ref 27–31)
MCHC RBC-ENTMCNC: 31.2 G/DL — LOW (ref 32–37)
MCV RBC AUTO: 83.7 FL — SIGNIFICANT CHANGE UP (ref 81–99)
MONOCYTES # BLD AUTO: 0.32 K/UL — SIGNIFICANT CHANGE UP (ref 0.1–0.6)
MONOCYTES NFR BLD AUTO: 6.7 % — SIGNIFICANT CHANGE UP (ref 1.7–9.3)
NEUTROPHILS # BLD AUTO: 2.99 K/UL — SIGNIFICANT CHANGE UP (ref 1.4–6.5)
NEUTROPHILS NFR BLD AUTO: 63 % — SIGNIFICANT CHANGE UP (ref 42.2–75.2)
NRBC # BLD: 0 /100 WBCS — SIGNIFICANT CHANGE UP (ref 0–0)
PLATELET # BLD AUTO: 222 K/UL — SIGNIFICANT CHANGE UP (ref 130–400)
POTASSIUM SERPL-MCNC: 3.7 MMOL/L — SIGNIFICANT CHANGE UP (ref 3.5–5)
POTASSIUM SERPL-SCNC: 3.7 MMOL/L — SIGNIFICANT CHANGE UP (ref 3.5–5)
PROT SERPL-MCNC: 4.9 G/DL — LOW (ref 6–8)
RBC # BLD: 3.37 M/UL — LOW (ref 4.2–5.4)
RBC # FLD: 14.4 % — SIGNIFICANT CHANGE UP (ref 11.5–14.5)
SODIUM SERPL-SCNC: 145 MMOL/L — SIGNIFICANT CHANGE UP (ref 135–146)
WBC # BLD: 4.75 K/UL — LOW (ref 4.8–10.8)
WBC # FLD AUTO: 4.75 K/UL — LOW (ref 4.8–10.8)

## 2022-04-18 PROCEDURE — 99232 SBSQ HOSP IP/OBS MODERATE 35: CPT

## 2022-04-18 RX ORDER — POTASSIUM CHLORIDE 20 MEQ
20 PACKET (EA) ORAL ONCE
Refills: 0 | Status: COMPLETED | OUTPATIENT
Start: 2022-04-18 | End: 2022-04-18

## 2022-04-18 RX ADMIN — Medication 1: at 11:59

## 2022-04-18 RX ADMIN — APIXABAN 10 MILLIGRAM(S): 2.5 TABLET, FILM COATED ORAL at 17:14

## 2022-04-18 RX ADMIN — APIXABAN 10 MILLIGRAM(S): 2.5 TABLET, FILM COATED ORAL at 05:27

## 2022-04-18 RX ADMIN — FAMOTIDINE 40 MILLIGRAM(S): 10 INJECTION INTRAVENOUS at 21:36

## 2022-04-18 RX ADMIN — CHLORHEXIDINE GLUCONATE 1 APPLICATION(S): 213 SOLUTION TOPICAL at 06:24

## 2022-04-18 RX ADMIN — ATORVASTATIN CALCIUM 80 MILLIGRAM(S): 80 TABLET, FILM COATED ORAL at 21:36

## 2022-04-18 RX ADMIN — Medication 20 MILLIEQUIVALENT(S): at 11:06

## 2022-04-18 RX ADMIN — Medication 112 MICROGRAM(S): at 05:27

## 2022-04-18 RX ADMIN — INSULIN GLARGINE 5 UNIT(S): 100 INJECTION, SOLUTION SUBCUTANEOUS at 21:44

## 2022-04-18 NOTE — SWALLOW BEDSIDE ASSESSMENT ADULT - SWALLOW EVAL: RECOMMENDED DIET
Easy to chew diet w/ thin liquids
puree diet w/ mildly thick liquid s
regular consistency diet w/ thin liquids

## 2022-04-18 NOTE — SWALLOW BEDSIDE ASSESSMENT ADULT - COMMENTS
FEES completed on 4/14, Results indicate mild pharyngeal dysphagia for puree, easy to chew, and mildly thick liquids; moderate pharyngeal dysphagia for thin liquids. Recommendations for an easy to chew diet w/ thin liquids

## 2022-04-18 NOTE — PROGRESS NOTE ADULT - ASSESSMENT
77 yo F w/ PMHx of HTN, hypothyroidism presents after fall, had R sided arm and leg weakness, and difficulty finding words. Received tpa for a Left M2 occlusion on CTA H/N, followed by attempted thrombectomy which was aborted, and also had an episode of worsening NIHSS from 7 to 11 which was responded to BP augmentation. Exams improved, ptn will be downgraded to 3E, pending ILR.     Plan:  Neuro  - MRI Brain w/o contrast showed acute left MCA territory infarct and right temporal cortical infarct  - Continue with Eliquis 10 mg BID  - Continue Atorvastatin 80 mg daily  - q4hr stroke neuro checks and vitals  - Stroke education  - JOEL showed no PFO or intracardiac thrombosis.   - ILR pending, today.     Cardio  #HTN  - Goal -150  - Continue to hold home blood pressure medication for now  - JOEL completed, LVEF 65%, no PFO  - EPS consult for ILR placement    #HLD  - Continue high dose statin as above in CVA  - LDL results: 80    Pulm  - Place call provider if SpO2 < 92%    GI  #Nutrition/Fluids/Electrolytes   - Replete K<4 and Mg <2  - Diet: Consistent carbohydrate diet  - Outptn GI eval after discharge, r/u chronic GIB.   - Iron study requested.    Renal  - Creat 0.8  - U/A.     Endocrine  #DM  - A1C results: 7.5%  - Cont ISS  - TSH results: 1.7  - Continue synthroid    Heme/Onc  - US Duplex showed +DVT of the right peroneal vein and a branch of the right posterior tibial vein  - Continue with Eliquis 10 mg BID    DVT Prophylaxis  - SCDs for DVT prophylaxis on the L leg, R has +DVT.      Dispo: downgraded to 3E, 4A candidate after ILR.

## 2022-04-18 NOTE — PROGRESS NOTE ADULT - SUBJECTIVE AND OBJECTIVE BOX
Neurology Progress Note    Interval History:    Patient was seen and examined, no acute event over night. Going for ILR today.     Medications:  acetaminophen     Tablet .. 650 milliGRAM(s) Oral every 6 hours PRN  apixaban 10 milliGRAM(s) Oral every 12 hours  atorvastatin 80 milliGRAM(s) Oral at bedtime  chlorhexidine 4% Liquid 1 Application(s) Topical <User Schedule>  famotidine  Oral Tab/Cap - Peds 40 milliGRAM(s) Oral at bedtime  insulin glargine Injectable (LANTUS) 5 Unit(s) SubCutaneous at bedtime  insulin lispro (ADMELOG) corrective regimen sliding scale   SubCutaneous three times a day before meals  levothyroxine 112 MICROGram(s) Oral daily  polyethylene glycol 3350 17 Gram(s) Oral daily PRN      Vital Signs Last 24 Hrs  T(C): 36.3 (18 Apr 2022 04:19), Max: 36.8 (17 Apr 2022 17:20)  T(F): 97.3 (18 Apr 2022 04:19), Max: 98.2 (17 Apr 2022 17:20)  HR: 86 (18 Apr 2022 12:02) (68 - 91)  BP: 128/49 (18 Apr 2022 12:02) (117/66 - 160/62)  BP(mean): 74 (18 Apr 2022 12:02) (70 - 103)  RR: 18 (18 Apr 2022 12:02) (18 - 22)  SpO2: 98% (18 Apr 2022 12:02) (98% - 100%)    Neurological Exam:   Mental status: Awake, alert and oriented x3. Moderate dysarthria, no aphasia.   Cranial nerves: Pupils equally round and reactive to light,  no nystagmus, extraocular muscles intact, V1 through V3 intact bilaterally and symmetric, face asymmetric on R, hearing intact to finger rub, palate elevation symmetric, tongue was midline.  Motor:  MRC grading RUE 4-/5, RLE 4/5, LUE/LLE 5/5.   strength 5/5.  Normal tone and bulk.  No abnormal movements.    Sensation: Decreased sensation on the right side, improving. Intact to light touch, proprioception, and pinprick otherwise.   Coordination: No dysmetria on finger-to-nose and heel-to-shin.   Reflexes: 2+ in bilateral UE/LE, downgoing toes bilaterally.  Gait: Deferred.    Labs:  CBC Full  -  ( 18 Apr 2022 05:00 )  WBC Count : 4.75 K/uL  RBC Count : 3.37 M/uL  Hemoglobin : 8.8 g/dL  Hematocrit : 28.2 %  Platelet Count - Automated : 222 K/uL  Mean Cell Volume : 83.7 fL  Mean Cell Hemoglobin : 26.1 pg  Mean Cell Hemoglobin Concentration : 31.2 g/dL  Auto Neutrophil # : 2.99 K/uL  Auto Lymphocyte # : 1.24 K/uL  Auto Monocyte # : 0.32 K/uL  Auto Eosinophil # : 0.16 K/uL  Auto Basophil # : 0.02 K/uL  Auto Neutrophil % : 63.0 %  Auto Lymphocyte % : 26.1 %  Auto Monocyte % : 6.7 %  Auto Eosinophil % : 3.4 %  Auto Basophil % : 0.4 %    04-18    145  |  110  |  18  ----------------------------<  133<H>  3.7   |  25  |  0.7    Ca    9.0      18 Apr 2022 05:00  Mg     1.9     04-18    TPro  4.9<L>  /  Alb  3.5  /  TBili  1.3<H>  /  DBili  x   /  AST  18  /  ALT  27  /  AlkPhos  61  04-18    LIVER FUNCTIONS - ( 18 Apr 2022 05:00 )  Alb: 3.5 g/dL / Pro: 4.9 g/dL / ALK PHOS: 61 U/L / ALT: 27 U/L / AST: 18 U/L / GGT: x                 RADIOLOGY & ADDITIONAL TESTS:  < from: MR Head w/wo IV Cont (04.13.22 @ 19:40) >    IMPRESSION:    Acute left MCA territory infarct as detailed above as well as additional   focal infarct involving the right temporal cortical region. No   hemorrhagic transformation.    Mild chronic microvascular type changes.      < end of copied text >  < from: CT Head No Cont (04.14.22 @ 16:17) >      IMPRESSION:    Redemonstrated acute infarct in the left corona radiata and left insular   cortex. No intracranial hemorrhage, extra-axial fluid collection, or   midline shift.    --- End of Report ---      < end of copied text >  < from: Transesophageal Echocardiogram (04.15.22 @ 16:09) >    Summary:   1. Left ventricular ejection fraction, by visual estimation, is 60 to   65%.   2. Normal global left ventricular systolic function.   3. Mild mitral annular calcification.   4. Mild thickening and calcification of the anterior and posterior   mitral valve leaflets.   5. Moderate mitral valve regurgitation.   6. Saline contrast bubble study was negative, with no evidence of any   intracardiac shunt.   7. Lipomatous hypertrophy of the intra-atrial septum.   8. No left atrial appendage thrombus.      < end of copied text >

## 2022-04-18 NOTE — PROGRESS NOTE ADULT - ASSESSMENT
77 yo F w/ PMHx of HTN, hypothyroidism presents after fall . Patient had R sided arm and leg weakness, and difficulty finding words. Received tPA for a Left M2 occlusion, followed by attempted thrombectomy,    # acute ischemic stroke of left insular/frontoparietal region and small ischemic stroke of right temporal lobe.   CTA head/neck with left M2 occlusion  Continue eliquis, statin  Neurology following; case discussed; plan for inpatient rehab after ILR placement  - ILR pending, today. follow up with cardiac EP    # RLE DVT, started on eliquis 10mg BID x 7 days then 5mg BID  JOEL without evidence of shunt.     # anemia- normocytic  hg dropped from 11.9 on presentation to 8.8; monitor  Patient follow up with Dr Jimenez for Gi. had plans for endoscopic evaluation as op. Follow up with GI  monitor hg;   check iron studies  continue pepcid    # hypothyroidism- continue Ltx  TSH- 1.7    #DM  - A1C results: 7.5%  - Cont ISS    #HLD

## 2022-04-18 NOTE — SWALLOW BEDSIDE ASSESSMENT ADULT - SLP PERTINENT HISTORY OF CURRENT PROBLEM
75 yo female w/ PMH of HTN, hypothyroidism presents after fall this afternoon. At 16:30, started having R sided arm and leg weakness, and difficulty finding words. Received tPA for a Left M2 occlusion, now admitted to NCCU following a thrombectomy.
77 y/o F w/ PMHx: HTN, hypothyroidism, splenic CA s/p chemo, presents after fall. After fall, pt started having R sided arm and leg weakness, and difficulty finding words; Stroke code called upon presentation. CTA showed a left M2 occlusion. Patient received tPA but neurologically declined shortly after. CODE NI actual called and patient taken for emergent thrombectomy and to be transferred to NCCU following procedure. MRI-> Acute left MCA territory infarct as detailed above as well as additional focal infarct involving the right temporal cortical region.
77 yo female w/ PMH of HTN, hypothyroidism presents after fall this afternoon. At 16:30, started having R sided arm and leg weakness, and difficulty finding words. Received tPA for a Left M2 occlusion, now admitted to NCCU following a thrombectomy.

## 2022-04-18 NOTE — SWALLOW BEDSIDE ASSESSMENT ADULT - NS SPL SWALLOW CLINIC TRIAL FT
josé manuel-pharyngeal swallow is WFL for thin, puree and regular solids w/o overt s/s of penetration/aspiration
+ toleration for an easy to chew diet w/ thin liquids w/o overt s/s of aspiration/penetration.
+overt s/s of penetration/aspiration w/ thin liquids, +min overt s/s of penetration/aspiration w/ puree and mildly thick liquids

## 2022-04-18 NOTE — SWALLOW BEDSIDE ASSESSMENT ADULT - SWALLOW EVAL: RECOMMENDED FEEDING/EATING TECHNIQUES
allow for swallow between intakes/oral hygiene/small sips/bites
oral hygiene/position upright (90 degrees)
oral hygiene/position upright (90 degrees)

## 2022-04-18 NOTE — SWALLOW BEDSIDE ASSESSMENT ADULT - SPECIFY REASON(S)
dysphagia eval
Dysphagia f/u post FEES (4/14)
dysphagia f/u, pt was stroke code yesterday, RN reported pocketing this am w/ breakfast

## 2022-04-18 NOTE — SWALLOW BEDSIDE ASSESSMENT ADULT - SWALLOW EVAL: DIAGNOSIS
+overt s/s of penetration/aspiration w/ thin liquids, +min overt s/s of penetration/aspiration w/ puree and mildly thick liquids
josé manuel-pharyngeal swallow is WFL for thin, puree and regular solids w/o overt s/s of penetration/aspiration
+ toleration for an easy to chew diet w/ thin liquids w/o overt s/s of aspiration/penetration.

## 2022-04-18 NOTE — PROGRESS NOTE ADULT - SUBJECTIVE AND OBJECTIVE BOX
HPI  Patient is a 76y old Female who presents with a chief complaint of right m2 occlusion (18 Apr 2022 13:01)    Currently admitted to medicine with the primary diagnosis of CVA (cerebral vascular accident)       Today is hospital day 8d.     INTERVAL HPI / OVERNIGHT EVENTS:  Patient was seen and examined at bedside  Patient Feels better. 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    H/O gastroesophageal reflux (GERD)    H/O cholecystitis  lap pedro    S/P appendectomy    H/O carpal tunnel repair  b/l    S/P trigger finger release  2002      ALLERGIES  adhesives (Blisters; Rash)  copper (Hives; Rash)  gold (Hives; Rash)  nickel (Hives; Rash)  silver (Hives; Rash)  Sulfacet-R (Rash)    MEDICATIONS  STANDING MEDICATIONS  apixaban 10 milliGRAM(s) Oral every 12 hours  atorvastatin 80 milliGRAM(s) Oral at bedtime  chlorhexidine 4% Liquid 1 Application(s) Topical <User Schedule>  famotidine  Oral Tab/Cap - Peds 40 milliGRAM(s) Oral at bedtime  insulin glargine Injectable (LANTUS) 5 Unit(s) SubCutaneous at bedtime  insulin lispro (ADMELOG) corrective regimen sliding scale   SubCutaneous three times a day before meals  levothyroxine 112 MICROGram(s) Oral daily    PRN MEDICATIONS  acetaminophen     Tablet .. 650 milliGRAM(s) Oral every 6 hours PRN  polyethylene glycol 3350 17 Gram(s) Oral daily PRN    VITALS:  T(F): 97.3  HR: 86  BP: 128/49  RR: 18  SpO2: 98%    PHYSICAL EXAM  GEN: no distress, comfortable  PULM: BS heard b/l equal, No wheezing  CVS: S1S2 present, no rubs or gallops  ABD: Soft, non-distended, no guarding; non-tender  EXT: right lower extremity edema  NEURO: A&Ox3    LABS                        8.8    4.75  )-----------( 222      ( 18 Apr 2022 05:00 )             28.2     04-18    145  |  110  |  18  ----------------------------<  133<H>  3.7   |  25  |  0.7    Ca    9.0      18 Apr 2022 05:00  Mg     1.9     04-18    TPro  4.9<L>  /  Alb  3.5  /  TBili  1.3<H>  /  DBili  x   /  AST  18  /  ALT  27  /  AlkPhos  61  04-18                  RADIOLOGY

## 2022-04-18 NOTE — SWALLOW BEDSIDE ASSESSMENT ADULT - CONSISTENCIES ADMINISTERED
DISPLAY PLAN FREE TEXT
3oz water, 4oz easy to chew solids/thin liquid/easy to chew
8oz/thin liquid/pureed/regular solid
6oz/thin liquid/mildly thick/pureed

## 2022-04-18 NOTE — PROGRESS NOTE ADULT - TIME BILLING
Followup care visit. examined the patient. Reviewed the charts. Coordinated care with ICU, Cardiology and Neurology follow up.
Review of imaging and chart; obtaining history; examination of pt; discussion and coordination of care.
Review of imaging and chart; obtaining history; examination of pt; discussion and coordination of care.

## 2022-04-18 NOTE — SWALLOW BEDSIDE ASSESSMENT ADULT - PHARYNGEAL PHASE
cough w/ thin, throat clearing w/ puree/mildly thick/Cough post oral intake/Throat clear post oral intake
Within functional limits
Within functional limits

## 2022-04-18 NOTE — PROGRESS NOTE ADULT - ATTENDING COMMENTS
Pt is a 75 yo F with PMHx of HTN, s/p composite lymphoma, hypothyroidism, who presented with aphasia and right arm weakness. S/p IV tPA on 4/10. NIHSS 7.     Impr: acute ischemic stroke of left insular/frontoparietal region and small ischemic stroke of right temporal lobe.   CTA head/neck with left M2 occlusion (EVT unsuccessful, migrated to M3)  Also found to have a RLE DVT, started on eliquis 10mg BID x 7 days then 5mg BID  JOEL without evidence of shunt. ILR placement today  Continue AC/statin  PT/OT/ST, tele, ok to downgrade to 3E, q8 neurochecks, -160 .  Dispo planning, possible 4A IPR Pt is a 77 yo F with PMHx of HTN, s/p composite lymphoma, hypothyroidism, who presented with aphasia and right arm weakness. S/p IV tPA on 4/10. NIHSS 7.     Impr: acute ischemic stroke of left insular/frontoparietal region and small ischemic stroke of right temporal lobe.   CTA head/neck with left M2 occlusion (EVT unsuccessful, migrated to M3)  Also found to have a RLE DVT, started on eliquis 10mg BID x 7 days then 5mg BID  JOEL without evidence of shunt. ILR placement today.  Etiology: possible 2/2 hypercoagulable state  Continue AC/statin  PT/OT/ST, tele, ok to downgrade to 3E, q8 neurochecks, -160 .  Dispo planning, possible 4A IPR

## 2022-04-18 NOTE — SWALLOW BEDSIDE ASSESSMENT ADULT - SLP GENERAL OBSERVATIONS
Pt received in bed awake, remains aphasic, pt reported coughing yesterday w/ po, RN reported pocketing today w/ breakfast
pt received in bed awake alert , room air, w/o c/o pain, +aphasic , mildly dysarthric.
Pt received in bed awake and alert on room air

## 2022-04-19 ENCOUNTER — TRANSCRIPTION ENCOUNTER (OUTPATIENT)
Age: 77
End: 2022-04-19

## 2022-04-19 ENCOUNTER — INPATIENT (INPATIENT)
Facility: HOSPITAL | Age: 77
LOS: 13 days | Discharge: NURSING, REHAB OR LONG-TERM CARE | End: 2022-05-03
Attending: PHYSICAL MEDICINE & REHABILITATION | Admitting: PHYSICAL MEDICINE & REHABILITATION

## 2022-04-19 VITALS
RESPIRATION RATE: 18 BRPM | DIASTOLIC BLOOD PRESSURE: 58 MMHG | TEMPERATURE: 96 F | HEART RATE: 81 BPM | SYSTOLIC BLOOD PRESSURE: 138 MMHG

## 2022-04-19 VITALS
SYSTOLIC BLOOD PRESSURE: 124 MMHG | RESPIRATION RATE: 18 BRPM | DIASTOLIC BLOOD PRESSURE: 59 MMHG | TEMPERATURE: 96 F | HEART RATE: 81 BPM

## 2022-04-19 DIAGNOSIS — Z90.49 ACQUIRED ABSENCE OF OTHER SPECIFIED PARTS OF DIGESTIVE TRACT: Chronic | ICD-10-CM

## 2022-04-19 DIAGNOSIS — Z87.19 PERSONAL HISTORY OF OTHER DISEASES OF THE DIGESTIVE SYSTEM: Chronic | ICD-10-CM

## 2022-04-19 DIAGNOSIS — Z98.890 OTHER SPECIFIED POSTPROCEDURAL STATES: Chronic | ICD-10-CM

## 2022-04-19 LAB
ANION GAP SERPL CALC-SCNC: 9 MMOL/L — SIGNIFICANT CHANGE UP (ref 7–14)
BUN SERPL-MCNC: 19 MG/DL — SIGNIFICANT CHANGE UP (ref 10–20)
CALCIUM SERPL-MCNC: 9.1 MG/DL — SIGNIFICANT CHANGE UP (ref 8.5–10.1)
CHLORIDE SERPL-SCNC: 112 MMOL/L — HIGH (ref 98–110)
CO2 SERPL-SCNC: 27 MMOL/L — SIGNIFICANT CHANGE UP (ref 17–32)
CREAT SERPL-MCNC: 0.8 MG/DL — SIGNIFICANT CHANGE UP (ref 0.7–1.5)
EGFR: 77 ML/MIN/1.73M2 — SIGNIFICANT CHANGE UP
FERRITIN SERPL-MCNC: 116 NG/ML — SIGNIFICANT CHANGE UP (ref 15–150)
FOLATE SERPL-MCNC: 5.9 NG/ML — SIGNIFICANT CHANGE UP
GLUCOSE BLDC GLUCOMTR-MCNC: 128 MG/DL — HIGH (ref 70–99)
GLUCOSE BLDC GLUCOMTR-MCNC: 128 MG/DL — HIGH (ref 70–99)
GLUCOSE BLDC GLUCOMTR-MCNC: 148 MG/DL — HIGH (ref 70–99)
GLUCOSE SERPL-MCNC: 149 MG/DL — HIGH (ref 70–99)
HCT VFR BLD CALC: 29.7 % — LOW (ref 37–47)
HGB BLD-MCNC: 9.6 G/DL — LOW (ref 12–16)
MAGNESIUM SERPL-MCNC: 2.1 MG/DL — SIGNIFICANT CHANGE UP (ref 1.8–2.4)
MCHC RBC-ENTMCNC: 27.3 PG — SIGNIFICANT CHANGE UP (ref 27–31)
MCHC RBC-ENTMCNC: 32.3 G/DL — SIGNIFICANT CHANGE UP (ref 32–37)
MCV RBC AUTO: 84.4 FL — SIGNIFICANT CHANGE UP (ref 81–99)
NRBC # BLD: 0 /100 WBCS — SIGNIFICANT CHANGE UP (ref 0–0)
PHOSPHATE SERPL-MCNC: 3.7 MG/DL — SIGNIFICANT CHANGE UP (ref 2.1–4.9)
PLATELET # BLD AUTO: 217 K/UL — SIGNIFICANT CHANGE UP (ref 130–400)
POTASSIUM SERPL-MCNC: 4.2 MMOL/L — SIGNIFICANT CHANGE UP (ref 3.5–5)
POTASSIUM SERPL-SCNC: 4.2 MMOL/L — SIGNIFICANT CHANGE UP (ref 3.5–5)
RBC # BLD: 3.52 M/UL — LOW (ref 4.2–5.4)
RBC # FLD: 14.7 % — HIGH (ref 11.5–14.5)
SARS-COV-2 RNA SPEC QL NAA+PROBE: SIGNIFICANT CHANGE UP
SODIUM SERPL-SCNC: 148 MMOL/L — HIGH (ref 135–146)
WBC # BLD: 5.43 K/UL — SIGNIFICANT CHANGE UP (ref 4.8–10.8)
WBC # FLD AUTO: 5.43 K/UL — SIGNIFICANT CHANGE UP (ref 4.8–10.8)

## 2022-04-19 PROCEDURE — 33285 INSJ SUBQ CAR RHYTHM MNTR: CPT

## 2022-04-19 PROCEDURE — 93010 ELECTROCARDIOGRAM REPORT: CPT

## 2022-04-19 PROCEDURE — 99232 SBSQ HOSP IP/OBS MODERATE 35: CPT

## 2022-04-19 PROCEDURE — 99223 1ST HOSP IP/OBS HIGH 75: CPT | Mod: 57

## 2022-04-19 PROCEDURE — 99238 HOSP IP/OBS DSCHRG MGMT 30/<: CPT

## 2022-04-19 RX ORDER — MAGNESIUM HYDROXIDE 400 MG/1
30 TABLET, CHEWABLE ORAL DAILY
Refills: 0 | Status: DISCONTINUED | OUTPATIENT
Start: 2022-04-19 | End: 2022-05-03

## 2022-04-19 RX ORDER — LOSARTAN POTASSIUM 100 MG/1
1 TABLET, FILM COATED ORAL
Qty: 0 | Refills: 0 | DISCHARGE

## 2022-04-19 RX ORDER — POLYETHYLENE GLYCOL 3350 17 G/17G
17 POWDER, FOR SOLUTION ORAL
Qty: 0 | Refills: 0 | DISCHARGE
Start: 2022-04-19

## 2022-04-19 RX ORDER — ATORVASTATIN CALCIUM 80 MG/1
80 TABLET, FILM COATED ORAL AT BEDTIME
Refills: 0 | Status: DISCONTINUED | OUTPATIENT
Start: 2022-04-19 | End: 2022-05-03

## 2022-04-19 RX ORDER — LANOLIN ALCOHOL/MO/W.PET/CERES
5 CREAM (GRAM) TOPICAL AT BEDTIME
Refills: 0 | Status: DISCONTINUED | OUTPATIENT
Start: 2022-04-19 | End: 2022-04-22

## 2022-04-19 RX ORDER — APIXABAN 2.5 MG/1
10 TABLET, FILM COATED ORAL EVERY 12 HOURS
Refills: 0 | Status: DISCONTINUED | OUTPATIENT
Start: 2022-04-19 | End: 2022-04-22

## 2022-04-19 RX ORDER — ACETAMINOPHEN 500 MG
2 TABLET ORAL
Qty: 0 | Refills: 0 | DISCHARGE
Start: 2022-04-19

## 2022-04-19 RX ORDER — ATORVASTATIN CALCIUM 80 MG/1
1 TABLET, FILM COATED ORAL
Qty: 0 | Refills: 0 | DISCHARGE
Start: 2022-04-19

## 2022-04-19 RX ORDER — METFORMIN HYDROCHLORIDE 850 MG/1
500 TABLET ORAL
Refills: 0 | Status: DISCONTINUED | OUTPATIENT
Start: 2022-04-19 | End: 2022-04-23

## 2022-04-19 RX ORDER — ACETAMINOPHEN 500 MG
650 TABLET ORAL EVERY 6 HOURS
Refills: 0 | Status: DISCONTINUED | OUTPATIENT
Start: 2022-04-19 | End: 2022-05-03

## 2022-04-19 RX ORDER — METFORMIN HYDROCHLORIDE 850 MG/1
1 TABLET ORAL
Qty: 60 | Refills: 3
Start: 2022-04-19 | End: 2022-08-16

## 2022-04-19 RX ORDER — LEVOTHYROXINE SODIUM 125 MCG
112 TABLET ORAL DAILY
Refills: 0 | Status: DISCONTINUED | OUTPATIENT
Start: 2022-04-19 | End: 2022-05-03

## 2022-04-19 RX ORDER — FAMOTIDINE 10 MG/ML
40 INJECTION INTRAVENOUS AT BEDTIME
Refills: 0 | Status: DISCONTINUED | OUTPATIENT
Start: 2022-04-19 | End: 2022-04-22

## 2022-04-19 RX ORDER — DULOXETINE HYDROCHLORIDE 30 MG/1
30 CAPSULE, DELAYED RELEASE ORAL EVERY 12 HOURS
Refills: 0 | Status: DISCONTINUED | OUTPATIENT
Start: 2022-04-19 | End: 2022-05-03

## 2022-04-19 RX ORDER — LOSARTAN POTASSIUM 100 MG/1
1 TABLET, FILM COATED ORAL
Qty: 30 | Refills: 3
Start: 2022-04-19 | End: 2022-08-16

## 2022-04-19 RX ORDER — APIXABAN 2.5 MG/1
2 TABLET, FILM COATED ORAL
Qty: 120 | Refills: 1
Start: 2022-04-19 | End: 2022-06-17

## 2022-04-19 RX ORDER — CEPHALEXIN 500 MG
500 CAPSULE ORAL ONCE
Refills: 0 | Status: COMPLETED | OUTPATIENT
Start: 2022-04-19 | End: 2022-04-19

## 2022-04-19 RX ORDER — POLYETHYLENE GLYCOL 3350 17 G/17G
17 POWDER, FOR SOLUTION ORAL DAILY
Refills: 0 | Status: DISCONTINUED | OUTPATIENT
Start: 2022-04-19 | End: 2022-05-03

## 2022-04-19 RX ORDER — LOSARTAN POTASSIUM 100 MG/1
25 TABLET, FILM COATED ORAL DAILY
Refills: 0 | Status: DISCONTINUED | OUTPATIENT
Start: 2022-04-19 | End: 2022-05-03

## 2022-04-19 RX ORDER — SENNA PLUS 8.6 MG/1
2 TABLET ORAL AT BEDTIME
Refills: 0 | Status: DISCONTINUED | OUTPATIENT
Start: 2022-04-19 | End: 2022-04-21

## 2022-04-19 RX ADMIN — APIXABAN 10 MILLIGRAM(S): 2.5 TABLET, FILM COATED ORAL at 17:31

## 2022-04-19 RX ADMIN — Medication 112 MICROGRAM(S): at 05:02

## 2022-04-19 RX ADMIN — Medication 500 MILLIGRAM(S): at 13:36

## 2022-04-19 RX ADMIN — SENNA PLUS 2 TABLET(S): 8.6 TABLET ORAL at 22:07

## 2022-04-19 RX ADMIN — FAMOTIDINE 40 MILLIGRAM(S): 10 INJECTION INTRAVENOUS at 22:19

## 2022-04-19 RX ADMIN — ATORVASTATIN CALCIUM 80 MILLIGRAM(S): 80 TABLET, FILM COATED ORAL at 22:07

## 2022-04-19 RX ADMIN — APIXABAN 10 MILLIGRAM(S): 2.5 TABLET, FILM COATED ORAL at 05:02

## 2022-04-19 NOTE — PROGRESS NOTE ADULT - SUBJECTIVE AND OBJECTIVE BOX
Electrophysiology Brief Post-Op Note    I have personally seen and examined the patient.  I agree with the history and physical which I have reviewed and noted any changes below.  04-19-22 @ 14:19    PRE-OP DIAGNOSIS: Cryptogenic CVA    POST-OP DIAGNOSIS:  Cryptogenic CVA    PROCEDURE: Loop Implnat    Physician: WILL Davenport  Assistant: George WITT    ESTIMATED BLOOD LOSS:  2    mL    ANESTHESIA TYPE:  [  ]General Anesthesia  [  ] Sedation  [X  ] Local/Regional    CONDITION  [  ] Critical  [  ] Serious  [  ]Fair  [ X ]Good    SPECIMENS REMOVED (IF APPLICABLE):  none    IMPLANTS (IF APPLICABLE)  Loop Recorder ([employer])    FINDINGS  PLAN OF CARE  - May remove bandaid in 2 days  - May shower in 2 days  Follow up in EP office ____ 1month

## 2022-04-19 NOTE — CHART NOTE - NSCHARTNOTESELECT_GEN_ALL_CORE
IR to ICU transfer note/Transfer Note
Stroke code: 7.08pm/Event Note
EP PROCEDURE NOTE
Event Note
Stroke Code/Event Note
Transfer Note

## 2022-04-19 NOTE — H&P ADULT - NSHPREVIEWOFSYSTEMS_GEN_ALL_CORE
Constiutional:    [ x  ] WNL           [   ] poor appetite   [   ] insomnia   [   ] tired   Cardio:                [  x ] WNL           [   ] CP   [   ] ALVAREZ   [   ] palpitations               Resp:                   [  x ] WNL           [   ] SOB   [   ] cough   [   ] wheezing   GI:                        [  x ] WNL           [   ] constipation   [   ] diarrhea   [   ] abdominal pain   [   ] nausea   [   ] emesis                                :                      [ x  ] WNL           [   ] NARANJO  [   ] dusuria   [   ] difficulty voiding             Endo:                   [ x  ] WNL          [   ] po;yuria   [   ] temperature intolerance                 Skin:                     [ x  ] WNL          [   ] pain   [   ] wound   [   ] rash   MSK:                    [  x ] WNL          [   ] muscle pain   [   ] joint pain/ stiffness   [   ] muscle tenderness   [   ] swelling   Neuro:                 [   ] WNL          [   ] HA   [   ] change in vision   [   ] tremor   [  x ] weakness right sided  [  x ]dysphagia              Cognitive:           [   ] WNL           [ x  ]confusion      Psych:                  [  x ] WNL           [   ] hallucinations   [   ]agitation   [   ] delusion   [   ]depression

## 2022-04-19 NOTE — DISCHARGE NOTE PROVIDER - HOSPITAL COURSE
Patient is a 75 yo female w/ PMH of HTN, hypothyroidism who presented to the ED after a fall on 4/10/22. Patient started having R sided arm and leg weakness, and difficulty finding words, but could not recall if symptoms started before or after fall but was bending over, fell over, denies head trauma, LOC, blood thinners. Has vomited a couple times. Denies pain anywhere.Stroke code called upon presentation. CTA showed a left M2 occlusion. Patient received tPA but neurologically declined shortly after. CODE NI actual called and patient taken for emergent thrombectomy and to be transferred to NCCU following procedure.  The patient was given tPA and taken for an emergent mechanical thrombectomy, but this was not completed due to dislodgement of clot at the time of procedure. Patient's dysarthria and LLE weakness briefly worsened, but subsequently improved againRepeat CTH did not reveal any hemorrhage, patient was receiving heparin drip for right sided lower DVT, and now switched to Eliquis 10 BID, 5 mg BID starting 4/22.    ** Patient went for ILR today ** Patient is a 75 yo female w/ PMH of HTN, hypothyroidism who presented to the ED after a fall on 4/10/22. Patient started having R sided arm and leg weakness, and difficulty finding words, but could not recall if symptoms started before or after fall. Patient denied pain, head trauma or LOC but endorsed vomiting a couple times. Stroke code was called upon presentation. CTA showed a left M2 occlusion.  The patient was given tPA and taken for an emergent mechanical thrombectomy, but this was not completed due to dislodgement of clot at the time of procedure. Patient's dysarthria and LLE weakness briefly worsened, but subsequently improved again. Repeat CTH did not reveal any hemorrhage, patient was receiving heparin drip for right sided lower DVT, and now switched to Eliquis 10 BID, 5 mg BID starting 4/22.     ** Patient went for ILR today ** Patient is a 75 yo female w/ PMH of HTN, hypothyroidism who presented to the ED after a fall on 4/10/22. Patient started having R sided arm and leg weakness, and difficulty finding words, but could not recall if symptoms started before or after fall. Patient denied pain, head trauma or LOC but endorsed vomiting a couple times. Stroke code was called upon presentation. CTA showed a left M2 occlusion.  The patient was given tPA and taken for an emergent mechanical thrombectomy, but this was not completed due to dislodgement of clot at the time of procedure. Patient's dysarthria and LLE weakness briefly worsened, but subsequently improved again. Repeat CTH did not reveal any hemorrhage, patient was receiving heparin drip for right sided lower DVT, and now switched to Eliquis 10 BID, 5 mg BID starting 4/22.   Patient is getting loop placed today and then if stable can be discharged to . Patient is a 75 yo female w/ PMH of HTN, hypothyroidism who presented to the ED after a fall on 4/10/22. Patient started having R sided arm and leg weakness, and difficulty finding words, but could not recall if symptoms started before or after fall. Patient denied pain, head trauma or LOC but endorsed vomiting a couple times. Stroke code was called upon presentation. CTA showed a left M2 occlusion.  The patient was given tPA and taken for an emergent mechanical thrombectomy, but this was not completed due to dislodgement of clot at the time of procedure. Patient's dysarthria and LLE weakness briefly worsened, but subsequently improved again. Repeat CTH did not reveal any hemorrhage, patient was receiving heparin drip for right sided lower DVT, and now switched to Eliquis 10 BID, 5 mg BID starting 4/22.   Patient is getting loop placed today and then if stable can be discharged to .       Stroke attending attestation:  Pt is a 75 yo F with PMHx of HTN, s/p composite lymphoma, hypothyroidism, who presented with aphasia and right arm weakness. S/p IV tPA on 4/10/22. NIHSS 7.     Impr: acute ischemic stroke of left insular/frontoparietal region and small ischemic stroke of right temporal lobe.   CTA head/neck with left M2 occlusion (EVT unsuccessful, migrated to M3)  Also found to have a RLE DVT, started on eliquis 10mg BID x 7 days then 5mg BID  JOEL without evidence of shunt. S/p ILR placement.  Etiology: possible 2/2 hypercoagulable state vs cardioembolic  Continue AC/statin  D/c to 4A IPR.   F/u in stroke clinic in 2-3 weeks

## 2022-04-19 NOTE — H&P ADULT - NSHPPHYSICALEXAM_GEN_ALL_CORE
PHYSICAL EXAMINATION   VItals: T(C): 35.7 (04-19-22 @ 05:36), Max: 36.7 (04-18-22 @ 20:44)  HR: 81 (04-19-22 @ 05:36) (65 - 81)  BP: 138/58 (04-19-22 @ 05:36) (138/58 - 152/75)  RR: 18 (04-19-22 @ 05:36) (18 - 18)  SpO2: --    General:[  x] NAD, Resting Comfortable,   [   ] other:                                HEENT: [ x  ] NC/AT, EOMI, PERRL , Normal Conjunctivae,   [   ] other:  Cardio: [ x  ] RRR, no murmer,   [   ] other:                              Pulm: [ x  ] No Respiratory Distress,  Lungs CTAB,   [   ] other:                       Abdomen: [ x  ]ND/NT, Soft,   [   ] other:    : [ x  ] NO NARANJO CATHETER, [   ] NARANJO CATHETER- no meatal tear, no discharge, [   ] other:                                            MSK: [  x ] No joint swelling, Full ROM,   [   ] other:                                         Ext: [ x ]No C/C/E, No calf tenderness,   [   ]other:    Skin: [   ]intact,   [x   ] other:    skin tear right elbow, ecchymosis right groin/hip                                                               Neurological Examination:  Cognitive: [  x  ] AAO x 3,   [    ]  other:                                                                      Attention:  [   x ] intact,   [    ]  other:                            Memory: [    ] intact,    [   x ]  other:   1/3 five minute recall  Mood/Affect: [ x   ] wnl,    [    ]  other:                                                                             Communication: [    ]Fluent, no dysarthria, following commands:  [  x  ] other: dysarthria, difficulty with word finding, able to follow 2 step commands  CN II - XII:  [  x  ] intact,  [    ] other:                                                                                        Motor:   RIGHT UE: [   ] WNL,  [   x] other: 4/5  LEFT    UE: [ x  ] WNL,  [   ] other:  RIGHT LE: [   ] WNL,  [ x  ] other: 4/5  LEFT    LE: [  x ] WNL,  [   ] other:    Tone: [   x ] wnl,   [    ]  other:  DTRs: [  x ]symmetric, [   ] other:  Coordination:   [    ] intact,   [  x  ] other:   impaired RUE ftn                                                                        Sensory: [    ] Intact to light touch,   [   x ] other:  decrease sensation to light touch bilateral hands and feet when compared proximally

## 2022-04-19 NOTE — DISCHARGE NOTE PROVIDER - EXTENDED VTE YES NO FOR MLM ENOXAPARIN
, You can access the [facility] Patient Portal, offered by [facility], by registering with the following website: [email or web address]

## 2022-04-19 NOTE — H&P ADULT - NSICDXPASTMEDICALHX_GEN_ALL_CORE_FT
PAST MEDICAL HISTORY:  Adult hypothyroidism     Cataract Right IOL    DM (diabetes mellitus)     H/O gastroesophageal reflux (GERD)     H/O: HTN (hypertension)     Hodgkin's granuloma of spleen and non- hodgkins

## 2022-04-19 NOTE — DISCHARGE NOTE NURSING/CASE MANAGEMENT/SOCIAL WORK - PATIENT PORTAL LINK FT
You can access the FollowMyHealth Patient Portal offered by [facility] by registering at the following website: [email or web address]. By joining [employer]’s FollowMyHealth portal, you will also be able to view your health information using other applications (apps) compatible with our system.

## 2022-04-19 NOTE — DISCHARGE NOTE PROVIDER - NSDCCPCAREPLAN_GEN_ALL_CORE_FT
PRINCIPAL DISCHARGE DIAGNOSIS  Diagnosis: Acute ischemic left MCA stroke  Assessment and Plan of Treatment: During this hospital admission, you had an ischemic stroke. During an ischemic stroke, blood stops flowing to part of your brain because of a blockage in the blood vessel. This can damage areas in the brain that control other parts of the body.  Please take your Eliquis 5mg twice a day for blood thinning and Atorvastatin for cholesterol medication/blood vessel protection as prescribed to prevent further strokes. Do not skip doses and do not run low on your medication. If you run low on your medication, please contact your doctor.  You will follow up outpatient with the stroke Nurse Practitioner as scheduled below.  Doing your regular tasks may be difficult after you've had a stroke, but you can learn new ways to manage your daily activities. In fact, doing daily activities may help you to regain muscle strength. Be patient, give yourself time to adjust, and appreciate the progress you make. For example, when showering or bathing, test the water temperature with a hand or foot that was not affected by the stroke, use grab bars, a shower seat, a hand-held showerhead, etc. It is normal to feel fatigue after a stroke, while some days may be worse than others, you will continue to improve.  Call 911 right away if you have any of the following symptoms of another stroke:  B: Balance: Sudden: Dizziness, loss of balance, or a sense of falling, difficulty with coordinating movement  E: Eyes: Sudden double vision or trouble seeing in one or both eyes  F: Face: Sudden uneven face  A: Arms (Legs): Sudden weakness, tingling, or loss of feeling on one side of your face or body  S: Speech: Sudden trouble talking or slurred speech, sudden difficulty understanding others  T: Time: Please call 911 right away and go to the emergency room  •Sudden, severe headache  •Blackouts or seizures      SECONDARY DISCHARGE DIAGNOSES  Diagnosis: HTN (hypertension)  Assessment and Plan of Treatment: Please take your losartan 25mg once a day and follow up with your primary care doctor.

## 2022-04-19 NOTE — CONSULT NOTE ADULT - ASSESSMENT
76y Female with h/o HTN, hypothyroid, presented sudden onset with right sided weakness, was found to have left M2 CVA, s/p TPA, EP consulted for loop recorder placement for long term cardiac monitoring  In addition pt was found to have DVT, currently on Eliquis for ~3 months    risks and benefits of implantable loop recorder placement discussed with patient  pt is agreeable   COVID PCR pending  plan for ILR today

## 2022-04-19 NOTE — CHART NOTE - NSCHARTNOTEFT_GEN_A_CORE
EP PROCEDURE NOTE    Date of Procedure: 04-19-22  EP Attending: WILL Davenport MD  Assistant: RINKU MCKENNA  Referring Physician:   Procedure: Loop Recorder Implant    Indication: 76y Female with history of *CVA** referred for implantable loop recorder.     Anesthesia: Local    EQUIPMENT IMPLANTED  : Medtronic Linq  Model Number: [id number]  Serial Number: [id number]     PROCEDURE DESCRIPTION  The patient was brought to the electrophysiology procedure area in a non-sedated state and received preoperative antibiotics. Informed, written consent was obtained prior to the procedure. The left anterior chest region was prepped and cleaned from the nipple to the upper left clavicular border with serial applications of Chlorhexidine. Patient was then covered with sterile drapes in the usual manner. Blood pressure, oxygenation, and level of comfort were stable throughout.     Following infiltration with local anesthetic, a 1-cm incision was made along the left sternal border at the fourth intercostal space. Using the tunneling device the implantable loop recorder was inserted into the subcutaneous tissue. Direct pressure was applied to the wound to obtain hemostasis. The wound was approximated with Dermabond. Steri-strips and a dry, sterile dressing were placed over the wound. R waves were noted to be *** 0.09mV.     The patient tolerated the procedure well.     COMPLICATIONS  No immediate complications    CONCLUSIONS  Successful loop recorder implant    EBL: 2 cc EP PROCEDURE NOTE    Date of Procedure: 04-19-22  EP Attending: WILL Davenport MD  Assistant: RINKU MCKENNA  Referring Physician:   Procedure: Loop Recorder Implant    Indication: 76y Female with history of *CVA** referred for implantable loop recorder.     Anesthesia: Local    EQUIPMENT IMPLANTED  : [employer] Linq  Model Number: [id number]  Serial Number: [id number]    PROCEDURE DESCRIPTION  The patient was brought to the electrophysiology procedure area in a non-sedated state and received preoperative antibiotics. Informed, written consent was obtained prior to the procedure. The left anterior chest region was prepped and cleaned from the nipple to the upper left clavicular border with serial applications of Chlorhexidine. Patient was then covered with sterile drapes in the usual manner. Blood pressure, oxygenation, and level of comfort were stable throughout.     Following infiltration with local anesthetic, a 1-cm incision was made along the left sternal border at the fourth intercostal space. Using the tunneling device the implantable loop recorder was inserted into the subcutaneous tissue. Direct pressure was applied to the wound to obtain hemostasis. The wound was approximated with Dermabond. Steri-strips and a dry, sterile dressing were placed over the wound. R waves were noted to be *** 0.09mV.     The patient tolerated the procedure well.     COMPLICATIONS  No immediate complications    CONCLUSIONS  Successful loop recorder implant    EBL: 2 cc EP PROCEDURE NOTE    Date of Procedure: 04-19-22  EP Attending: WILL Davenport MD  Assistant: RINKU MCKENNA  Referring Physician:   Procedure: Loop Recorder Implant    Indication: 76y Female with history of *CVA** referred for implantable loop recorder.     Anesthesia: Local    EQUIPMENT IMPLANTED  : Medtronic Linq  Model Number: [id number]  Serial Number: [id number]    PROCEDURE DESCRIPTION  The patient was brought to the electrophysiology procedure area in a non-sedated state and received preoperative antibiotics. Informed, written consent was obtained prior to the procedure. The left anterior chest region was prepped and cleaned from the nipple to the upper left clavicular border with serial applications of Chlorhexidine. Patient was then covered with sterile drapes in the usual manner. Blood pressure, oxygenation, and level of comfort were stable throughout.     Following infiltration with local anesthetic, a 1-cm incision was made along the left sternal border at the fourth intercostal space. Using the tunneling device the implantable loop recorder was inserted into the subcutaneous tissue. Direct pressure was applied to the wound to obtain hemostasis. The wound was approximated with Dermabond. Steri-strips and a dry, sterile dressing were placed over the wound. R waves were noted to be *** 0.09mV.     The patient tolerated the procedure well.     COMPLICATIONS  No immediate complications    CONCLUSIONS  Successful loop recorder implant    EBL: 2 cc    Attending Attestation  I was physically present for the key portion of the evaluation and management service provide.

## 2022-04-19 NOTE — DISCHARGE NOTE PROVIDER - NSDCMRMEDTOKEN_GEN_ALL_CORE_FT
docusate sodium 100 mg oral capsule: 1 cap(s) orally 3 times a day  DULoxetine 30 mg oral delayed release capsule: 1 cap(s) orally 2 times a day  levothyroxine 112 mcg (0.112 mg) oral tablet: 1 tab(s) orally once a day  losartan 100 mg oral tablet: 1 tab(s) orally once a day  Pepcid 40 mg oral tablet: 1 tab(s) orally once a day (at bedtime)   acetaminophen 325 mg oral tablet: 2 tab(s) orally every 6 hours, As needed, Temp greater or equal to 38C (100.4F)  apixaban 5 mg oral tablet: 2 tab(s) orally every 12 hours  atorvastatin 80 mg oral tablet: 1 tab(s) orally once a day (at bedtime)  docusate sodium 100 mg oral capsule: 1 cap(s) orally 3 times a day  DULoxetine 30 mg oral delayed release capsule: 1 cap(s) orally 2 times a day  levothyroxine 112 mcg (0.112 mg) oral tablet: 1 tab(s) orally once a day  losartan 25 mg oral tablet: 1 tab(s) orally once a day  Pepcid 40 mg oral tablet: 1 tab(s) orally once a day (at bedtime)  polyethylene glycol 3350 oral powder for reconstitution: 17 gram(s) orally once a day, As needed, Constipation   acetaminophen 325 mg oral tablet: 2 tab(s) orally every 6 hours, As needed, Temp greater or equal to 38C (100.4F)  apixaban 5 mg oral tablet: 2 tab(s) orally every 12 hours  atorvastatin 80 mg oral tablet: 1 tab(s) orally once a day (at bedtime)  docusate sodium 100 mg oral capsule: 1 cap(s) orally 3 times a day  DULoxetine 30 mg oral delayed release capsule: 1 cap(s) orally 2 times a day  levothyroxine 112 mcg (0.112 mg) oral tablet: 1 tab(s) orally once a day  losartan 25 mg oral tablet: 1 tab(s) orally once a day  metFORMIN 500 mg oral tablet: 1 tab(s) orally 2 times a day   Pepcid 40 mg oral tablet: 1 tab(s) orally once a day (at bedtime)  polyethylene glycol 3350 oral powder for reconstitution: 17 gram(s) orally once a day, As needed, Constipation

## 2022-04-19 NOTE — H&P ADULT - NSHPLABSRESULTS_GEN_ALL_CORE
LABS:                        9.6    5.43  )-----------( 217      ( 19 Apr 2022 06:50 )             29.7     04-19    148<H>  |  112<H>  |  19  ----------------------------<  149<H>  4.2   |  27  |  0.8    Ca    9.1      19 Apr 2022 06:50  Phos  3.7     04-19  Mg     2.1     04-19    TPro  4.9<L>  /  Alb  3.5  /  TBili  1.3<H>  /  DBili  x   /  AST  18  /  ALT  27  /  AlkPhos  61  04-18

## 2022-04-19 NOTE — CONSULT NOTE ADULT - SUBJECTIVE AND OBJECTIVE BOX
Patient is a 76y old  Female who presents with a chief complaint of right m2 occlusion (18 Apr 2022 16:37)        HPI:   77 yo female w/ PMH of HTN, hypothyroidism presents after fall this afternoon. At 16:30, started having R sided arm and leg weakness, and difficulty finding words.  Does not recall if sxs started before or after fall but was bending over, fell over, denies head trauma, LOC, blood thinners. Has vomited a couple times. Denies pain anywhere. Stroke code called upon presentation. CTA showed a left M2 occlusion. Patient received tPA but neurologically declined shortly after. CODE NI actual called and patient taken for emergent thrombectomy and to be transferred to [facility] following procedure.  (10 Apr 2022 22:53)      Electrophysiology:  76y Female    REVIEW OF SYSTEMS    [ ] A ten-point review of systems was otherwise negative except as noted.  [ ] Due to altered mental status/intubation, subjective information were not able to be obtained from the patient. History was obtained, to the extent possible, from review of the chart and collateral sources of information.      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    H/O gastroesophageal reflux (GERD)    H/O cholecystitis  lap pedro    S/P appendectomy    H/O carpal tunnel repair  b/l    S/P trigger finger release  2002        Home Medications:  docusate sodium 100 mg oral capsule: 1 cap(s) orally 3 times a day (10 Apr 2022 22:58)  DULoxetine 30 mg oral delayed release capsule: 1 cap(s) orally 2 times a day (13 Jan 2021 07:56)  levothyroxine 112 mcg (0.112 mg) oral tablet: 1 tab(s) orally once a day (13 Jan 2021 07:56)  losartan 100 mg oral tablet: 1 tab(s) orally once a day (10 Apr 2022 22:57)  Pepcid 40 mg oral tablet: 1 tab(s) orally once a day (at bedtime) (10 Apr 2022 22:58)      Allergies:  adhesives (Blisters; Rash)  copper (Hives; Rash)  gold (Hives; Rash)  nickel (Hives; Rash)  silver (Hives; Rash)  Sulfacet-R (Rash)      FAMILY HISTORY:      SOCIAL HISTORY:    CIGARETTES:  ALCOHOL:        PREVIOUS DIAGNOSTIC TESTING:      ECHO  FINDINGS:    STRESS  FINDINGS:    CATHETERIZATION  FINDINGS:    ELECTROPHYSIOLOGY STUDY  FINDINGS:    CAROTID ULTRASOUND:  FINDINGS    VENOUS DUPLEX SCAN:  FINDINGS:    CHEST CT PULMONARY ANGIO with IV Contrast:  FINDINGS:      MEDICATIONS  (STANDING):  apixaban 10 milliGRAM(s) Oral every 12 hours  atorvastatin 80 milliGRAM(s) Oral at bedtime  chlorhexidine 4% Liquid 1 Application(s) Topical <User Schedule>  famotidine  Oral Tab/Cap - Peds 40 milliGRAM(s) Oral at bedtime  insulin glargine Injectable (LANTUS) 5 Unit(s) SubCutaneous at bedtime  insulin lispro (ADMELOG) corrective regimen sliding scale   SubCutaneous three times a day before meals  levothyroxine 112 MICROGram(s) Oral daily    MEDICATIONS  (PRN):  acetaminophen     Tablet .. 650 milliGRAM(s) Oral every 6 hours PRN Temp greater or equal to 38C (100.4F)  polyethylene glycol 3350 17 Gram(s) Oral daily PRN Constipation      Vital Signs Last 24 Hrs  T(C): 35.7 (19 Apr 2022 05:36), Max: 36.7 (18 Apr 2022 20:44)  T(F): 96.3 (19 Apr 2022 05:36), Max: 98.1 (18 Apr 2022 20:44)  HR: 81 (19 Apr 2022 05:36) (65 - 86)  BP: 138/58 (19 Apr 2022 05:36) (128/49 - 152/75)  BP(mean): 74 (18 Apr 2022 12:02) (74 - 74)  RR: 18 (19 Apr 2022 05:36) (18 - 18)  SpO2: 98% (18 Apr 2022 12:02) (98% - 98%)    PHYSICAL EXAM:    GENERAL: In no apparent distress, well nourished, and hydrated.  HEAD:  Atraumatic, Normocephalic  EYES: EOMI, PERRLA, conjunctiva and sclera clear  NECK: Supple and normal thyroid.  No JVD or carotid bruit.  Carotid pulse is 2+ bilaterally.  HEART: Regular rate and rhythm; No murmurs, rubs, or gallops.  PULMONARY: Clear to auscultation and perfusion.  No rales, wheezing, or rhonchi bilaterally.  ABDOMEN: Soft, Nontender, Nondistended; Bowel sounds present  EXTREMITIES:  2+ Peripheral Pulses, No clubbing, cyanosis, or edema  NEUROLOGICAL: Grossly nonfocal    I&O's Detail    18 Apr 2022 07:01  -  19 Apr 2022 07:00  --------------------------------------------------------  IN:  Total IN: 0 mL    OUT:    Voided (mL): 300 mL  Total OUT: 300 mL    Total NET: -300 mL        Daily     Daily     INTERPRETATION OF TELEMETRY:    ECG:        LABS:                        9.6    5.43  )-----------( 217      ( 19 Apr 2022 06:50 )             29.7     04-19    148<H>  |  112<H>  |  19  ----------------------------<  149<H>  4.2   |  27  |  0.8    Ca    9.1      19 Apr 2022 06:50  Phos  3.7     04-19  Mg     2.1     04-19    TPro  4.9<L>  /  Alb  3.5  /  TBili  1.3<H>  /  DBili  x   /  AST  18  /  ALT  27  /  AlkPhos  61  04-18            BNP            RADIOLOGY & ADDITIONAL STUDIES:       Patient is a 76y old  Female who presents with a chief complaint of right m2 occlusion (18 Apr 2022 16:37)        HPI:   75 yo female w/ PMH of HTN, hypothyroidism presents after fall this afternoon. At 16:30, started having R sided arm and leg weakness, and difficulty finding words.  Does not recall if sxs started before or after fall but was bending over, fell over, denies head trauma, LOC, blood thinners. Has vomited a couple times. Denies pain anywhere. Stroke code called upon presentation. CTA showed a left M2 occlusion. Patient received tPA but neurologically declined shortly after. CODE NI actual called and patient taken for emergent thrombectomy and to be transferred to [facility] following procedure.  (10 Apr 2022 22:53)      Electrophysiology:  76y Female with h/o HTN, hypothyroid, presented sudden onset with right sided weakness, was found to have left M2 CVA, s/p TPA, EP consulted for loop recorder placement for long term cardiac monitoring  In addition pt was found to have DVT, currently on Eliquis for 3 months    REVIEW OF SYSTEMS    [ ] A ten-point review of systems was otherwise negative except as noted.  [ ] Due to altered mental status/intubation, subjective information were not able to be obtained from the patient. History was obtained, to the extent possible, from review of the chart and collateral sources of information.      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    H/O gastroesophageal reflux (GERD)    H/O cholecystitis  lap pedro    S/P appendectomy    H/O carpal tunnel repair  b/l    S/P trigger finger release  2002        Home Medications:  docusate sodium 100 mg oral capsule: 1 cap(s) orally 3 times a day (10 Apr 2022 22:58)  DULoxetine 30 mg oral delayed release capsule: 1 cap(s) orally 2 times a day (13 Jan 2021 07:56)  levothyroxine 112 mcg (0.112 mg) oral tablet: 1 tab(s) orally once a day (13 Jan 2021 07:56)  losartan 100 mg oral tablet: 1 tab(s) orally once a day (10 Apr 2022 22:57)  Pepcid 40 mg oral tablet: 1 tab(s) orally once a day (at bedtime) (10 Apr 2022 22:58)      Allergies:  adhesives (Blisters; Rash)  copper (Hives; Rash)  gold (Hives; Rash)  nickel (Hives; Rash)  silver (Hives; Rash)  Sulfacet-R (Rash)      FAMILY HISTORY:      SOCIAL HISTORY:    CIGARETTES:  ALCOHOL:        PREVIOUS DIAGNOSTIC TESTING:      ECHO  FINDINGS:    < from: TTE Echo Complete w/o Contrast w/ Doppler (04.11.22 @ 07:20) >  Summary:   1. Left ventricular ejection fraction, by visual estimation, is 60 to   65%.   2. Normal left ventricular size and wall thicknesses, with normal   systolic and diastolic function.   3. Spectral Doppler shows impaired relaxation pattern of left   ventricular myocardial filling (Grade I diastolic dysfunction).   4. Sclerotic aortic valve with normal opening.   5. Mild mitral regurgitation.    < end of copied text >    < from: Transesophageal Echocardiogram (04.15.22 @ 16:09) >  Summary:   1. Left ventricular ejection fraction, by visual estimation, is 60 to   65%.   2. Normal global left ventricular systolic function.   3. Mild mitral annular calcification.   4. Mild thickening and calcification of the anterior and posterior   mitral valve leaflets.   5. Moderate mitral valve regurgitation.   6. Saline contrast bubble study was negative, with no evidence of any   intracardiac shunt.   7. Lipomatous hypertrophy of the intra-atrial septum.   8. No left atrial appendage thrombus.    < end of copied text >      STRESS  FINDINGS:    CATHETERIZATION  FINDINGS:    ELECTROPHYSIOLOGY STUDY  FINDINGS:    CAROTID ULTRASOUND:  FINDINGS  < from: CT Angio Head w/ IV Cont (04.10.22 @ 20:18) >  CTA neck-  The study is somewhat limited due to suboptimal contrast bolus.    The visualized aortic arch and the great vessel origins are patent. There   is a common origin of the brachiocephalic and left common carotid   arteries, normal variation (bovine configuration).    The common,internal and external carotid arteries are patent laterally,   without significant stenosis. Medial retropharyngeal course of the right   ICA is noted.    < end of copied text >    VENOUS DUPLEX SCAN:  FINDINGS:    < from: VA Duplex Lower Ext Vein Scan, Bilat (04.11.22 @ 17:31) >  Impression:  Acute deep vein thrombosis of the right peroneal vein and a branch of the   right posterior tibial vein.  No evidence of superficial thrombophlebitis in the left lower extremity.    < end of copied text >  CHEST CT PULMONARY ANGIO with IV Contrast:  FINDINGS:      MEDICATIONS  (STANDING):  apixaban 10 milliGRAM(s) Oral every 12 hours  atorvastatin 80 milliGRAM(s) Oral at bedtime  chlorhexidine 4% Liquid 1 Application(s) Topical <User Schedule>  famotidine  Oral Tab/Cap - Peds 40 milliGRAM(s) Oral at bedtime  insulin glargine Injectable (LANTUS) 5 Unit(s) SubCutaneous at bedtime  insulin lispro (ADMELOG) corrective regimen sliding scale   SubCutaneous three times a day before meals  levothyroxine 112 MICROGram(s) Oral daily    MEDICATIONS  (PRN):  acetaminophen     Tablet .. 650 milliGRAM(s) Oral every 6 hours PRN Temp greater or equal to 38C (100.4F)  polyethylene glycol 3350 17 Gram(s) Oral daily PRN Constipation      Vital Signs Last 24 Hrs  T(C): 35.7 (19 Apr 2022 05:36), Max: 36.7 (18 Apr 2022 20:44)  T(F): 96.3 (19 Apr 2022 05:36), Max: 98.1 (18 Apr 2022 20:44)  HR: 81 (19 Apr 2022 05:36) (65 - 86)  BP: 138/58 (19 Apr 2022 05:36) (128/49 - 152/75)  BP(mean): 74 (18 Apr 2022 12:02) (74 - 74)  RR: 18 (19 Apr 2022 05:36) (18 - 18)  SpO2: 98% (18 Apr 2022 12:02) (98% - 98%)    PHYSICAL EXAM:    GENERAL: In no apparent distress, well nourished, and hydrated.  HEAD:  Atraumatic, Normocephalic  EYES: EOMI, PERRLA, conjunctiva and sclera clear  NECK: Supple and normal thyroid.  No JVD or carotid bruit.  Carotid pulse is 2+ bilaterally.  HEART: Regular rate and rhythm; No murmurs, rubs, or gallops.  PULMONARY: Clear to auscultation and perfusion.  No rales, wheezing, or rhonchi bilaterally.  ABDOMEN: Soft, Nontender, Nondistended; Bowel sounds present  EXTREMITIES:  2+ Peripheral Pulses, No clubbing, cyanosis, or edema  Rt U+LE 4/5   NEUROLOGICAL: Grossly nonfocal    I&O's Detail    18 Apr 2022 07:01  -  19 Apr 2022 07:00  --------------------------------------------------------  IN:  Total IN: 0 mL    OUT:    Voided (mL): 300 mL  Total OUT: 300 mL    Total NET: -300 mL        Daily     Daily     INTERPRETATION OF TELEMETRY:    ECG:        LABS:                        9.6    5.43  )-----------( 217      ( 19 Apr 2022 06:50 )             29.7     04-19    148<H>  |  112<H>  |  19  ----------------------------<  149<H>  4.2   |  27  |  0.8    Ca    9.1      19 Apr 2022 06:50  Phos  3.7     04-19  Mg     2.1     04-19    TPro  4.9<L>  /  Alb  3.5  /  TBili  1.3<H>  /  DBili  x   /  AST  18  /  ALT  27  /  AlkPhos  61  04-18            BNP    COVID-19 PCR: NotDetec (19 Apr 2022 09:05)  COVID-19 PCR: NotDetec (14 Apr 2022 16:30)  COVID-19 PCR: NotDetec (10 Apr 2022 19:10)          RADIOLOGY & ADDITIONAL STUDIES:    < from: CT Brain Stroke Protocol (04.10.22 @ 19:34) >  Findings:    The ventricles, basal cisterns and sulcal pattern are within normal   limits for the patient's stated age.    Questionable loss of gray-white matter differentiation within the left   frontal lobe, indeterminate. Otherwise, gray-white matter differentiation   is preserved.    Additional patchy hypodensities within the subcortical and   periventricular white matter, consistent with mild chronic microvascular   ischemic changes.    There is no acute mass effect, midline shift or intracranial hemorrhage.     Opacification of the ethmoidair cells. Right frontal sinus osteoid   osteoma.    No evidence of a depressed skull fracture. Beam hardening artifact is   noted overlying the brain stem and posterior fossa which is inherent to   CT in this location.    Impression:    Indeterminate hypodensities involving the left frontal lobe. Correlate   with MRI brain if clinically warranted.    No evidence of intracranial hemorrhage or mass effect.    Preliminary findings discussed with Dr. Prescott on 4/10/2022 7:43 PM with   readback.    < end of copied text >    < from: CT Angio Head w/ IV Cont (04.10.22 @ 20:18) >  INTERPRETATION:  CLINICAL INDICATION: Stroke code.    Technique: CT angiogram of the head and neck including perfusion study of   the brain.  Contiguous CT axial images of the head and neck were obtained   following the bolus intravenous administration of 120 cc Optiray with   multiple 3-D and MIP reformats with perfusion mapping performed on a   separate 3D workstation (RAPID).    Correlation made with accompanying noncontrast head CT.    Findings:    CT PERFUSION: Moderate areas of hypoperfusion  involving the left   frontal/parietal lobes and brainstem. Small area of core infarction of   the left temporal/parietal lobes.    CBF <30: 7 ml  Tmax > 6s: Total of 38 ml  Mismatch volume: 31 ml  Mismatch ratio: 5.4    CTA neck-  The study is somewhat limited due to suboptimal contrast bolus.    The visualized aortic arch and the great vessel origins are patent. There   is a common origin of the brachiocephalic and left common carotid   arteries, normal variation (bovine configuration).    The common,internal and external carotid arteries are patent laterally,   without significant stenosis. Medial retropharyngeal course of the right   ICA is noted.    The vertebral arteries are grossly patent bilaterally.    CTA head-  There is calcific plaque of the carotid siphons without significant   stenosis.    The right proximal MAURICE and MCA branches are grossly patent.    The left proximal MAURICE and MCA branches are grossly patent. There is   occlusion of the superior M2 segment (602-100).    The distalvertebral arteries, basilar artery and proximal posterior   cerebral arteries are grossly patent. Hypoplastic P1 segment of the left   PCA, normal variant.    The distal vessels are suboptimally assessed due to suboptimal contrast   bolus.    OTHER:  Polypoid mucosal thickening within scattered portions of the paranasal   sinuses. Degenerative changes of the spine. Dilated pulmonary trunk up to   3.1 cc. Right chest wall Mediport.    IMPRESSION:    1.  CT perfusion- Areas of hypoperfusion within the left frontal/parietal   lobes and brainstem (total Tmax >6s vol 38 cc). Evidence for small area   of core infarct in the left frontal/parietal lobes (CBF <30% vol 7 cc).    2.  CTA head/neck- Limited study due to suboptimal contrast bolus.   Occlusion of a left MCA M2 segment (602-100).    Preliminary findings discussed with NILES Lucero on 4/10/2022, 9:05 PM with   readback.    < end of copied text >      < from: CT Angio Head w/ IV Cont (04.12.22 @ 22:07) >  IMPRESSION:    PERFUSION:  Small volume mismatch defect of 18 cc corresponding to the region of MCA   territory stroke visualized on CT head. There is no associated core   infarct.      CTA HEAD:    Redemonstration of superior left MCA M2 segment occlusion versus severe   stenosis ((key image 1) with minimal opacification/distal reconstitution   of its distal branches.    CTA NECK:    No evidence of greaterthan 50% carotid or vertebral artery stenosis.   Unchanged examination.        Dr. Demetrio Claros discussed preliminary findings regarding the CT   perfusion study with JENNYFER IRIZARRY on 4/12/2022 10:02 PM with   readback, and additional findings on CTA imaging at 10:25 PM on 4/12/2022   with readback.    < end of copied text >    < from: MR Head w/wo IV Cont (04.13.22 @ 19:40) >  FINDINGS:    Examination is limited due to motion.    There are acute infarcts involving the left MCA territory predominantly   involving the insula as well as the corona radiata with additional   scattered punctate infarct involving the frontal and parietal cortical   region. There is an additional focal cortical infarct involving the right   temporal lobe, series 3 image 18.    There is no evidence of intracranial hemorrhage, mass effect or midline   shift.    There is linear susceptibility weighted artifact noted within the left   superior temporal region likely related to chronic hemosiderin deposition.    There is nonenhancing periventricular white matter T2 and FLAIR signal   hyperintensity.    There is no abnormal intracranial enhancement.    Ventricles and sulci are age-appropriate in size.    There is no extra-axial fluid collection.    Major intracranial flow-voids are preserved.    The orbits, sellar and suprasellar structures, and craniocervical   junction are unremarkable. There has been bilateral cataract surgery.    The calvarium demonstrates normal signal intensity.    The visualized paranasal sinuses and tympanomastoid cavities are clear.    IMPRESSION:    Acute left MCA territory infarct as detailed above as well as additional   focal infarct involving the right temporal cortical region. No   hemorrhagic transformation.    Mild chronic microvascular type changes.      < end of copied text >

## 2022-04-19 NOTE — PATIENT PROFILE ADULT - FALL HARM RISK - HARM RISK INTERVENTIONS

## 2022-04-19 NOTE — DISCHARGE NOTE NURSING/CASE MANAGEMENT/SOCIAL WORK - NSDCPETBCESMAN_GEN_ALL_CORE
Prescription printed, signed and put on MA/LPN's desk. Thanks!     If you are a smoker, it is important for your health to stop smoking. Please be aware that second hand smoke is also harmful.

## 2022-04-19 NOTE — DISCHARGE NOTE NURSING/CASE MANAGEMENT/SOCIAL WORK - NSDCPEFALRISK_GEN_ALL_CORE
For information on Fall & Injury Prevention, visit: [email or web address] OR  [email or web address] OR  https://www.cdc.gov/steadi/patient.html

## 2022-04-19 NOTE — PROGRESS NOTE ADULT - SUBJECTIVE AND OBJECTIVE BOX
Patient is a 76y old  Female who presents with a chief complaint of right m2 occlusion (19 Apr 2022 14:19)    HPI:   77 yo female w/ PMH of HTN, hypothyroidism presents after fall this afternoon. At 16:30, started having R sided arm and leg weakness, and difficulty finding words.  Does not recall if sxs started before or after fall but was bending over, fell over, denies head trauma, LOC, blood thinners. Has vomited a couple times. Denies pain anywhere. Stroke code called upon presentation. CTA showed a left M2 occlusion. Patient received tPA but neurologically declined shortly after. CODE NI actual called and patient taken for emergent thrombectomy and to be transferred to NCCU following procedure.  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    planning for ILR placement today and then for potential discharge to acute 4A rehab    Hospital Day #9    Vital Signs Last 24 Hrs  T(C): 35.7 (19 Apr 2022 05:36), Max: 36.7 (18 Apr 2022 20:44)  T(F): 96.3 (19 Apr 2022 05:36), Max: 98.1 (18 Apr 2022 20:44)  HR: 81 (19 Apr 2022 05:36) (65 - 81)  BP: 138/58 (19 Apr 2022 05:36) (138/58 - 152/75)  BP(mean): --  RR: 18 (19 Apr 2022 05:36) (18 - 18)  SpO2: --             PE:  GEN-NAD, AAOx3  PULM- Clear to auscultation bilaterally, fair air entry  CVS- +s1/s2 RRR  GI- soft NT ND +bs, no rebound, no guarding  EXT-1+ edema               9.6    5.43  )-----------( 217      ( 19 Apr 2022 06:50 )             29.7     04-19    148<H>  |  112<H>  |  19  ----------------------------<  149<H>  4.2   |  27  |  0.8    Ca    9.1      19 Apr 2022 06:50  Phos  3.7     04-19  Mg     2.1     04-19    TPro  4.9<L>  /  Alb  3.5  /  TBili  1.3<H>  /  DBili  x   /  AST  18  /  ALT  27  /  AlkPhos  61  04-18              MEDICATIONS  (STANDING):  apixaban 10 milliGRAM(s) Oral every 12 hours  atorvastatin 80 milliGRAM(s) Oral at bedtime  chlorhexidine 4% Liquid 1 Application(s) Topical <User Schedule>  famotidine  Oral Tab/Cap - Peds 40 milliGRAM(s) Oral at bedtime  insulin glargine Injectable (LANTUS) 5 Unit(s) SubCutaneous at bedtime  insulin lispro (ADMELOG) corrective regimen sliding scale   SubCutaneous three times a day before meals  levothyroxine 112 MICROGram(s) Oral daily

## 2022-04-19 NOTE — PROGRESS NOTE ADULT - ASSESSMENT
a/p:  75 yo F w/ PMHx of HTN, hypothyroidism presents after fall . Patient had R sided arm and leg weakness, and difficulty finding words. Received tPA for a Left M2 occlusion, followed by attempted thrombectomy,    # acute ischemic stroke of left insular/frontoparietal region and small ischemic stroke of right temporal lobe  -CTA head/neck- Left M2 occlusion  -cont statin, eliquis  -EPS to place ILR today  -PT/physiatry following---awaiting transfer to acute 4A rehab  -fall precautions  -f/u neurology recommendations     # RLE DVT (Acute)  -cont AC ( eliquis 10mg BID x 7 days then 5mg BID to continue)  -JOEL without evidence of shunt.   -f/u with heme for continuation of AC     # anemia- normocytic  -hb stable (today 9.6)  -f/u with GI for outaptient EGD (Dr. Jimenez is outpatient GI)  -cont ppi  -f/u anemia w/u    # hypothyroidism  -cont synthroid 112 mcg daily  -tsh 1.7  -monitor thyroid function     #DM  - A1C results: 7.5%  - Cont ISS    #HLD  -cont statin  -f/u lipid profile     DVT/GI ppx    FULL CODE    awaiting transfer to acute 4A rehab when bed available-auth    Total time spent to complete patient's bedside assessment, review medical chart, discuss medical plan of care with covering medical team was more than 25 minutes  with >50% of time spendt face to face with patient, discussion with patient/family and/or coordination of care

## 2022-04-19 NOTE — H&P ADULT - HISTORY OF PRESENT ILLNESS
77 yo female w/ PMH of HTN, hypothyroidism, DM, dm peripheral neuropathy, GERD, obesity who presented to the ED after a fall on 4/10/22. Patient started having R sided arm and leg weakness, and difficulty finding words, but could not recall if symptoms started before or after fall. Patient denied pain, head trauma or LOC but endorsed vomiting a couple times. Stroke code was called upon presentation. CTA showed a left M2 occlusion.  The patient was given tPA and taken for an emergent mechanical thrombectomy, but this was not completed due to dislodgement of clot at the time of procedure. Patient's dysarthria and LLE weakness briefly worsened, but subsequently improved again. Repeat CTH did not reveal any hemorrhage, patient was receiving heparin drip for right sided lower DVT, and now switched to Eliquis 10 BID, 5 mg BID starting 4/22. EP consulted and patient getting a loop recorder     PM&R consulted for admission to  for acute inpatient rehabilitation. Prior to admission, patient was independent with ADLs and ambulation without an assistive device. Resides w/ sister, pvt home, +JODIE, no stairs in home, +bathtub    CLOF evaluated by OT  BM - Max A  Transfers - Mod-Max A  UBD - Max A  LBD - Dependent    CLOF evaluated by PT  BM - Max A  Transfers - Max A  Ambulation - Max A standing tolerance 3-5s    Also seen by SLP - FEES completed on 4/14, Results indicate mild pharyngeal dysphagia for puree, easy to chew, and mildly thick liquids; moderate pharyngeal dysphagia for thin liquids. Recommendations for an easy to chew diet w/ thin liquids    Patient is a good candidate for admission to acute inpatient rehabilitation for CVA with right sided hemiplegia (dominant) with comorbidities of dm, htn, hld, hyperthyroidism, obesity, dm peripheral neuropathy, and rle dvt requiring hospital level supervision. She was deemed medically stable for discharge to  on 4/19/22 where she will undergo intensive restorative therapies 3 hours a day for at least 5 days a week with the goal of regaining the patients independence and discharging her home.

## 2022-04-19 NOTE — DISCHARGE NOTE PROVIDER - CARE PROVIDER_API CALL
Natali Hedrick)  Neurology  [address] [place] [address]  Phone: [phone]  Fax: [phone]  Follow Up Time: 2 weeks

## 2022-04-19 NOTE — H&P ADULT - ASSESSMENT
ASSESSMENT/PLAN    Rehab of B/L CVA with R hemiplegia (dominant), dysphagia, aphasia, dysarthria      #MRI Brain w/o contrast showed acute left MCA territory infarct and right temporal cortical infarct  - Continue with Eliquis 10 mg BID  - Continue Atorvastatin 80 mg daily  - Stroke education  - JOEL showed no PFO or intracardiac thrombosis.     #HTN  - Goal -150  - Hold home BP meds (Losartan 100mg POD)  - JOEL completed, LVEF 65%, no PFO  - ILR placed    #HLD  - Continue high dose statin as above in CVA  - LDL results: 80    #DM2  - A1C results: 7.5%  - Metformin 500 BID  - BG checks BID    #DM Neuropathy  -DULoxetine 30 mg oral delayed release POD    #GERD  - PPI    #Hypothyroid  - Continue synthroid    #RLE DVT  - US Duplex showed +DVT of the right peroneal vein and a branch of the right posterior tibial vein  - Continue with Eliquis 10 mg BID and decrease to 5 mg BID starting 4/22.      -Pain control:   Tylenol prn    -GI/Bowel Mgmt:  No active issues at this time  Miralax/senna    -Bladder management:   No active issues at this time    -Skin:  No active issues at this time    -FEN   Monitor lytes, Replete prn    - Diet:  DM  Easy to chew w/ thin liquids       Precautions / PROPHYLAXIS:      - Falls    - Ortho: Weight bearing status:       - DVT prophylaxis: Has RLE DVT, Eliquis      MEDICAL PROGNOSIS: GOOD            REHAB POTENTIAL: GOOD             ESTIMATED DISPOSITION: HOME WITH HOME CARE       [ x ]  The goals of the IRF admission were discussed with the patient and or family member, who agreed             ELOS:  [     ] 7-14    [ x   ]  14-21    [    ]  Other    THERAPY ORDERS and INITIAL INDIVIDUALIZED PLAN OF CARE:  This initial individualized interdisciplinary plan of care, which was established by me (the attending physiatrist), is based on elements from the post admission evaluation. The interdisciplinary therapy program is to be at least 3 hrs a day, at least 5 days per week from from physical, occupational and/ or speech therapies as ordered by me below.      [ x  ] P.T. 90 mins. /day at least 5 out of 7 days:  [  x ] superficial  modalities prn, [ x  ] A/AAROM, [ x  ] PREs, [ x  ] transfer training,            [ x  ] progressive ambulation, [x   ] stairs                                               [ x  ] O.T. 90 mins. /day at least 5 out of 7 days::  [ x  ] modalities prn,  [ x  ]A/AAROM, [ x  ] PREs, [  x ] functional transfer training, [ x  ] ADL training           [ x  ] cognitive/ perceptual eval and training, [   ] splint eval                                                  [ x  ] S.L.P:  [x   ] speech eval, [ x  ] swallow eval     [ x  ] Neuropsychology eval     [ x  ] Individualized rec. therapy      RATIONALE FOR INPATIENT ADMISSION - Patient demonstrates the following: (check all that apply)  [X] Medically appropriate for acute rehabilitation admission. Requires interdisiplinary therapy consisting of at least PT and OT, at least 3 hrs. a day at least 5 days a week  [X] Has attainable rehab goals with an appropriate initial discharge plan  [X] Has rehabilitation potential (expected to make a significant improvement within a reasonable period of time)  [X] Requires close medical management by a rehab physician, rehab nursing care,  and comprehensive interdisciplinary team (including PT, OT)    [X] Requires evaluation by a physiatrist at least 3 days a week to evaluate and manage and coordinate rehab and medical problems   ASSESSMENT/PLAN  77 yo F with a bilateral acute infacts including acute left MCA territory and right temporal cortical with a right hemiparesis, aphasia dysphagia and dysarthria with comorbidities including untreated NIDDM, obesity, diabetic peripheral neuropathy, HTN, obesity, HTN, HLD, GERD, hypothyroidism and RLE DVT on Eliquis.   She warrants acute rehab with 3 hours of daily therapies at least 5 out of 7 days including PT, OT and Speech. Additionally she requires close physiatry follow up for CVA and the many complex comorbdities.    Rehab of B/L CVA with R hemiparesis (dominant), dysphagia, aphasia, dysarthria      #MRI Brain w/o contrast showed acute left MCA territory infarct and right temporal cortical infarct  - Continue with Eliquis 10 mg BID  - Continue Atorvastatin 80 mg daily  - Stroke education  - JOEL showed no PFO or intracardiac thrombosis.     #HTN  - Goal -150  - Hold home BP meds (Losartan 100mg PO  daily)  - JOEL completed, LVEF 65%, no PFO  - ILR placed    #HLD  - Continue high dose statin as above in CVA  - LDL results: 80    #DM2  - A1C results: 7.5%  - I will initiate Metformin 500 BID; better control of her diabetes can help reduce her risk o CAD, CVA and neuropathy.  - BG checks BID    #DM Neuropathy  -DULoxetine 30 mg oral delayed release POD    #GERD  - PPI    #Hypothyroid  - Continue synthroid    #RLE DVT  - US Duplex showed +DVT of the right peroneal vein and a branch of the right posterior tibial vein  - Continue with Eliquis 10 mg BID and decrease to 5 mg BID starting 4/22.      -Pain control:   Tylenol prn    -GI/Bowel Mgmt:  No active issues at this time  Miralax/senna    -Bladder management:   No active issues at this time    -Skin:  No active issues at this time    -FEN   Monitor lytes, Replete prn    - Diet:  DM  Easy to chew w/ thin liquids       Precautions / PROPHYLAXIS:      - Falls    - Ortho: Weight bearing status:       - DVT prophylaxis: Has RLE DVT, Eliquis      MEDICAL PROGNOSIS: GOOD            REHAB POTENTIAL: GOOD             ESTIMATED DISPOSITION: HOME WITH HOME CARE       [ x ]  The goals of the IRF admission were discussed with the patient and or family member, who agreed             ELOS:  [     ] 7-14    [ x   ]  14-21    [    ]  Other    THERAPY ORDERS and INITIAL INDIVIDUALIZED PLAN OF CARE:  This initial individualized interdisciplinary plan of care, which was established by me (the attending physiatrist), is based on elements from the post admission evaluation. The interdisciplinary therapy program is to be at least 3 hrs a day, at least 5 days per week from from physical, occupational and/ or speech therapies as ordered by me below.      [ x  ] P.T. 90 mins. /day at least 5 out of 7 days:  [  x ] superficial  modalities prn, [ x  ] A/AAROM, [ x  ] PREs, [ x  ] transfer training,            [ x  ] progressive ambulation, [x   ] stairs                                               [ x  ] O.T. 90 mins. /day at least 5 out of 7 days::  [ x  ] modalities prn,  [ x  ]A/AAROM, [ x  ] PREs, [  x ] functional transfer training, [ x  ] ADL training           [ x  ] cognitive/ perceptual eval and training, [   ] splint eval                                                  [ x  ] S.L.P:  [x   ] speech eval, [ x  ] swallow eval     [ x  ] Neuropsychology eval     [ x  ] Individualized rec. therapy      RATIONALE FOR INPATIENT ADMISSION - Patient demonstrates the following: (check all that apply)  [X] Medically appropriate for acute rehabilitation admission. Requires interdisiplinary therapy consisting of at least PT and OT, at least 3 hrs. a day at least 5 days a week  [X] Has attainable rehab goals with an appropriate initial discharge plan  [X] Has rehabilitation potential (expected to make a significant improvement within a reasonable period of time)  [X] Requires close medical management by a rehab physician, rehab nursing care,  and comprehensive interdisciplinary team (including PT, OT)    [X] Requires evaluation by a physiatrist at least 3 days a week to evaluate and manage and coordinate rehab and medical problems   ASSESSMENT/PLAN  75 yo F with a bilateral acute infacts including acute left MCA territory and right temporal cortical with a right hemiparesis, aphasia dysphagia and dysarthria with comorbidities including untreated NIDDM, obesity, diabetic peripheral neuropathy, HTN, obesity, HTN, HLD, GERD, hypothyroidism and RLE DVT on Eliquis.   She warrants acute rehab with 3 hours of daily therapies at least 5 out of 7 days including PT, OT and Speech. Additionally she requires close physiatry follow up for CVA and the many complex comorbdities.  She had been fully independent with amb in the community and ADL's before her stroke; she has excellent potential to improve over the next 2-3 weeks on the acute rehab program and likely to facilitate a safe discharge home.   Rehab of B/L CVA with R hemiparesis (dominant), dysphagia, aphasia, dysarthria  Warrants acute rehab with PT, OT, Speech, rehab nursing, neuropsychology and physiatry.     #MRI Brain w/o contrast showed acute left MCA territory infarct and right temporal cortical infarct  - Continue with Eliquis 10 mg BID  - Continue Atorvastatin 80 mg daily  - Stroke education  - JOEL showed no PFO or intracardiac thrombosis.     #HTN  - Goal -150  - Hold home BP meds (Losartan 100mg PO  daily)  - JOEL completed, LVEF 65%, no PFO  - ILR placed    #HLD  - Continue high dose statin as above in CVA  - LDL results: 80    #DM2  - A1C results: 7.5%  - I will initiate Metformin 500 BID; better control of her diabetes can help reduce her risk o CAD, CVA and neuropathy.  - BG checks BID    #DM Neuropathy  -DULoxetine 30 mg oral delayed release POD    #GERD  - PPI    #Hypothyroid  - Continue synthroid    #RLE DVT  - US Duplex showed +DVT of the right peroneal vein and a branch of the right posterior tibial vein  - Continue with Eliquis 10 mg BID and decrease to 5 mg BID starting 4/22.      -Pain control:   Tylenol prn    -GI/Bowel Mgmt:  No active issues at this time  Miralax/senna    -Bladder management:   No active issues at this time    -Skin:  No active issues at this time    -FEN   Monitor lytes, Replete prn    - Diet:  DM  Easy to chew w/ thin liquids       Precautions / PROPHYLAXIS:      - Falls    - Ortho: Weight bearing status:       - DVT prophylaxis: Has RLE DVT, Eliquis      MEDICAL PROGNOSIS: GOOD            REHAB POTENTIAL: GOOD             ESTIMATED DISPOSITION: HOME WITH HOME CARE       [ x ]  The goals of the IRF admission were discussed with the patient and or family member, who agreed             ELOS:  [     ] 7-14    [ x   ]  14-21    [    ]  Other    THERAPY ORDERS and INITIAL INDIVIDUALIZED PLAN OF CARE:  This initial individualized interdisciplinary plan of care, which was established by me (the attending physiatrist), is based on elements from the post admission evaluation. The interdisciplinary therapy program is to be at least 3 hrs a day, at least 5 days per week from from physical, occupational and/ or speech therapies as ordered by me below.      [ x  ] P.T. 90 mins. /day at least 5 out of 7 days:  [  x ] superficial  modalities prn, [ x  ] A/AAROM, [ x  ] PREs, [ x  ] transfer training,            [ x  ] progressive ambulation, [x   ] stairs                                               [ x  ] O.T. 90 mins. /day at least 5 out of 7 days::  [ x  ] modalities prn,  [ x  ]A/AAROM, [ x  ] PREs, [  x ] functional transfer training, [ x  ] ADL training           [ x  ] cognitive/ perceptual eval and training, [   ] splint eval                                                  [ x  ] S.L.P:  [x   ] speech eval, [ x  ] swallow eval     [ x  ] Neuropsychology eval     [ x  ] Individualized rec. therapy      RATIONALE FOR INPATIENT ADMISSION - Patient demonstrates the following: (check all that apply)  [X] Medically appropriate for acute rehabilitation admission. Requires interdisiplinary therapy consisting of at least PT and OT, at least 3 hrs. a day at least 5 days a week  [X] Has attainable rehab goals with an appropriate initial discharge plan  [X] Has rehabilitation potential (expected to make a significant improvement within a reasonable period of time)  [X] Requires close medical management by a rehab physician, rehab nursing care,  and comprehensive interdisciplinary team (including PT, OT)    [X] Requires evaluation by a physiatrist at least 3 days a week to evaluate and manage and coordinate rehab and medical problems

## 2022-04-19 NOTE — CONSULT NOTE ADULT - TIME BILLING
Initial consultation. examined the patient. Reviewed the charts. Coordinated care with other attending physician and nursing staff.   Coordinated with neurology and ICU staff. Made suggestions to for the patient's condition of right leg DVT.
cryptogenic stroke  - recommend loop recorder implant  - Risk and benefits explained to patient    I have explained the procedure to the patient.  I have explained the risks and benefits of the procedure to the patient.  There is approximately 1% chance of any major cardiovascular complication to occur. Complications include, but are not limited to infection or bleeding,  The patient understands the risk and would like to proceed with the procedure. Patient indicated that all of his questions were answered to his satisfaction and verbalized understanding.

## 2022-04-20 LAB
ALBUMIN SERPL ELPH-MCNC: 3.7 G/DL — SIGNIFICANT CHANGE UP (ref 3.5–5.2)
ALP SERPL-CCNC: 61 U/L — SIGNIFICANT CHANGE UP (ref 30–115)
ALT FLD-CCNC: 18 U/L — SIGNIFICANT CHANGE UP (ref 0–41)
ANION GAP SERPL CALC-SCNC: 13 MMOL/L — SIGNIFICANT CHANGE UP (ref 7–14)
AST SERPL-CCNC: 13 U/L — SIGNIFICANT CHANGE UP (ref 0–41)
BASOPHILS # BLD AUTO: 0.03 K/UL — SIGNIFICANT CHANGE UP (ref 0–0.2)
BASOPHILS NFR BLD AUTO: 0.6 % — SIGNIFICANT CHANGE UP (ref 0–1)
BILIRUB SERPL-MCNC: 1.2 MG/DL — SIGNIFICANT CHANGE UP (ref 0.2–1.2)
BUN SERPL-MCNC: 18 MG/DL — SIGNIFICANT CHANGE UP (ref 10–20)
CALCIUM SERPL-MCNC: 9.2 MG/DL — SIGNIFICANT CHANGE UP (ref 8.5–10.1)
CHLORIDE SERPL-SCNC: 109 MMOL/L — SIGNIFICANT CHANGE UP (ref 98–110)
CO2 SERPL-SCNC: 24 MMOL/L — SIGNIFICANT CHANGE UP (ref 17–32)
CREAT SERPL-MCNC: 0.7 MG/DL — SIGNIFICANT CHANGE UP (ref 0.7–1.5)
EGFR: 91 ML/MIN/1.73M2 — SIGNIFICANT CHANGE UP
EOSINOPHIL # BLD AUTO: 0.21 K/UL — SIGNIFICANT CHANGE UP (ref 0–0.7)
EOSINOPHIL NFR BLD AUTO: 4.2 % — SIGNIFICANT CHANGE UP (ref 0–8)
GLUCOSE BLDC GLUCOMTR-MCNC: 123 MG/DL — HIGH (ref 70–99)
GLUCOSE BLDC GLUCOMTR-MCNC: 132 MG/DL — HIGH (ref 70–99)
GLUCOSE SERPL-MCNC: 140 MG/DL — HIGH (ref 70–99)
HCT VFR BLD CALC: 29.4 % — LOW (ref 37–47)
HGB BLD-MCNC: 9.4 G/DL — LOW (ref 12–16)
IMM GRANULOCYTES NFR BLD AUTO: 0.6 % — HIGH (ref 0.1–0.3)
LYMPHOCYTES # BLD AUTO: 1.15 K/UL — LOW (ref 1.2–3.4)
LYMPHOCYTES # BLD AUTO: 23.1 % — SIGNIFICANT CHANGE UP (ref 20.5–51.1)
MAGNESIUM SERPL-MCNC: 1.9 MG/DL — SIGNIFICANT CHANGE UP (ref 1.8–2.4)
MCHC RBC-ENTMCNC: 27 PG — SIGNIFICANT CHANGE UP (ref 27–31)
MCHC RBC-ENTMCNC: 32 G/DL — SIGNIFICANT CHANGE UP (ref 32–37)
MCV RBC AUTO: 84.5 FL — SIGNIFICANT CHANGE UP (ref 81–99)
MONOCYTES # BLD AUTO: 0.34 K/UL — SIGNIFICANT CHANGE UP (ref 0.1–0.6)
MONOCYTES NFR BLD AUTO: 6.8 % — SIGNIFICANT CHANGE UP (ref 1.7–9.3)
NEUTROPHILS # BLD AUTO: 3.21 K/UL — SIGNIFICANT CHANGE UP (ref 1.4–6.5)
NEUTROPHILS NFR BLD AUTO: 64.7 % — SIGNIFICANT CHANGE UP (ref 42.2–75.2)
NRBC # BLD: 0 /100 WBCS — SIGNIFICANT CHANGE UP (ref 0–0)
PLATELET # BLD AUTO: 217 K/UL — SIGNIFICANT CHANGE UP (ref 130–400)
POTASSIUM SERPL-MCNC: 4.2 MMOL/L — SIGNIFICANT CHANGE UP (ref 3.5–5)
POTASSIUM SERPL-SCNC: 4.2 MMOL/L — SIGNIFICANT CHANGE UP (ref 3.5–5)
PROT SERPL-MCNC: 5.2 G/DL — LOW (ref 6–8)
RBC # BLD: 3.48 M/UL — LOW (ref 4.2–5.4)
RBC # FLD: 14.8 % — HIGH (ref 11.5–14.5)
SODIUM SERPL-SCNC: 146 MMOL/L — SIGNIFICANT CHANGE UP (ref 135–146)
WBC # BLD: 4.97 K/UL — SIGNIFICANT CHANGE UP (ref 4.8–10.8)
WBC # FLD AUTO: 4.97 K/UL — SIGNIFICANT CHANGE UP (ref 4.8–10.8)

## 2022-04-20 RX ADMIN — SENNA PLUS 2 TABLET(S): 8.6 TABLET ORAL at 21:52

## 2022-04-20 RX ADMIN — APIXABAN 10 MILLIGRAM(S): 2.5 TABLET, FILM COATED ORAL at 05:53

## 2022-04-20 RX ADMIN — Medication 112 MICROGRAM(S): at 05:53

## 2022-04-20 RX ADMIN — METFORMIN HYDROCHLORIDE 500 MILLIGRAM(S): 850 TABLET ORAL at 17:09

## 2022-04-20 RX ADMIN — DULOXETINE HYDROCHLORIDE 30 MILLIGRAM(S): 30 CAPSULE, DELAYED RELEASE ORAL at 17:10

## 2022-04-20 RX ADMIN — METFORMIN HYDROCHLORIDE 500 MILLIGRAM(S): 850 TABLET ORAL at 07:57

## 2022-04-20 RX ADMIN — ATORVASTATIN CALCIUM 80 MILLIGRAM(S): 80 TABLET, FILM COATED ORAL at 21:52

## 2022-04-20 RX ADMIN — FAMOTIDINE 40 MILLIGRAM(S): 10 INJECTION INTRAVENOUS at 21:52

## 2022-04-20 RX ADMIN — LOSARTAN POTASSIUM 25 MILLIGRAM(S): 100 TABLET, FILM COATED ORAL at 05:54

## 2022-04-20 RX ADMIN — DULOXETINE HYDROCHLORIDE 30 MILLIGRAM(S): 30 CAPSULE, DELAYED RELEASE ORAL at 05:53

## 2022-04-20 RX ADMIN — APIXABAN 10 MILLIGRAM(S): 2.5 TABLET, FILM COATED ORAL at 17:10

## 2022-04-20 NOTE — PROGRESS NOTE ADULT - ATTENDING COMMENTS
Patient seen and examined with the resident. We discussed the case. I have directed the care. I edited the note. She requires acute rehab with 3 hours of daily therapies and close physiatry follow up.  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     #MRI Brain w/o contrast showed acute left MCA territory infarct and right temporal cortical infarct  - Continue with Eliquis 10 mg BID  - Continue Atorvastatin 80 mg daily  - Stroke education  - JOEL showed no PFO or intracardiac thrombosis.     #HTN  - Goal -150  - Hold home BP meds (Losartan 100mg PO  daily)  - JOEL completed, LVEF 65%, no PFO  - ILR placed    #HLD  - Continue high dose statin as above in CVA  - LDL results: 80    #DM2  - A1C results: 7.5%  - I will initiate Metformin 500 BID; better control of her diabetes can help reduce her risk o CAD, CVA and neuropathy.  - BG checks BID    #DM Neuropathy  -DULoxetine 30 mg oral delayed release POD    #GERD  - PPI    #Hypothyroid  - Continue synthroid    #RLE DVT  - US Duplex showed +DVT of the right peroneal vein and a branch of the right posterior tibial vein  - Continue with Eliquis 10 mg BID and decrease to 5 mg BID starting 4/22.      -Pain control:   Tylenol prn    -GI/Bowel Mgmt:  No active issues at this time  Miralax/senna    -Bladder management:   No active issues at this time

## 2022-04-20 NOTE — PROGRESS NOTE ADULT - SUBJECTIVE AND OBJECTIVE BOX
Patient is a 76y old  Female who presents with a chief complaint of Rehab for B/L CVA with R hemiplegia (dominant), dysphagia, aphasia, dysarthria (19 Apr 2022 16:13)      HPI:  75 yo female w/ PMH of HTN, hypothyroidism, DM, dm peripheral neuropathy, GERD, obesity who presented to the ED after a fall on 4/10/22. Patient started having R sided arm and leg weakness, and difficulty finding words, but could not recall if symptoms started before or after fall. Patient denied pain, head trauma or LOC but endorsed vomiting a couple times. Stroke code was called upon presentation. CTA showed a left M2 occlusion.  The patient was given tPA and taken for an emergent mechanical thrombectomy, but this was not completed due to dislodgement of clot at the time of procedure. Patient's dysarthria and LLE weakness briefly worsened, but subsequently improved again. Repeat CTH did not reveal any hemorrhage, patient was receiving heparin drip for right sided lower DVT, and now switched to Eliquis 10 BID, 5 mg BID starting 4/22. EP consulted and patient getting a loop recorder     PM&R consulted for admission to  for acute inpatient rehabilitation. Prior to admission, patient was independent with ADLs and ambulation without an assistive device. Resides w/ sister, pvt home, +JODIE, no stairs in home, +bathtub    CLOF evaluated by OT  BM - Max A  Transfers - Mod-Max A  UBD - Max A  LBD - Dependent    CLOF evaluated by PT  BM - Max A  Transfers - Max A  Ambulation - Max A standing tolerance 3-5s    Also seen by SLP - FEES completed on 4/14, Results indicate mild pharyngeal dysphagia for puree, easy to chew, and mildly thick liquids; moderate pharyngeal dysphagia for thin liquids. Recommendations for an easy to chew diet w/ thin liquids    Patient is a good candidate for admission to acute inpatient rehabilitation for CVA with right sided hemiplegia (dominant) with comorbidities of dm, htn, hld, hyperthyroidism, obesity, dm peripheral neuropathy, and rle dvt requiring hospital level supervision. She was deemed medically stable for discharge to  on 4/19/22 where she will undergo intensive restorative therapies 3 hours a day for at least 5 days a week with the goal of regaining the patients independence and discharging her home.   (19 Apr 2022 16:13)      I examined the patient and reviewed the chart. There have been no significant changes since my history and physical except where documented below.    TODAY'S SUBJECTIVE & REVIEW OF SYMPTOMS:  No acute overnight events. VSS. Looking forward to starting therapies today.      Review of Systems: Constiutional:    [ x  ] WNL           [   ] poor appetite   [   ] insomnia   [   ] tired   Cardio:                [  x ] WNL           [   ] CP   [   ] ALVAREZ   [   ] palpitations               Resp:                   [  x ] WNL           [   ] SOB   [   ] cough   [   ] wheezing   GI:                        [  x ] WNL           [   ] constipation   [   ] diarrhea   [   ] abdominal pain   [   ] nausea   [   ] emesis                                :                      [ x  ] WNL           [   ] NARANJO  [   ] dusuria   [   ] difficulty voiding             Endo:                   [ x  ] WNL          [   ] po;yuria   [   ] temperature intolerance                 Skin:                     [ x  ] WNL          [   ] pain   [   ] wound   [   ] rash   MSK:                    [  x ] WNL          [   ] muscle pain   [   ] joint pain/ stiffness   [   ] muscle tenderness   [   ] swelling   Neuro:                 [   ] WNL          [   ] HA   [   ] change in vision   [   ] tremor   [  x ] weakness right sided  [  x ]dysphagia              Cognitive:           [   ] WNL           [ x  ]confusion      Psych:                  [  x ] WNL           [   ] hallucinations   [   ]agitation   [   ] delusion   [   ]depression      PHYSICAL EXAM    Vital Signs Last 24 Hrs  T(C): 37.2 (20 Apr 2022 05:18), Max: 37.2 (20 Apr 2022 05:18)  T(F): 98.9 (20 Apr 2022 05:18), Max: 98.9 (20 Apr 2022 05:18)  HR: 76 (20 Apr 2022 05:18) (76 - 81)  BP: 136/62 (20 Apr 2022 05:18) (124/59 - 136/62)  BP(mean): --  RR: 18 (20 Apr 2022 05:18) (18 - 18)  SpO2: --    General:[  x] NAD, Resting Comfortable,   [   ] other:                                HEENT: [ x  ] NC/AT, EOMI, PERRL , Normal Conjunctivae,   [   ] other:  Cardio: [ x  ] RRR, no murmer,   [   ] other:                              Pulm: [ x  ] No Respiratory Distress,  Lungs CTAB,   [   ] other:                       Abdomen: [ x  ]ND/NT, Soft,   [   ] other:    : [ x  ] NO NARANJO CATHETER, [   ] NARANJO CATHETER- no meatal tear, no discharge, [   ] other:                                            MSK: [  x ] No joint swelling, Full ROM,   [   ] other:                                         Ext: [ x ]No C/C/E, No calf tenderness,   [   ]other:    Skin: [   ]intact,   [x   ] other:    skin tear right elbow, ecchymosis right groin/hip                                                               Neurological Examination:  Cognitive: [  x  ] AAO x 3,   [    ]  other:                                                                      Attention:  [   x ] intact,   [    ]  other:                            Memory: [    ] intact,    [   x ]  other:   1/3 five minute recall  Mood/Affect: [ x   ] wnl,    [    ]  other:                                                                             Communication: [    ]Fluent, no dysarthria, following commands:  [  x  ] other: dysarthria, difficulty with word finding, able to follow 2 step commands  CN II - XII:  [  x  ] intact,  [    ] other:                                                                                        Motor:   RIGHT UE: [   ] WNL,  [   x] other: 4/5  LEFT    UE: [ x  ] WNL,  [   ] other:  RIGHT LE: [   ] WNL,  [ x  ] other: 4/5  LEFT    LE: [  x ] WNL,  [   ] other:    Tone: [   x ] wnl,   [    ]  other:  DTRs: [  x ]symmetric, [   ] other:  Coordination:   [    ] intact,   [  x  ] other:   impaired RUE ftn                                                                        Sensory: [    ] Intact to light touch,   [   x ] other:  decrease sensation to light touch bilateral hands and feet when compared proximally    acetaminophen     Tablet .. 650 milliGRAM(s) Oral every 6 hours PRN  aluminum hydroxide/magnesium hydroxide/simethicone Suspension 30 milliLiter(s) Oral every 4 hours PRN  apixaban 10 milliGRAM(s) Oral every 12 hours  atorvastatin 80 milliGRAM(s) Oral at bedtime  DULoxetine 30 milliGRAM(s) Oral every 12 hours  famotidine    Tablet 40 milliGRAM(s) Oral at bedtime  levothyroxine 112 MICROGram(s) Oral daily  losartan 25 milliGRAM(s) Oral daily  magnesium hydroxide Suspension 30 milliLiter(s) Oral daily PRN  melatonin 5 milliGRAM(s) Oral at bedtime PRN  metFORMIN 500 milliGRAM(s) Oral two times a day with meals  polyethylene glycol 3350 17 Gram(s) Oral daily PRN  senna 2 Tablet(s) Oral at bedtime      RECENT LABS/IMAGING                        9.4    4.97  )-----------( 217      ( 20 Apr 2022 07:35 )             29.4     04-20    146  |  109  |  18  ----------------------------<  140<H>  4.2   |  24  |  0.7    Ca    9.2      20 Apr 2022 07:35  Phos  3.7     04-19  Mg     1.9     04-20    TPro  5.2<L>  /  Alb  3.7  /  TBili  1.2  /  DBili  x   /  AST  13  /  ALT  18  /  AlkPhos  61  04-20

## 2022-04-20 NOTE — PROGRESS NOTE ADULT - ASSESSMENT
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   She warrants acute rehab with 3 hours of daily therapies at least 5 out of 7 days including PT, OT and Speech. Additionally she requires close physiatry follow up for CVA and the many complex comorbdities.      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     #MRI Brain w/o contrast showed acute left MCA territory infarct and right temporal cortical infarct  - Continue with Eliquis 10 mg BID  - Continue Atorvastatin 80 mg daily  - Stroke education  - JOEL showed no PFO or intracardiac thrombosis.     #HTN  - Goal -150  - Hold home BP meds (Losartan 100mg PO  daily)  - JOEL completed, LVEF 65%, no PFO  - ILR placed    #HLD  - Continue high dose statin as above in CVA  - LDL results: 80    #DM2  - A1C results: 7.5%  - I will initiate Metformin 500 BID; better control of her diabetes can help reduce her risk o CAD, CVA and neuropathy.  - BG checks BID    #DM Neuropathy  -DULoxetine 30 mg oral delayed release POD    #GERD  - PPI    #Hypothyroid  - Continue synthroid    #RLE DVT  - US Duplex showed +DVT of the right peroneal vein and a branch of the right posterior tibial vein  - Continue with Eliquis 10 mg BID and decrease to 5 mg BID starting 4/22.      -Pain control:   Tylenol prn    -GI/Bowel Mgmt:  No active issues at this time  Miralax/senna    -Bladder management:   No active issues at this time    -Skin:  No active issues at this time    -FEN   Monitor lytes, Replete prn    - Diet:  DM  Easy to chew w/ thin liquids       Precautions / PROPHYLAXIS:      - Falls    - Ortho: Weight bearing status:       - DVT prophylaxis: Has RLE DVT, Eliquis

## 2022-04-21 LAB
GLUCOSE BLDC GLUCOMTR-MCNC: 120 MG/DL — HIGH (ref 70–99)
GLUCOSE BLDC GLUCOMTR-MCNC: 129 MG/DL — HIGH (ref 70–99)
GLUCOSE BLDC GLUCOMTR-MCNC: 129 MG/DL — HIGH (ref 70–99)

## 2022-04-21 RX ADMIN — FAMOTIDINE 40 MILLIGRAM(S): 10 INJECTION INTRAVENOUS at 21:14

## 2022-04-21 RX ADMIN — ATORVASTATIN CALCIUM 80 MILLIGRAM(S): 80 TABLET, FILM COATED ORAL at 21:14

## 2022-04-21 RX ADMIN — METFORMIN HYDROCHLORIDE 500 MILLIGRAM(S): 850 TABLET ORAL at 17:07

## 2022-04-21 RX ADMIN — DULOXETINE HYDROCHLORIDE 30 MILLIGRAM(S): 30 CAPSULE, DELAYED RELEASE ORAL at 17:08

## 2022-04-21 RX ADMIN — APIXABAN 10 MILLIGRAM(S): 2.5 TABLET, FILM COATED ORAL at 06:23

## 2022-04-21 RX ADMIN — APIXABAN 10 MILLIGRAM(S): 2.5 TABLET, FILM COATED ORAL at 17:05

## 2022-04-21 RX ADMIN — LOSARTAN POTASSIUM 25 MILLIGRAM(S): 100 TABLET, FILM COATED ORAL at 06:23

## 2022-04-21 RX ADMIN — DULOXETINE HYDROCHLORIDE 30 MILLIGRAM(S): 30 CAPSULE, DELAYED RELEASE ORAL at 06:23

## 2022-04-21 RX ADMIN — Medication 112 MICROGRAM(S): at 06:23

## 2022-04-21 RX ADMIN — METFORMIN HYDROCHLORIDE 500 MILLIGRAM(S): 850 TABLET ORAL at 07:56

## 2022-04-21 NOTE — PROGRESS NOTE ADULT - SUBJECTIVE AND OBJECTIVE BOX
Patient is a 76y old  Female who presents with a chief complaint of Rehab for B/L CVA with R hemiplegia (dominant), dysphagia, aphasia, dysarthria (19 Apr 2022 16:13)      HPI:  75 yo female w/ PMH of HTN, hypothyroidism, DM, dm peripheral neuropathy, GERD, obesity who presented to the ED after a fall on 4/10/22. Patient started having R sided arm and leg weakness, and difficulty finding words, but could not recall if symptoms started before or after fall. Patient denied pain, head trauma or LOC but endorsed vomiting a couple times. Stroke code was called upon presentation. CTA showed a left M2 occlusion.  The patient was given tPA and taken for an emergent mechanical thrombectomy, but this was not completed due to dislodgement of clot at the time of procedure. Patient's dysarthria and LLE weakness briefly worsened, but subsequently improved again. Repeat CTH did not reveal any hemorrhage, patient was receiving heparin drip for right sided lower DVT, and now switched to Eliquis 10 BID, 5 mg BID starting 4/22. EP consulted and patient getting a loop recorder     PM&R consulted for admission to  for acute inpatient rehabilitation. Prior to admission, patient was independent with ADLs and ambulation without an assistive device. Resides w/ sister, pvt home, +JODIE, no stairs in home, +bathtub    CLOF evaluated by OT  BM - Max A  Transfers - Mod-Max A  UBD - Max A  LBD - Dependent    CLOF evaluated by PT  BM - Max A  Transfers - Max A  Ambulation - Max A standing tolerance 3-5s    Also seen by SLP - FEES completed on 4/14, Results indicate mild pharyngeal dysphagia for puree, easy to chew, and mildly thick liquids; moderate pharyngeal dysphagia for thin liquids. Recommendations for an easy to chew diet w/ thin liquids    Patient is a good candidate for admission to acute inpatient rehabilitation for CVA with right sided hemiplegia (dominant) with comorbidities of dm, htn, hld, hyperthyroidism, obesity, dm peripheral neuropathy, and rle dvt requiring hospital level supervision. She was deemed medically stable for discharge to  on 4/19/22 where she will undergo intensive restorative therapies 3 hours a day for at least 5 days a week with the goal of regaining the patients independence and discharging her home.   (19 Apr 2022 16:13)      I examined the patient and reviewed the chart. There have been no significant changes since my history and physical except where documented below.    TODAY'S SUBJECTIVE & REVIEW OF SYMPTOMS:  No acute overnight events. VSS. C/o loose BMs. Will cut back on bowel medication. Participating with therapies      Review of Systems: Constiutional:    [ x  ] WNL           [   ] poor appetite   [   ] insomnia   [   ] tired   Cardio:                [  x ] WNL           [   ] CP   [   ] ALVAREZ   [   ] palpitations               Resp:                   [  x ] WNL           [   ] SOB   [   ] cough   [   ] wheezing   GI:                        [  x ] WNL           [   ] constipation   [   ] diarrhea   [   ] abdominal pain   [   ] nausea   [   ] emesis                                :                      [ x  ] WNL           [   ] NARANJO  [   ] dusuria   [   ] difficulty voiding             Endo:                   [ x  ] WNL          [   ] po;yuria   [   ] temperature intolerance                 Skin:                     [ x  ] WNL          [   ] pain   [   ] wound   [   ] rash   MSK:                    [  x ] WNL          [   ] muscle pain   [   ] joint pain/ stiffness   [   ] muscle tenderness   [   ] swelling   Neuro:                 [   ] WNL          [   ] HA   [   ] change in vision   [   ] tremor   [  x ] weakness right sided  [  x ]dysphagia              Cognitive:           [   ] WNL           [ x  ]confusion      Psych:                  [  x ] WNL           [   ] hallucinations   [   ]agitation   [   ] delusion   [   ]depression      PHYSICAL EXAM    Vital Signs Last 24 Hrs  T(C): 36.6 (21 Apr 2022 05:56), Max: 36.6 (21 Apr 2022 05:56)  T(F): 97.9 (21 Apr 2022 05:56), Max: 97.9 (21 Apr 2022 05:56)  HR: 80 (21 Apr 2022 05:56) (78 - 80)  BP: 132/59 (21 Apr 2022 05:56) (126/68 - 148/67)  BP(mean): 85 (21 Apr 2022 05:56) (85 - 89)  RR: 18 (21 Apr 2022 05:56) (18 - 18)  SpO2: --    General:[  x] NAD, Resting Comfortable,   [   ] other:                                HEENT: [ x  ] NC/AT, EOMI, PERRL , Normal Conjunctivae,   [   ] other:  Cardio: [ x  ] RRR, no murmer,   [   ] other:                              Pulm: [ x  ] No Respiratory Distress,  Lungs CTAB,   [   ] other:                       Abdomen: [ x  ]ND/NT, Soft,   [   ] other:    : [ x  ] NO NARANJO CATHETER, [   ] NARANJO CATHETER- no meatal tear, no discharge, [   ] other:                                            MSK: [  x ] No joint swelling, Full ROM,   [   ] other:                                         Ext: [ x ]No C/C/E, No calf tenderness,   [   ]other:    Skin: [   ]intact,   [x   ] other:    skin tear right elbow, ecchymosis right groin/hip                                                               Neurological Examination:  Cognitive: [  x  ] AAO x 3,   [    ]  other:                                                                      Attention:  [   x ] intact,   [    ]  other:                            Mood/Affect: [ x   ] wnl,    [    ]  other:                                                                             Communication: [    ]Fluent, no dysarthria, following commands:  [  x  ] other: dysarthria, difficulty with word finding, able to follow 2 step commands  CN II - XII:  [  x  ] intact,  [    ] other:                                                                                        Motor:   RIGHT UE: [   ] WNL,  [   x] other: 4/5  LEFT    UE: [ x  ] WNL,  [   ] other:  RIGHT LE: [   ] WNL,  [ x  ] other: 4/5  LEFT    LE: [  x ] WNL,  [   ] other:    Tone: [   x ] wnl,   [    ]  other:  DTRs: [  x ]symmetric, [   ] other:  Coordination:   [    ] intact,   [  x  ] other:   impaired RUE ftn                                                                        Sensory: [    ] Intact to light touch,   [   x ] other:  decrease sensation to light touch bilateral hands and feet when compared proximally      MEDICATIONS  (STANDING):  apixaban 10 milliGRAM(s) Oral every 12 hours  atorvastatin 80 milliGRAM(s) Oral at bedtime  DULoxetine 30 milliGRAM(s) Oral every 12 hours  famotidine    Tablet 40 milliGRAM(s) Oral at bedtime  levothyroxine 112 MICROGram(s) Oral daily  losartan 25 milliGRAM(s) Oral daily  metFORMIN 500 milliGRAM(s) Oral two times a day with meals    MEDICATIONS  (PRN):  acetaminophen     Tablet .. 650 milliGRAM(s) Oral every 6 hours PRN Temp greater or equal to 38C (100.4F)  aluminum hydroxide/magnesium hydroxide/simethicone Suspension 30 milliLiter(s) Oral every 4 hours PRN Dyspepsia  magnesium hydroxide Suspension 30 milliLiter(s) Oral daily PRN Constipation  melatonin 5 milliGRAM(s) Oral at bedtime PRN Insomnia  polyethylene glycol 3350 17 Gram(s) Oral daily PRN Constipation      RECENT LABS/IMAGING                        9.4    4.97  )-----------( 217      ( 20 Apr 2022 07:35 )             29.4     04-20    146  |  109  |  18  ----------------------------<  140<H>  4.2   |  24  |  0.7    Ca    9.2      20 Apr 2022 07:35  Phos  3.7     04-19  Mg     1.9     04-20    TPro  5.2<L>  /  Alb  3.7  /  TBili  1.2  /  DBili  x   /  AST  13  /  ALT  18  /  AlkPhos  61  04-20     Patient is a 76y old  Female who presents with a chief complaint of Rehab for B/L CVA with R hemiplegia (dominant), dysphagia, aphasia, dysarthria (19 Apr 2022 16:13)      HPI:  77 yo female w/ PMH of HTN, hypothyroidism, DM, dm peripheral neuropathy, GERD, obesity who presented to the ED after a fall on 4/10/22. Patient started having R sided arm and leg weakness, and difficulty finding words, but could not recall if symptoms started before or after fall. Patient denied pain, head trauma or LOC but endorsed vomiting a couple times. Stroke code was called upon presentation. CTA showed a left M2 occlusion.  The patient was given tPA and taken for an emergent mechanical thrombectomy, but this was not completed due to dislodgement of clot at the time of procedure. Patient's dysarthria and LLE weakness briefly worsened, but subsequently improved again. Repeat CTH did not reveal any hemorrhage, patient was receiving heparin drip for right sided lower DVT, and now switched to Eliquis 10 BID, 5 mg BID starting 4/22. EP consulted and patient getting a loop recorder     PM&R consulted for admission to  for acute inpatient rehabilitation. Prior to admission, patient was independent with  ADLs and ambulation without an assistive device. Resides w/ sister, pvt home, +JODIE, no stairs in home, +bathtub    CLOF evaluated by OT  BM - Max A  Transfers - Mod-Max A  UBD - Max A  LBD - Dependent    CLOF evaluated by PT  BM - Max A  Transfers - Max A  Ambulation - Max A standing tolerance 3-5s    Also seen by SLP - FEES completed on 4/14, Results indicate mild pharyngeal dysphagia for puree, easy to chew, and mildly thick liquids; moderate pharyngeal dysphagia for thin liquids. Recommendations for an easy to chew diet w/ thin liquids    Patient is a good candidate for admission to acute inpatient rehabilitation for CVA with right sided hemiplegia (dominant) with comorbidities of dm, htn, hld, hyperthyroidism, obesity, dm peripheral neuropathy, and rle dvt requiring hospital level supervision. She was deemed medically stable for discharge to  on 4/19/22 where she will undergo intensive restorative therapies 3 hours a day for at least 5 days a week with the goal of regaining the patients independence and discharging her home.   (19 Apr 2022 16:13)      I examined the patient and reviewed the chart. There have been no significant changes since my history and physical except where documented below.    TODAY'S SUBJECTIVE & REVIEW OF SYMPTOMS:  No acute overnight events. VSS. C/o loose BMs. Will cut back on bowel medication. Participating with therapies      Review of Systems: Constiutional:    [ x  ] WNL           [   ] poor appetite   [   ] insomnia   [   ] tired   Cardio:                [  x ] WNL           [   ] CP   [   ] ALVAREZ   [   ] palpitations               Resp:                   [  x ] WNL           [   ] SOB   [   ] cough   [   ] wheezing   GI:                        [  x ] WNL           [   ] constipation   [   ] diarrhea   [   ] abdominal pain   [   ] nausea   [   ] emesis                                :                      [ x  ] WNL           [   ] NARANJO  [   ] dusuria   [   ] difficulty voiding             Endo:                   [ x  ] WNL          [   ] po;yuria   [   ] temperature intolerance                 Skin:                     [ x  ] WNL          [   ] pain   [   ] wound   [   ] rash   MSK:                    [  x ] WNL          [   ] muscle pain   [   ] joint pain/ stiffness   [   ] muscle tenderness   [   ] swelling   Neuro:                 [   ] WNL          [   ] HA   [   ] change in vision   [   ] tremor   [  x ] weakness right sided  [  x ]dysphagia              Cognitive:           [   ] WNL           [ x  ]confusion      Psych:                  [  x ] WNL           [   ] hallucinations   [   ]agitation   [   ] delusion   [   ]depression      PHYSICAL EXAM    Vital Signs Last 24 Hrs  T(C): 36.6 (21 Apr 2022 05:56), Max: 36.6 (21 Apr 2022 05:56)  T(F): 97.9 (21 Apr 2022 05:56), Max: 97.9 (21 Apr 2022 05:56)  HR: 80 (21 Apr 2022 05:56) (78 - 80)  BP: 132/59 (21 Apr 2022 05:56) (126/68 - 148/67)  BP(mean): 85 (21 Apr 2022 05:56) (85 - 89)  RR: 18 (21 Apr 2022 05:56) (18 - 18)  SpO2: --    General:[  x] NAD, Resting Comfortable,   [   ] other:                                HEENT: [ x  ] NC/AT, EOMI, PERRL , Normal Conjunctivae,   [   ] other:  Cardio: [ x  ] RRR, no murmer,   [   ] other:                              Pulm: [ x  ] No Respiratory Distress,  Lungs CTAB,   [   ] other:                       Abdomen: [ x  ]ND/NT, Soft,   [   ] other:    : [ x  ] NO NARANJO CATHETER, [   ] NARANJO CATHETER- no meatal tear, no discharge, [   ] other:                                            MSK: [  x ] No joint swelling, Full ROM,   [   ] other:                                         Ext: [ x ]No C/C/E, No calf tenderness,   [   ]other:    Skin: [   ]intact,   [x   ] other:    skin tear right elbow, ecchymosis right groin/hip                                                               Neurological Examination:  Cognitive: [  x  ] AAO x 3,   [    ]  other:                                                                      Attention:  [   x ] intact,   [    ]  other:                            Mood/Affect: [ x   ] wnl,    [    ]  other:                                                                             Communication: [    ]Fluent, no dysarthria, following commands:  [  x  ] other: dysarthria, difficulty with word finding, able to follow 2 step commands  CN II - XII:  [  x  ] intact,  [    ] other:                                                                                        Motor:   RIGHT UE: [   ] WNL,  [   x] other: 4/5  LEFT    UE: [ x  ] WNL,  [   ] other:  RIGHT LE: [   ] WNL,  [ x  ] other: 4/5  LEFT    LE: [  x ] WNL,  [   ] other:    Tone: [   x ] wnl,   [    ]  other:  DTRs: [  x ]symmetric, [   ] other:  Coordination:   [    ] intact,   [  x  ] other:   impaired RUE ftn                                                                        Sensory: [    ] Intact to light touch,   [   x ] other:  decrease sensation to light touch bilateral hands and feet when compared proximally      MEDICATIONS  (STANDING):  apixaban 10 milliGRAM(s) Oral every 12 hours  atorvastatin 80 milliGRAM(s) Oral at bedtime  DULoxetine 30 milliGRAM(s) Oral every 12 hours  famotidine    Tablet 40 milliGRAM(s) Oral at bedtime  levothyroxine 112 MICROGram(s) Oral daily  losartan 25 milliGRAM(s) Oral daily  metFORMIN 500 milliGRAM(s) Oral two times a day with meals    MEDICATIONS  (PRN):  acetaminophen     Tablet .. 650 milliGRAM(s) Oral every 6 hours PRN Temp greater or equal to 38C (100.4F)  aluminum hydroxide/magnesium hydroxide/simethicone Suspension 30 milliLiter(s) Oral every 4 hours PRN Dyspepsia  magnesium hydroxide Suspension 30 milliLiter(s) Oral daily PRN Constipation  melatonin 5 milliGRAM(s) Oral at bedtime PRN Insomnia  polyethylene glycol 3350 17 Gram(s) Oral daily PRN Constipation      RECENT LABS/IMAGING                        9.4    4.97  )-----------( 217      ( 20 Apr 2022 07:35 )             29.4     04-20    146  |  109  |  18  ----------------------------<  140<H>  4.2   |  24  |  0.7    Ca    9.2      20 Apr 2022 07:35  Phos  3.7     04-19  Mg     1.9     04-20    TPro  5.2<L>  /  Alb  3.7  /  TBili  1.2  /  DBili  x   /  AST  13  /  ALT  18  /  AlkPhos  61  04-20

## 2022-04-21 NOTE — PROGRESS NOTE ADULT - ASSESSMENT
ASSESSMENT/PLAN:    77 yo F with a bilateral acute infacts including acute left MCA territory and right temporal cortical with a right hemiparesis, aphasia dysphagia and dysarthria with comorbidities including untreated NIDDM, obesity, diabetic peripheral neuropathy, HTN, obesity, HTN, HLD, GERD, hypothyroidism and RLE DVT on Eliquis.   She warrants acute rehab with 3 hours of daily therapies at least 5 out of 7 days including PT, OT and Speech. Additionally she requires close physiatry follow up for CVA and the many complex comorbdities.      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     #MRI Brain w/o contrast showed acute left MCA territory infarct and right temporal cortical infarct  - Continue with Eliquis 10 mg BID  - Continue Atorvastatin 80 mg daily  - Stroke education  - JOEL showed no PFO or intracardiac thrombosis.     #HTN  - Goal -150  - Hold home BP meds (Losartan 100mg PO  daily)  - JOEL completed, LVEF 65%, no PFO  - ILR placed    #HLD  - Continue high dose statin as above in CVA  - LDL results: 80    #DM2  - A1C results: 7.5%  - I will initiate Metformin 500 BID; better control of her diabetes can help reduce her risk o CAD, CVA and neuropathy.  - BG checks BID    #DM Neuropathy  -DULoxetine 30 mg oral delayed release POD    #GERD  - PPI    #Hypothyroid  - Continue synthroid    #RLE DVT  - US Duplex showed +DVT of the right peroneal vein and a branch of the right posterior tibial vein  - Continue with Eliquis 10 mg BID and decrease to 5 mg BID starting 4/22.      -Pain control:   Tylenol prn    -GI/Bowel Mgmt:  No active issues at this time  Miralax    -Bladder management:   No active issues at this time    -Skin:  No active issues at this time    -FEN   Monitor lytes, Replete prn    - Diet:  DM  Easy to chew w/ thin liquids       Precautions / PROPHYLAXIS:      - Falls    - Ortho: Weight bearing status:       - DVT prophylaxis: Has RLE DVT, Eliquis   ASSESSMENT/PLAN:    77 yo F with a bilateral acute infacts including acute left MCA territory and right temporal cortical with a right hemiparesis, aphasia dysphagia and dysarthria with comorbidities including untreated NIDDM, obesity, diabetic peripheral neuropathy, HTN, obesity, HTN, HLD, GERD, hypothyroidism and RLE DVT on Eliquis.   She warrants acute rehab with 3 hours of daily therapies at least 5 out of 7 days including PT, OT and Speech. Additionally she requires close physiatry follow up for CVA and the many complex comorbdities.      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     #MRI Brain w/o contrast showed acute left MCA territory infarct and right temporal cortical infarct  - Continue with Eliquis 10 mg BID  - Continue Atorvastatin 80 mg daily  - Stroke education  - JOEL showed no PFO or intracardiac thrombosis.     #HTN  - Goal -150  - Hold home BP meds (Losartan 100mg PO  daily)  - JOEL completed, LVEF 65%, no PFO  - ILR placed    #HLD  - Continue high dose statin as above in CVA  - LDL results: 80    #DM2  - A1C results: 7.5%  - I will initiate Metformin 500 BID; better control of her diabetes can help reduce her risk o CAD, CVA and neuropathy.  - BG checks BID    #DM Neuropathy  -DULoxetine 30 mg oral delayed release POD    #GERD  - PPI    #Hypothyroid  - Continue synthroid    #RLE DVT  - US Duplex showed +DVT of the right peroneal vein and a branch of the right posterior tibial vein  - Continue with Eliquis 10 mg BID and decrease to 5 mg BID starting 4/22.      -Pain control:   Tylenol prn    -GI/Bowel Mgmt:  No active issues at this time  Miralax    -Bladder management:   No active issues at this time    -Skin:  No active issues at this time    -FEN   Monitor lytes, Replete prn    - Diet:  DM  Easy to chew w/ thin liquids       Precautions / PROPHYLAXIS:      - Falls    - Ortho: Weight bearing status:       - DVT prophylaxis: Has RLE DVT,  Eliquis

## 2022-04-21 NOTE — PROGRESS NOTE ADULT - ASSESSMENT
Primary Contact Name: Zoila Armstrong Relationship: Sister    Pertinent Social History:    Patient’s sister reported changes in speech, expressive language, and memory since her hospitalization. Though no major mood concerns were reported, patient’s sister did indicate increased frustration secondary to speech and memory difficulties. She denied significant behavior changes. Patient lives at home with her 78-year-old sister, and was reportedly very active and fully independent prior to her stroke.    Premorbid Functioning:    · ADLs: Independent    · IADLs: Independent    · Psychiatric History/Treatment: Denied    · Cognition: Denied significant premorbid problems; potential subtle memory or attention difficulties with age    Current Status:    Mood Changes: Frustration secondary to cognitive deficits    Cognition Changes: Speech/language and memory deficits    Behavior Changes: Denied    Patient’s Primary Language: English    a) Changes in understanding primary language after Neurological event. No    b) Preferred language for health care information? English    Brain Injury / Cognitive Behavioral Protocol Education Provided: Not Applicable    Family Education: Family contact was educated about the rehabilitation process, current status, and family education sessions.    Family Concerns: Cognition / Frustration since being in the hospital    Family Goals: Cognitive assessment

## 2022-04-22 LAB
GLUCOSE BLDC GLUCOMTR-MCNC: 112 MG/DL — HIGH (ref 70–99)
GLUCOSE BLDC GLUCOMTR-MCNC: 128 MG/DL — HIGH (ref 70–99)

## 2022-04-22 RX ORDER — PANTOPRAZOLE SODIUM 20 MG/1
40 TABLET, DELAYED RELEASE ORAL EVERY 12 HOURS
Refills: 0 | Status: COMPLETED | OUTPATIENT
Start: 2022-04-22 | End: 2022-04-29

## 2022-04-22 RX ORDER — PANTOPRAZOLE SODIUM 20 MG/1
40 TABLET, DELAYED RELEASE ORAL
Refills: 0 | Status: DISCONTINUED | OUTPATIENT
Start: 2022-04-29 | End: 2022-05-03

## 2022-04-22 RX ORDER — LANOLIN ALCOHOL/MO/W.PET/CERES
5 CREAM (GRAM) TOPICAL AT BEDTIME
Refills: 0 | Status: DISCONTINUED | OUTPATIENT
Start: 2022-04-22 | End: 2022-05-03

## 2022-04-22 RX ORDER — APIXABAN 2.5 MG/1
5 TABLET, FILM COATED ORAL EVERY 12 HOURS
Refills: 0 | Status: DISCONTINUED | OUTPATIENT
Start: 2022-04-22 | End: 2022-05-03

## 2022-04-22 RX ADMIN — Medication 30 MILLILITER(S): at 23:12

## 2022-04-22 RX ADMIN — Medication 112 MICROGRAM(S): at 05:47

## 2022-04-22 RX ADMIN — ATORVASTATIN CALCIUM 80 MILLIGRAM(S): 80 TABLET, FILM COATED ORAL at 21:17

## 2022-04-22 RX ADMIN — METFORMIN HYDROCHLORIDE 500 MILLIGRAM(S): 850 TABLET ORAL at 18:03

## 2022-04-22 RX ADMIN — METFORMIN HYDROCHLORIDE 500 MILLIGRAM(S): 850 TABLET ORAL at 08:14

## 2022-04-22 RX ADMIN — APIXABAN 5 MILLIGRAM(S): 2.5 TABLET, FILM COATED ORAL at 18:03

## 2022-04-22 RX ADMIN — DULOXETINE HYDROCHLORIDE 30 MILLIGRAM(S): 30 CAPSULE, DELAYED RELEASE ORAL at 05:47

## 2022-04-22 RX ADMIN — APIXABAN 10 MILLIGRAM(S): 2.5 TABLET, FILM COATED ORAL at 05:47

## 2022-04-22 RX ADMIN — DULOXETINE HYDROCHLORIDE 30 MILLIGRAM(S): 30 CAPSULE, DELAYED RELEASE ORAL at 18:03

## 2022-04-22 RX ADMIN — LOSARTAN POTASSIUM 25 MILLIGRAM(S): 100 TABLET, FILM COATED ORAL at 05:47

## 2022-04-22 RX ADMIN — Medication 5 MILLIGRAM(S): at 21:17

## 2022-04-22 NOTE — PROGRESS NOTE ADULT - ATTENDING COMMENTS
Patient seen and examined with the resident. We discussed the case. I have directed the care. I edited the note. She requires acute rehab with 3 hours of daily therapies and close physiatry follow up.  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     #MRI Brain w/o contrast showed acute left MCA territory infarct and right temporal cortical infarct  - Continue with Eliquis 10 mg BID  - Continue Atorvastatin 80 mg daily  - Stroke education  - JOEL showed no PFO or intracardiac thrombosis.     #HTN  - Goal -150  - Hold home BP meds (Losartan 100mg PO  daily)  - JOEL completed, LVEF 65%, no PFO  - ILR placed    #HLD  - Continue high dose statin as above in CVA  - LDL results: 80    #DM2  - A1C results: 7.5%  - I will initiate Metformin 500 BID; better control of her diabetes can help reduce her risk o CAD, CVA and neuropathy.  - BG checks BID    #DM Neuropathy  -DULoxetine 30 mg oral delayed release POD    #GERD  - PPI    #Hypothyroid  - Continue synthroid    #RLE DVT  - US Duplex showed +DVT of the right peroneal vein and a branch of the right posterior tibial vein  - Continue with Eliquis 10 mg BID and decrease to 5 mg BID starting 4/22.      -Pain control:   Tylenol prn    -GI/Bowel Mgmt:  No active issues at this time  Miralax/senna    -Bladder management:   No active issues at this time Patient seen and examined with the resident. We discussed the case. I have directed the care. I edited the note. She requires acute rehab with 3 hours of daily therapies and close physiatry follow up. She is standing in PT.   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     #MRI Brain w/o contrast showed acute left MCA territory infarct and right temporal cortical infarct  - Continue with Eliquis 10 mg BID  - Continue Atorvastatin 80 mg daily  - Stroke education  - JOEL showed no PFO or intracardiac thrombosis.     #HTN  - Goal -150  - Hold home BP meds (Losartan 100mg PO  daily)  - JOEL completed, LVEF 65%, no PFO  - ILR placed    #HLD  - Continue high dose statin as above in CVA  - LDL results: 80    #DM2  - A1C results: 7.5%  - I will initiate Metformin 500 BID; better control of her diabetes can help reduce her risk o CAD, CVA and neuropathy.  - BG checks BID    #DM Neuropathy  -DULoxetine 30 mg oral delayed release POD    #GERD  - PPI    #Hypothyroid  - Continue synthroid    #RLE DVT  - US Duplex showed +DVT of the right peroneal vein and a branch of the right posterior tibial vein  - Continue with Eliquis 10 mg BID and decrease to 5 mg BID starting 4/22.      -Pain control:   Tylenol prn    -GI/Bowel Mgmt:  No active issues at this time  Miralax/senna    -Bladder management:   No active issues at this time

## 2022-04-22 NOTE — PROGRESS NOTE ADULT - SUBJECTIVE AND OBJECTIVE BOX
Patient is a 76y old  Female who presents with a chief complaint of Rehab for B/L CVA with R hemiplegia (dominant), dysphagia, aphasia, dysarthria (19 Apr 2022 16:13)      HPI:  77 yo female w/ PMH of HTN, hypothyroidism, DM, dm peripheral neuropathy, GERD, obesity who presented to the ED after a fall on 4/10/22. Patient started having R sided arm and leg weakness, and difficulty finding words, but could not recall if symptoms started before or after fall. Patient denied pain, head trauma or LOC but endorsed vomiting a couple times. Stroke code was called upon presentation. CTA showed a left M2 occlusion.  The patient was given tPA and taken for an emergent mechanical thrombectomy, but this was not completed due to dislodgement of clot at the time of procedure. Patient's dysarthria and LLE weakness briefly worsened, but subsequently improved again. Repeat CTH did not reveal any hemorrhage, patient was receiving heparin drip for right sided lower DVT, and now switched to Eliquis 10 BID, 5 mg BID starting 4/22. EP consulted and patient getting a loop recorder     PM&R consulted for admission to  for acute inpatient rehabilitation. Prior to admission, patient was independent with  ADLs and ambulation without an assistive device. Resides w/ sister, pvt home, +JODIE, no stairs in home, +bathtub    CLOF evaluated by OT  BM - Max A  Transfers - Mod-Max A  UBD - Max A  LBD - Dependent    CLOF evaluated by PT  BM - Max A  Transfers - Max A  Ambulation - Max A standing tolerance 3-5s    Also seen by SLP - FEES completed on 4/14, Results indicate mild pharyngeal dysphagia for puree, easy to chew, and mildly thick liquids; moderate pharyngeal dysphagia for thin liquids. Recommendations for an easy to chew diet w/ thin liquids    Patient is a good candidate for admission to acute inpatient rehabilitation for CVA with right sided hemiplegia (dominant) with comorbidities of dm, htn, hld, hyperthyroidism, obesity, dm peripheral neuropathy, and rle dvt requiring hospital level supervision. She was deemed medically stable for discharge to  on 4/19/22 where she will undergo intensive restorative therapies 3 hours a day for at least 5 days a week with the goal of regaining the patients independence and discharging her home.   (19 Apr 2022 16:13)      I examined the patient and reviewed the chart. There have been no significant changes since my history and physical except where documented below.    TODAY'S SUBJECTIVE & REVIEW OF SYMPTOMS:  No acute overnight events. VSS. C/o loose BMs. Will cut back all bowel medication. Participating with therapies      Review of Systems: Constiutional:    [ x  ] WNL           [   ] poor appetite   [   ] insomnia   [   ] tired   Cardio:                [  x ] WNL           [   ] CP   [   ] ALVAREZ   [   ] palpitations               Resp:                   [  x ] WNL           [   ] SOB   [   ] cough   [   ] wheezing   GI:                        [  x ] WNL           [   ] constipation   [   ] diarrhea   [   ] abdominal pain   [   ] nausea   [   ] emesis                                :                      [ x  ] WNL           [   ] NARANJO  [   ] dusuria   [   ] difficulty voiding             Endo:                   [ x  ] WNL          [   ] po;yuria   [   ] temperature intolerance                 Skin:                     [ x  ] WNL          [   ] pain   [   ] wound   [   ] rash   MSK:                    [  x ] WNL          [   ] muscle pain   [   ] joint pain/ stiffness   [   ] muscle tenderness   [   ] swelling   Neuro:                 [   ] WNL          [   ] HA   [   ] change in vision   [   ] tremor   [  x ] weakness right sided  [  x ]dysphagia              Cognitive:           [   ] WNL           [ x  ]confusion      Psych:                  [  x ] WNL           [   ] hallucinations   [   ]agitation   [   ] delusion   [   ]depression      PHYSICAL EXAM    Vital Signs Last 24 Hrs  T(C): 36.2 (22 Apr 2022 05:30), Max: 36.4 (21 Apr 2022 21:43)  T(F): 97.1 (22 Apr 2022 05:30), Max: 97.6 (21 Apr 2022 21:43)  HR: 79 (22 Apr 2022 05:30) (79 - 88)  BP: 152/69 (22 Apr 2022 05:30) (144/67 - 152/69)  BP(mean): 95 (21 Apr 2022 21:43) (95 - 95)  RR: 18 (22 Apr 2022 05:30) (18 - 18)  SpO2: --    General:[  x] NAD, Resting Comfortable,   [   ] other:                                HEENT: [ x  ] NC/AT, EOMI, PERRL , Normal Conjunctivae,   [   ] other:  Cardio: [ x  ] RRR, no murmer,   [   ] other:                              Pulm: [ x  ] No Respiratory Distress,  Lungs CTAB,   [   ] other:                       Abdomen: [ x  ]ND/NT, Soft,   [   ] other:    : [ x  ] NO NARANJO CATHETER, [   ] NARANJO CATHETER- no meatal tear, no discharge, [   ] other:                                            MSK: [  x ] No joint swelling, Full ROM,   [   ] other:                                         Ext: [ x ]No C/C/E, No calf tenderness,   [   ]other:    Skin: [   ]intact,   [x   ] other:    skin tear right elbow, ecchymosis right groin/hip                                                               Neurological Examination:  Cognitive: [  x  ] AAO x 3,   [    ]  other:                                                                      Attention:  [   x ] intact,   [    ]  other:                            Mood/Affect: [ x   ] wnl,    [    ]  other:                                                                             Communication: [    ]Fluent, no dysarthria, following commands:  [  x  ] other: dysarthria, difficulty with word finding, able to follow 2 step commands  CN II - XII:  [  x  ] intact,  [    ] other:                                                                                        Motor:   RIGHT UE: [   ] WNL,  [   x] other: 4/5  LEFT    UE: [ x  ] WNL,  [   ] other:  RIGHT LE: [   ] WNL,  [ x  ] other: 4/5  LEFT    LE: [  x ] WNL,  [   ] other:    Tone: [   x ] wnl,   [    ]  other:  DTRs: [  x ]symmetric, [   ] other:  Coordination:   [    ] intact,   [  x  ] other:   impaired RUE ftn                                                                        Sensory: [    ] Intact to light touch,   [   x ] other:  decrease sensation to light touch bilateral hands and feet when compared proximally      MEDICATIONS  (STANDING):  apixaban 10 milliGRAM(s) Oral every 12 hours  atorvastatin 80 milliGRAM(s) Oral at bedtime  DULoxetine 30 milliGRAM(s) Oral every 12 hours  famotidine    Tablet 40 milliGRAM(s) Oral at bedtime  levothyroxine 112 MICROGram(s) Oral daily  losartan 25 milliGRAM(s) Oral daily  melatonin 5 milliGRAM(s) Oral at bedtime  metFORMIN 500 milliGRAM(s) Oral two times a day with meals    MEDICATIONS  (PRN):  acetaminophen     Tablet .. 650 milliGRAM(s) Oral every 6 hours PRN Temp greater or equal to 38C (100.4F)  aluminum hydroxide/magnesium hydroxide/simethicone Suspension 30 milliLiter(s) Oral every 4 hours PRN Dyspepsia  magnesium hydroxide Suspension 30 milliLiter(s) Oral daily PRN Constipation  polyethylene glycol 3350 17 Gram(s) Oral daily PRN Constipation      RECENT LABS/IMAGING                        9.4    4.97  )-----------( 217      ( 20 Apr 2022 07:35 )             29.4     04-20    146  |  109  |  18  ----------------------------<  140<H>  4.2   |  24  |  0.7    Ca    9.2      20 Apr 2022 07:35  Phos  3.7     04-19  Mg     1.9     04-20    TPro  5.2<L>  /  Alb  3.7  /  TBili  1.2  /  DBili  x   /  AST  13  /  ALT  18  /  AlkPhos  61  04-20

## 2022-04-22 NOTE — PROGRESS NOTE ADULT - ASSESSMENT
ASSESSMENT/PLAN:    77 yo F with a bilateral acute infacts including acute left MCA territory and right temporal cortical with a right hemiparesis, aphasia dysphagia and dysarthria with comorbidities including untreated NIDDM, obesity, diabetic peripheral neuropathy, HTN, obesity, HTN, HLD, GERD, hypothyroidism and RLE DVT on Eliquis.   She warrants acute rehab with 3 hours of daily therapies at least 5 out of 7 days including PT, OT and Speech. Additionally she requires close physiatry follow up for CVA and the many complex comorbdities.      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     #MRI Brain w/o contrast showed acute left MCA territory infarct and right temporal cortical infarct  - Continue with Eliquis 10 mg BID  - Continue Atorvastatin 80 mg daily  - Stroke education  - JOEL showed no PFO or intracardiac thrombosis.     #HTN  - Goal -150  - Hold home BP meds (Losartan 100mg PO  daily)  - JOEL completed, LVEF 65%, no PFO  - ILR placed    #HLD  - Continue high dose statin as above in CVA  - LDL results: 80    #DM2  - A1C results: 7.5%  - I will initiate Metformin 500 BID; better control of her diabetes can help reduce her risk o CAD, CVA and neuropathy.  - BG checks BID    #DM Neuropathy  -DULoxetine 30 mg oral delayed release POD    #GERD  - PPI    #Hypothyroid  - Continue synthroid    #RLE DVT  - US Duplex showed +DVT of the right peroneal vein and a branch of the right posterior tibial vein  - Continue with Eliquis 10 mg BID and decrease to 5 mg BID starting 4/22.      -Pain control:   Tylenol prn    -GI/Bowel Mgmt:  No active issues at this time  Miralax    -Bladder management:   No active issues at this time    -Skin:  No active issues at this time    -FEN   Monitor lytes, Replete prn    - Diet:  DM  Easy to chew w/ thin liquids       Precautions / PROPHYLAXIS:      - Falls    - Ortho: Weight bearing status:       - DVT prophylaxis: Has RLE DVT,  Eliquis   ASSESSMENT/PLAN:    77 yo F with a bilateral acute infacts including acute left MCA territory and right temporal cortical with a right hemiparesis, aphasia dysphagia and dysarthria with comorbidities including untreated NIDDM, obesity, diabetic peripheral neuropathy, HTN, obesity, HTN, HLD, GERD, hypothyroidism and RLE DVT on Eliquis.   She warrants acute rehab with 3 hours of daily therapies at least 5 out of 7 days including PT, OT and Speech. Additionally she requires close physiatry follow up for CVA and the many complex comorbdities.      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     #MRI Brain w/o contrast showed acute left MCA territory infarct and right temporal cortical infarct  - Continue with Eliquis 10 mg BID  - Continue Atorvastatin 80 mg daily  - Stroke education  - JOEL showed no PFO or intracardiac thrombosis.     #HTN  - Goal -150  - Hold home BP meds (Losartan 100mg PO  daily)  - JOEL completed, LVEF 65%, no PFO  - ILR placed    #HLD  - Continue high dose statin as above in CVA  - LDL results: 80    #DM2  - A1C results: 7.5%  - I will initiate Metformin 500 BID; better control of her diabetes can help reduce her risk o CAD, CVA and neuropathy.  - BG checks BID    #DM Neuropathy  -DULoxetine 30 mg oral delayed release PO Daily    #GERD  - PPI    #Hypothyroid  - Continue synthroid    #RLE DVT  - US Duplex showed +DVT of the right peroneal vein and a branch of the right posterior tibial vein  - Continue with Eliquis 10 mg BID and decrease to 5 mg BID starting 4/22.      -Pain control:   Tylenol prn    -GI/Bowel Mgmt:  No active issues at this time  Miralax    -Bladder management:   No active issues at this time    -Skin:  No active issues at this time    -FEN   Monitor lytes, Replete prn    - Diet:  DM  Easy to chew w/ thin liquids       Precautions / PROPHYLAXIS:      - Falls    - Ortho: Weight bearing status:       - DVT prophylaxis: Has RLE DVT,  Eliquis

## 2022-04-23 LAB
GLUCOSE BLDC GLUCOMTR-MCNC: 103 MG/DL — HIGH (ref 70–99)
GLUCOSE BLDC GLUCOMTR-MCNC: 124 MG/DL — HIGH (ref 70–99)

## 2022-04-23 RX ORDER — METFORMIN HYDROCHLORIDE 850 MG/1
500 TABLET ORAL
Refills: 0 | Status: DISCONTINUED | OUTPATIENT
Start: 2022-04-23 | End: 2022-05-03

## 2022-04-23 RX ADMIN — METFORMIN HYDROCHLORIDE 500 MILLIGRAM(S): 850 TABLET ORAL at 07:55

## 2022-04-23 RX ADMIN — LOSARTAN POTASSIUM 25 MILLIGRAM(S): 100 TABLET, FILM COATED ORAL at 06:02

## 2022-04-23 RX ADMIN — APIXABAN 5 MILLIGRAM(S): 2.5 TABLET, FILM COATED ORAL at 06:02

## 2022-04-23 RX ADMIN — DULOXETINE HYDROCHLORIDE 30 MILLIGRAM(S): 30 CAPSULE, DELAYED RELEASE ORAL at 17:14

## 2022-04-23 RX ADMIN — Medication 112 MICROGRAM(S): at 06:02

## 2022-04-23 RX ADMIN — DULOXETINE HYDROCHLORIDE 30 MILLIGRAM(S): 30 CAPSULE, DELAYED RELEASE ORAL at 06:02

## 2022-04-23 RX ADMIN — PANTOPRAZOLE SODIUM 40 MILLIGRAM(S): 20 TABLET, DELAYED RELEASE ORAL at 17:14

## 2022-04-23 RX ADMIN — APIXABAN 5 MILLIGRAM(S): 2.5 TABLET, FILM COATED ORAL at 17:14

## 2022-04-23 RX ADMIN — ATORVASTATIN CALCIUM 80 MILLIGRAM(S): 80 TABLET, FILM COATED ORAL at 21:22

## 2022-04-23 RX ADMIN — PANTOPRAZOLE SODIUM 40 MILLIGRAM(S): 20 TABLET, DELAYED RELEASE ORAL at 06:02

## 2022-04-23 RX ADMIN — Medication 5 MILLIGRAM(S): at 21:22

## 2022-04-23 NOTE — CHART NOTE - NSCHARTNOTEFT_GEN_A_CORE
77 yo female w/ PMH of HTN, hypothyroidism, DM, dm peripheral neuropathy, GERD, obesity who presented to the ED after a fall on 4/10/22. She started having R sided arm and leg weakness, and difficulty finding words, but could not recall if symptoms started before or after fall. Patient denied pain, head trauma or LOC but endorsed vomiting a couple of times. Stroke code was called upon presentation. CTA showed a left M2 occlusion.  The patient was given tPA and taken for an emergent mechanical thrombectomy, but this was not completed due to dislodgement of clot at the time of procedure. Patient's dysarthria and LLE weakness briefly worsened, but subsequently improved again. Repeat CTH did not reveal any hemorrhage, patient was receiving heparin drip for right sided lower DVT (R peroneal and R PT branch), and now switched to Eliquis 10 BID, 5 mg BID starting 4/22. EP was consulted and a loop recorder was placed on 4/19/22:          EQUIPMENT IMPLANTED  : [employer]  Model Number: [id number]  Serial Number: [id number]     The patient is having diarrhea 3 times a day, soft, for past few days--she just started on metformin for DM a few days ago; LBM is most likely due to the medication--she doesn't feel sick, is able to eat and has no N/V or abd pain, VS are stable--she is on isolation for now until C diff  and GI PCR are done and negative; add lactose-free and low residue to current diet until diarrhea resolves; Pepto Bismol prn was ordered in the meanwhile--she did receive one dose of Keflex 500mg po prior to ILR placement on 4/19.    PE:  Vital Signs Last 24 Hrs  T(C): 36.6 (23 Apr 2022 05:16), Max: 37 (22 Apr 2022 13:10)  T(F): 97.8 (23 Apr 2022 05:16), Max: 98.6 (22 Apr 2022 13:10)  HR: 78 (23 Apr 2022 05:16) (78 - 92)  BP: 146/83 (23 Apr 2022 05:16) (146/83 - 166/69)  BP(mean): 99 (22 Apr 2022 20:58) (99 - 99)  RR: 18 (23 Apr 2022 05:16) (18 - 18)  SpO2: --    The patient is alert, NAD  Lungs are clear  Heart RR  Abdomen soft, non-tender, bs hyperactive throughout  Extremities no CCE  CNS- mild RUE and RLE weakness, alert, oriented x 4    Labs were stable on 4/20.    Diarrhea may be due to new medication--metformin--may be self-limiting, but if it doesn't improve in the next few days, may need to change DM meds   Continue low residue lactose-free diet until LBM resolves  Check C diff and GI PCR  Monitor labs, VS

## 2022-04-23 NOTE — PROGRESS NOTE ADULT - SUBJECTIVE AND OBJECTIVE BOX
Patient is a 76y old  Female who presents with a chief complaint of Rehab for B/L CVA with R hemiplegia (dominant), dysphagia, aphasia, dysarthria (19 Apr 2022 16:13)      HPI:  77 yo female w/ PMH of HTN, hypothyroidism, DM, dm peripheral neuropathy, GERD, obesity who presented to the ED after a fall on 4/10/22. Patient started having R sided arm and leg weakness, and difficulty finding words, but could not recall if symptoms started before or after fall. Patient denied pain, head trauma or LOC but endorsed vomiting a couple times. Stroke code was called upon presentation. CTA showed a left M2 occlusion.  The patient was given tPA and taken for an emergent mechanical thrombectomy, but this was not completed due to dislodgement of clot at the time of procedure. Patient's dysarthria and LLE weakness briefly worsened, but subsequently improved again. Repeat CTH did not reveal any hemorrhage, patient was receiving heparin drip for right sided lower DVT, and now switched to Eliquis 10 BID, 5 mg BID starting 4/22. EP consulted and patient getting a loop recorder     PM&R consulted for admission to  for acute inpatient rehabilitation. Prior to admission, patient was independent with  ADLs and ambulation without an assistive device. Resides w/ sister, pvt home, +JODIE, no stairs in home, +bathtub    CLOF evaluated by OT  BM - Max A  Transfers - Mod-Max A  UBD - Max A  LBD - Dependent    CLOF evaluated by PT  BM - Max A  Transfers - Max A  Ambulation - Max A standing tolerance 3-5s    Also seen by SLP - FEES completed on 4/14, Results indicate mild pharyngeal dysphagia for puree, easy to chew, and mildly thick liquids; moderate pharyngeal dysphagia for thin liquids. Recommendations for an easy to chew diet w/ thin liquids    Patient is a good candidate for admission to acute inpatient rehabilitation for CVA with right sided hemiplegia (dominant) with comorbidities of dm, htn, hld, hyperthyroidism, obesity, dm peripheral neuropathy, and rle dvt requiring hospital level supervision. She was deemed medically stable for discharge to  on 4/19/22 where she will undergo intensive restorative therapies 3 hours a day for at least 5 days a week with the goal of regaining the patients independence and discharging her home.   (19 Apr 2022 16:13)      I examined the patient and reviewed the chart. There have been no significant changes since my history and physical except where documented below.    TODAY'S SUBJECTIVE & REVIEW OF SYMPTOMS:  No acute overnight events. VSS. C/o loose BMs. Will cut back all bowel medication. Participating with therapies      Review of Systems: Constiutional:    [ x  ] WNL           [   ] poor appetite   [   ] insomnia   [   ] tired   Cardio:                [  x ] WNL           [   ] CP   [   ] ALVAREZ   [   ] palpitations               Resp:                   [  x ] WNL           [   ] SOB   [   ] cough   [   ] wheezing   GI:                        [  ] WNL           [   ] constipation   [  x ] diarrhea-3 BM's last 24 hrs on Metformin   [   ] abdominal pain   [   ] nausea   [   ] emesis                                :                      [ x  ] WNL           [   ] NARANJO  [   ] dusuria   [   ] difficulty voiding             Endo:                   [ x  ] WNL          [   ] po;yuria   [   ] temperature intolerance                 Skin:                     [ x  ] WNL          [   ] pain   [   ] wound   [   ] rash   MSK:                    [  x ] WNL          [   ] muscle pain   [   ] joint pain/ stiffness   [   ] muscle tenderness   [   ] swelling   Neuro:                 [   ] WNL          [   ] HA   [   ] change in vision   [   ] tremor   [  x ] weakness right sided  [  x ]dysphagia              Cognitive:           [   ] WNL           [ x  ]confusion      Psych:                  [  x ] WNL           [   ] hallucinations   [   ]agitation   [   ] delusion   [   ]depression      PHYSICAL EXAM  T(C): 36.6 (04-23-22 @ 05:16), Max: 36.6 (04-23-22 @ 05:16)  HR: 78 (04-23-22 @ 05:16) (78 - 85)  BP: 146/83 (04-23-22 @ 05:16) (146/83 - 166/69)  RR: 18 (04-23-22 @ 05:16) (18 - 18)  SpO2: --      General:[  x] NAD, Resting Comfortable,   [   ] other:                                HEENT: [ x  ] NC/AT, EOMI, PERRL , Normal Conjunctivae,   [   ] other:  Cardio: [ x  ] RRR, no murmer,   [   ] other:                              Pulm: [ x  ] No Respiratory Distress,  Lungs CTAB,   [   ] other:                       Abdomen: [ x  ]ND/NT, Soft,   [   ] other:    : [ x  ] NO NARANJO CATHETER, [   ] NARANJO CATHETER- no meatal tear, no discharge, [   ] other:                                            MSK: [  x ] No joint swelling, Full ROM,   [   ] other:                                         Ext: [ x ]No C/C/E, No calf tenderness,   [   ]other:    Skin: [   ]intact,   [x   ] other:    skin tear right elbow, ecchymosis right groin/hip                                                               Neurological Examination:  Cognitive: [  x  ] AAO x 3,   [    ]  other:                                                                      Attention:  [   x ] intact,   [    ]  other:                            Mood/Affect: [ x   ] wnl,    [    ]  other:                                                                             Communication: [    ]Fluent, no dysarthria, following commands:  [  x  ] other: dysarthria, difficulty with word finding, able to follow 2 step commands  CN II - XII:  [  x  ] intact,  [    ] other:                                                                                        Motor:   RIGHT UE: [   ] WNL,  [   x] other: 4/5  LEFT    UE: [ x  ] WNL,  [   ] other:  RIGHT LE: [   ] WNL,  [ x  ] other: 4/5  LEFT    LE: [  x ] WNL,  [   ] other:    Tone: [   x ] wnl,   [    ]  other:  DTRs: [  x ]symmetric, [   ] other:  Coordination:   [    ] intact,   [  x  ] other:   impaired RUE ftn                                                                        Sensory: [    ] Intact to light touch,   [   x ] other:  decrease sensation to light touch bilateral hands and feet when compared proximally      MEDICATIONS  (STANDING):  apixaban 10 milliGRAM(s) Oral every 12 hours  atorvastatin 80 milliGRAM(s) Oral at bedtime  DULoxetine 30 milliGRAM(s) Oral every 12 hours  famotidine    Tablet 40 milliGRAM(s) Oral at bedtime  levothyroxine 112 MICROGram(s) Oral daily  losartan 25 milliGRAM(s) Oral daily  melatonin 5 milliGRAM(s) Oral at bedtime  metFORMIN 500 milliGRAM(s) Oral two times a day with meals    MEDICATIONS  (PRN):  acetaminophen     Tablet .. 650 milliGRAM(s) Oral every 6 hours PRN Temp greater or equal to 38C (100.4F)  aluminum hydroxide/magnesium hydroxide/simethicone Suspension 30 milliLiter(s) Oral every 4 hours PRN Dyspepsia  magnesium hydroxide Suspension 30 milliLiter(s) Oral daily PRN Constipation  polyethylene glycol 3350 17 Gram(s) Oral daily PRN Constipation      RECENT LABS/IMAGING

## 2022-04-23 NOTE — PROGRESS NOTE ADULT - ASSESSMENT
ASSESSMENT/PLAN:    77 yo F with a bilateral acute infacts including acute left MCA territory and right temporal cortical with a right hemiparesis, aphasia dysphagia and dysarthria with comorbidities including untreated NIDDM, obesity, diabetic peripheral neuropathy, HTN, obesity, HTN, HLD, GERD, hypothyroidism and RLE DVT on Eliquis.   She warrants acute rehab with 3 hours of daily therapies at least 5 out of 7 days including PT, OT and Speech. Additionally she requires close physiatry follow up for CVA and the many complex comorbdities.      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     #MRI Brain w/o contrast showed acute left MCA territory infarct and right temporal cortical infarct  - Continue with Eliquis 10 mg BID  - Continue Atorvastatin 80 mg daily  - Stroke education  - JOEL showed no PFO or intracardiac thrombosis.     #HTN  - Goal -150  - Hold home BP meds (Losartan 100mg PO  daily)  - JOEL completed, LVEF 65%, no PFO  - ILR placed    #HLD  - Continue high dose statin as above in CVA  - LDL results: 80    # diarrhea  #DM2  - A1C results: 7.5% not taking medications for DM before admission  Metformin 500 mg po BID is likely causing diarrhea.   I will reduce to daily.   I have filled out a nonformulary request to start Metformin  mg daily with dinner on Monday and halt the am Metformin.      #DM Neuropathy  -DULoxetine 30 mg oral delayed release PO Daily    #GERD  - PPI    #Hypothyroid  - Continue synthroid    #RLE DVT  - US Duplex showed +DVT of the right peroneal vein and a branch of the right posterior tibial vein  - Continue with Eliquis 10 mg BID and decrease to 5 mg BID starting 4/22.      -Pain control:   Tylenol prn    -GI/Bowel Mgmt:  No active issues at this time  Miralax    -Bladder management:   No active issues at this time    -Skin:  No active issues at this time    -FEN   Monitor lytes, Replete prn    - Diet:  DM  Easy to chew w/ thin liquids       Precautions / PROPHYLAXIS:      - Falls    - Ortho: Weight bearing status:       - DVT prophylaxis: Has RLE DVT,  Eliquis

## 2022-04-24 LAB
C DIFF BY PCR RESULT: NEGATIVE — SIGNIFICANT CHANGE UP
C DIFF TOX GENS STL QL NAA+PROBE: SIGNIFICANT CHANGE UP
CULTURE RESULTS: SIGNIFICANT CHANGE UP
GLUCOSE BLDC GLUCOMTR-MCNC: 130 MG/DL — HIGH (ref 70–99)
SPECIMEN SOURCE: SIGNIFICANT CHANGE UP

## 2022-04-24 RX ADMIN — APIXABAN 5 MILLIGRAM(S): 2.5 TABLET, FILM COATED ORAL at 06:09

## 2022-04-24 RX ADMIN — Medication 112 MICROGRAM(S): at 06:09

## 2022-04-24 RX ADMIN — PANTOPRAZOLE SODIUM 40 MILLIGRAM(S): 20 TABLET, DELAYED RELEASE ORAL at 17:24

## 2022-04-24 RX ADMIN — APIXABAN 5 MILLIGRAM(S): 2.5 TABLET, FILM COATED ORAL at 17:24

## 2022-04-24 RX ADMIN — LOSARTAN POTASSIUM 25 MILLIGRAM(S): 100 TABLET, FILM COATED ORAL at 06:09

## 2022-04-24 RX ADMIN — PANTOPRAZOLE SODIUM 40 MILLIGRAM(S): 20 TABLET, DELAYED RELEASE ORAL at 06:10

## 2022-04-24 RX ADMIN — DULOXETINE HYDROCHLORIDE 30 MILLIGRAM(S): 30 CAPSULE, DELAYED RELEASE ORAL at 06:09

## 2022-04-24 RX ADMIN — DULOXETINE HYDROCHLORIDE 30 MILLIGRAM(S): 30 CAPSULE, DELAYED RELEASE ORAL at 17:24

## 2022-04-24 RX ADMIN — ATORVASTATIN CALCIUM 80 MILLIGRAM(S): 80 TABLET, FILM COATED ORAL at 21:15

## 2022-04-24 RX ADMIN — Medication 5 MILLIGRAM(S): at 21:15

## 2022-04-24 RX ADMIN — METFORMIN HYDROCHLORIDE 500 MILLIGRAM(S): 850 TABLET ORAL at 08:18

## 2022-04-24 NOTE — PROGRESS NOTE ADULT - SUBJECTIVE AND OBJECTIVE BOX
MEDICATIONS  (STANDING):  apixaban 5 milliGRAM(s) Oral every 12 hours  atorvastatin 80 milliGRAM(s) Oral at bedtime  DULoxetine 30 milliGRAM(s) Oral every 12 hours  levothyroxine 112 MICROGram(s) Oral daily  losartan 25 milliGRAM(s) Oral daily  melatonin 5 milliGRAM(s) Oral at bedtime  metFORMIN 500 milliGRAM(s) Oral <User Schedule>  pantoprazole    Tablet 40 milliGRAM(s) Oral every 12 hours    MEDICATIONS  (PRN):  acetaminophen     Tablet .. 650 milliGRAM(s) Oral every 6 hours PRN Temp greater or equal to 38C (100.4F)  aluminum hydroxide/magnesium hydroxide/simethicone Suspension 30 milliLiter(s) Oral every 4 hours PRN Dyspepsia  bismuth subsalicylate Liquid 30 milliLiter(s) Oral every 4 hours PRN abdominal pain  and diarrhea  magnesium hydroxide Suspension 30 milliLiter(s) Oral daily PRN Constipation  polyethylene glycol 3350 17 Gram(s) Oral daily PRN Constipation      Patient was stable overnight and expresses no new complaints.    T(C): 36.6 (04-24-22 @ 06:04), Max: 36.6 (04-24-22 @ 06:04)  HR: 77 (04-24-22 @ 06:04) (77 - 100)  BP: 141/54 (04-24-22 @ 06:04) (141/54 - 168/70)  RR: 18 (04-24-22 @ 06:04) (18 - 18)  SpO2: --      PE:    Alert   LUNGS- clear  COR- RRR S1S2  ABD- SOFT, NT  EXTR- + RLE edema  NEURO- stable with right hemiparesis (LE>UE)                      Rehab for bilateral CVA with dominant right hemiplegia, dysphagia, aphasia, dysarthria    Continue full acute rehab program.    Continue apixaban

## 2022-04-25 DIAGNOSIS — I63.9 CEREBRAL INFARCTION, UNSPECIFIED: ICD-10-CM

## 2022-04-25 DIAGNOSIS — E78.5 HYPERLIPIDEMIA, UNSPECIFIED: ICD-10-CM

## 2022-04-25 DIAGNOSIS — R47.1 DYSARTHRIA AND ANARTHRIA: ICD-10-CM

## 2022-04-25 DIAGNOSIS — Z85.71 PERSONAL HISTORY OF HODGKIN LYMPHOMA: ICD-10-CM

## 2022-04-25 DIAGNOSIS — R47.81 SLURRED SPEECH: ICD-10-CM

## 2022-04-25 DIAGNOSIS — R29.714 NIHSS SCORE 14: ICD-10-CM

## 2022-04-25 DIAGNOSIS — K21.9 GASTRO-ESOPHAGEAL REFLUX DISEASE WITHOUT ESOPHAGITIS: ICD-10-CM

## 2022-04-25 DIAGNOSIS — E03.9 HYPOTHYROIDISM, UNSPECIFIED: ICD-10-CM

## 2022-04-25 DIAGNOSIS — Z92.21 PERSONAL HISTORY OF ANTINEOPLASTIC CHEMOTHERAPY: ICD-10-CM

## 2022-04-25 DIAGNOSIS — D64.9 ANEMIA, UNSPECIFIED: ICD-10-CM

## 2022-04-25 DIAGNOSIS — R29.810 FACIAL WEAKNESS: ICD-10-CM

## 2022-04-25 DIAGNOSIS — G81.91 HEMIPLEGIA, UNSPECIFIED AFFECTING RIGHT DOMINANT SIDE: ICD-10-CM

## 2022-04-25 DIAGNOSIS — I63.312 CEREBRAL INFARCTION DUE TO THROMBOSIS OF LEFT MIDDLE CEREBRAL ARTERY: ICD-10-CM

## 2022-04-25 DIAGNOSIS — E11.9 TYPE 2 DIABETES MELLITUS WITHOUT COMPLICATIONS: ICD-10-CM

## 2022-04-25 DIAGNOSIS — R47.01 APHASIA: ICD-10-CM

## 2022-04-25 DIAGNOSIS — Z92.82 STATUS POST ADMINISTRATION OF TPA (RTPA) IN A DIFFERENT FACILITY WITHIN THE LAST 24 HOURS PRIOR TO ADMISSION TO CURRENT FACILITY: ICD-10-CM

## 2022-04-25 DIAGNOSIS — I82.451 ACUTE EMBOLISM AND THROMBOSIS OF RIGHT PERONEAL VEIN: ICD-10-CM

## 2022-04-25 DIAGNOSIS — I10 ESSENTIAL (PRIMARY) HYPERTENSION: ICD-10-CM

## 2022-04-25 DIAGNOSIS — I82.441 ACUTE EMBOLISM AND THROMBOSIS OF RIGHT TIBIAL VEIN: ICD-10-CM

## 2022-04-25 DIAGNOSIS — R29.704 NIHSS SCORE 4: ICD-10-CM

## 2022-04-25 DIAGNOSIS — Z85.72 PERSONAL HISTORY OF NON-HODGKIN LYMPHOMAS: ICD-10-CM

## 2022-04-25 LAB
ALBUMIN SERPL ELPH-MCNC: 4.1 G/DL — SIGNIFICANT CHANGE UP (ref 3.5–5.2)
ALP SERPL-CCNC: 73 U/L — SIGNIFICANT CHANGE UP (ref 30–115)
ALT FLD-CCNC: 15 U/L — SIGNIFICANT CHANGE UP (ref 0–41)
ANION GAP SERPL CALC-SCNC: 11 MMOL/L — SIGNIFICANT CHANGE UP (ref 7–14)
AST SERPL-CCNC: 20 U/L — SIGNIFICANT CHANGE UP (ref 0–41)
BASOPHILS # BLD AUTO: 0.02 K/UL — SIGNIFICANT CHANGE UP (ref 0–0.2)
BASOPHILS NFR BLD AUTO: 0.4 % — SIGNIFICANT CHANGE UP (ref 0–1)
BILIRUB SERPL-MCNC: 1.5 MG/DL — HIGH (ref 0.2–1.2)
BUN SERPL-MCNC: 17 MG/DL — SIGNIFICANT CHANGE UP (ref 10–20)
CALCIUM SERPL-MCNC: 9.9 MG/DL — SIGNIFICANT CHANGE UP (ref 8.5–10.1)
CHLORIDE SERPL-SCNC: 107 MMOL/L — SIGNIFICANT CHANGE UP (ref 98–110)
CO2 SERPL-SCNC: 26 MMOL/L — SIGNIFICANT CHANGE UP (ref 17–32)
CREAT SERPL-MCNC: 0.8 MG/DL — SIGNIFICANT CHANGE UP (ref 0.7–1.5)
EGFR: 77 ML/MIN/1.73M2 — SIGNIFICANT CHANGE UP
EOSINOPHIL # BLD AUTO: 0.19 K/UL — SIGNIFICANT CHANGE UP (ref 0–0.7)
EOSINOPHIL NFR BLD AUTO: 3.8 % — SIGNIFICANT CHANGE UP (ref 0–8)
GLUCOSE BLDC GLUCOMTR-MCNC: 106 MG/DL — HIGH (ref 70–99)
GLUCOSE BLDC GLUCOMTR-MCNC: 127 MG/DL — HIGH (ref 70–99)
GLUCOSE SERPL-MCNC: 121 MG/DL — HIGH (ref 70–99)
HCT VFR BLD CALC: 33.2 % — LOW (ref 37–47)
HGB BLD-MCNC: 10.6 G/DL — LOW (ref 12–16)
IMM GRANULOCYTES NFR BLD AUTO: 0.4 % — HIGH (ref 0.1–0.3)
LYMPHOCYTES # BLD AUTO: 1.26 K/UL — SIGNIFICANT CHANGE UP (ref 1.2–3.4)
LYMPHOCYTES # BLD AUTO: 25.5 % — SIGNIFICANT CHANGE UP (ref 20.5–51.1)
MAGNESIUM SERPL-MCNC: 2 MG/DL — SIGNIFICANT CHANGE UP (ref 1.8–2.4)
MCHC RBC-ENTMCNC: 26.4 PG — LOW (ref 27–31)
MCHC RBC-ENTMCNC: 31.9 G/DL — LOW (ref 32–37)
MCV RBC AUTO: 82.8 FL — SIGNIFICANT CHANGE UP (ref 81–99)
MONOCYTES # BLD AUTO: 0.24 K/UL — SIGNIFICANT CHANGE UP (ref 0.1–0.6)
MONOCYTES NFR BLD AUTO: 4.9 % — SIGNIFICANT CHANGE UP (ref 1.7–9.3)
NEUTROPHILS # BLD AUTO: 3.21 K/UL — SIGNIFICANT CHANGE UP (ref 1.4–6.5)
NEUTROPHILS NFR BLD AUTO: 65 % — SIGNIFICANT CHANGE UP (ref 42.2–75.2)
NRBC # BLD: 0 /100 WBCS — SIGNIFICANT CHANGE UP (ref 0–0)
PLATELET # BLD AUTO: 243 K/UL — SIGNIFICANT CHANGE UP (ref 130–400)
POTASSIUM SERPL-MCNC: 5.3 MMOL/L — HIGH (ref 3.5–5)
POTASSIUM SERPL-SCNC: 5.3 MMOL/L — HIGH (ref 3.5–5)
PROT SERPL-MCNC: 5.8 G/DL — LOW (ref 6–8)
RBC # BLD: 4.01 M/UL — LOW (ref 4.2–5.4)
RBC # FLD: 14.6 % — HIGH (ref 11.5–14.5)
SODIUM SERPL-SCNC: 144 MMOL/L — SIGNIFICANT CHANGE UP (ref 135–146)
WBC # BLD: 4.94 K/UL — SIGNIFICANT CHANGE UP (ref 4.8–10.8)
WBC # FLD AUTO: 4.94 K/UL — SIGNIFICANT CHANGE UP (ref 4.8–10.8)

## 2022-04-25 RX ADMIN — METFORMIN HYDROCHLORIDE 500 MILLIGRAM(S): 850 TABLET ORAL at 08:02

## 2022-04-25 RX ADMIN — PANTOPRAZOLE SODIUM 40 MILLIGRAM(S): 20 TABLET, DELAYED RELEASE ORAL at 17:27

## 2022-04-25 RX ADMIN — APIXABAN 5 MILLIGRAM(S): 2.5 TABLET, FILM COATED ORAL at 05:58

## 2022-04-25 RX ADMIN — LOSARTAN POTASSIUM 25 MILLIGRAM(S): 100 TABLET, FILM COATED ORAL at 05:58

## 2022-04-25 RX ADMIN — ATORVASTATIN CALCIUM 80 MILLIGRAM(S): 80 TABLET, FILM COATED ORAL at 21:24

## 2022-04-25 RX ADMIN — DULOXETINE HYDROCHLORIDE 30 MILLIGRAM(S): 30 CAPSULE, DELAYED RELEASE ORAL at 17:26

## 2022-04-25 RX ADMIN — Medication 5 MILLIGRAM(S): at 21:24

## 2022-04-25 RX ADMIN — APIXABAN 5 MILLIGRAM(S): 2.5 TABLET, FILM COATED ORAL at 17:26

## 2022-04-25 RX ADMIN — DULOXETINE HYDROCHLORIDE 30 MILLIGRAM(S): 30 CAPSULE, DELAYED RELEASE ORAL at 05:58

## 2022-04-25 RX ADMIN — PANTOPRAZOLE SODIUM 40 MILLIGRAM(S): 20 TABLET, DELAYED RELEASE ORAL at 05:59

## 2022-04-25 RX ADMIN — Medication 112 MICROGRAM(S): at 05:58

## 2022-04-25 NOTE — PROGRESS NOTE ADULT - ASSESSMENT
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   She warrants acute rehab with 3 hours of daily therapies at least 5 out of 7 days including PT, OT and Speech. Additionally she requires close physiatry follow up for CVA and the many complex comorbdities.      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     #MRI Brain w/o contrast showed acute left MCA territory infarct and right temporal cortical infarct  - Apixaban 5mg q12  - Lipitor 80 mg bedtime   - JOEL showed no PFO or intracardiac thrombosis.     #HTN  - Goal -150  - Losartan 25mg daily  - JOEL completed, LVEF 65%, no PFO  - ILR placed  - Normotensive today     #Dyspnea on exertion  - Continue to Monitor     #HLD  - c/w lipitor 80mg qhs     #DM2  - A1C results: 7.5%  - Metformin 500mg     #DM Neuropathy  -DULoxetine 30 mg oral delayed release PO Daily    #GERD  - PPI    #Hypothyroid  -C/w levothyroxine 112 mcg    #RLE DVT  - US Duplex showed +DVT of the right peroneal vein and a branch of the right posterior tibial vein  - Continue with Eliquis 10 mg BID and decrease to 5 mg BID starting 4/22.   - c/w eliquis 5mg bid     -Pain control:   Tylenol prn    -GI/Bowel Mgmt:  No active issues at this time  Miralax    -Bladder management:   No active issues at this time    -Skin:  No active issues at this time    -FEN   Monitor lytes, Replete prn    - Diet:  DM  Easy to chew w/ thin liquids       Precautions / PROPHYLAXIS:      - Falls    - Ortho: Weight bearing status:       - DVT prophylaxis: Has RLE DVT,  Eliquis   ASSESSMENT/PLAN:    75 yo F with a bilateral acute infacts including acute left MCA territory and right temporal cortical with a right hemiparesis, aphasia dysphagia and dysarthria with comorbidities including untreated NIDDM, obesity, diabetic peripheral neuropathy, HTN, obesity, HTN, HLD, GERD, hypothyroidism and RLE DVT on Eliquis.   She warrants acute rehab with 3 hours of daily therapies at least 5 out of 7 days including PT, OT and Speech. Additionally she requires close physiatry follow up for CVA and the many complex comorbdities.      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   -MRI Brain w/o contrast showed acute left MCA territory infarct and right temporal cortical infarct  - Apixaban 5mg q12 (for DVT)  - Lipitor 80 mg bedtime   - JOEL showed no PFO or intracardiac thrombosis.     #Dysphagia  - EZ to chew diet  - SLP following    #HTN  - Goal -150  - Losartan 25mg daily  - JOEL completed, LVEF 65%, no PFO  - ILR placed  - Normotensive today     #Dyspnea on exertion  - Continue to Monitor     #HLD  - c/w lipitor 80mg qhs     #DM2  - A1C results: 7.5%  - Metformin 500mg     #DM Neuropathy  -DULoxetine 30 mg oral delayed release PO Daily    #GERD  - PPI    #Hypothyroid  -C/w levothyroxine 112 mcg    #RLE DVT  - US Duplex showed +DVT of the right peroneal vein and a branch of the right posterior tibial vein  - Continue with Eliquis 10 mg BID and decrease to 5 mg BID starting 4/22.   - c/w eliquis 5mg bid     -Pain control:   Tylenol prn    -GI/Bowel Mgmt:  No active issues at this time  Miralax    -Bladder management:   No active issues at this time    -Diet, Easy to Chew:   Consistent Carbohydrate No Snacks  Fiber/Residue Restricted  No Lactose  Free Water Flush Instructions:  SMALL BITES/SIPS; ALLOW TIME TO SWALLOW; SIT UPRIGHT DURING MEALS AND FOR 2 HOURS AFTER; CONTINUE LOW RESIDUE LACTOSE-FREE UNTIL DIARRHEA RESOLVES--CAN HAVE LACTOSE-FREE MILK (04-23-22 @ 14:45) [Active]      Precautions / PROPHYLAXIS:      - Falls      - DVT prophylaxis: Has RLE DVT,  Eliquis

## 2022-04-25 NOTE — PROGRESS NOTE ADULT - SUBJECTIVE AND OBJECTIVE BOX
Patient is a 76y old  Female who presents with a chief complaint of Rehab for B/L CVA with R hemiplegia (dominant), dysphagia, aphasia, dysarthria (19 Apr 2022 16:13)      HPI:  77 yo female w/ PMH of HTN, hypothyroidism, DM, dm peripheral neuropathy, GERD, obesity who presented to the ED after a fall on 4/10/22. Patient started having R sided arm and leg weakness, and difficulty finding words, but could not recall if symptoms started before or after fall. Patient denied pain, head trauma or LOC but endorsed vomiting a couple times. Stroke code was called upon presentation. CTA showed a left M2 occlusion.  The patient was given tPA and taken for an emergent mechanical thrombectomy, but this was not completed due to dislodgement of clot at the time of procedure. Patient's dysarthria and LLE weakness briefly worsened, but subsequently improved again. Repeat CTH did not reveal any hemorrhage, patient was receiving heparin drip for right sided lower DVT, and now switched to Eliquis 10 BID, 5 mg BID starting 4/22. EP consulted and patient getting a loop recorder     PM&R consulted for admission to  for acute inpatient rehabilitation. Prior to admission, patient was independent with  ADLs and ambulation without an assistive device. Resides w/ sister, pvt home, +JODIE, no stairs in home, +bathtub    CLOF evaluated by OT  BM - Max A  Transfers - Mod-Max A  UBD - Max A  LBD - Dependent    CLOF evaluated by PT  BM - Max A  Transfers - Max A  Ambulation - Max A standing tolerance 3-5s    Also seen by SLP - FEES completed on 4/14, Results indicate mild pharyngeal dysphagia for puree, easy to chew, and mildly thick liquids; moderate pharyngeal dysphagia for thin liquids. Recommendations for an easy to chew diet w/ thin liquids    Patient is a good candidate for admission to acute inpatient rehabilitation for CVA with right sided hemiplegia (dominant) with comorbidities of dm, htn, hld, hyperthyroidism, obesity, dm peripheral neuropathy, and rle dvt requiring hospital level supervision. She was deemed medically stable for discharge to  on 4/19/22 where she will undergo intensive restorative therapies 3 hours a day for at least 5 days a week with the goal of regaining the patients independence and discharging her home.   (19 Apr 2022 16:13)      I examined the patient and reviewed the chart. There have been no significant changes since my history and physical except where documented below.    TODAY'S SUBJECTIVE & REVIEW OF SYMPTOMS:  Patient was seen and examined at bedside this AM by the rehabilitation team.       Review of Systems:   Constiutional:    [   ] WNL           [   ] poor appetite   [   ] insomnia   [   ] tired   Cardio:                [   ] WNL           [   ] CP   [   ] ALVAREZ   [   ] palpitations               Resp:                   [   ] WNL           [   ] SOB   [   ] cough   [   ] wheezing   GI:                        [  ] WNL           [   ] constipation   [   ] diarrhea   [   ] abdominal pain   [   ] nausea   [   ] emesis                                :                      [   ] WNL           [   ] NARANJO  [   ] dusuria   [   ] difficulty voiding             Endo:                   [   ] WNL          [   ] po;yuria   [   ] temperature intolerance                 Skin:                     [   ] WNL          [   ] pain   [   ] wound   [   ] rash   MSK:                    [   ] WNL          [   ] muscle pain   [   ] joint pain/ stiffness   [   ] muscle tenderness   [   ] swelling   Neuro:                 [   ] WNL          [   ] HA   [   ] change in vision   [   ] tremor   [  x ] weakness right sided  [   ]dysphagia              Cognitive:           [   ] WNL           [   ]confusion      Psych:                  [  ] WNL           [   ] hallucinations   [   ]agitation   [   ] delusion   [   ]depression    Patient denied cp, sob, leg swelling, fatigue/weakness    PHYSICAL EXAM    ICU Vital Signs Last 24 Hrs  T(C): 36.2 (25 Apr 2022 12:12), Max: 36.2 (25 Apr 2022 12:12)  T(F): 97.1 (25 Apr 2022 12:12), Max: 97.1 (25 Apr 2022 12:12)  HR: 86 (25 Apr 2022 12:12) (78 - 88)  BP: 135/63 (25 Apr 2022 12:12) (132/61 - 145/64)  BP(mean): --  ABP: --  ABP(mean): --  RR: 18 (25 Apr 2022 12:12) (18 - 18)  SpO2: --      General:[  x] NAD, Resting Comfortable,   [   ] other:                                HEENT: [ x  ] NC/AT, EOMI, PERRL , Normal Conjunctivae,   [   ] other:  Cardio: [ x  ] RRR, no murmer,   [   ] other:                              Pulm: [ x  ] No Respiratory Distress,  Lungs CTAB,   [   ] other:                       Abdomen: [ x  ]ND/NT, Soft,   [   ] other:    : [ x  ] NO NARANJO CATHETER, [   ] NARANJO CATHETER- no meatal tear, no discharge, [   ] other:                                            MSK: [  x ] No joint swelling, Full ROM,   [   ] other:                                         Ext: [ x ]No C/C/E, No calf tenderness,   [   ]other:  RLE swelling present  Skin: [   ]intact,   [x   ] other:    skin tear right elbow, ecchymosis right groin/hip                                                               Neurological Examination:  Cognitive: [  x  ] AAO x 3,   [    ]  other:                                                                      Attention:  [   x ] intact,   [    ]  other:                            Mood/Affect: [ x   ] wnl,    [    ]  other:                                                                             Communication: [    ]Fluent, no dysarthria, following commands:  [  x  ] other: dysarthria, difficulty with word finding, able to follow 2 step commands  CN II - XII:  [  x  ] intact,  [    ] other:                                                                                        Motor:   RIGHT UE: [   ] WNL,  [   x] other: 4-/5 shoulde  LEFT    UE: [ x  ] WNL,  [   ] other:  RIGHT LE: [   ] WNL,  [ x  ] other: 3-/5 dorsiflexion  LEFT    LE: [  x ] WNL,  [   ] other:    Tone: [   x ] wnl,   [    ]  other:  DTRs: [   ]symmetric, [   ] other: not assessed  Coordination:   [    ] intact,   [  x  ] other:   impaired RUE ftn                                                                        Sensory: [    ] Intact to light touch,   [   x ] other:  decrease sensation to light touch on the RUE and RLE     MEDICATIONS  (STANDING):    MEDICATIONS  (STANDING):  apixaban 5 milliGRAM(s) Oral every 12 hours  atorvastatin 80 milliGRAM(s) Oral at bedtime  DULoxetine 30 milliGRAM(s) Oral every 12 hours  levothyroxine 112 MICROGram(s) Oral daily  losartan 25 milliGRAM(s) Oral daily  melatonin 5 milliGRAM(s) Oral at bedtime  metFORMIN 500 milliGRAM(s) Oral <User Schedule>  pantoprazole    Tablet 40 milliGRAM(s) Oral every 12 hours    MEDICATIONS  (PRN):  acetaminophen     Tablet .. 650 milliGRAM(s) Oral every 6 hours PRN Temp greater or equal to 38C (100.4F)  aluminum hydroxide/magnesium hydroxide/simethicone Suspension 30 milliLiter(s) Oral every 4 hours PRN Dyspepsia  bismuth subsalicylate Liquid 30 milliLiter(s) Oral every 4 hours PRN abdominal pain  and diarrhea  magnesium hydroxide Suspension 30 milliLiter(s) Oral daily PRN Constipation  polyethylene glycol 3350 17 Gram(s) Oral daily PRN Constipation          RECENT LABS/IMAGING                                   10.6   4.94  )-----------( 243      ( 25 Apr 2022 07:36 )             33.2     04-25    144  |  107  |  17  ----------------------------<  121<H>  5.3<H>   |  26  |  0.8    Ca    9.9      25 Apr 2022 07:36  Mg     2.0     04-25    TPro  5.8<L>  /  Alb  4.1  /  TBili  1.5<H>  /  DBili  x   /  AST  20  /  ALT  15  /  AlkPhos  73  04-25        POCT Blood Glucose.: 127 mg/dL (04-25-22 @ 07:18)  POCT Blood Glucose.: 130 mg/dL (04-24-22 @ 07:54)  POCT Blood Glucose.: 103 mg/dL (04-23-22 @ 16:22)  POCT Blood Glucose.: 124 mg/dL (04-23-22 @ 07:28)  POCT Blood Glucose.: 112 mg/dL (04-22-22 @ 16:03)  POCT Blood Glucose.: 128 mg/dL (04-22-22 @ 07:20)  POCT Blood Glucose.: 120 mg/dL (04-21-22 @ 16:55)       Patient is a 76y old  Female who presents with a chief complaint of Rehab for B/L CVA with R hemiplegia (dominant), dysphagia, aphasia, dysarthria (19 Apr 2022 16:13)      HPI:  75 yo female w/ PMH of HTN, hypothyroidism, DM, dm peripheral neuropathy, GERD, obesity who presented to the ED after a fall on 4/10/22. Patient started having R sided arm and leg weakness, and difficulty finding words, but could not recall if symptoms started before or after fall. Patient denied pain, head trauma or LOC but endorsed vomiting a couple times. Stroke code was called upon presentation. CTA showed a left M2 occlusion.  The patient was given tPA and taken for an emergent mechanical thrombectomy, but this was not completed due to dislodgement of clot at the time of procedure. Patient's dysarthria and LLE weakness briefly worsened, but subsequently improved again. Repeat CTH did not reveal any hemorrhage, patient was receiving heparin drip for right sided lower DVT, and now switched to Eliquis 10 BID, 5 mg BID starting 4/22. EP consulted and patient getting a loop recorder     PM&R consulted for admission to  for acute inpatient rehabilitation. Prior to admission, patient was independent with  ADLs and ambulation without an assistive device. Resides w/ sister, pvt home, +JODIE, no stairs in home, +bathtub    Admission LOF evaluated by OT  BM - Max A  Transfers - Mod-Max A  UBD - Max A  LBD - Dependent    Admission LOF evaluated by PT  BM - Max A  Transfers - Max A  Ambulation - Max A standing tolerance 3-5s    Also seen by SLP - FEES completed on 4/14, Results indicate mild pharyngeal dysphagia for puree, easy to chew, and mildly thick liquids; moderate pharyngeal dysphagia for thin liquids. Recommendations for an easy to chew diet w/ thin liquids    Patient is a good candidate for admission to acute inpatient rehabilitation for CVA with right sided hemiplegia (dominant) with comorbidities of dm, htn, hld, hyperthyroidism, obesity, dm peripheral neuropathy, and rle dvt requiring hospital level supervision. She was deemed medically stable for discharge to  on 4/19/22 where she will undergo intensive restorative therapies 3 hours a day for at least 5 days a week with the goal of regaining the patients independence and discharging her home.   (19 Apr 2022 16:13)      TODAY'S SUBJECTIVE & REVIEW OF SYMPTOMS:  Patient was seen and examined at bedside this AM by the rehabilitation team.       Review of Systems:   Constiutional:    [ x  ] WNL           [   ] poor appetite   [   ] insomnia   [   ] tired   Cardio:                [   ] WNL           [   ] CP   [ x  ] ALVAREZ   [   ] palpitations               Resp:                   [   ] WNL           [x   ] SOB   [   ] cough   [   ] wheezing   GI:                        [ x ] WNL           [   ] constipation   [   ] diarrhea   [   ] abdominal pain   [   ] nausea   [   ] emesis                                :                      [ x  ] WNL           [   ] NARANJO  [   ] dusuria   [   ] difficulty voiding             Endo:                   [ x  ] WNL          [   ] po;yuria   [   ] temperature intolerance                 Skin:                     [x   ] WNL          [   ] pain   [   ] wound   [   ] rash   MSK:                    [ x  ] WNL          [   ] muscle pain   [   ] joint pain/ stiffness   [   ] muscle tenderness   [   ] swelling   Neuro:                 [   ] WNL          [   ] HA   [   ] change in vision   [   ] tremor   [  x ] weakness right sided  [   ]dysphagia              Cognitive:           [x   ] WNL           [   ]confusion      Psych:                  [ x ] WNL           [   ] hallucinations   [   ]agitation   [   ] delusion   [   ]depression      PHYSICAL EXAM    ICU Vital Signs Last 24 Hrs  T(C): 36.2 (25 Apr 2022 12:12), Max: 36.2 (25 Apr 2022 12:12)  T(F): 97.1 (25 Apr 2022 12:12), Max: 97.1 (25 Apr 2022 12:12)  HR: 86 (25 Apr 2022 12:12) (78 - 88)  BP: 135/63 (25 Apr 2022 12:12) (132/61 - 145/64)  RR: 18 (25 Apr 2022 12:12) (18 - 18)  SpO2: --      General:[  x] NAD, Resting Comfortable,   [   ] other:                                HEENT: [ x  ] NC/AT, EOMI, PERRL , Normal Conjunctivae,   [   ] other:  Cardio: [ x  ] RRR, no murmer,   [   ] other:                              Pulm: [ x  ] No Respiratory Distress,  Lungs CTAB,   [   ] other:                       Abdomen: [ x  ]ND/NT, Soft,   [   ] other:    : [ x  ] NO NARANJO CATHETER, [   ] NARANJO CATHETER- no meatal tear, no discharge, [   ] other:                                            MSK: [  x ] No joint swelling, Full ROM,   [   ] other:                                         Ext: [   ]No C/C/E, No calf tenderness,   [ x  ]other:  RLE swelling present. soft, NT  Skin: [   ]intact,   [x   ] other:    skin tear right elbow, ecchymosis right groin/hip                                                               Neurological Examination:  Cognitive: [  x  ] AAO x 3,   [    ]  other:                                                                      Attention:  [   x ] intact,   [    ]  other:                            Mood/Affect: [ x   ] wnl,    [    ]  other:                                                                             Communication: [    ]Fluent, no dysarthria, following commands:  [  x  ] other: dysarthria, difficulty with word finding, able to follow 2 step commands  CN II - XII:  [  x  ] intact,  [    ] other:                                                                                        Motor:   RIGHT UE: [   ] WNL,  [   x] other: 4-/5 shoulder, 4+ distal  LEFT    UE: [ x  ] WNL,  [   ] other:  RIGHT LE: [   ] WNL,  [ x  ] other: 4+ prox, 3-/5 dorsiflexion  LEFT    LE: [  x ] WNL,  [   ] other:    Tone: [   x ] wnl,   [    ]  other:  DTRs: [   ]symmetric, [   ] other: not assessed  Coordination:   [    ] intact,   [  x  ] other:   impaired RUE ftn                                                                        Sensory: [    ] Intact to light touch,   [   x ] other:  decrease sensation to light touch on the RUE and RLE     MEDICATIONS  (STANDING):    MEDICATIONS  (STANDING):  apixaban 5 milliGRAM(s) Oral every 12 hours  atorvastatin 80 milliGRAM(s) Oral at bedtime  DULoxetine 30 milliGRAM(s) Oral every 12 hours  levothyroxine 112 MICROGram(s) Oral daily  losartan 25 milliGRAM(s) Oral daily  melatonin 5 milliGRAM(s) Oral at bedtime  metFORMIN 500 milliGRAM(s) Oral <User Schedule>  pantoprazole    Tablet 40 milliGRAM(s) Oral every 12 hours    MEDICATIONS  (PRN):  acetaminophen     Tablet .. 650 milliGRAM(s) Oral every 6 hours PRN Temp greater or equal to 38C (100.4F)  aluminum hydroxide/magnesium hydroxide/simethicone Suspension 30 milliLiter(s) Oral every 4 hours PRN Dyspepsia  bismuth subsalicylate Liquid 30 milliLiter(s) Oral every 4 hours PRN abdominal pain  and diarrhea  magnesium hydroxide Suspension 30 milliLiter(s) Oral daily PRN Constipation  polyethylene glycol 3350 17 Gram(s) Oral daily PRN Constipation          RECENT LABS/IMAGING                                   10.6   4.94  )-----------( 243      ( 25 Apr 2022 07:36 )             33.2     04-25    144  |  107  |  17  ----------------------------<  121<H>  5.3<H>   |  26  |  0.8    Ca    9.9      25 Apr 2022 07:36  Mg     2.0     04-25    TPro  5.8<L>  /  Alb  4.1  /  TBili  1.5<H>  /  DBili  x   /  AST  20  /  ALT  15  /  AlkPhos  73  04-25        POCT Blood Glucose.: 127 mg/dL (04-25-22 @ 07:18)  POCT Blood Glucose.: 130 mg/dL (04-24-22 @ 07:54)  POCT Blood Glucose.: 103 mg/dL (04-23-22 @ 16:22)  POCT Blood Glucose.: 124 mg/dL (04-23-22 @ 07:28)  POCT Blood Glucose.: 112 mg/dL (04-22-22 @ 16:03)  POCT Blood Glucose.: 128 mg/dL (04-22-22 @ 07:20)  POCT Blood Glucose.: 120 mg/dL (04-21-22 @ 16:55)

## 2022-04-25 NOTE — PROGRESS NOTE ADULT - ATTENDING COMMENTS
I reviewed the chart and examined the patient with the resident and we discussed the findings and treatment plan.  The patient is tolerating the rehab program well. I agree with the findings and treatment plan above, which I modified as indicated. The patient requires 3 hrs a day of acute inpatient rehab. Doing well. No new complaints. Right hemiparesis secondary to acute bilateral strokes. Complains of some ALVAREZ, but morbidly  obese. K=5.3 On low dose Losartan. Monitor. Continue rehab program.  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   -MRI Brain w/o contrast showed acute left MCA territory infarct and right temporal cortical infarct  - Apixaban 5mg q12 (for DVT)  - Lipitor 80 mg bedtime   - JOEL showed no PFO or intracardiac thrombosis.     #Dysphagia  - EZ to chew diet  - SLP following    #HTN  - Goal -150  - Losartan 25mg daily  - JOEL completed, LVEF 65%, no PFO  - ILR placed  - Normotensive today     #Dyspnea on exertion  - Continue to Monitor     #HLD  - c/w lipitor 80mg qhs     #DM2  - A1C results: 7.5%  - Metformin 500mg     #DM Neuropathy  -DULoxetine 30 mg oral delayed release PO Daily    #GERD  - PPI    #Hypothyroid  -C/w levothyroxine 112 mcg    #RLE DVT  - US Duplex showed +DVT of the right peroneal vein and a branch of the right posterior tibial vein  - Continue with Eliquis 10 mg BID and decrease to 5 mg BID starting 4/22.   - c/w eliquis 5mg bid     -Pain control:   Tylenol prn    -GI/Bowel Mgmt:  No active issues at this time  Miralax    -Bladder management:   No active issues at this time    -Diet, Easy to Chew:   Consistent Carbohydrate No Snacks  Fiber/Residue Restricted  No Lactose  Free Water Flush Instructions:  SMALL BITES/SIPS; ALLOW TIME TO SWALLOW; SIT UPRIGHT DURING MEALS AND FOR 2 HOURS AFTER; CONTINUE LOW RESIDUE LACTOSE-FREE UNTIL DIARRHEA RESOLVES--CAN HAVE LACTOSE-FREE MILK (04-23-22 @ 14:45) [Active]

## 2022-04-26 LAB — GLUCOSE BLDC GLUCOMTR-MCNC: 139 MG/DL — HIGH (ref 70–99)

## 2022-04-26 RX ADMIN — ATORVASTATIN CALCIUM 80 MILLIGRAM(S): 80 TABLET, FILM COATED ORAL at 21:18

## 2022-04-26 RX ADMIN — DULOXETINE HYDROCHLORIDE 30 MILLIGRAM(S): 30 CAPSULE, DELAYED RELEASE ORAL at 18:23

## 2022-04-26 RX ADMIN — APIXABAN 5 MILLIGRAM(S): 2.5 TABLET, FILM COATED ORAL at 06:27

## 2022-04-26 RX ADMIN — PANTOPRAZOLE SODIUM 40 MILLIGRAM(S): 20 TABLET, DELAYED RELEASE ORAL at 18:24

## 2022-04-26 RX ADMIN — DULOXETINE HYDROCHLORIDE 30 MILLIGRAM(S): 30 CAPSULE, DELAYED RELEASE ORAL at 06:27

## 2022-04-26 RX ADMIN — PANTOPRAZOLE SODIUM 40 MILLIGRAM(S): 20 TABLET, DELAYED RELEASE ORAL at 06:27

## 2022-04-26 RX ADMIN — METFORMIN HYDROCHLORIDE 500 MILLIGRAM(S): 850 TABLET ORAL at 08:20

## 2022-04-26 RX ADMIN — Medication 5 MILLIGRAM(S): at 21:18

## 2022-04-26 RX ADMIN — APIXABAN 5 MILLIGRAM(S): 2.5 TABLET, FILM COATED ORAL at 18:23

## 2022-04-26 RX ADMIN — Medication 112 MICROGRAM(S): at 06:27

## 2022-04-26 RX ADMIN — LOSARTAN POTASSIUM 25 MILLIGRAM(S): 100 TABLET, FILM COATED ORAL at 06:27

## 2022-04-26 NOTE — PROGRESS NOTE ADULT - SUBJECTIVE AND OBJECTIVE BOX
Patient is a 76y old  Female who presents with a chief complaint of Rehab for B/L CVA with R hemiplegia (dominant), dysphagia, aphasia, dysarthria (19 Apr 2022 16:13)      HPI:  77 yo female w/ PMH of HTN, hypothyroidism, DM, dm peripheral neuropathy, GERD, obesity who presented to the ED after a fall on 4/10/22. Patient started having R sided arm and leg weakness, and difficulty finding words, but could not recall if symptoms started before or after fall. Patient denied pain, head trauma or LOC but endorsed vomiting a couple times. Stroke code was called upon presentation. CTA showed a left M2 occlusion.  The patient was given tPA and taken for an emergent mechanical thrombectomy, but this was not completed due to dislodgement of clot at the time of procedure. Patient's dysarthria and LLE weakness briefly worsened, but subsequently improved again. Repeat CTH did not reveal any hemorrhage, patient was receiving heparin drip for right sided lower DVT, and now switched to Eliquis 10 BID, 5 mg BID starting 4/22. EP consulted and patient getting a loop recorder     PM&R consulted for admission to  for acute inpatient rehabilitation. Prior to admission, patient was independent with  ADLs and ambulation without an assistive device. Resides w/ sister, pvt home, +JDOIE, no stairs in home, +bathtub    Admission LOF evaluated by OT  BM - Max A  Transfers - Mod-Max A  UBD - Max A  LBD - Dependent    Admission LOF evaluated by PT  BM - Max A  Transfers - Max A  Ambulation - Max A standing tolerance 3-5s    Also seen by SLP - FEES completed on 4/14, Results indicate mild pharyngeal dysphagia for puree, easy to chew, and mildly thick liquids; moderate pharyngeal dysphagia for thin liquids. Recommendations for an easy to chew diet w/ thin liquids    Patient is a good candidate for admission to acute inpatient rehabilitation for CVA with right sided hemiplegia (dominant) with comorbidities of dm, htn, hld, hyperthyroidism, obesity, dm peripheral neuropathy, and rle dvt requiring hospital level supervision. She was deemed medically stable for discharge to  on 4/19/22 where she will undergo intensive restorative therapies 3 hours a day for at least 5 days a week with the goal of regaining the patients independence and discharging her home.   (19 Apr 2022 16:13)      TODAY'S SUBJECTIVE & REVIEW OF SYMPTOMS:  Patient was seen and examined at bedside this AM by the rehabilitation team. No acute overnight events VSS      Review of Systems:   Constiutional:    [ x  ] WNL           [   ] poor appetite   [   ] insomnia   [   ] tired   Cardio:                [   ] WNL           [   ] CP   [ x  ] ALVAREZ   [   ] palpitations               Resp:                   [ x  ] WNL           [   ] SOB   [   ] cough   [   ] wheezing   GI:                        [ x ] WNL           [   ] constipation   [   ] diarrhea   [   ] abdominal pain   [   ] nausea   [   ] emesis                                :                      [ x  ] WNL           [   ] NARANJO  [   ] dusuria   [   ] difficulty voiding             Endo:                   [ x  ] WNL          [   ] po;yuria   [   ] temperature intolerance                 Skin:                     [x   ] WNL          [   ] pain   [   ] wound   [   ] rash   MSK:                    [ x  ] WNL          [   ] muscle pain   [   ] joint pain/ stiffness   [   ] muscle tenderness   [   ] swelling   Neuro:                 [   ] WNL          [   ] HA   [   ] change in vision   [   ] tremor   [  x ] weakness right sided  [   ]dysphagia              Cognitive:           [x   ] WNL           [   ]confusion      Psych:                  [ x ] WNL           [   ] hallucinations   [   ]agitation   [   ] delusion   [   ]depression      PHYSICAL EXAM    Vital Signs Last 24 Hrs  T(C): 36.1 (26 Apr 2022 05:03), Max: 36.8 (25 Apr 2022 20:29)  T(F): 96.9 (26 Apr 2022 05:03), Max: 98.2 (25 Apr 2022 20:29)  HR: 79 (26 Apr 2022 05:03) (79 - 80)  BP: 134/61 (26 Apr 2022 05:03) (128/79 - 134/61)  BP(mean): --  RR: 18 (26 Apr 2022 05:03) (18 - 18)  SpO2: --    General:[  x] NAD, Resting Comfortable,   [   ] other:                                HEENT: [ x  ] NC/AT, EOMI, PERRL , Normal Conjunctivae,   [   ] other:  Cardio: [ x  ] RRR, no murmer,   [   ] other:                              Pulm: [ x  ] No Respiratory Distress,  Lungs CTAB,   [   ] other:                       Abdomen: [ x  ]ND/NT, Soft,   [   ] other:    : [ x  ] NO NARANJO CATHETER, [   ] NARANJO CATHETER- no meatal tear, no discharge, [   ] other:                                            MSK: [  x ] No joint swelling, Full ROM,   [   ] other:                                         Ext: [   ]No C/C/E, No calf tenderness,   [ x  ]other:  RLE swelling present. soft, NT  Skin: [   ]intact,   [x   ] other:    skin tear right elbow, ecchymosis right groin/hip                                                               Neurological Examination:  Cognitive: [  x  ] AAO x 3,   [    ]  other:                                                                      Attention:  [   x ] intact,   [    ]  other:                            Mood/Affect: [ x   ] wnl,    [    ]  other:                                                                             Communication: [    ]Fluent, no dysarthria, following commands:  [  x  ] other: dysarthria, difficulty with word finding, able to follow 2 step commands  CN II - XII:  [  x  ] intact,  [    ] other:                                                                                        Motor:   RIGHT UE: [   ] WNL,  [   x] other: 3+/5 shoulder, 4+ distal  LEFT    UE: [ x  ] WNL,  [   ] other:  RIGHT LE: [   ] WNL,  [ x  ] other: 4+ prox, 3-/5 dorsiflexion  LEFT    LE: [  x ] WNL,  [   ] other:    Tone: [   x ] wnl,   [    ]  other:  DTRs: [   ]symmetric, [   ] other: not assessed  Coordination:   [    ] intact,   [  x  ] other:   impaired RUE ftn                                                                        Sensory: [    ] Intact to light touch,   [   x ] other:  decrease sensation to light touch on the RUE and RLE     CLOF  BM Max A  Transfers Mod-MaxA  Standing tolerance - Mod assist II bars 60 sec x 2    MEDICATIONS  (STANDING):  apixaban 5 milliGRAM(s) Oral every 12 hours  atorvastatin 80 milliGRAM(s) Oral at bedtime  DULoxetine 30 milliGRAM(s) Oral every 12 hours  levothyroxine 112 MICROGram(s) Oral daily  losartan 25 milliGRAM(s) Oral daily  melatonin 5 milliGRAM(s) Oral at bedtime  metFORMIN 500 milliGRAM(s) Oral <User Schedule>  pantoprazole    Tablet 40 milliGRAM(s) Oral every 12 hours    MEDICATIONS  (PRN):  acetaminophen     Tablet .. 650 milliGRAM(s) Oral every 6 hours PRN Temp greater or equal to 38C (100.4F)  aluminum hydroxide/magnesium hydroxide/simethicone Suspension 30 milliLiter(s) Oral every 4 hours PRN Dyspepsia  bismuth subsalicylate Liquid 30 milliLiter(s) Oral every 4 hours PRN abdominal pain  and diarrhea  magnesium hydroxide Suspension 30 milliLiter(s) Oral daily PRN Constipation  polyethylene glycol 3350 17 Gram(s) Oral daily PRN Constipation        RECENT LABS/IMAGING                                   10.6   4.94  )-----------( 243      ( 25 Apr 2022 07:36 )             33.2     04-25    144  |  107  |  17  ----------------------------<  121<H>  5.3<H>   |  26  |  0.8    Ca    9.9      25 Apr 2022 07:36  Mg     2.0     04-25    TPro  5.8<L>  /  Alb  4.1  /  TBili  1.5<H>  /  DBili  x   /  AST  20  /  ALT  15  /  AlkPhos  73  04-25        POCT Blood Glucose.: 127 mg/dL (04-25-22 @ 07:18)  POCT Blood Glucose.: 130 mg/dL (04-24-22 @ 07:54)  POCT Blood Glucose.: 103 mg/dL (04-23-22 @ 16:22)  POCT Blood Glucose.: 124 mg/dL (04-23-22 @ 07:28)  POCT Blood Glucose.: 112 mg/dL (04-22-22 @ 16:03)  POCT Blood Glucose.: 128 mg/dL (04-22-22 @ 07:20)  POCT Blood Glucose.: 120 mg/dL (04-21-22 @ 16:55)

## 2022-04-26 NOTE — PROGRESS NOTE ADULT - ASSESSMENT
ASSESSMENT/PLAN:    77 yo F with a bilateral acute infacts including acute left MCA territory and right temporal cortical with a right hemiparesis, aphasia dysphagia and dysarthria with comorbidities including untreated NIDDM, obesity, diabetic peripheral neuropathy, HTN, obesity, HTN, HLD, GERD, hypothyroidism and RLE DVT on Eliquis.   She warrants acute rehab with 3 hours of daily therapies at least 5 out of 7 days including PT, OT and Speech. Additionally she requires close physiatry follow up for CVA and the many complex comorbdities.      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   -MRI Brain w/o contrast showed acute left MCA territory infarct and right temporal cortical infarct  - Apixaban 5mg q12 (for DVT)  - Lipitor 80 mg bedtime   - JOEL showed no PFO or intracardiac thrombosis.     #Dysphagia  - EZ to chew diet  - SLP following    #HTN  - Goal -150  - Losartan 25mg daily  - JOEL completed, LVEF 65%, no PFO  - ILR placed  - Normotensive today     #Dyspnea on exertion  - Continue to Monitor     #HLD  - c/w lipitor 80mg qhs     #DM2  - A1C results: 7.5%  - Metformin 500mg BID    #DM Neuropathy  -DULoxetine 30 mg oral delayed release PO Daily    #GERD  - PPI    #Hypothyroid  -C/w levothyroxine 112 mcg    #RLE DVT  - US Duplex showed +DVT of the right peroneal vein and a branch of the right posterior tibial vein  - Continue with Eliquis 10 mg BID and decrease to 5 mg BID starting 4/22.   - c/w eliquis 5mg bid     -Pain control:   Tylenol prn    -GI/Bowel Mgmt:  No active issues at this time  Miralax    -Bladder management:   No active issues at this time    -Diet, Easy to Chew:   Consistent Carbohydrate No Snacks  Fiber/Residue Restricted  No Lactose  Free Water Flush Instructions:  SMALL BITES/SIPS; ALLOW TIME TO SWALLOW; SIT UPRIGHT DURING MEALS AND FOR 2 HOURS AFTER; CONTINUE LOW RESIDUE LACTOSE-FREE UNTIL DIARRHEA RESOLVES--CAN HAVE LACTOSE-FREE MILK (04-23-22 @ 14:45) [Active]      Precautions / PROPHYLAXIS:      - Falls      - DVT prophylaxis: Has RLE DVT,  Eliquis

## 2022-04-26 NOTE — PROGRESS NOTE ADULT - ATTENDING COMMENTS
I reviewed the chart and examined the patient with the resident and we discussed the findings and treatment plan.  The patient is tolerating the rehab program well. I agree with the findings and treatment plan above, which I modified as indicated. The patient requires 3 hrs a day of acute inpatient rehab. Doing well. No new complaints. Right hemiparesis secondary to acute bilateral strokes. Complains of some ALVAREZ, but morbidly  obese. K=5.3 On low dose Losartan. Monitor. Continue rehab program.  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   -MRI Brain w/o contrast showed acute left MCA territory infarct and right temporal cortical infarct  - Apixaban 5mg q12 (for DVT)  - Lipitor 80 mg bedtime   - JOEL showed no PFO or intracardiac thrombosis.     #Dysphagia  - EZ to chew diet  - SLP following    #HTN  - Goal -150  - Losartan 25mg daily  - JOEL completed, LVEF 65%, no PFO  - ILR placed  - Normotensive today     #Dyspnea on exertion  - Continue to Monitor     #HLD  - c/w lipitor 80mg qhs     #DM2  - A1C results: 7.5%  - Metformin 500mg     #DM Neuropathy  -DULoxetine 30 mg oral delayed release PO Daily    #GERD  - PPI    #Hypothyroid  -C/w levothyroxine 112 mcg    #RLE DVT  - US Duplex showed +DVT of the right peroneal vein and a branch of the right posterior tibial vein  - Continue with Eliquis 10 mg BID and decrease to 5 mg BID starting 4/22.   - c/w eliquis 5mg bid     -Pain control:   Tylenol prn    -GI/Bowel Mgmt:  No active issues at this time  Miralax    -Bladder management:   No active issues at this time    -Diet, Easy to Chew:   Consistent Carbohydrate No Snacks  Fiber/Residue Restricted  No Lactose  Free Water Flush Instructions:  SMALL BITES/SIPS; ALLOW TIME TO SWALLOW; SIT UPRIGHT DURING MEALS AND FOR 2 HOURS AFTER; CONTINUE LOW RESIDUE LACTOSE-FREE UNTIL DIARRHEA RESOLVES--CAN HAVE LACTOSE-FREE MILK (04-23-22 @ 14:45) [Active] Patient seen earlier today with the Rehab Resident. No new complaints. Neuro exam stable. No SOB. Lungs clear. Tolerating therapies well. Ambulating in parallel bars with mod assistance. Tolerating diet. VSS.  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   -MRI Brain w/o contrast showed acute left MCA territory infarct and right temporal cortical infarct  - Apixaban 5mg q12 (for DVT)  - Lipitor 80 mg bedtime   - JOEL showed no PFO or intracardiac thrombosis.     #Dysphagia  - EZ to chew diet  - SLP following    #HTN  - Goal -150  - Losartan 25mg daily  - JOEL completed, LVEF 65%, no PFO  - ILR placed  - Normotensive today     #Dyspnea on exertion  - Continue to Monitor     #HLD  - c/w lipitor 80mg qhs     #DM2  - A1C results: 7.5%  - Metformin 500mg BID    #DM Neuropathy  -DULoxetine 30 mg oral delayed release PO Daily    #GERD  - PPI    #Hypothyroid  -C/w levothyroxine 112 mcg    #RLE DVT  - US Duplex showed +DVT of the right peroneal vein and a branch of the right posterior tibial vein  - Continue with Eliquis 10 mg BID and decrease to 5 mg BID starting 4/22.   - c/w eliquis 5mg bid     -Pain control:   Tylenol prn    -GI/Bowel Mgmt:  No active issues at this time  Miralax    -Bladder management:   No active issues at this time    -Diet, Easy to Chew:   Consistent Carbohydrate No Snacks  Fiber/Residue Restricted  No Lactose  Free Water Flush Instructions:  SMALL BITES/SIPS; ALLOW TIME TO SWALLOW; SIT UPRIGHT DURING MEALS AND FOR 2 HOURS AFTER; CONTINUE LOW RESIDUE LACTOSE-FREE UNTIL DIARRHEA RESOLVES--CAN HAVE LACTOSE-FREE MILK (04-23-22 @ 14:45) [Active]      Precautions / PROPHYLAXIS:      - Falls      - DVT prophylaxis: Has FLORIN DVT,  Eliquis

## 2022-04-26 NOTE — PROGRESS NOTE ADULT - ASSESSMENT
Pain: Yes   Location: Buttocks                        Ratin/10     Intervention: RN Notified  Pain rating after Intervention: Unable to assess  Orientation: Patient was oriented to self, place, event, month, date, year, day, and time.   Arousal Level: Alert  Behavior: Pleasant and cooperative  Affect Range: WFL     Needed: ? No   #: N/A  Attention: ? WFL   Insight into illness/deficits: Fair  Tests Administered: ? Repeatable Battery for the Assessment of Neuropsychological Status (RBANS; Form B), select subtests  Treatment Session Focused on Cognition     Patient was seen to conduct neurocognitive testing. Select subtests from the RBANS were administered. Performance on a measure of verbal learning and memory that required her to learn and recall a list of 10 words over 4 trials was impaired. Her learning curve was flat, indicating that she did not benefit from repetition, and possibly indicative of rapid forgetting (4-4-4-5). After a delay, she remembered 2 of the words (borderline-low average). On a yes/no recognition condition, her performance was borderline-low average, indicating some benefit from cues. Immediate recall of contextually-based paragraph length information was borderline. Following a delay, her recall was mildly impaired. As for visuospatial functions, performance on a line orientation task was average. Confrontation naming was average. Semantically-based linguistic search and retrieval in the form of category fluency was impaired. On a measure of immediate auditory attention, patient demonstrated impaired performance, as she was only able to repeat back up to 4 digits. To some extent, memory deficits may be partially impacted by impairments in attention.    Overall, results suggest deficits in verbal learning and memory, immediate auditory attention, and semantic fluency. Visuospatial skills and confrontation naming are within normal limits. Patient will continue to be seen for further neurocognitive assessment and support.    Repeatable Battery for the Assessment of Neuropsychological Status (RBANS; Form B)  Domain	Index/Scaled Score	Percentile Rank	Range  Subtest	 	 	   Immediate Memory			  List Learning	4	2	Impaired  Story Memory	6	9	Borderline  Visuospatial/Constructional			  Line Orientation	--	26-50	Average  Language			  Picture Naming	--	26-50	Average  Semantic Fluency	3	1	Impaired  Attention			  Digit Span	4	2	Impaired  Delayed Memory			  List Recall	--	10-16	Borderline-Low Average  List Recognition	--	17-25	Low Average  Story Recall	5	5	Mildly Impaired        Goals: ? Cognitive assessment    Plan: Neuropsychology services 1-2x per week 30-60 minutes    Brain Injury Protocol: ? No          Cognitive Behavioral Guidelines:  ? No

## 2022-04-27 LAB
ANION GAP SERPL CALC-SCNC: 12 MMOL/L — SIGNIFICANT CHANGE UP (ref 7–14)
BUN SERPL-MCNC: 17 MG/DL — SIGNIFICANT CHANGE UP (ref 10–20)
CALCIUM SERPL-MCNC: 9.5 MG/DL — SIGNIFICANT CHANGE UP (ref 8.5–10.1)
CHLORIDE SERPL-SCNC: 104 MMOL/L — SIGNIFICANT CHANGE UP (ref 98–110)
CO2 SERPL-SCNC: 27 MMOL/L — SIGNIFICANT CHANGE UP (ref 17–32)
CREAT SERPL-MCNC: 0.7 MG/DL — SIGNIFICANT CHANGE UP (ref 0.7–1.5)
EGFR: 91 ML/MIN/1.73M2 — SIGNIFICANT CHANGE UP
GLUCOSE BLDC GLUCOMTR-MCNC: 135 MG/DL — HIGH (ref 70–99)
GLUCOSE SERPL-MCNC: 178 MG/DL — HIGH (ref 70–99)
MAGNESIUM SERPL-MCNC: 1.9 MG/DL — SIGNIFICANT CHANGE UP (ref 1.8–2.4)
POTASSIUM SERPL-MCNC: 4.6 MMOL/L — SIGNIFICANT CHANGE UP (ref 3.5–5)
POTASSIUM SERPL-SCNC: 4.6 MMOL/L — SIGNIFICANT CHANGE UP (ref 3.5–5)
SODIUM SERPL-SCNC: 143 MMOL/L — SIGNIFICANT CHANGE UP (ref 135–146)

## 2022-04-27 RX ADMIN — METFORMIN HYDROCHLORIDE 500 MILLIGRAM(S): 850 TABLET ORAL at 07:54

## 2022-04-27 RX ADMIN — Medication 112 MICROGRAM(S): at 06:25

## 2022-04-27 RX ADMIN — LOSARTAN POTASSIUM 25 MILLIGRAM(S): 100 TABLET, FILM COATED ORAL at 06:25

## 2022-04-27 RX ADMIN — APIXABAN 5 MILLIGRAM(S): 2.5 TABLET, FILM COATED ORAL at 17:14

## 2022-04-27 RX ADMIN — PANTOPRAZOLE SODIUM 40 MILLIGRAM(S): 20 TABLET, DELAYED RELEASE ORAL at 17:14

## 2022-04-27 RX ADMIN — ATORVASTATIN CALCIUM 80 MILLIGRAM(S): 80 TABLET, FILM COATED ORAL at 21:55

## 2022-04-27 RX ADMIN — PANTOPRAZOLE SODIUM 40 MILLIGRAM(S): 20 TABLET, DELAYED RELEASE ORAL at 06:25

## 2022-04-27 RX ADMIN — APIXABAN 5 MILLIGRAM(S): 2.5 TABLET, FILM COATED ORAL at 06:30

## 2022-04-27 RX ADMIN — Medication 5 MILLIGRAM(S): at 21:55

## 2022-04-27 RX ADMIN — DULOXETINE HYDROCHLORIDE 30 MILLIGRAM(S): 30 CAPSULE, DELAYED RELEASE ORAL at 17:13

## 2022-04-27 RX ADMIN — DULOXETINE HYDROCHLORIDE 30 MILLIGRAM(S): 30 CAPSULE, DELAYED RELEASE ORAL at 06:25

## 2022-04-27 NOTE — PROGRESS NOTE ADULT - ATTENDING COMMENTS
Patient seen earlier today with the Rehab Resident. No new complaints. Neuro exam stable. No SOB. Lungs clear. Tolerating therapies well. Ambulating in parallel bars with mod assistance. Tolerating diet. VSS.  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   -MRI Brain w/o contrast showed acute left MCA territory infarct and right temporal cortical infarct  - Apixaban 5mg q12 (for DVT)  - Lipitor 80 mg bedtime   - JOEL showed no PFO or intracardiac thrombosis.     #Dysphagia  - EZ to chew diet  - SLP following    #HTN  - Goal -150  - Losartan 25mg daily  - JOEL completed, LVEF 65%, no PFO  - ILR placed  - Normotensive today     #Dyspnea on exertion  - Continue to Monitor     #HLD  - c/w lipitor 80mg qhs     #DM2  - A1C results: 7.5%  - Metformin 500mg BID    #DM Neuropathy  -DULoxetine 30 mg oral delayed release PO Daily    #GERD  - PPI    #Hypothyroid  -C/w levothyroxine 112 mcg    #RLE DVT  - US Duplex showed +DVT of the right peroneal vein and a branch of the right posterior tibial vein  - Continue with Eliquis 10 mg BID and decrease to 5 mg BID starting 4/22.   - c/w eliquis 5mg bid     -Pain control:   Tylenol prn    -GI/Bowel Mgmt:  No active issues at this time  Miralax    -Bladder management:   No active issues at this time    -Diet, Easy to Chew:   Consistent Carbohydrate No Snacks  Fiber/Residue Restricted  No Lactose  Free Water Flush Instructions:  SMALL BITES/SIPS; ALLOW TIME TO SWALLOW; SIT UPRIGHT DURING MEALS AND FOR 2 HOURS AFTER; CONTINUE LOW RESIDUE LACTOSE-FREE UNTIL DIARRHEA RESOLVES--CAN HAVE LACTOSE-FREE MILK (04-23-22 @ 14:45) [Active]      Precautions / PROPHYLAXIS:      - Falls      - DVT prophylaxis: Has FLORIN DVT,  Eliquis I reviewed the chart and examined the patient with the resident and we discussed the findings and treatment plan.  The patient is tolerating the rehab program well. I agree with the findings and treatment plan above, which I modified as indicated. The patient requires 3 hrs a day of acute inpatient rehab. Making gains. No complaints. Ambulating in parallel bars mod assist. Has had no BM in a few days, but 'not eating much'. On Miralax. Neuro exam stable. RLE swelling stable, soft, NT. On Eliquis. Continue rehab.  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   -MRI Brain w/o contrast showed acute left MCA territory infarct and right temporal cortical infarct  - Apixaban 5mg q12 (for DVT)  - Lipitor 80 mg bedtime   - JOEL showed no PFO or intracardiac thrombosis.     #Dysphagia  - EZ to chew diet  - SLP following    #HTN  - Goal -150  - Losartan 25mg daily  - JOEL completed, LVEF 65%, no PFO  - ILR placed  - Normotensive today     #Dyspnea on exertion  - Continue to Monitor     #Morbid Obesity  - dietary counseling    #HLD  - c/w lipitor 80mg qhs     #DM2 - well controlled  - A1C results: 7.5%  - Metformin 500mg BID    #DM Neuropathy  -DULoxetine 30 mg oral delayed release PO Daily    #GERD  - PPI    #Hypothyroid  -C/w levothyroxine 112 mcg    #RLE DVT  - US Duplex showed +DVT of the right peroneal vein and a branch of the right posterior tibial vein  - Continue with Eliquis 5 mg BID starting 4/22.   - c/w eliquis 5mg bid     -Pain control:   Tylenol prn    -GI/Bowel Mgmt:  No active issues at this time  Miralax    -Bladder management:   No active issues at this time    -Diet, Easy to Chew:   Consistent Carbohydrate No Snacks  Fiber/Residue Restricted  No Lactose  Free Water Flush Instructions:  SMALL BITES/SIPS; ALLOW TIME TO SWALLOW; SIT UPRIGHT DURING MEALS AND FOR 2 HOURS AFTER; CONTINUE LOW RESIDUE LACTOSE-FREE UNTIL DIARRHEA RESOLVES--CAN HAVE LACTOSE-FREE MILK (04-23-22 @ 14:45) [Active]

## 2022-04-27 NOTE — PROGRESS NOTE ADULT - SUBJECTIVE AND OBJECTIVE BOX
Patient is a 76y old  Female who presents with a chief complaint of Rehab for B/L CVA with R hemiplegia (dominant), dysphagia, aphasia, dysarthria (19 Apr 2022 16:13)      HPI:  75 yo female w/ PMH of HTN, hypothyroidism, DM, dm peripheral neuropathy, GERD, obesity who presented to the ED after a fall on 4/10/22. Patient started having R sided arm and leg weakness, and difficulty finding words, but could not recall if symptoms started before or after fall. Patient denied pain, head trauma or LOC but endorsed vomiting a couple times. Stroke code was called upon presentation. CTA showed a left M2 occlusion.  The patient was given tPA and taken for an emergent mechanical thrombectomy, but this was not completed due to dislodgement of clot at the time of procedure. Patient's dysarthria and LLE weakness briefly worsened, but subsequently improved again. Repeat CTH did not reveal any hemorrhage, patient was receiving heparin drip for right sided lower DVT, and now switched to Eliquis 10 BID, 5 mg BID starting 4/22. EP consulted and patient getting a loop recorder     PM&R consulted for admission to  for acute inpatient rehabilitation. Prior to admission, patient was independent with  ADLs and ambulation without an assistive device. Resides w/ sister, pvt home, +JODIE, no stairs in home, +bathtub    Admission LOF evaluated by OT  BM - Max A  Transfers - Mod-Max A  UBD - Max A  LBD - Dependent    Admission LOF evaluated by PT  BM - Max A  Transfers - Max A  Ambulation - Max A standing tolerance 3-5s    Also seen by SLP - FEES completed on 4/14, Results indicate mild pharyngeal dysphagia for puree, easy to chew, and mildly thick liquids; moderate pharyngeal dysphagia for thin liquids. Recommendations for an easy to chew diet w/ thin liquids    Patient is a good candidate for admission to acute inpatient rehabilitation for CVA with right sided hemiplegia (dominant) with comorbidities of dm, htn, hld, hyperthyroidism, obesity, dm peripheral neuropathy, and rle dvt requiring hospital level supervision. She was deemed medically stable for discharge to  on 4/19/22 where she will undergo intensive restorative therapies 3 hours a day for at least 5 days a week with the goal of regaining the patients independence and discharging her home.   (19 Apr 2022 16:13)      TODAY'S SUBJECTIVE & REVIEW OF SYMPTOMS:  Patient was seen and examined at bedside this AM by the rehabilitation team. No acute overnight events VSS. Continues to participate with therapies, reports was able to take a couple steps in the II bars yesterday    Review of Systems:   Constiutional:    [ x  ] WNL           [   ] poor appetite   [   ] insomnia   [   ] tired   Cardio:                [   ] WNL           [   ] CP   [ x  ] ALVAREZ   [   ] palpitations               Resp:                   [ x  ] WNL           [   ] SOB   [   ] cough   [   ] wheezing   GI:                        [ x ] WNL           [   ] constipation   [   ] diarrhea   [   ] abdominal pain   [   ] nausea   [   ] emesis                                :                      [ x  ] WNL           [   ] NARANJO  [   ] dusuria   [   ] difficulty voiding             Endo:                   [ x  ] WNL          [   ] po;yuria   [   ] temperature intolerance                 Skin:                     [x   ] WNL          [   ] pain   [   ] wound   [   ] rash   MSK:                    [ x  ] WNL          [   ] muscle pain   [   ] joint pain/ stiffness   [   ] muscle tenderness   [   ] swelling   Neuro:                 [   ] WNL          [   ] HA   [   ] change in vision   [   ] tremor   [  x ] weakness right sided  [   ]dysphagia              Cognitive:           [x   ] WNL           [   ]confusion      Psych:                  [ x ] WNL           [   ] hallucinations   [   ]agitation   [   ] delusion   [   ]depression      PHYSICAL EXAM    Vital Signs Last 24 Hrs  T(C): 37.1 (27 Apr 2022 12:57), Max: 37.1 (27 Apr 2022 12:57)  T(F): 98.7 (27 Apr 2022 12:57), Max: 98.7 (27 Apr 2022 12:57)  HR: 96 (27 Apr 2022 12:57) (74 - 99)  BP: 146/76 (27 Apr 2022 12:57) (118/59 - 169/86)  BP(mean): --  RR: 19 (27 Apr 2022 12:57) (18 - 20)  SpO2: 98% (27 Apr 2022 12:57) (98% - 98%)    General:[  x] NAD, Resting Comfortable,   [   ] other:                                HEENT: [ x  ] NC/AT, EOMI, PERRL , Normal Conjunctivae,   [   ] other:  Cardio: [ x  ] RRR, no murmer,   [   ] other:                              Pulm: [ x  ] No Respiratory Distress,  Lungs CTAB,   [   ] other:                       Abdomen: [ x  ]ND/NT, Soft,   [   ] other:    : [ x  ] NO ANRANJO CATHETER, [   ] NARANJO CATHETER- no meatal tear, no discharge, [   ] other:                                            MSK: [  x ] No joint swelling, Full ROM,   [   ] other:                                         Ext: [   ]No C/C/E, No calf tenderness,   [ x  ]other:  RLE swelling present. soft, NT  Skin: [   ]intact,   [x   ] other:    skin tear right elbow, ecchymosis right groin/hip                                                               Neurological Examination:  Cognitive: [  x  ] AAO x 3,   [    ]  other:                                                                      Attention:  [   x ] intact,   [    ]  other:                            Mood/Affect: [ x   ] wnl,    [    ]  other:                                                                             Communication: [    ]Fluent, no dysarthria, following commands:  [  x  ] other: dysarthria, difficulty with word finding, able to follow 2 step commands  CN II - XII:  [  x  ] intact,  [    ] other:                                                                                        Motor:   RIGHT UE: [   ] WNL,  [   x] other: 3+/5 shoulder, 4+ distal  LEFT    UE: [ x  ] WNL,  [   ] other:  RIGHT LE: [   ] WNL,  [ x  ] other: 4+ prox, 3-/5 dorsiflexion  LEFT    LE: [  x ] WNL,  [   ] other:    Tone: [   x ] wnl,   [    ]  other:  DTRs: [   ]symmetric, [   ] other: not assessed  Coordination:   [    ] intact,   [  x  ] other:   impaired RUE ftn                                                                        Sensory: [    ] Intact to light touch,   [   x ] other:  decrease sensation to light touch on the RUE and RLE     CLOF  BM Max A  Transfers Mod-MaxA  Standing tolerance - Mod assist II bars 60 sec x 2    MEDICATIONS  (STANDING):  apixaban 5 milliGRAM(s) Oral every 12 hours  atorvastatin 80 milliGRAM(s) Oral at bedtime  DULoxetine 30 milliGRAM(s) Oral every 12 hours  levothyroxine 112 MICROGram(s) Oral daily  losartan 25 milliGRAM(s) Oral daily  melatonin 5 milliGRAM(s) Oral at bedtime  metFORMIN 500 milliGRAM(s) Oral <User Schedule>  pantoprazole    Tablet 40 milliGRAM(s) Oral every 12 hours    MEDICATIONS  (PRN):  acetaminophen     Tablet .. 650 milliGRAM(s) Oral every 6 hours PRN Temp greater or equal to 38C (100.4F)  aluminum hydroxide/magnesium hydroxide/simethicone Suspension 30 milliLiter(s) Oral every 4 hours PRN Dyspepsia  bismuth subsalicylate Liquid 30 milliLiter(s) Oral every 4 hours PRN abdominal pain  and diarrhea  magnesium hydroxide Suspension 30 milliLiter(s) Oral daily PRN Constipation  polyethylene glycol 3350 17 Gram(s) Oral daily PRN Constipation        RECENT LABS/IMAGING                                   10.6   4.94  )-----------( 243      ( 25 Apr 2022 07:36 )             33.2     04-25    144  |  107  |  17  ----------------------------<  121<H>  5.3<H>   |  26  |  0.8    Ca    9.9      25 Apr 2022 07:36  Mg     2.0     04-25    TPro  5.8<L>  /  Alb  4.1  /  TBili  1.5<H>  /  DBili  x   /  AST  20  /  ALT  15  /  AlkPhos  73  04-25        POCT Blood Glucose.: 127 mg/dL (04-25-22 @ 07:18)  POCT Blood Glucose.: 130 mg/dL (04-24-22 @ 07:54)  POCT Blood Glucose.: 103 mg/dL (04-23-22 @ 16:22)  POCT Blood Glucose.: 124 mg/dL (04-23-22 @ 07:28)  POCT Blood Glucose.: 112 mg/dL (04-22-22 @ 16:03)  POCT Blood Glucose.: 128 mg/dL (04-22-22 @ 07:20)  POCT Blood Glucose.: 120 mg/dL (04-21-22 @ 16:55)       Patient is a 76y old  Female who presents with a chief complaint of Rehab for B/L CVA with R hemiplegia (dominant), dysphagia, aphasia, dysarthria (19 Apr 2022 16:13)      HPI:  77 yo female w/ PMH of HTN, hypothyroidism, DM, dm peripheral neuropathy, GERD, obesity who presented to the ED after a fall on 4/10/22. Patient started having R sided arm and leg weakness, and difficulty finding words, but could not recall if symptoms started before or after fall. Patient denied pain, head trauma or LOC but endorsed vomiting a couple times. Stroke code was called upon presentation. CTA showed a left M2 occlusion.  The patient was given tPA and taken for an emergent mechanical thrombectomy, but this was not completed due to dislodgement of clot at the time of procedure. Patient's dysarthria and LLE weakness briefly worsened, but subsequently improved again. Repeat CTH did not reveal any hemorrhage, patient was receiving heparin drip for right sided lower DVT, and now switched to Eliquis 10 BID, 5 mg BID starting 4/22. EP consulted and patient getting a loop recorder     PM&R consulted for admission to  for acute inpatient rehabilitation. Prior to admission, patient was independent with  ADLs and ambulation without an assistive device. Resides w/ sister, pvt home, +JODIE, no stairs in home, +bathtub    Admission LOF evaluated by OT  BM - Max A  Transfers - Mod-Max A  UBD - Max A  LBD - Dependent    Admission LOF evaluated by PT  BM - Max A  Transfers - Max A  Ambulation - Max A standing tolerance 3-5s    Also seen by SLP - FEES completed on 4/14, Results indicate mild pharyngeal dysphagia for puree, easy to chew, and mildly thick liquids; moderate pharyngeal dysphagia for thin liquids. Recommendations for an easy to chew diet w/ thin liquids    Patient is a good candidate for admission to acute inpatient rehabilitation for CVA with right sided hemiplegia (dominant) with comorbidities of dm, htn, hld, hyperthyroidism, obesity, dm peripheral neuropathy, and rle dvt requiring hospital level supervision. She was deemed medically stable for discharge to  on 4/19/22 where she will undergo intensive restorative therapies 3 hours a day for at least 5 days a week with the goal of regaining the patients independence and discharging her home.   (19 Apr 2022 16:13)      TODAY'S SUBJECTIVE & REVIEW OF SYMPTOMS:  Patient was seen and examined at bedside this AM by the rehabilitation team. No acute overnight events VSS. Continues to participate with therapies, reports was able to take a couple steps in the II bars yesterday    Review of Systems:   Constiutional:    [ x  ] WNL           [   ] poor appetite   [   ] insomnia   [   ] tired   Cardio:                [   ] WNL           [   ] CP   [ x  ] ALVAREZ   [   ] palpitations               Resp:                   [ x  ] WNL           [   ] SOB   [   ] cough   [   ] wheezing   GI:                        [ x ] WNL           [   ] constipation   [   ] diarrhea   [   ] abdominal pain   [   ] nausea   [   ] emesis                                :                      [ x  ] WNL           [   ] NARANJO  [   ] dusuria   [   ] difficulty voiding             Endo:                   [ x  ] WNL          [   ] po;yuria   [   ] temperature intolerance                 Skin:                     [x   ] WNL          [   ] pain   [   ] wound   [   ] rash   MSK:                    [ x  ] WNL          [   ] muscle pain   [   ] joint pain/ stiffness   [   ] muscle tenderness   [   ] swelling   Neuro:                 [   ] WNL          [   ] HA   [   ] change in vision   [   ] tremor   [  x ] weakness right sided  [   ]dysphagia              Cognitive:           [x   ] WNL           [   ]confusion      Psych:                  [ x ] WNL           [   ] hallucinations   [   ]agitation   [   ] delusion   [   ]depression      PHYSICAL EXAM    Vital Signs Last 24 Hrs  T(C): 37.1 (27 Apr 2022 12:57), Max: 37.1 (27 Apr 2022 12:57)  T(F): 98.7 (27 Apr 2022 12:57), Max: 98.7 (27 Apr 2022 12:57)  HR: 96 (27 Apr 2022 12:57) (74 - 99)  BP: 146/76 (27 Apr 2022 12:57) (118/59 - 169/86)  BP(mean): --  RR: 19 (27 Apr 2022 12:57) (18 - 20)  SpO2: 98% (27 Apr 2022 12:57) (98% - 98%)    General:[  x] NAD, Resting Comfortable,   [   ] other:                                HEENT: [ x  ] NC/AT, EOMI, PERRL , Normal Conjunctivae,   [   ] other:  Cardio: [ x  ] RRR, no murmer,   [   ] other:                              Pulm: [ x  ] No Respiratory Distress,  Lungs CTAB,   [   ] other:                       Abdomen: [ x  ]ND/NT, Soft,   [   ] other:    : [ x  ] NO NARANJO CATHETER, [   ] NARANJO CATHETER- no meatal tear, no discharge, [   ] other:                                            MSK: [  x ] No joint swelling, Full ROM,   [   ] other:                                         Ext: [   ]No C/C/E, No calf tenderness,   [ x  ]other:  RLE swelling present. soft, NT  Skin: [   ]intact,   [x   ] other:    skin tear right elbow, ecchymosis right groin/hip                                                               Neurological Examination:  Cognitive: [  x  ] AAO x 3,   [    ]  other:                                                                      Attention:  [   x ] intact,   [    ]  other:                            Mood/Affect: [ x   ] wnl,    [    ]  other:                                                                             Communication: [    ]Fluent, no dysarthria, following commands:  [  x  ] other: dysarthria, difficulty with word finding, able to follow 2 step commands  CN II - XII:  [  x  ] intact,  [    ] other:                                                                                        Motor:   RIGHT UE: [   ] WNL,  [   x] other: 3+/5 shoulder, 4+ distal  LEFT    UE: [ x  ] WNL,  [   ] other:  RIGHT LE: [   ] WNL,  [ x  ] other: 4+ prox, 3-/5 dorsiflexion  LEFT    LE: [  x ] WNL,  [   ] other:    Tone: [   x ] wnl,   [    ]  other:  DTRs: [   ]symmetric, [   ] other: not assessed  Coordination:   [    ] intact,   [  x  ] other:   impaired RUE ftn                                                                        Sensory: [    ] Intact to light touch,   [   x ] other:  decrease sensation to light touch on the RUE and RLE     CLOF  BM Max A  Transfers Mod-MaxA  Standing tolerance - Mod assist II bars 60 sec x 2    MEDICATIONS  (STANDING):  apixaban 5 milliGRAM(s) Oral every 12 hours  atorvastatin 80 milliGRAM(s) Oral at bedtime  DULoxetine 30 milliGRAM(s) Oral every 12 hours  levothyroxine 112 MICROGram(s) Oral daily  losartan 25 milliGRAM(s) Oral daily  melatonin 5 milliGRAM(s) Oral at bedtime  metFORMIN 500 milliGRAM(s) Oral <User Schedule>  pantoprazole    Tablet 40 milliGRAM(s) Oral every 12 hours    MEDICATIONS  (PRN):  acetaminophen     Tablet .. 650 milliGRAM(s) Oral every 6 hours PRN Temp greater or equal to 38C (100.4F)  aluminum hydroxide/magnesium hydroxide/simethicone Suspension 30 milliLiter(s) Oral every 4 hours PRN Dyspepsia  bismuth subsalicylate Liquid 30 milliLiter(s) Oral every 4 hours PRN abdominal pain  and diarrhea  magnesium hydroxide Suspension 30 milliLiter(s) Oral daily PRN Constipation  polyethylene glycol 3350 17 Gram(s) Oral daily PRN Constipation        RECENT LABS/IMAGING      04-27    143  |  104  |  17  ----------------------------<  178<H>  4.6   |  27  |  0.7    Ca    9.5      27 Apr 2022 04:30  Mg     1.9     04-27          POCT Blood Glucose.: 135 mg/dL (04-27-22 @ 07:51)  POCT Blood Glucose.: 139 mg/dL (04-26-22 @ 07:38)  POCT Blood Glucose.: 106 mg/dL (04-25-22 @ 15:52)  POCT Blood Glucose.: 127 mg/dL (04-25-22 @ 07:18)  POCT Blood Glucose.: 130 mg/dL (04-24-22 @ 07:54)  POCT Blood Glucose.: 103 mg/dL (04-23-22 @ 16:22)                                     10.6   4.94  )-----------( 243      ( 25 Apr 2022 07:36 )             33.2     04-25    144  |  107  |  17  ----------------------------<  121<H>  5.3<H>   |  26  |  0.8

## 2022-04-27 NOTE — PROGRESS NOTE ADULT - ASSESSMENT
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   She warrants acute rehab with 3 hours of daily therapies at least 5 out of 7 days including PT, OT and Speech. Additionally she requires close physiatry follow up for CVA and the many complex comorbdities.      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   -MRI Brain w/o contrast showed acute left MCA territory infarct and right temporal cortical infarct  - Apixaban 5mg q12 (for DVT)  - Lipitor 80 mg bedtime   - JOEL showed no PFO or intracardiac thrombosis.     #Dysphagia  - EZ to chew diet  - SLP following    #HTN  - Goal -150  - Losartan 25mg daily  - JOEL completed, LVEF 65%, no PFO  - ILR placed  - Normotensive today     #Dyspnea on exertion  - Continue to Monitor     #HLD  - c/w lipitor 80mg qhs     #DM2  - A1C results: 7.5%  - Metformin 500mg BID    #DM Neuropathy  -DULoxetine 30 mg oral delayed release PO Daily    #GERD  - PPI    #Hypothyroid  -C/w levothyroxine 112 mcg    #RLE DVT  - US Duplex showed +DVT of the right peroneal vein and a branch of the right posterior tibial vein  - Continue with Eliquis 10 mg BID and decrease to 5 mg BID starting 4/22.   - c/w eliquis 5mg bid     -Pain control:   Tylenol prn    -GI/Bowel Mgmt:  No active issues at this time  Miralax    -Bladder management:   No active issues at this time    -Diet, Easy to Chew:   Consistent Carbohydrate No Snacks  Fiber/Residue Restricted  No Lactose  Free Water Flush Instructions:  SMALL BITES/SIPS; ALLOW TIME TO SWALLOW; SIT UPRIGHT DURING MEALS AND FOR 2 HOURS AFTER; CONTINUE LOW RESIDUE LACTOSE-FREE UNTIL DIARRHEA RESOLVES--CAN HAVE LACTOSE-FREE MILK (04-23-22 @ 14:45) [Active]      Precautions / PROPHYLAXIS:      - Falls      - DVT prophylaxis: Has RLE DVT,  Eliquis   ASSESSMENT/PLAN:    75 yo F with a bilateral acute infacts including acute left MCA territory and right temporal cortical with a right hemiparesis, aphasia dysphagia and dysarthria with comorbidities including untreated NIDDM, obesity, diabetic peripheral neuropathy, HTN, obesity, HTN, HLD, GERD, hypothyroidism and RLE DVT on Eliquis.   She warrants acute rehab with 3 hours of daily therapies at least 5 out of 7 days including PT, OT and Speech. Additionally she requires close physiatry follow up for CVA and the many complex comorbdities.      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   -MRI Brain w/o contrast showed acute left MCA territory infarct and right temporal cortical infarct  - Apixaban 5mg q12 (for DVT)  - Lipitor 80 mg bedtime   - JOEL showed no PFO or intracardiac thrombosis.     #Dysphagia  - EZ to chew diet  - SLP following    #HTN  - Goal -150  - Losartan 25mg daily  - JOEL completed, LVEF 65%, no PFO  - ILR placed  - Normotensive today     #Dyspnea on exertion  - Continue to Monitor     #HLD  - c/w lipitor 80mg qhs     #DM2 - well controlled  - A1C results: 7.5%  - Metformin 500mg BID    #DM Neuropathy  -DULoxetine 30 mg oral delayed release PO Daily    #GERD  - PPI    #Hypothyroid  -C/w levothyroxine 112 mcg    #RLE DVT  - US Duplex showed +DVT of the right peroneal vein and a branch of the right posterior tibial vein  - Continue with Eliquis 5 mg BID starting 4/22.   - c/w eliquis 5mg bid     -Pain control:   Tylenol prn    -GI/Bowel Mgmt:  No active issues at this time  Miralax    -Bladder management:   No active issues at this time    -Diet, Easy to Chew:   Consistent Carbohydrate No Snacks  Fiber/Residue Restricted  No Lactose  Free Water Flush Instructions:  SMALL BITES/SIPS; ALLOW TIME TO SWALLOW; SIT UPRIGHT DURING MEALS AND FOR 2 HOURS AFTER; CONTINUE LOW RESIDUE LACTOSE-FREE UNTIL DIARRHEA RESOLVES--CAN HAVE LACTOSE-FREE MILK (04-23-22 @ 14:45) [Active]      Precautions / PROPHYLAXIS:      - Falls      - DVT prophylaxis: Has RLE DVT,  Eliquis

## 2022-04-28 LAB
GLUCOSE BLDC GLUCOMTR-MCNC: 114 MG/DL — HIGH (ref 70–99)
GLUCOSE BLDC GLUCOMTR-MCNC: 148 MG/DL — HIGH (ref 70–99)

## 2022-04-28 RX ADMIN — Medication 5 MILLIGRAM(S): at 22:10

## 2022-04-28 RX ADMIN — DULOXETINE HYDROCHLORIDE 30 MILLIGRAM(S): 30 CAPSULE, DELAYED RELEASE ORAL at 17:52

## 2022-04-28 RX ADMIN — PANTOPRAZOLE SODIUM 40 MILLIGRAM(S): 20 TABLET, DELAYED RELEASE ORAL at 17:52

## 2022-04-28 RX ADMIN — APIXABAN 5 MILLIGRAM(S): 2.5 TABLET, FILM COATED ORAL at 17:52

## 2022-04-28 RX ADMIN — DULOXETINE HYDROCHLORIDE 30 MILLIGRAM(S): 30 CAPSULE, DELAYED RELEASE ORAL at 06:32

## 2022-04-28 RX ADMIN — APIXABAN 5 MILLIGRAM(S): 2.5 TABLET, FILM COATED ORAL at 06:31

## 2022-04-28 RX ADMIN — ATORVASTATIN CALCIUM 80 MILLIGRAM(S): 80 TABLET, FILM COATED ORAL at 22:10

## 2022-04-28 RX ADMIN — METFORMIN HYDROCHLORIDE 500 MILLIGRAM(S): 850 TABLET ORAL at 08:13

## 2022-04-28 RX ADMIN — LOSARTAN POTASSIUM 25 MILLIGRAM(S): 100 TABLET, FILM COATED ORAL at 06:32

## 2022-04-28 RX ADMIN — PANTOPRAZOLE SODIUM 40 MILLIGRAM(S): 20 TABLET, DELAYED RELEASE ORAL at 06:31

## 2022-04-28 RX ADMIN — Medication 112 MICROGRAM(S): at 06:32

## 2022-04-28 NOTE — PROGRESS NOTE ADULT - ASSESSMENT
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   She warrants acute rehab with 3 hours of daily therapies at least 5 out of 7 days including PT, OT and Speech. Additionally she requires close physiatry follow up for CVA and the many complex comorbdities.      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   -MRI Brain w/o contrast showed acute left MCA territory infarct and right temporal cortical infarct  - Apixaban 5mg q12 (for DVT)  - Lipitor 80 mg bedtime   - JOEL showed no PFO or intracardiac thrombosis.     #Dysphagia  - EZ to chew diet  - SLP following    #HTN  - Goal -150  - Losartan 25mg daily  - JOEL completed, LVEF 65%, no PFO  - ILR placed  - Normotensive today     #Dyspnea on exertion  - Continue to Monitor     #HLD  - c/w lipitor 80mg qhs     #DM2 - well controlled  - A1C results: 7.5%  - Metformin 500mg BID    #DM Neuropathy  -DULoxetine 30 mg oral delayed release PO Daily    #GERD  - PPI    #Hypothyroid  -C/w levothyroxine 112 mcg    #RLE DVT  - US Duplex showed +DVT of the right peroneal vein and a branch of the right posterior tibial vein  - Continue with Eliquis 5 mg BID starting 4/22.   - c/w eliquis 5mg bid     -Pain control:   Tylenol prn    -GI/Bowel Mgmt:  No active issues at this time  Miralax    -Bladder management:   No active issues at this time    -Diet, Easy to Chew:   Consistent Carbohydrate No Snacks  Fiber/Residue Restricted  No Lactose  Free Water Flush Instructions:  SMALL BITES/SIPS; ALLOW TIME TO SWALLOW; SIT UPRIGHT DURING MEALS AND FOR 2 HOURS AFTER; CONTINUE LOW RESIDUE LACTOSE-FREE UNTIL DIARRHEA RESOLVES--CAN HAVE LACTOSE-FREE MILK (04-23-22 @ 14:45) [Active]      Precautions / PROPHYLAXIS:      - Falls      - DVT prophylaxis: Has RLE DVT,  Eliquis

## 2022-04-28 NOTE — PROGRESS NOTE ADULT - SUBJECTIVE AND OBJECTIVE BOX
Patient is a 76y old  Female who presents with a chief complaint of Rehab for B/L CVA with R hemiplegia (dominant), dysphagia, aphasia, dysarthria (19 Apr 2022 16:13)      HPI:  75 yo female w/ PMH of HTN, hypothyroidism, DM, dm peripheral neuropathy, GERD, obesity who presented to the ED after a fall on 4/10/22. Patient started having R sided arm and leg weakness, and difficulty finding words, but could not recall if symptoms started before or after fall. Patient denied pain, head trauma or LOC but endorsed vomiting a couple times. Stroke code was called upon presentation. CTA showed a left M2 occlusion.  The patient was given tPA and taken for an emergent mechanical thrombectomy, but this was not completed due to dislodgement of clot at the time of procedure. Patient's dysarthria and LLE weakness briefly worsened, but subsequently improved again. Repeat CTH did not reveal any hemorrhage, patient was receiving heparin drip for right sided lower DVT, and now switched to Eliquis 10 BID, 5 mg BID starting 4/22. EP consulted and patient getting a loop recorder     PM&R consulted for admission to  for acute inpatient rehabilitation. Prior to admission, patient was independent with  ADLs and ambulation without an assistive device. Resides w/ sister, pvt home, +JODIE, no stairs in home, +bathtub    Admission LOF evaluated by OT  BM - Max A  Transfers - Mod-Max A  UBD - Max A  LBD - Dependent    Admission LOF evaluated by PT  BM - Max A  Transfers - Max A  Ambulation - Max A standing tolerance 3-5s    Also seen by SLP - FEES completed on 4/14, Results indicate mild pharyngeal dysphagia for puree, easy to chew, and mildly thick liquids; moderate pharyngeal dysphagia for thin liquids. Recommendations for an easy to chew diet w/ thin liquids    Patient is a good candidate for admission to acute inpatient rehabilitation for CVA with right sided hemiplegia (dominant) with comorbidities of dm, htn, hld, hyperthyroidism, obesity, dm peripheral neuropathy, and rle dvt requiring hospital level supervision. She was deemed medically stable for discharge to  on 4/19/22 where she will undergo intensive restorative therapies 3 hours a day for at least 5 days a week with the goal of regaining the patients independence and discharging her home.   (19 Apr 2022 16:13)      TODAY'S SUBJECTIVE & REVIEW OF SYMPTOMS:  Patient was seen and examined at bedside this AM. No acute overnight events VSS. Continues to participate with therapies.    Review of Systems:   Constiutional:    [ x  ] WNL           [   ] poor appetite   [   ] insomnia   [   ] tired   Cardio:                [   ] WNL           [   ] CP   [ x  ] ALVAREZ   [   ] palpitations               Resp:                   [ x  ] WNL           [   ] SOB   [   ] cough   [   ] wheezing   GI:                        [ x ] WNL           [   ] constipation   [   ] diarrhea   [   ] abdominal pain   [   ] nausea   [   ] emesis                                :                      [ x  ] WNL           [   ] NARANJO  [   ] dusuria   [   ] difficulty voiding             Endo:                   [ x  ] WNL          [   ] po;yuria   [   ] temperature intolerance                 Skin:                     [x   ] WNL          [   ] pain   [   ] wound   [   ] rash   MSK:                    [ x  ] WNL          [   ] muscle pain   [   ] joint pain/ stiffness   [   ] muscle tenderness   [   ] swelling   Neuro:                 [   ] WNL          [   ] HA   [   ] change in vision   [   ] tremor   [  x ] weakness right sided  [   ]dysphagia              Cognitive:           [x   ] WNL           [   ]confusion      Psych:                  [ x ] WNL           [   ] hallucinations   [   ]agitation   [   ] delusion   [   ]depression      PHYSICAL EXAM    Vital Signs Last 24 Hrs  T(C): 36.5 (28 Apr 2022 12:16), Max: 36.5 (27 Apr 2022 21:14)  T(F): 97.7 (28 Apr 2022 12:16), Max: 97.7 (27 Apr 2022 21:14)  HR: 92 (28 Apr 2022 12:16) (82 - 92)  BP: 168/82 (28 Apr 2022 12:16) (128/74 - 168/82)  BP(mean): 92 (27 Apr 2022 21:14) (92 - 92)  RR: 18 (28 Apr 2022 12:16) (18 - 20)  SpO2: --    General:[  x] NAD, Resting Comfortable,   [   ] other:                                HEENT: [ x  ] NC/AT, EOMI, PERRL , Normal Conjunctivae,   [   ] other:  Cardio: [ x  ] RRR, no murmer,   [   ] other:                              Pulm: [ x  ] No Respiratory Distress,  Lungs CTAB,   [   ] other:                       Abdomen: [ x  ]ND/NT, Soft,   [   ] other:    : [ x  ] NO NARANJO CATHETER, [   ] NARANJO CATHETER- no meatal tear, no discharge, [   ] other:                                            MSK: [  x ] No joint swelling, Full ROM,   [   ] other:                                         Ext: [   ]No C/C/E, No calf tenderness,   [ x  ]other:  RLE swelling present. soft, NT  Skin: [   ]intact,   [x   ] other:    skin tear right elbow, ecchymosis right groin/hip                                                               Neurological Examination:  Cognitive: [  x  ] AAO x 3,   [    ]  other:                                                                      Attention:  [   x ] intact,   [    ]  other:                            Mood/Affect: [ x   ] wnl,    [    ]  other:                                                                             Communication: [    ]Fluent, no dysarthria, following commands:  [  x  ] other: dysarthria, difficulty with word finding, able to follow 2 step commands  CN II - XII:  [  x  ] intact,  [    ] other:                                                                                        Motor:   RIGHT UE: [   ] WNL,  [   x] other: 4/5 shoulder, 4+ distal  LEFT    UE: [ x  ] WNL,  [   ] other:  RIGHT LE: [   ] WNL,  [ x  ] other: 4+ prox, 3-/5 dorsiflexion  LEFT    LE: [  x ] WNL,  [   ] other:    Tone: [   x ] wnl,   [    ]  other:  DTRs: [x  ]symmetric, [   ] other: not assessed  Coordination:   [    ] intact,   [  x  ] other:   impaired RUE ftn                                                                        Sensory: [    ] Intact to light touch,   [   x ] other:  decrease sensation to light touch on the RUE and RLE     CLOF  BM Max A  Transfers Mod-MaxA  Standing tolerance - Mod assist II bars 60 sec x 2    MEDICATIONS  (STANDING):  apixaban 5 milliGRAM(s) Oral every 12 hours  atorvastatin 80 milliGRAM(s) Oral at bedtime  DULoxetine 30 milliGRAM(s) Oral every 12 hours  levothyroxine 112 MICROGram(s) Oral daily  losartan 25 milliGRAM(s) Oral daily  melatonin 5 milliGRAM(s) Oral at bedtime  metFORMIN 500 milliGRAM(s) Oral <User Schedule>  pantoprazole    Tablet 40 milliGRAM(s) Oral every 12 hours    MEDICATIONS  (PRN):  acetaminophen     Tablet .. 650 milliGRAM(s) Oral every 6 hours PRN Temp greater or equal to 38C (100.4F)  aluminum hydroxide/magnesium hydroxide/simethicone Suspension 30 milliLiter(s) Oral every 4 hours PRN Dyspepsia  bismuth subsalicylate Liquid 30 milliLiter(s) Oral every 4 hours PRN abdominal pain  and diarrhea  magnesium hydroxide Suspension 30 milliLiter(s) Oral daily PRN Constipation  polyethylene glycol 3350 17 Gram(s) Oral daily PRN Constipation        RECENT LABS/IMAGING      04-27    143  |  104  |  17  ----------------------------<  178<H>  4.6   |  27  |  0.7    Ca    9.5      27 Apr 2022 04:30  Mg     1.9                                10.6   4.94  )-----------( 243      ( 25 Apr 2022 07:36 )             33.2     04-25    144  |  107  |  17  ----------------------------<  121<H>  5.3<H>   |  26  |  0.8

## 2022-04-29 PROBLEM — E11.9 TYPE 2 DIABETES MELLITUS WITHOUT COMPLICATIONS: Chronic | Status: ACTIVE | Noted: 2022-04-19

## 2022-04-29 LAB
GLUCOSE BLDC GLUCOMTR-MCNC: 113 MG/DL — HIGH (ref 70–99)
GLUCOSE BLDC GLUCOMTR-MCNC: 134 MG/DL — HIGH (ref 70–99)

## 2022-04-29 RX ADMIN — APIXABAN 5 MILLIGRAM(S): 2.5 TABLET, FILM COATED ORAL at 07:05

## 2022-04-29 RX ADMIN — LOSARTAN POTASSIUM 25 MILLIGRAM(S): 100 TABLET, FILM COATED ORAL at 07:06

## 2022-04-29 RX ADMIN — DULOXETINE HYDROCHLORIDE 30 MILLIGRAM(S): 30 CAPSULE, DELAYED RELEASE ORAL at 07:06

## 2022-04-29 RX ADMIN — Medication 5 MILLIGRAM(S): at 22:55

## 2022-04-29 RX ADMIN — METFORMIN HYDROCHLORIDE 500 MILLIGRAM(S): 850 TABLET ORAL at 08:30

## 2022-04-29 RX ADMIN — PANTOPRAZOLE SODIUM 40 MILLIGRAM(S): 20 TABLET, DELAYED RELEASE ORAL at 07:45

## 2022-04-29 RX ADMIN — DULOXETINE HYDROCHLORIDE 30 MILLIGRAM(S): 30 CAPSULE, DELAYED RELEASE ORAL at 17:23

## 2022-04-29 RX ADMIN — ATORVASTATIN CALCIUM 80 MILLIGRAM(S): 80 TABLET, FILM COATED ORAL at 22:55

## 2022-04-29 RX ADMIN — Medication 112 MICROGRAM(S): at 07:06

## 2022-04-29 RX ADMIN — APIXABAN 5 MILLIGRAM(S): 2.5 TABLET, FILM COATED ORAL at 17:24

## 2022-04-29 RX ADMIN — PANTOPRAZOLE SODIUM 40 MILLIGRAM(S): 20 TABLET, DELAYED RELEASE ORAL at 07:07

## 2022-04-29 NOTE — PROGRESS NOTE ADULT - ATTENDING COMMENTS
I reviewed the chart and examined the patient with the resident and we discussed the findings and treatment plan.  The patient is tolerating the rehab program well. I agree with the findings and treatment plan above, which I modified as indicated. The patient requires 3 hrs a day of acute inpatient rehab. No new complaints. Right sided strength good, limited by sensory awareness and knee buckling. No new complaints. Making slow gains.    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   -MRI Brain w/o contrast showed acute left MCA territory infarct and right temporal cortical infarct  - Apixaban 5mg q12 (for DVT)  - Lipitor 80 mg bedtime   - JOEL showed no PFO or intracardiac thrombosis.     #Dysphagia  - Diet, Easy to Chew:   Consistent Carbohydrate No Snacks  Fiber/Residue Restricted  No Lactose  Free Water Flush Instructions:  SMALL BITES/SIPS; ALLOW TIME TO SWALLOW; SIT UPRIGHT DURING MEALS AND FOR 2 HOURS AFTER; CONTINUE LOW RESIDUE LACTOSE-FREE UNTIL DIARRHEA RESOLVES--CAN HAVE LACTOSE-FREE MILK (04-23-22 @ 14:45) [Active]  - SLP following    #HTN  - Goal -150  - Losartan 25mg daily  - JOEL completed, LVEF 65%, no PFO  - ILR placed  - Running on high side. May need to increase Losartan if it continues    #Dyspnea on exertion  - Obesity and debility. Lungs clear  - Continue to Monitor     #HLD  - c/w lipitor 80mg qhs     #DM2 - well controlled  - A1C results: 7.5%  - Metformin 500mg BID    #DM Neuropathy  -DULoxetine 30 mg oral delayed release PO Daily    #GERD  - PPI    #Hypothyroid  -C/w levothyroxine 112 mcg    #RLE DVT  - US Duplex showed +DVT of the right peroneal vein and a branch of the right posterior tibial vein  - Continue with Eliquis 5 mg BID     -GI/Bowel Mgmt:  No active issues at this time  Miralax    -Bladder management:   No active issues at this time      Precautions / PROPHYLAXIS:      - Falls      - DVT prophylaxis: Has RLE DVT,  Eliquis

## 2022-04-29 NOTE — PROGRESS NOTE ADULT - SUBJECTIVE AND OBJECTIVE BOX
Patient is a 76y old  Female who presents with a chief complaint of Rehab for B/L CVA with R hemiplegia (dominant), dysphagia, aphasia, dysarthria (19 Apr 2022 16:13)      HPI:  75 yo female w/ PMH of HTN, hypothyroidism, DM, dm peripheral neuropathy, GERD, obesity who presented to the ED after a fall on 4/10/22. Patient started having R sided arm and leg weakness, and difficulty finding words, but could not recall if symptoms started before or after fall. Patient denied pain, head trauma or LOC but endorsed vomiting a couple times. Stroke code was called upon presentation. CTA showed a left M2 occlusion.  The patient was given tPA and taken for an emergent mechanical thrombectomy, but this was not completed due to dislodgement of clot at the time of procedure. Patient's dysarthria and LLE weakness briefly worsened, but subsequently improved again. Repeat CTH did not reveal any hemorrhage, patient was receiving heparin drip for right sided lower DVT, and now switched to Eliquis 10 BID, 5 mg BID starting 4/22. EP consulted and patient getting a loop recorder     PM&R consulted for admission to  for acute inpatient rehabilitation. Prior to admission, patient was independent with  ADLs and ambulation without an assistive device. Resides w/ sister, pvt home, +JODIE, no stairs in home, +bathtub    Admission LOF evaluated by OT  BM - Max A  Transfers - Mod-Max A  UBD - Max A  LBD - Dependent    Admission LOF evaluated by PT  BM - Max A  Transfers - Max A  Ambulation - Max A standing tolerance 3-5s    Also seen by SLP - FEES completed on 4/14, Results indicate mild pharyngeal dysphagia for puree, easy to chew, and mildly thick liquids; moderate pharyngeal dysphagia for thin liquids. Recommendations for an easy to chew diet w/ thin liquids    Patient is a good candidate for admission to acute inpatient rehabilitation for CVA with right sided hemiplegia (dominant) with comorbidities of dm, htn, hld, hyperthyroidism, obesity, dm peripheral neuropathy, and rle dvt requiring hospital level supervision. She was deemed medically stable for discharge to  on 4/19/22 where she will undergo intensive restorative therapies 3 hours a day for at least 5 days a week with the goal of regaining the patients independence and discharging her home.   (19 Apr 2022 16:13)      TODAY'S SUBJECTIVE & REVIEW OF SYMPTOMS:  Patient was seen this AM with the rehab team while working w/ OT. Patient is making great progress, still experiencing difficulties w/ R knee buckling at times. VSS.     Review of Systems:     Patient denies CP, SOB, weakness, constipation/diarrhea, dizziness, or headache.       PHYSICAL EXAM    ICU Vital Signs Last 24 Hrs  T(C): 36.5 (29 Apr 2022 05:11), Max: 36.8 (28 Apr 2022 21:19)  T(F): 97.7 (29 Apr 2022 05:11), Max: 98.2 (28 Apr 2022 21:19)  HR: 78 (29 Apr 2022 05:11) (78 - 92)  BP: 121/73 (29 Apr 2022 05:11) (121/73 - 168/82)  BP(mean): 93 (28 Apr 2022 21:19) (93 - 93)  ABP: --  ABP(mean): --  RR: 18 (29 Apr 2022 05:11) (18 - 18)  SpO2: --      General:[  x] NAD, Resting Comfortable,   [   ] other:                                HEENT: [ x  ] NC/AT, EOMI, PERRL , Normal Conjunctivae,   [   ] other:  Cardio: [ x  ] RRR, no murmer,   [   ] other:                              Pulm: [ x  ] No Respiratory Distress,  Lungs CTAB,   [   ] other:                       Abdomen: [ x  ]ND/NT, Soft,   [   ] other:    : [ x  ] NO NARANJO CATHETER, [   ] NARANJO CATHETER- no meatal tear, no discharge, [   ] other:                                            MSK: [  x ] No joint swelling, Full ROM,   [   ] other:                                         Ext: [   ]No C/C/E, No calf tenderness,   [ x  ]other:  RLE swelling present. soft, NT  Skin: [   ]intact,   [x   ] other:    skin tear right elbow, ecchymosis right groin/hip                                                               Neurological Examination:  Cognitive: [  x  ] AAO x 3,   [    ]  other:                                                                      Attention:  [   x ] intact,   [    ]  other:                            Mood/Affect: [ x   ] wnl,    [    ]  other:                                                                             Communication: [    ]Fluent, no dysarthria, following commands:  [  x  ] other: dysarthria, difficulty with word finding, able to follow 2 step commands  CN II - XII:  [  x  ] intact,  [    ] other:                                                                                        Motor:   RIGHT UE: [   ] WNL,  [   x] other: 4/5 shoulder, 4+ distal  LEFT    UE: [ x  ] WNL,  [   ] other:  RIGHT LE: [   ] WNL,  [ x  ] other: 4+ prox, 3-/5 dorsiflexion  LEFT    LE: [  x ] WNL,  [   ] other:    Tone: [   x ] wnl,   [    ]  other:  DTRs: [x  ]symmetric, [   ] other: not assessed  Coordination:   [    ] intact,   [  x  ] other:   impaired RUE ftn                                                                        Sensory: [    ] Intact to light touch,   [   x ] other:  decrease sensation to light touch on the RUE and RLE     CLOF  BM Max A  Transfers Mod-MaxA  Standing tolerance - Mod assist II bars 60 sec x 2    MEDICATIONS  (STANDING):  apixaban 5 milliGRAM(s) Oral every 12 hours  atorvastatin 80 milliGRAM(s) Oral at bedtime  DULoxetine 30 milliGRAM(s) Oral every 12 hours  levothyroxine 112 MICROGram(s) Oral daily  losartan 25 milliGRAM(s) Oral daily  melatonin 5 milliGRAM(s) Oral at bedtime  metFORMIN 500 milliGRAM(s) Oral <User Schedule>  pantoprazole    Tablet 40 milliGRAM(s) Oral before breakfast    MEDICATIONS  (PRN):  acetaminophen     Tablet .. 650 milliGRAM(s) Oral every 6 hours PRN Temp greater or equal to 38C (100.4F)  aluminum hydroxide/magnesium hydroxide/simethicone Suspension 30 milliLiter(s) Oral every 4 hours PRN Dyspepsia  bismuth subsalicylate Liquid 30 milliLiter(s) Oral every 4 hours PRN abdominal pain  and diarrhea  magnesium hydroxide Suspension 30 milliLiter(s) Oral daily PRN Constipation  polyethylene glycol 3350 17 Gram(s) Oral daily PRN Constipation            RECENT LABS/IMAGING      04-27    143  |  104  |  17  ----------------------------<  178<H>  4.6   |  27  |  0.7    Ca    9.5      27 Apr 2022 04:30  Mg     1.9                                10.6   4.94  )-----------( 243      ( 25 Apr 2022 07:36 )             33.2     04-25    144  |  107  |  17  ----------------------------<  121<H>  5.3<H>   |  26  |  0.8     Patient is a 76y old  Female who presents with a chief complaint of Rehab for B/L CVA with R hemiplegia (dominant), dysphagia, aphasia, dysarthria (19 Apr 2022 16:13)      HPI:  75 yo female w/ PMH of HTN, hypothyroidism, DM, dm peripheral neuropathy, GERD, obesity who presented to the ED after a fall on 4/10/22. Patient started having R sided arm and leg weakness, and difficulty finding words, but could not recall if symptoms started before or after fall. Patient denied pain, head trauma or LOC but endorsed vomiting a couple times. Stroke code was called upon presentation. CTA showed a left M2 occlusion.  The patient was given tPA and taken for an emergent mechanical thrombectomy, but this was not completed due to dislodgement of clot at the time of procedure. Patient's dysarthria and LLE weakness briefly worsened, but subsequently improved again. Repeat CTH did not reveal any hemorrhage, patient was receiving heparin drip for right sided lower DVT, and now switched to Eliquis 10 BID, 5 mg BID starting 4/22. EP consulted and patient getting a loop recorder     PM&R consulted for admission to  for acute inpatient rehabilitation. Prior to admission, patient was independent with  ADLs and ambulation without an assistive device. Resides w/ sister, pvt home, +JOIDE, no stairs in home, +bathtub    Admission LOF evaluated by OT  BM - Max A  Transfers - Mod-Max A  UBD - Max A  LBD - Dependent    Admission LOF evaluated by PT  BM - Max A  Transfers - Max A  Ambulation - Max A standing tolerance 3-5s    Also seen by SLP - FEES completed on 4/14, Results indicate mild pharyngeal dysphagia for puree, easy to chew, and mildly thick liquids; moderate pharyngeal dysphagia for thin liquids. Recommendations for an easy to chew diet w/ thin liquids    Patient is a good candidate for admission to acute inpatient rehabilitation for CVA with right sided hemiplegia (dominant) with comorbidities of dm, htn, hld, hyperthyroidism, obesity, dm peripheral neuropathy, and rle dvt requiring hospital level supervision. She was deemed medically stable for discharge to  on 4/19/22 where she will undergo intensive restorative therapies 3 hours a day for at least 5 days a week with the goal of regaining the patients independence and discharging her home.   (19 Apr 2022 16:13)      TODAY'S SUBJECTIVE & REVIEW OF SYMPTOMS:  Patient was seen this AM with the rehab team while working w/ OT. Patient is making great progress, still experiencing difficulties w/ R knee buckling at times. VSS.     Review of Systems:     Patient denies CP, SOB, weakness, constipation/diarrhea, dizziness, or headache.   Constiutional:    [x   ] WNL           [   ] poor appetite   [   ] insomnia   [   ] tired   Cardio:                [ x  ] WNL           [   ] CP   [   ] ALVAREZ   [   ] palpitations               Resp:                   [ x  ] WNL           [   ] SOB   [   ] cough   [   ] wheezing   GI:                        [ x  ] WNL           [   ] constipation   [   ] diarrhea   [   ] abdominal pain   [   ] nausea   [   ] emesis                                :                      [x   ] WNL           [   ] NARANJO  [   ] dusuria   [   ] difficulty voiding             Endo:                   [ x  ] WNL          [   ] po;yuria   [   ] temperature intolerance                 Skin:                     [ x  ] WNL          [   ] pain   [   ] wound   [   ] rash   MSK:                    [x   ] WNL          [   ] muscle pain   [   ] joint pain/ stiffness   [   ] muscle tenderness   [   ] swelling   Neuro:                 [   ] WNL     per HPI     [   ] HA   [   ] change in vision   [   ] tremor   [   ] weakness   [   ]dysphagia              Cognitive:           [  x ] WNL           [   ]confusion      Psych:                  [ x  ] WNL           [   ] hallucinations   [   ]agitation   [   ] delusion   [   ]depression        PHYSICAL EXAM    ICU Vital Signs Last 24 Hrs  T(C): 36.5 (29 Apr 2022 05:11), Max: 36.8 (28 Apr 2022 21:19)  T(F): 97.7 (29 Apr 2022 05:11), Max: 98.2 (28 Apr 2022 21:19)  HR: 78 (29 Apr 2022 05:11) (78 - 92)  BP: 121/73 (29 Apr 2022 05:11) (121/73 - 168/82)  BP(mean): 93 (28 Apr 2022 21:19) (93 - 93)  ABP: --  ABP(mean): --  RR: 18 (29 Apr 2022 05:11) (18 - 18)  SpO2: --      General:[  x] NAD, Resting Comfortable,   [   ] other:                                HEENT: [ x  ] NC/AT, EOMI, PERRL , Normal Conjunctivae,   [   ] other:  Cardio: [ x  ] RRR, no murmer,   [   ] other:                              Pulm: [ x  ] No Respiratory Distress,  Lungs CTAB,   [   ] other:                       Abdomen: [ x  ]ND/NT, Soft,   [   ] other:    : [ x  ] NO NARANJO CATHETER, [   ] NARANJO CATHETER- no meatal tear, no discharge, [   ] other:                                            MSK: [  x ] No joint swelling, Full ROM,   [   ] other:                                         Ext: [   ]No C/C/E, No calf tenderness,   [ x  ]other:  RLE swelling present. soft, NT  Skin: [   ]intact,   [x   ] other:    skin tear right elbow, ecchymosis right groin/hip                                                               Neurological Examination:  Cognitive: [  x  ] AAO x 3,   [    ]  other:                                                                      Attention:  [   x ] intact,   [    ]  other:                            Mood/Affect: [ x   ] wnl,    [    ]  other:                                                                             Communication: [    ]Fluent, no dysarthria, following commands:  [  x  ] other: dysarthria, difficulty with word finding, able to follow 2 step commands  CN II - XII:  [  x  ] intact,  [    ] other:                                                                                        Motor:   RIGHT UE: [   ] WNL,  [   x] other: 4+/5 shoulder, 4+ distal  LEFT    UE: [ x  ] WNL,  [   ] other:  RIGHT LE: [   ] WNL,  [ x  ] other: 4+ prox, 4-/5 dorsiflexion  LEFT    LE: [  x ] WNL,  [   ] other:    Tone: [   x ] wnl,   [    ]  other:  DTRs: [x  ]symmetric, [   ] other: not assessed  Coordination:   [    ] intact,   [  x  ] other:   impaired RUE ftn                                                                        Sensory: [    ] Intact to light touch,   [   x ] other:  decrease sensation to light touch on the RUE and RLE and proprioception    CLOF  BM Max A  Transfers Mod-MaxA  Standing tolerance - Mod assist II bars 60 sec x 2    MEDICATIONS  (STANDING):  apixaban 5 milliGRAM(s) Oral every 12 hours  atorvastatin 80 milliGRAM(s) Oral at bedtime  DULoxetine 30 milliGRAM(s) Oral every 12 hours  levothyroxine 112 MICROGram(s) Oral daily  losartan 25 milliGRAM(s) Oral daily  melatonin 5 milliGRAM(s) Oral at bedtime  metFORMIN 500 milliGRAM(s) Oral <User Schedule>  pantoprazole    Tablet 40 milliGRAM(s) Oral before breakfast    MEDICATIONS  (PRN):  acetaminophen     Tablet .. 650 milliGRAM(s) Oral every 6 hours PRN Temp greater or equal to 38C (100.4F)  aluminum hydroxide/magnesium hydroxide/simethicone Suspension 30 milliLiter(s) Oral every 4 hours PRN Dyspepsia  bismuth subsalicylate Liquid 30 milliLiter(s) Oral every 4 hours PRN abdominal pain  and diarrhea  magnesium hydroxide Suspension 30 milliLiter(s) Oral daily PRN Constipation  polyethylene glycol 3350 17 Gram(s) Oral daily PRN Constipation            RECENT LABS/IMAGING      04-27    143  |  104  |  17  ----------------------------<  178<H>  4.6   |  27  |  0.7    Ca    9.5      27 Apr 2022 04:30  Mg     1.9                                10.6   4.94  )-----------( 243      ( 25 Apr 2022 07:36 )             33.2     04-25    144  |  107  |  17  ----------------------------<  121<H>  5.3<H>   |  26  |  0.8

## 2022-04-29 NOTE — PROGRESS NOTE ADULT - ASSESSMENT
ASSESSMENT/PLAN:    77 yo F with a bilateral acute infacts including acute left MCA territory and right temporal cortical with a right hemiparesis, aphasia dysphagia and dysarthria with comorbidities including untreated NIDDM, obesity, diabetic peripheral neuropathy, HTN, obesity, HTN, HLD, GERD, hypothyroidism and RLE DVT on Eliquis.   She warrants acute rehab with 3 hours of daily therapies at least 5 out of 7 days including PT, OT and Speech. Additionally she requires close physiatry follow up for CVA and the many complex comorbdities.      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   -MRI Brain w/o contrast showed acute left MCA territory infarct and right temporal cortical infarct  - Apixaban 5mg q12 (for DVT)  - Lipitor 80 mg bedtime   - JOEL showed no PFO or intracardiac thrombosis.     #Dysphagia  - EZ to chew diet  - SLP following    #HTN  - Goal -150  - Losartan 25mg daily  - JOEL completed, LVEF 65%, no PFO  - ILR placed  - Normotensive today     #Dyspnea on exertion  - Continue to Monitor     #HLD  - c/w lipitor 80mg qhs     #DM2 - well controlled  - A1C results: 7.5%  - Metformin 500mg BID    #DM Neuropathy  -DULoxetine 30 mg oral delayed release PO Daily    #GERD  - PPI    #Hypothyroid  -C/w levothyroxine 112 mcg    #RLE DVT  - US Duplex showed +DVT of the right peroneal vein and a branch of the right posterior tibial vein  - Continue with Eliquis 5 mg BID starting 4/22.   - c/w eliquis 5mg bid     -Pain control:   Tylenol prn    -GI/Bowel Mgmt:  No active issues at this time  Miralax    -Bladder management:   No active issues at this time    -Diet, Easy to Chew:   Consistent Carbohydrate No Snacks  Fiber/Residue Restricted  No Lactose  Free Water Flush Instructions:  SMALL BITES/SIPS; ALLOW TIME TO SWALLOW; SIT UPRIGHT DURING MEALS AND FOR 2 HOURS AFTER; CONTINUE LOW RESIDUE LACTOSE-FREE UNTIL DIARRHEA RESOLVES--CAN HAVE LACTOSE-FREE MILK (04-23-22 @ 14:45) [Active]      Precautions / PROPHYLAXIS:      - Falls      - DVT prophylaxis: Has RLE DVT,  Eliquis   ASSESSMENT/PLAN:    75 yo F with a bilateral acute infacts including acute left MCA territory and right temporal cortical with a right hemiparesis, aphasia dysphagia and dysarthria with comorbidities including untreated NIDDM, obesity, diabetic peripheral neuropathy, HTN, obesity, HTN, HLD, GERD, hypothyroidism and RLE DVT on Eliquis.   She warrants acute rehab with 3 hours of daily therapies at least 5 out of 7 days including PT, OT and Speech. Additionally she requires close physiatry follow up for CVA and the many complex comorbdities.      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   -MRI Brain w/o contrast showed acute left MCA territory infarct and right temporal cortical infarct  - Apixaban 5mg q12 (for DVT)  - Lipitor 80 mg bedtime   - JOEL showed no PFO or intracardiac thrombosis.     #Dysphagia  - Diet, Easy to Chew:   Consistent Carbohydrate No Snacks  Fiber/Residue Restricted  No Lactose  Free Water Flush Instructions:  SMALL BITES/SIPS; ALLOW TIME TO SWALLOW; SIT UPRIGHT DURING MEALS AND FOR 2 HOURS AFTER; CONTINUE LOW RESIDUE LACTOSE-FREE UNTIL DIARRHEA RESOLVES--CAN HAVE LACTOSE-FREE MILK (04-23-22 @ 14:45) [Active]  - SLP following    #HTN  - Goal -150  - Losartan 25mg daily  - JOEL completed, LVEF 65%, no PFO  - ILR placed  - Running on high side. May need to increase Losartan if it continues    #Dyspnea on exertion  - Obesity and debility. Lungs clear  - Continue to Monitor     #HLD  - c/w lipitor 80mg qhs     #DM2 - well controlled  - A1C results: 7.5%  - Metformin 500mg BID    #DM Neuropathy  -DULoxetine 30 mg oral delayed release PO Daily    #GERD  - PPI    #Hypothyroid  -C/w levothyroxine 112 mcg    #RLE DVT  - US Duplex showed +DVT of the right peroneal vein and a branch of the right posterior tibial vein  - Continue with Eliquis 5 mg BID     -GI/Bowel Mgmt:  No active issues at this time  Miralax    -Bladder management:   No active issues at this time      Precautions / PROPHYLAXIS:      - Falls      - DVT prophylaxis: Has RLE DVT,  Eliquis

## 2022-04-29 NOTE — CHART NOTE - NSCHARTNOTEFT_GEN_A_CORE
Registered Dietitian Follow-Up     Patient Profile Reviewed                           Yes [x]   No []     Nutrition History Previously Obtained        Yes [x]  No []       Pertinent Subjective Information: Pt reports eating as much as she can despite not liking meals provided. Pt does not like sweet breakfasts such as french toast; makes her nauseous. Oatmeal, banana and tea is preferred in the morning. Pt reports she will eat only if she likes the ingredients.      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), dysphagia, aphasia, dysarthria  #Dysphagia - SLP following  #HTN  #Dyspnea on exertion  #HLD  #DM2 - well controlled  #DM Neuropathy  #GERD  #Hypothyroid  #RLE DVT    Diet order:  Diet, Easy to Chew:   Consistent Carbohydrate {No Snacks}  Fiber/Residue Restricted  No Lactose  Free Water Flush Instructions:  SMALL BITES/SIPS; ALLOW TIME TO SWALLOW; SIT UPRIGHT DURING MEALS AND FOR 2 HOURS AFTER; CONTINUE LOW RESIDUE LACTOSE-FREE UNTIL DIARRHEA RESOLVES--CAN HAVE LACTOSE-FREE MILK (04-23-22 @ 14:45) [Active]     Anthropometrics:  - Ht: 152.9 cm  - Wt: 112.8 KG (4/19)  - BMI: 48.3  - IBW: 45.4 KG     Pertinent Lab Data:  04/25 @ 07:36 - RBC 4.01; H/H 10.6/33.2; K+ 5.3;     CAPILLARY BLOOD GLUCOSE:  POCT Blood Glucose.: 134 mg/dL (29 Apr 2022 07:33)  POCT Blood Glucose.: 114 mg/dL (28 Apr 2022 16:35)    Pertinent Meds:  MEDICATIONS  (STANDING):  apixaban 5 milliGRAM(s) Oral every 12 hours  atorvastatin 80 milliGRAM(s) Oral at bedtime  DULoxetine 30 milliGRAM(s) Oral every 12 hours  levothyroxine 112 MICROGram(s) Oral daily  losartan 25 milliGRAM(s) Oral daily  melatonin 5 milliGRAM(s) Oral at bedtime  metFORMIN 500 milliGRAM(s) Oral <User Schedule>  pantoprazole    Tablet 40 milliGRAM(s) Oral before breakfast    MEDICATIONS  (PRN):  aluminum hydroxide/magnesium hydroxide/simethicone Suspension 30 milliLiter(s) Oral every 4 hours PRN Dyspepsia  bismuth subsalicylate Liquid 30 milliLiter(s) Oral every 4 hours PRN abdominal pain  and diarrhea  magnesium hydroxide Suspension 30 milliLiter(s) Oral daily PRN Constipation  polyethylene glycol 3350 17 Gram(s) Oral daily PRN Constipation    Physical Findings:  - Appearance: No edema noted per flowsheet.  - GI function: Obese, soft/nontender per flowsheet; last BM 4/28 - 1x per pt.  - Tubes: No feeding tubes.  - Oral/Mouth cavity: Pt tolerating an easy to chew diet order per SLP recs.   - Skin: Bruised (ecchymotic); Intact.  - Cognitive: AAOx4     Nutrition Requirements  Weight Used: Using .8 KG     Estimated Energy Needs    Continue [x]  Adjust []  ENERGY: 4849-1382 kcal/day (MSJ 1-1.1 AF)     Estimated Protein Needs    Continue [x]  Adjust [] - used IBW for pro  PROTEIN: 54-59 g/day (1.2-1.3 g/kg)     Estimated Fluid Needs        Continue [x]  Adjust []  FLUID: 8226-3339 mL/day (1 mL/kcal)     Nutrient Intake: PO intake varies from fair to good; consumed % of meals today.     [x] Previous Nutrition Diagnosis: Dysphagia            [x] Ongoing          [] Resolved    [] No active nutrition diagnosis identified at this time     Goal/Expected Outcome: Pt to meet >75% of estimated energy needs within 4-6 days.     Indicator/Monitoring: Diet order, PO intake, weights, labs, NFPF, body composition and BM.    Recommendations:  1. Continue on current diet order per SLP recs.  2. Encourage PO intake and assist during meals as needed.  3. Pt is at mod risk; will f/u in 4-6 days.

## 2022-04-30 LAB — GLUCOSE BLDC GLUCOMTR-MCNC: 137 MG/DL — HIGH (ref 70–99)

## 2022-04-30 RX ADMIN — PANTOPRAZOLE SODIUM 40 MILLIGRAM(S): 20 TABLET, DELAYED RELEASE ORAL at 06:34

## 2022-04-30 RX ADMIN — ATORVASTATIN CALCIUM 80 MILLIGRAM(S): 80 TABLET, FILM COATED ORAL at 21:14

## 2022-04-30 RX ADMIN — LOSARTAN POTASSIUM 25 MILLIGRAM(S): 100 TABLET, FILM COATED ORAL at 06:34

## 2022-04-30 RX ADMIN — DULOXETINE HYDROCHLORIDE 30 MILLIGRAM(S): 30 CAPSULE, DELAYED RELEASE ORAL at 17:41

## 2022-04-30 RX ADMIN — METFORMIN HYDROCHLORIDE 500 MILLIGRAM(S): 850 TABLET ORAL at 07:59

## 2022-04-30 RX ADMIN — APIXABAN 5 MILLIGRAM(S): 2.5 TABLET, FILM COATED ORAL at 17:41

## 2022-04-30 RX ADMIN — APIXABAN 5 MILLIGRAM(S): 2.5 TABLET, FILM COATED ORAL at 06:34

## 2022-04-30 RX ADMIN — Medication 5 MILLIGRAM(S): at 21:14

## 2022-04-30 RX ADMIN — DULOXETINE HYDROCHLORIDE 30 MILLIGRAM(S): 30 CAPSULE, DELAYED RELEASE ORAL at 06:34

## 2022-04-30 RX ADMIN — Medication 112 MICROGRAM(S): at 06:34

## 2022-04-30 NOTE — PROGRESS NOTE ADULT - SUBJECTIVE AND OBJECTIVE BOX
T(C): 36.5 (04-29-22 @ 21:08), Max: 36.7 (04-29-22 @ 14:55)  HR: 80 (04-29-22 @ 21:08) (80 - 91)  BP: 102/49 (04-29-22 @ 21:08) (102/49 - 130/79)  RR: 18 (04-29-22 @ 21:08) (18 - 18)  SpO2: --      Patient was stable overnight and expresses no new complaints     PE:    Alert   LUNGS- clear  COR- RRR  ABD- SOFT, NT  EXTR- w/o edema  NEURO- stable

## 2022-05-01 LAB — GLUCOSE BLDC GLUCOMTR-MCNC: 133 MG/DL — HIGH (ref 70–99)

## 2022-05-01 RX ADMIN — METFORMIN HYDROCHLORIDE 500 MILLIGRAM(S): 850 TABLET ORAL at 07:41

## 2022-05-01 RX ADMIN — Medication 112 MICROGRAM(S): at 05:35

## 2022-05-01 RX ADMIN — APIXABAN 5 MILLIGRAM(S): 2.5 TABLET, FILM COATED ORAL at 05:35

## 2022-05-01 RX ADMIN — DULOXETINE HYDROCHLORIDE 30 MILLIGRAM(S): 30 CAPSULE, DELAYED RELEASE ORAL at 17:27

## 2022-05-01 RX ADMIN — LOSARTAN POTASSIUM 25 MILLIGRAM(S): 100 TABLET, FILM COATED ORAL at 05:35

## 2022-05-01 RX ADMIN — PANTOPRAZOLE SODIUM 40 MILLIGRAM(S): 20 TABLET, DELAYED RELEASE ORAL at 05:41

## 2022-05-01 RX ADMIN — ATORVASTATIN CALCIUM 80 MILLIGRAM(S): 80 TABLET, FILM COATED ORAL at 22:03

## 2022-05-01 RX ADMIN — APIXABAN 5 MILLIGRAM(S): 2.5 TABLET, FILM COATED ORAL at 17:27

## 2022-05-01 RX ADMIN — DULOXETINE HYDROCHLORIDE 30 MILLIGRAM(S): 30 CAPSULE, DELAYED RELEASE ORAL at 05:35

## 2022-05-01 RX ADMIN — Medication 5 MILLIGRAM(S): at 22:03

## 2022-05-01 NOTE — PROGRESS NOTE ADULT - ASSESSMENT
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   She warrants acute rehab with 3 hours of daily therapies at least 5 out of 7 days including PT, OT and Speech. Additionally she requires close physiatry follow up for CVA and the many complex comorbdities.      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   -MRI Brain w/o contrast showed acute left MCA territory infarct and right temporal cortical infarct  - Apixaban 5mg q12 (for DVT)  - Lipitor 80 mg bedtime   - JOEL showed no PFO or intracardiac thrombosis.     #Dysphagia  - Diet, Easy to Chew:   Consistent Carbohydrate No Snacks  Fiber/Residue Restricted  No Lactose  Free Water Flush Instructions:  SMALL BITES/SIPS; ALLOW TIME TO SWALLOW; SIT UPRIGHT DURING MEALS AND FOR 2 HOURS AFTER; CONTINUE LOW RESIDUE LACTOSE-FREE UNTIL DIARRHEA RESOLVES--CAN HAVE LACTOSE-FREE MILK (04-23-22 @ 14:45) [Active]  - SLP following    #HTN  - Goal -150  - Losartan 25mg daily  - JOEL completed, LVEF 65%, no PFO  - ILR placed  - Running on high side. May need to increase Losartan if it continues    #Dyspnea on exertion  - Obesity and debility. Lungs clear  - Continue to Monitor     #HLD  - c/w lipitor 80mg qhs     #DM2 - well controlled  - A1C results: 7.5%  - Metformin 500mg BID    #DM Neuropathy  -DULoxetine 30 mg oral delayed release PO Daily    #GERD  - PPI    #Hypothyroid  -C/w levothyroxine 112 mcg    #RLE DVT  - US Duplex showed +DVT of the right peroneal vein and a branch of the right posterior tibial vein  - Continue with Eliquis 5 mg BID     -GI/Bowel Mgmt:  No active issues at this time  Miralax    -Bladder management:   No active issues at this time      Precautions / PROPHYLAXIS:      - Falls      - DVT prophylaxis: Has RLE DVT,  Eliquis

## 2022-05-01 NOTE — PROGRESS NOTE ADULT - ATTENDING COMMENTS
I reviewed the chart and examined the patient with the resident and we discussed the findings and treatment plan.  The patient is tolerating the rehab program well. I agree with the findings and treatment plan above, which I modified as indicated. The patient requires 3 hrs a day of acute inpatient rehab. No new complaints. Having BMs. Transfers with max assistance. Neuro exam stable. VSS.    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   -MRI Brain w/o contrast showed acute left MCA territory infarct and right temporal cortical infarct  - Apixaban 5mg q12 (for DVT)  - Lipitor 80 mg bedtime   - JOEL showed no PFO or intracardiac thrombosis.     #Dysphagia  - Diet, Easy to Chew:   Consistent Carbohydrate No Snacks  Fiber/Residue Restricted  No Lactose  Free Water Flush Instructions:  SMALL BITES/SIPS; ALLOW TIME TO SWALLOW; SIT UPRIGHT DURING MEALS AND FOR 2 HOURS AFTER; CONTINUE LOW RESIDUE LACTOSE-FREE UNTIL DIARRHEA RESOLVES--CAN HAVE LACTOSE-FREE MILK (04-23-22 @ 14:45) [Active]  - SLP following    #HTN  - Goal -150  - Losartan 25mg daily  - JOEL completed, LVEF 65%, no PFO  - ILR placed  - Running on high side. May need to increase Losartan if it continues    #Dyspnea on exertion  - Obesity and debility. Lungs clear  - Continue to Monitor     #HLD  - c/w lipitor 80mg qhs     #DM2 - well controlled  - A1C results: 7.5%  - Metformin 500mg BID    #DM Neuropathy  -DULoxetine 30 mg oral delayed release PO Daily    #GERD  - PPI    #Hypothyroid  -C/w levothyroxine 112 mcg    #RLE DVT  - US Duplex showed +DVT of the right peroneal vein and a branch of the right posterior tibial vein  - Continue with Eliquis 5 mg BID     -GI/Bowel Mgmt:  No active issues at this time  Miralax    -Bladder management:   No active issues at this time      Precautions / PROPHYLAXIS:      - Falls      - DVT prophylaxis: Has RLE DVT,  Eliquis

## 2022-05-01 NOTE — PROGRESS NOTE ADULT - SUBJECTIVE AND OBJECTIVE BOX
Patient is a 76y old  Female who presents with a chief complaint of Rehab for B/L CVA with R hemiplegia (dominant), dysphagia, aphasia, dysarthria (19 Apr 2022 16:13)      HPI:  75 yo female w/ PMH of HTN, hypothyroidism, DM, dm peripheral neuropathy, GERD, obesity who presented to the ED after a fall on 4/10/22. Patient started having R sided arm and leg weakness, and difficulty finding words, but could not recall if symptoms started before or after fall. Patient denied pain, head trauma or LOC but endorsed vomiting a couple times. Stroke code was called upon presentation. CTA showed a left M2 occlusion.  The patient was given tPA and taken for an emergent mechanical thrombectomy, but this was not completed due to dislodgement of clot at the time of procedure. Patient's dysarthria and LLE weakness briefly worsened, but subsequently improved again. Repeat CTH did not reveal any hemorrhage, patient was receiving heparin drip for right sided lower DVT, and now switched to Eliquis 10 BID, 5 mg BID starting 4/22. EP consulted and patient getting a loop recorder     PM&R consulted for admission to  for acute inpatient rehabilitation. Prior to admission, patient was independent with  ADLs and ambulation without an assistive device. Resides w/ sister, pvt home, +JODIE, no stairs in home, +bathtub    Admission LOF evaluated by OT  BM - Max A  Transfers - Mod-Max A  UBD - Max A  LBD - Dependent    Admission LOF evaluated by PT  BM - Max A  Transfers - Max A  Ambulation - Max A standing tolerance 3-5s    Also seen by SLP - FEES completed on 4/14, Results indicate mild pharyngeal dysphagia for puree, easy to chew, and mildly thick liquids; moderate pharyngeal dysphagia for thin liquids. Recommendations for an easy to chew diet w/ thin liquids    Patient is a good candidate for admission to acute inpatient rehabilitation for CVA with right sided hemiplegia (dominant) with comorbidities of dm, htn, hld, hyperthyroidism, obesity, dm peripheral neuropathy, and rle dvt requiring hospital level supervision. She was deemed medically stable for discharge to  on 4/19/22 where she will undergo intensive restorative therapies 3 hours a day for at least 5 days a week with the goal of regaining the patients independence and discharging her home.   (19 Apr 2022 16:13)      TODAY'S SUBJECTIVE & REVIEW OF SYMPTOMS:  Patient was seen this AM. VSS. No acute overnight events.     Review of Systems:     Patient denies CP, SOB, weakness, constipation/diarrhea, dizziness, or headache.   Constitutional    [x ] WNL           [   ] poor appetite   [   ] insomnia   [   ] tired   Cardio:                [ x  ] WNL           [   ] CP   [   ] ALVAREZ   [   ] palpitations               Resp:                   [ x  ] WNL           [   ] SOB   [   ] cough   [   ] wheezing   GI:                        [ x  ] WNL           [   ] constipation   [   ] diarrhea   [   ] abdominal pain   [   ] nausea   [   ] emesis                                :                      [x   ] WNL           [   ] NARANJO  [   ] dysuria   [   ] difficulty voiding             Endo:                   [ x  ] WNL          [   ] polyuria   [   ] temperature intolerance                 Skin:                     [ x  ] WNL          [   ] pain   [   ] wound   [   ] rash   MSK:                    [x   ] WNL          [   ] muscle pain   [   ] joint pain/ stiffness   [   ] muscle tenderness   [   ] swelling   Neuro:                 [   ] WNL     per HPI     [   ] HA   [   ] change in vision   [   ] tremor   [   ] weakness   [   ]dysphagia              Cognitive:           [  x ] WNL           [   ]confusion      Psych:                  [ x  ] WNL           [   ] hallucinations   [   ]agitation   [   ] delusion   [   ]depression        PHYSICAL EXAM    ICU Vital Signs Last 24 Hrs  T(C): 36.4 (01 May 2022 06:09), Max: 37.1 (30 Apr 2022 20:55)  T(F): 97.5 (01 May 2022 06:09), Max: 98.7 (30 Apr 2022 20:55)  HR: 81 (01 May 2022 06:09) (76 - 81)  BP: 131/60 (01 May 2022 06:09) (124/72 - 131/60)  BP(mean): --  ABP: --  ABP(mean): --  RR: 20 (01 May 2022 06:09) (18 - 20)  SpO2: --        General:[  x] NAD, Resting Comfortable,   [   ] other:                                HEENT: [ x  ] NC/AT, EOMI, PERRL , Normal Conjunctivae,   [   ] other:  Cardio: [ x  ] RRR, no murmer,   [   ] other:                              Pulm: [ x  ] No Respiratory Distress,  Lungs CTAB,   [   ] other:                       Abdomen: [ x  ]ND/NT, Soft,   [   ] other:    : [ x  ] NO NARANJO CATHETER, [   ] NARANJO CATHETER- no meatal tear, no discharge, [   ] other:                                            MSK: [  x ] No joint swelling, Full ROM,   [   ] other:                                         Ext: [   ]No C/C/E, No calf tenderness,   [ x  ]other:  RLE swelling present. soft, NT  Skin: [   ]intact,   [x   ] other:    skin tear right elbow, ecchymosis right groin/hip                                                               Neurological Examination:  Cognitive: [  x  ] AAO x 3,   [    ]  other:                                                                      Attention:  [   x ] intact,   [    ]  other:                            Mood/Affect: [ x   ] wnl,    [    ]  other:                                                                             Communication: [    ]Fluent, no dysarthria, following commands:  [  x  ] other: dysarthria, difficulty with word finding, able to follow 2 step commands  CN II - XII:  [  x  ] intact,  [    ] other:                                                                                        Motor:   RIGHT UE: [   ] WNL,  [   x] other: 4+/5 shoulder, 4+ distal  LEFT    UE: [ x  ] WNL,  [   ] other:  RIGHT LE: [   ] WNL,  [ x  ] other: 4+ prox, 4-/5 dorsiflexion  LEFT    LE: [  x ] WNL,  [   ] other:    Tone: [   x ] wnl,   [    ]  other:  DTRs: [x  ]symmetric, [   ] other: not assessed  Coordination:   [    ] intact,   [  x  ] other:   impaired RUE ftn                                                                        Sensory: [    ] Intact to light touch,   [   x ] other:  decrease sensation to light touch on the RUE and RLE and proprioception    CLOF  BM Max A  Transfers Mod-MaxA  Standing tolerance - Mod assist II bars 60 sec x 2    MEDICATIONS  (STANDING):  apixaban 5 milliGRAM(s) Oral every 12 hours  atorvastatin 80 milliGRAM(s) Oral at bedtime  DULoxetine 30 milliGRAM(s) Oral every 12 hours  levothyroxine 112 MICROGram(s) Oral daily  losartan 25 milliGRAM(s) Oral daily  melatonin 5 milliGRAM(s) Oral at bedtime  metFORMIN 500 milliGRAM(s) Oral <User Schedule>  pantoprazole    Tablet 40 milliGRAM(s) Oral before breakfast    MEDICATIONS  (PRN):  acetaminophen     Tablet .. 650 milliGRAM(s) Oral every 6 hours PRN Temp greater or equal to 38C (100.4F)  aluminum hydroxide/magnesium hydroxide/simethicone Suspension 30 milliLiter(s) Oral every 4 hours PRN Dyspepsia  bismuth subsalicylate Liquid 30 milliLiter(s) Oral every 4 hours PRN abdominal pain  and diarrhea  magnesium hydroxide Suspension 30 milliLiter(s) Oral daily PRN Constipation  polyethylene glycol 3350 17 Gram(s) Oral daily PRN Constipation        RECENT LABS/IMAGING  POCT Blood Glucose.: 137 mg/dL (04-30-22 @ 07:32)  POCT Blood Glucose.: 113 mg/dL (04-29-22 @ 16:37)  POCT Blood Glucose.: 134 mg/dL (04-29-22 @ 07:33)  POCT Blood Glucose.: 114 mg/dL (04-28-22 @ 16:35)  POCT Blood Glucose.: 148 mg/dL (04-28-22 @ 07:27)    04-27    143  |  104  |  17  ----------------------------<  178<H>  4.6   |  27  |  0.7    Ca    9.5      27 Apr 2022 04:30  Mg     1.9                                10.6   4.94  )-----------( 243      ( 25 Apr 2022 07:36 )             33.2     04-25    144  |  107  |  17  ----------------------------<  121<H>  5.3<H>   |  26  |  0.8

## 2022-05-02 LAB
ALBUMIN SERPL ELPH-MCNC: 4 G/DL — SIGNIFICANT CHANGE UP (ref 3.5–5.2)
ALP SERPL-CCNC: 70 U/L — SIGNIFICANT CHANGE UP (ref 30–115)
ALT FLD-CCNC: 15 U/L — SIGNIFICANT CHANGE UP (ref 0–41)
ANION GAP SERPL CALC-SCNC: 11 MMOL/L — SIGNIFICANT CHANGE UP (ref 7–14)
AST SERPL-CCNC: 17 U/L — SIGNIFICANT CHANGE UP (ref 0–41)
BASOPHILS # BLD AUTO: 0.02 K/UL — SIGNIFICANT CHANGE UP (ref 0–0.2)
BASOPHILS NFR BLD AUTO: 0.5 % — SIGNIFICANT CHANGE UP (ref 0–1)
BILIRUB SERPL-MCNC: 1.3 MG/DL — HIGH (ref 0.2–1.2)
BUN SERPL-MCNC: 14 MG/DL — SIGNIFICANT CHANGE UP (ref 10–20)
CALCIUM SERPL-MCNC: 9.6 MG/DL — SIGNIFICANT CHANGE UP (ref 8.5–10.1)
CHLORIDE SERPL-SCNC: 107 MMOL/L — SIGNIFICANT CHANGE UP (ref 98–110)
CO2 SERPL-SCNC: 26 MMOL/L — SIGNIFICANT CHANGE UP (ref 17–32)
CREAT SERPL-MCNC: 0.7 MG/DL — SIGNIFICANT CHANGE UP (ref 0.7–1.5)
EGFR: 91 ML/MIN/1.73M2 — SIGNIFICANT CHANGE UP
EOSINOPHIL # BLD AUTO: 0.15 K/UL — SIGNIFICANT CHANGE UP (ref 0–0.7)
EOSINOPHIL NFR BLD AUTO: 3.4 % — SIGNIFICANT CHANGE UP (ref 0–8)
GLUCOSE BLDC GLUCOMTR-MCNC: 116 MG/DL — HIGH (ref 70–99)
GLUCOSE BLDC GLUCOMTR-MCNC: 118 MG/DL — HIGH (ref 70–99)
GLUCOSE BLDC GLUCOMTR-MCNC: 151 MG/DL — HIGH (ref 70–99)
GLUCOSE SERPL-MCNC: 143 MG/DL — HIGH (ref 70–99)
HCT VFR BLD CALC: 34.5 % — LOW (ref 37–47)
HGB BLD-MCNC: 11.1 G/DL — LOW (ref 12–16)
IMM GRANULOCYTES NFR BLD AUTO: 0.2 % — SIGNIFICANT CHANGE UP (ref 0.1–0.3)
LYMPHOCYTES # BLD AUTO: 1.17 K/UL — LOW (ref 1.2–3.4)
LYMPHOCYTES # BLD AUTO: 26.8 % — SIGNIFICANT CHANGE UP (ref 20.5–51.1)
MAGNESIUM SERPL-MCNC: 1.9 MG/DL — SIGNIFICANT CHANGE UP (ref 1.8–2.4)
MCHC RBC-ENTMCNC: 26.6 PG — LOW (ref 27–31)
MCHC RBC-ENTMCNC: 32.2 G/DL — SIGNIFICANT CHANGE UP (ref 32–37)
MCV RBC AUTO: 82.5 FL — SIGNIFICANT CHANGE UP (ref 81–99)
MONOCYTES # BLD AUTO: 0.22 K/UL — SIGNIFICANT CHANGE UP (ref 0.1–0.6)
MONOCYTES NFR BLD AUTO: 5 % — SIGNIFICANT CHANGE UP (ref 1.7–9.3)
NEUTROPHILS # BLD AUTO: 2.79 K/UL — SIGNIFICANT CHANGE UP (ref 1.4–6.5)
NEUTROPHILS NFR BLD AUTO: 64.1 % — SIGNIFICANT CHANGE UP (ref 42.2–75.2)
NRBC # BLD: 0 /100 WBCS — SIGNIFICANT CHANGE UP (ref 0–0)
PLATELET # BLD AUTO: 186 K/UL — SIGNIFICANT CHANGE UP (ref 130–400)
POTASSIUM SERPL-MCNC: 4 MMOL/L — SIGNIFICANT CHANGE UP (ref 3.5–5)
POTASSIUM SERPL-SCNC: 4 MMOL/L — SIGNIFICANT CHANGE UP (ref 3.5–5)
PROT SERPL-MCNC: 5.6 G/DL — LOW (ref 6–8)
RBC # BLD: 4.18 M/UL — LOW (ref 4.2–5.4)
RBC # FLD: 14.9 % — HIGH (ref 11.5–14.5)
SARS-COV-2 RNA SPEC QL NAA+PROBE: SIGNIFICANT CHANGE UP
SODIUM SERPL-SCNC: 144 MMOL/L — SIGNIFICANT CHANGE UP (ref 135–146)
WBC # BLD: 4.36 K/UL — LOW (ref 4.8–10.8)
WBC # FLD AUTO: 4.36 K/UL — LOW (ref 4.8–10.8)

## 2022-05-02 RX ADMIN — APIXABAN 5 MILLIGRAM(S): 2.5 TABLET, FILM COATED ORAL at 05:27

## 2022-05-02 RX ADMIN — DULOXETINE HYDROCHLORIDE 30 MILLIGRAM(S): 30 CAPSULE, DELAYED RELEASE ORAL at 17:06

## 2022-05-02 RX ADMIN — PANTOPRAZOLE SODIUM 40 MILLIGRAM(S): 20 TABLET, DELAYED RELEASE ORAL at 05:26

## 2022-05-02 RX ADMIN — DULOXETINE HYDROCHLORIDE 30 MILLIGRAM(S): 30 CAPSULE, DELAYED RELEASE ORAL at 05:27

## 2022-05-02 RX ADMIN — Medication 112 MICROGRAM(S): at 05:27

## 2022-05-02 RX ADMIN — APIXABAN 5 MILLIGRAM(S): 2.5 TABLET, FILM COATED ORAL at 17:06

## 2022-05-02 RX ADMIN — Medication 5 MILLIGRAM(S): at 22:02

## 2022-05-02 RX ADMIN — ATORVASTATIN CALCIUM 80 MILLIGRAM(S): 80 TABLET, FILM COATED ORAL at 22:02

## 2022-05-02 RX ADMIN — LOSARTAN POTASSIUM 25 MILLIGRAM(S): 100 TABLET, FILM COATED ORAL at 05:26

## 2022-05-02 RX ADMIN — METFORMIN HYDROCHLORIDE 500 MILLIGRAM(S): 850 TABLET ORAL at 08:46

## 2022-05-02 NOTE — PROGRESS NOTE ADULT - ATTENDING COMMENTS
I reviewed the chart and examined the patient with the resident and we discussed the findings and treatment plan.  The patient is tolerating the rehab program well. I agree with the findings and treatment plan above, which I modified as indicated. The patient requires 3 hrs a day of acute inpatient rehab. Doing well. No complaints. mobility and strength improving. Transfers and ambulates with mod assistance, short distances. Speech clear. VSS. Scheduled for discharge to [facility] in am.    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   - MRI Brain w/o contrast showed acute left MCA territory infarct and right temporal cortical infarct  - Apixaban 5mg q12 (for DVT)  - Lipitor 80 mg bedtime   - JOEL showed no PFO or intracardiac thrombosis.     #Dysphagia  - Diet, Easy to Chew:  Consistent Carbohydrate No Snacks ; Fiber/Residue Restricted; No Lactose  Free Water Flush Instructions:  SMALL BITES/SIPS; ALLOW TIME TO SWALLOW; SIT UPRIGHT DURING MEALS AND FOR 2 HOURS AFTER; CONTINUE LOW RESIDUE LACTOSE-FREE UNTIL DIARRHEA RESOLVES--CAN HAVE LACTOSE-FREE MILK (04-23-22 @ 14:45) [Active]  - SLP following    #HTN  - Goal -150  - Losartan 25mg daily  - JOEL completed, LVEF 65%, no PFO  - ILR placed  - Running on high side. May need to increase Losartan if it continues    #Dyspnea on exertion  - Obesity and debility. Lungs clear  - Continue to Monitor     #HLD  - c/w lipitor 80mg qhs     #DM2 - well controlled  - A1C results: 7.5%  - Metformin 500mg BID    #DM Neuropathy  -Duloxetine 30 mg oral delayed release PO Daily    #GERD  - PPI    #Hypothyroid  -C/w levothyroxine 112 mcg    #RLE DVT  - US Duplex showed +DVT of the right peroneal vein and a branch of the right posterior tibial vein  - Continue with Eliquis 5 mg BID     -GI/Bowel Mgmt:  No active issues at this time  Miralax    Precautions / PROPHYLAXIS:    - Falls  - DVT prophylaxis: Has RLE DVT,  Eliquis

## 2022-05-02 NOTE — PROGRESS NOTE ADULT - ASSESSMENT
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   She warrants acute rehab with 3 hours of daily therapies at least 5 out of 7 days including PT, OT and Speech. Additionally she requires close physiatry follow up for CVA and the many complex comorbdities.      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   - MRI Brain w/o contrast showed acute left MCA territory infarct and right temporal cortical infarct  - Apixaban 5mg q12 (for DVT)  - Lipitor 80 mg bedtime   - JOEL showed no PFO or intracardiac thrombosis.     #Dysphagia  - Diet, Easy to Chew:  Consistent Carbohydrate No Snacks ; Fiber/Residue Restricted; No Lactose  Free Water Flush Instructions:  SMALL BITES/SIPS; ALLOW TIME TO SWALLOW; SIT UPRIGHT DURING MEALS AND FOR 2 HOURS AFTER; CONTINUE LOW RESIDUE LACTOSE-FREE UNTIL DIARRHEA RESOLVES--CAN HAVE LACTOSE-FREE MILK (04-23-22 @ 14:45) [Active]  - SLP following    #HTN  - Goal -150  - Losartan 25mg daily  - JOEL completed, LVEF 65%, no PFO  - ILR placed  - Running on high side. May need to increase Losartan if it continues    #Dyspnea on exertion  - Obesity and debility. Lungs clear  - Continue to Monitor     #HLD  - c/w lipitor 80mg qhs     #DM2 - well controlled  - A1C results: 7.5%  - Metformin 500mg BID    #DM Neuropathy  -Duloxetine 30 mg oral delayed release PO Daily    #GERD  - PPI    #Hypothyroid  -C/w levothyroxine 112 mcg    #RLE DVT  - US Duplex showed +DVT of the right peroneal vein and a branch of the right posterior tibial vein  - Continue with Eliquis 5 mg BID     -GI/Bowel Mgmt:  No active issues at this time  Miralax    -Bladder management:   No active issues at this time      Precautions / PROPHYLAXIS:    - Falls  - DVT prophylaxis: Has RLE DVT,  Eliquis

## 2022-05-02 NOTE — PROGRESS NOTE ADULT - SUBJECTIVE AND OBJECTIVE BOX
Patient was evaluated by bedside with Dr. Fernandez. Vitals are within normal limits. No acute distress. No complaints this am. Denies n/v, chest pain, shortness of breath, or GI upset. Patient endorses regular bowel and bladder movements. Patient is pending discharge tomorrow.    HPI:  77 yo female w/ PMH of HTN, hypothyroidism, DM, dm peripheral neuropathy, GERD, obesity who presented to the ED after a fall on 4/10/22. Patient started having R sided arm and leg weakness, and difficulty finding words, but could not recall if symptoms started before or after fall. Patient denied pain, head trauma or LOC but endorsed vomiting a couple times. Stroke code was called upon presentation. CTA showed a left M2 occlusion.  The patient was given tPA and taken for an emergent mechanical thrombectomy, but this was not completed due to dislodgement of clot at the time of procedure. Patient's dysarthria and LLE weakness briefly worsened, but subsequently improved again. Repeat CTH did not reveal any hemorrhage, patient was receiving heparin drip for right sided lower DVT, and now switched to Eliquis 10 BID, 5 mg BID starting 4/22. EP consulted and patient getting a loop recorder   PM&R consulted for admission to  for acute inpatient rehabilitation. Prior to admission, patient was independent with  ADLs and ambulation without an assistive device. Resides w/ sister, pvt home, +JODIE, no stairs in home, +bathtub    Admission LOF evaluated by OT  BM - Max A  Transfers - Mod-Max A  UBD - Max A  LBD - Dependent    Admission LOF evaluated by PT  BM - Max A  Transfers - Max A  Ambulation - Max A standing tolerance 3-5s    Also seen by SLP - FEES completed on 4/14, Results indicate mild pharyngeal dysphagia for puree, easy to chew, and mildly thick liquids; moderate pharyngeal dysphagia for thin liquids. Recommendations for an easy to chew diet w/ thin liquids    Patient is a good candidate for admission to acute inpatient rehabilitation for CVA with right sided hemiplegia (dominant) with comorbidities of dm, htn, hld, hyperthyroidism, obesity, dm peripheral neuropathy, and rle dvt requiring hospital level supervision. She was deemed medically stable for discharge to  on 4/19/22 where she will undergo intensive restorative therapies 3 hours a day for at least 5 days a week with the goal of regaining the patients independence and discharging her home.   (19 Apr 2022 16:13)      TODAY'S SUBJECTIVE & REVIEW OF SYMPTOMS:  Patient was seen this AM. VSS. No acute overnight events.     Review of Systems:     Patient denies CP, SOB, weakness, constipation/diarrhea, dizziness, or headache.   Constitutional    [x ] WNL           [   ] poor appetite   [   ] insomnia   [   ] tired   Cardio:                [ x  ] WNL           [   ] CP   [   ] ALVAREZ   [   ] palpitations               Resp:                   [ x  ] WNL           [   ] SOB   [   ] cough   [   ] wheezing   GI:                        [ x  ] WNL           [   ] constipation   [   ] diarrhea   [   ] abdominal pain   [   ] nausea   [   ] emesis                                :                      [x   ] WNL           [   ] NARANJO  [   ] dysuria   [   ] difficulty voiding             Endo:                   [ x  ] WNL          [   ] polyuria   [   ] temperature intolerance                 Skin:                     [ x  ] WNL          [   ] pain   [   ] wound   [   ] rash   MSK:                    [x   ] WNL          [   ] muscle pain   [   ] joint pain/ stiffness   [   ] muscle tenderness   [   ] swelling   Neuro:                 [   ] WNL     per HPI     [   ] HA   [   ] change in vision   [   ] tremor   [   ] weakness   [   ]dysphagia              Cognitive:           [  x ] WNL           [   ]confusion      Psych:                  [ x  ] WNL           [   ] hallucinations   [   ]agitation   [   ] delusion   [   ]depression        PHYSICAL EXAM    Vital Signs Last 24 Hrs  T(C): 36 (02 May 2022 04:40), Max: 36.6 (01 May 2022 17:09)  T(F): 96.8 (02 May 2022 04:40), Max: 97.9 (01 May 2022 17:09)  HR: 82 (02 May 2022 04:40) (82 - 91)  BP: 132/66 (02 May 2022 04:40) (127/58 - 148/67)  BP(mean): --  RR: 18 (02 May 2022 04:40) (18 - 18)  SpO2: 98% (01 May 2022 20:58) (98% - 98%)        General:[  x] NAD, Resting Comfortable,   [   ] other:                                HEENT: [ x  ] NC/AT, EOMI, PERRL , Normal Conjunctivae,   [   ] other:  Cardio: [ x  ] RRR, no murmer,   [   ] other:                              Pulm: [ x  ] No Respiratory Distress,  Lungs CTAB,   [   ] other:                       Abdomen: [ x  ]ND/NT, Soft,   [   ] other:    : [ x  ] NO NARANJO CATHETER, [   ] NARANJO CATHETER- no meatal tear, no discharge, [   ] other:                                            MSK: [  x ] No joint swelling, Full ROM,   [   ] other:                                         Ext: [   ]No C/C/E, No calf tenderness,   [ x  ]other:  RLE swelling present. soft, NT  Skin: [   ]intact,   [x   ] other:    skin tear right elbow, ecchymosis right groin/hip                                                               Neurological Examination:  Cognitive: [  x  ] AAO x 3,   [    ]  other:                                                                      Attention:  [   x ] intact,   [    ]  other:                            Mood/Affect: [ x   ] wnl,    [    ]  other:                                                                             Communication: [    ]Fluent, no dysarthria, following commands:  [  x  ] other: dysarthria, difficulty with word finding, able to follow 2 step commands  CN II - XII:  [  x  ] intact,  [    ] other:                        11.1   4.36  )-----------( 186      ( 02 May 2022 04:30 )             34.5     05-02    144  |  107  |  14  ----------------------------<  143<H>  4.0   |  26  |  0.7    Ca    9.6      02 May 2022 04:30  Mg     1.9     05-02    TPro  5.6<L>  /  Alb  4.0  /  TBili  1.3<H>  /  DBili  x   /  AST  17  /  ALT  15  /  AlkPhos  70  05-02        POCT Blood Glucose.: 151 mg/dL (05-02-22 @ 07:40)  POCT Blood Glucose.: 133 mg/dL (05-01-22 @ 16:14)  POCT Blood Glucose.: 137 mg/dL (04-30-22 @ 07:32)  POCT Blood Glucose.: 113 mg/dL (04-29-22 @ 16:37)  POCT Blood Glucose.: 134 mg/dL (04-29-22 @ 07:33)  POCT Blood Glucose.: 114 mg/dL (04-28-22 @ 16:35)                                                                                        Motor:   RIGHT UE: [   ] WNL,  [   x] other: 4+/5 shoulder, 4+ distal  LEFT    UE: [ x  ] WNL,  [   ] other:  RIGHT LE: [   ] WNL,  [ x  ] other: 4+ prox, 4-/5 dorsiflexion  LEFT    LE: [  x ] WNL,  [   ] other:    Tone: [   x ] wnl,   [    ]  other:  DTRs: [x  ]symmetric, [   ] other: not assessed  Coordination:   [    ] intact,   [  x  ] other:   impaired RUE ftn                                                                        Sensory: [    ] Intact to light touch,   [   x ] other:  decrease sensation to light touch on the RUE and RLE and proprioception    CLOF  BM Max A  Transfers Mod A  Standing tolerance - ambulates with ModA with RW + knee immobilizer    MEDICATIONS  (STANDING):  apixaban 5 milliGRAM(s) Oral every 12 hours  atorvastatin 80 milliGRAM(s) Oral at bedtime  DULoxetine 30 milliGRAM(s) Oral every 12 hours  levothyroxine 112 MICROGram(s) Oral daily  losartan 25 milliGRAM(s) Oral daily  melatonin 5 milliGRAM(s) Oral at bedtime  metFORMIN 500 milliGRAM(s) Oral <User Schedule>  pantoprazole    Tablet 40 milliGRAM(s) Oral before breakfast    MEDICATIONS  (PRN):  acetaminophen     Tablet .. 650 milliGRAM(s) Oral every 6 hours PRN Temp greater or equal to 38C (100.4F)  aluminum hydroxide/magnesium hydroxide/simethicone Suspension 30 milliLiter(s) Oral every 4 hours PRN Dyspepsia  bismuth subsalicylate Liquid 30 milliLiter(s) Oral every 4 hours PRN abdominal pain  and diarrhea  magnesium hydroxide Suspension 30 milliLiter(s) Oral daily PRN Constipation  polyethylene glycol 3350 17 Gram(s) Oral daily PRN Constipation                              11.1   4.36  )-----------( 186      ( 02 May 2022 04:30 )             34.5     05-02    144  |  107  |  14  ----------------------------<  143<H>  4.0   |  26  |  0.7    Ca    9.6      02 May 2022 04:30  Mg     1.9     05-02    TPro  5.6<L>  /  Alb  4.0  /  TBili  1.3<H>  /  DBili  x   /  AST  17  /  ALT  15  /  AlkPhos  70  05-02        POCT Blood Glucose.: 151 mg/dL (05-02-22 @ 07:40)  POCT Blood Glucose.: 133 mg/dL (05-01-22 @ 16:14)  POCT Blood Glucose.: 137 mg/dL (04-30-22 @ 07:32)  POCT Blood Glucose.: 113 mg/dL (04-29-22 @ 16:37)  POCT Blood Glucose.: 134 mg/dL (04-29-22 @ 07:33)  POCT Blood Glucose.: 114 mg/dL (04-28-22 @ 16:35)   HPI:  75 yo female w/ PMH of HTN, hypothyroidism, DM, dm peripheral neuropathy, GERD, obesity who presented to the ED after a fall on 4/10/22. Patient started having R sided arm and leg weakness, and difficulty finding words, but could not recall if symptoms started before or after fall. Patient denied pain, head trauma or LOC but endorsed vomiting a couple times. Stroke code was called upon presentation. CTA showed a left M2 occlusion.  The patient was given tPA and taken for an emergent mechanical thrombectomy, but this was not completed due to dislodgement of clot at the time of procedure. Patient's dysarthria and LLE weakness briefly worsened, but subsequently improved again. Repeat CTH did not reveal any hemorrhage, patient was receiving heparin drip for right sided lower DVT, and now switched to Eliquis 10 BID, 5 mg BID starting 4/22. EP consulted and patient getting a loop recorder   PM&R consulted for admission to  for acute inpatient rehabilitation. Prior to admission, patient was independent with  ADLs and ambulation without an assistive device. Resides w/ sister, pvt home, +JODIE, no stairs in home, +bathtub    Admission LOF evaluated by OT  BM - Max A  Transfers - Mod-Max A  UBD - Max A  LBD - Dependent    Admission LOF evaluated by PT  BM - Max A  Transfers - Max A  Ambulation - Max A standing tolerance 3-5s    Also seen by SLP - FEES completed on 4/14, Results indicate mild pharyngeal dysphagia for puree, easy to chew, and mildly thick liquids; moderate pharyngeal dysphagia for thin liquids. Recommendations for an easy to chew diet w/ thin liquids    Patient is a good candidate for admission to acute inpatient rehabilitation for CVA with right sided hemiplegia (dominant) with comorbidities of dm, htn, hld, hyperthyroidism, obesity, dm peripheral neuropathy, and rle dvt requiring hospital level supervision. She was deemed medically stable for discharge to  on 4/19/22 where she will undergo intensive restorative therapies 3 hours a day for at least 5 days a week with the goal of regaining the patients independence and discharging her home.   (19 Apr 2022 16:13)      TODAY'S SUBJECTIVE & REVIEW OF SYMPTOMS:  Patient was evaluated by bedside with Dr. Fernandez. Vitals are within normal limits. No acute distress. No complaints this am. Denies n/v, chest pain, shortness of breath, or GI upset. Patient endorses regular bowel and bladder movements. Patient is pending discharge tomorrow.    Review of Systems:     Patient denies CP, SOB, weakness, constipation/diarrhea, dizziness, or headache.   Constitutional    [x ] WNL           [   ] poor appetite   [   ] insomnia   [   ] tired   Cardio:                [ x  ] WNL           [   ] CP   [   ] ALVAREZ   [   ] palpitations               Resp:                   [ x  ] WNL           [   ] SOB   [   ] cough   [   ] wheezing   GI:                        [ x  ] WNL           [   ] constipation   [   ] diarrhea   [   ] abdominal pain   [   ] nausea   [   ] emesis                                :                      [x   ] WNL           [   ] NARANJO  [   ] dysuria   [   ] difficulty voiding             Endo:                   [ x  ] WNL          [   ] polyuria   [   ] temperature intolerance                 Skin:                     [ x  ] WNL          [   ] pain   [   ] wound   [   ] rash   MSK:                    [x   ] WNL          [   ] muscle pain   [   ] joint pain/ stiffness   [   ] muscle tenderness   [   ] swelling   Neuro:                 [   ] WNL     per HPI     [   ] HA   [   ] change in vision   [   ] tremor   [   ] weakness   [   ]dysphagia              Cognitive:           [  x ] WNL           [   ]confusion      Psych:                  [ x  ] WNL           [   ] hallucinations   [   ]agitation   [   ] delusion   [   ]depression        PHYSICAL EXAM    Vital Signs Last 24 Hrs  T(C): 36 (02 May 2022 04:40), Max: 36.6 (01 May 2022 17:09)  T(F): 96.8 (02 May 2022 04:40), Max: 97.9 (01 May 2022 17:09)  HR: 82 (02 May 2022 04:40) (82 - 91)  BP: 132/66 (02 May 2022 04:40) (127/58 - 148/67)  BP(mean): --  RR: 18 (02 May 2022 04:40) (18 - 18)  SpO2: 98% (01 May 2022 20:58) (98% - 98%)        General:[  x] NAD, Resting Comfortable,   [   ] other:                                HEENT: [ x  ] NC/AT, EOMI, PERRL , Normal Conjunctivae,   [   ] other:  Cardio: [ x  ] RRR, no murmer,   [   ] other:                              Pulm: [ x  ] No Respiratory Distress,  Lungs CTAB,   [   ] other:                       Abdomen: [ x  ]ND/NT, Soft,   [   ] other:    : [ x  ] NO NARANJO CATHETER, [   ] NARANJO CATHETER- no meatal tear, no discharge, [   ] other:                                            MSK: [  x ] No joint swelling, Full ROM,   [   ] other:                                         Ext: [   ]No C/C/E, No calf tenderness,   [ x  ]other:  RLE swelling present. soft, NT  Skin: [   ]intact,   [x   ] other:    skin tear right elbow, ecchymosis right groin/hip                                                               Neurological Examination:  Cognitive: [  x  ] AAO x 3,   [    ]  other:                                                                      Attention:  [   x ] intact,   [    ]  other:                            Mood/Affect: [ x   ] wnl,    [    ]  other:                                                                             Communication: [    ]Fluent, no dysarthria, following commands:  [  x  ] other: dysarthria, difficulty with word finding, able to follow 2 step commands  CN II - XII:  [  x  ] intact,  [    ] other:                        11.1   4.36  )-----------( 186      ( 02 May 2022 04:30 )             34.5     05-02    144  |  107  |  14  ----------------------------<  143<H>  4.0   |  26  |  0.7    Ca    9.6      02 May 2022 04:30  Mg     1.9     05-02    TPro  5.6<L>  /  Alb  4.0  /  TBili  1.3<H>  /  DBili  x   /  AST  17  /  ALT  15  /  AlkPhos  70  05-02        POCT Blood Glucose.: 151 mg/dL (05-02-22 @ 07:40)  POCT Blood Glucose.: 133 mg/dL (05-01-22 @ 16:14)  POCT Blood Glucose.: 137 mg/dL (04-30-22 @ 07:32)  POCT Blood Glucose.: 113 mg/dL (04-29-22 @ 16:37)  POCT Blood Glucose.: 134 mg/dL (04-29-22 @ 07:33)  POCT Blood Glucose.: 114 mg/dL (04-28-22 @ 16:35)                                                                                        Motor:   RIGHT UE: [   ] WNL,  [   x] other: 4+/5 shoulder, 4+ distal  LEFT    UE: [ x  ] WNL,  [   ] other:  RIGHT LE: [   ] WNL,  [ x  ] other: 4+ prox, 4-/5 dorsiflexion  LEFT    LE: [  x ] WNL,  [   ] other:    Tone: [   x ] wnl,   [    ]  other:  DTRs: [x  ]symmetric, [   ] other: not assessed  Coordination:   [    ] intact,   [  x  ] other:   impaired RUE ftn                                                                        Sensory: [    ] Intact to light touch,   [   x ] other:  decrease sensation to light touch on the RUE and RLE and proprioception    CLOF  BM Max A  Transfers Mod A  Standing tolerance - ambulates with ModA with RW + knee immobilizer    MEDICATIONS  (STANDING):  apixaban 5 milliGRAM(s) Oral every 12 hours  atorvastatin 80 milliGRAM(s) Oral at bedtime  DULoxetine 30 milliGRAM(s) Oral every 12 hours  levothyroxine 112 MICROGram(s) Oral daily  losartan 25 milliGRAM(s) Oral daily  melatonin 5 milliGRAM(s) Oral at bedtime  metFORMIN 500 milliGRAM(s) Oral <User Schedule>  pantoprazole    Tablet 40 milliGRAM(s) Oral before breakfast    MEDICATIONS  (PRN):  acetaminophen     Tablet .. 650 milliGRAM(s) Oral every 6 hours PRN Temp greater or equal to 38C (100.4F)  aluminum hydroxide/magnesium hydroxide/simethicone Suspension 30 milliLiter(s) Oral every 4 hours PRN Dyspepsia  bismuth subsalicylate Liquid 30 milliLiter(s) Oral every 4 hours PRN abdominal pain  and diarrhea  magnesium hydroxide Suspension 30 milliLiter(s) Oral daily PRN Constipation  polyethylene glycol 3350 17 Gram(s) Oral daily PRN Constipation                              11.1   4.36  )-----------( 186      ( 02 May 2022 04:30 )             34.5     05-02    144  |  107  |  14  ----------------------------<  143<H>  4.0   |  26  |  0.7    Ca    9.6      02 May 2022 04:30  Mg     1.9     05-02    TPro  5.6<L>  /  Alb  4.0  /  TBili  1.3<H>  /  DBili  x   /  AST  17  /  ALT  15  /  AlkPhos  70  05-02        POCT Blood Glucose.: 151 mg/dL (05-02-22 @ 07:40)  POCT Blood Glucose.: 133 mg/dL (05-01-22 @ 16:14)  POCT Blood Glucose.: 137 mg/dL (04-30-22 @ 07:32)  POCT Blood Glucose.: 113 mg/dL (04-29-22 @ 16:37)  POCT Blood Glucose.: 134 mg/dL (04-29-22 @ 07:33)  POCT Blood Glucose.: 114 mg/dL (04-28-22 @ 16:35)   HPI:  75 yo female w/ PMH of HTN, hypothyroidism, DM, dm peripheral neuropathy, GERD, obesity who presented to the ED after a fall on 4/10/22. Patient started having R sided arm and leg weakness, and difficulty finding words, but could not recall if symptoms started before or after fall. Patient denied pain, head trauma or LOC but endorsed vomiting a couple times. Stroke code was called upon presentation. CTA showed a left M2 occlusion.  The patient was given tPA and taken for an emergent mechanical thrombectomy, but this was not completed due to dislodgement of clot at the time of procedure. Patient's dysarthria and LLE weakness briefly worsened, but subsequently improved again. Repeat CTH did not reveal any hemorrhage, patient was receiving heparin drip for right sided lower DVT, and now switched to Eliquis 10 BID, 5 mg BID starting 4/22. EP consulted and patient getting a loop recorder   PM&R consulted for admission to  for acute inpatient rehabilitation. Prior to admission, patient was independent with  ADLs and ambulation without an assistive device. Resides w/ sister, pvt home, +JODIE, no stairs in home, +bathtub    Admission LOF evaluated by OT  BM - Max A  Transfers - Mod-Max A  UBD - Max A  LBD - Dependent    Admission LOF evaluated by PT  BM - Max A  Transfers - Max A  Ambulation - Max A standing tolerance 3-5s    Also seen by SLP - FEES completed on 4/14, Results indicate mild pharyngeal dysphagia for puree, easy to chew, and mildly thick liquids; moderate pharyngeal dysphagia for thin liquids. Recommendations for an easy to chew diet w/ thin liquids    Patient is a good candidate for admission to acute inpatient rehabilitation for CVA with right sided hemiplegia (dominant) with comorbidities of dm, htn, hld, hyperthyroidism, obesity, dm peripheral neuropathy, and rle dvt requiring hospital level supervision. She was deemed medically stable for discharge to  on 4/19/22 where she will undergo intensive restorative therapies 3 hours a day for at least 5 days a week with the goal of regaining the patients independence and discharging her home.   (19 Apr 2022 16:13)      TODAY'S SUBJECTIVE & REVIEW OF SYMPTOMS:  Patient was evaluated by bedside with Dr. Fernandez. Vitals are within normal limits. No acute distress. No complaints this am. Denies n/v, chest pain, shortness of breath, or GI upset. Patient endorses regular bowel and bladder movements. Patient is pending discharge tomorrow.    Review of Systems:     Patient denies CP, SOB, weakness, constipation/diarrhea, dizziness, or headache.   Constitutional    [x ] WNL           [   ] poor appetite   [   ] insomnia   [   ] tired   Cardio:                [ x  ] WNL           [   ] CP   [   ] ALVAREZ   [   ] palpitations               Resp:                   [ x  ] WNL           [   ] SOB   [   ] cough   [   ] wheezing   GI:                        [ x  ] WNL           [   ] constipation   [   ] diarrhea   [   ] abdominal pain   [   ] nausea   [   ] emesis                                :                      [x   ] WNL           [   ] NARANJO  [   ] dysuria   [   ] difficulty voiding             Endo:                   [ x  ] WNL          [   ] polyuria   [   ] temperature intolerance                 Skin:                     [ x  ] WNL          [   ] pain   [   ] wound   [   ] rash   MSK:                    [x   ] WNL          [   ] muscle pain   [   ] joint pain/ stiffness   [   ] muscle tenderness   [   ] swelling   Neuro:                 [   ] WNL     per HPI     [   ] HA   [   ] change in vision   [   ] tremor   [   ] weakness   [   ]dysphagia              Cognitive:           [  x ] WNL           [   ]confusion      Psych:                  [ x  ] WNL           [   ] hallucinations   [   ]agitation   [   ] delusion   [   ]depression        PHYSICAL EXAM    Vital Signs Last 24 Hrs  T(C): 36 (02 May 2022 04:40), Max: 36.6 (01 May 2022 17:09)  T(F): 96.8 (02 May 2022 04:40), Max: 97.9 (01 May 2022 17:09)  HR: 82 (02 May 2022 04:40) (82 - 91)  BP: 132/66 (02 May 2022 04:40) (127/58 - 148/67)  BP(mean): --  RR: 18 (02 May 2022 04:40) (18 - 18)  SpO2: 98% (01 May 2022 20:58) (98% - 98%)        General:[  x] NAD, Resting Comfortable,   [   ] other:                                HEENT: [ x  ] NC/AT, EOMI, PERRL , Normal Conjunctivae,   [   ] other:  Cardio: [ x  ] RRR, no murmer,   [   ] other:                              Pulm: [ x  ] No Respiratory Distress,  Lungs CTAB,   [   ] other:                       Abdomen: [ x  ]ND/NT, Soft,   [   ] other:    : [ x  ] NO NARANJO CATHETER, [   ] NARANJO CATHETER- no meatal tear, no discharge, [   ] other:                                            MSK: [  x ] No joint swelling, Full ROM,   [   ] other:                                         Ext: [   ]No C/C/E, No calf tenderness,   [ x  ]other:  RLE swelling present. soft, NT  Skin: [   ]intact,   [x   ] other:    skin tear right elbow, ecchymosis right groin/hip                                                               Neurological Examination:  Cognitive: [  x  ] AAO x 3,   [    ]  other:                                                                      Attention:  [   x ] intact,   [    ]  other:                            Mood/Affect: [ x   ] wnl,    [    ]  other:                                                                             Communication: [    ]Fluent, no dysarthria, following commands:  [  x  ] other: dysarthria, difficulty with word finding, able to follow 2 step commands  CN II - XII:  [  x  ] intact,  [    ] other:    Motor:   RIGHT UE: [   ] WNL,  [   x] other: 4+/5 shoulder, 4+ distal  LEFT    UE: [ x  ] WNL,  [   ] other:  RIGHT LE: [   ] WNL,  [ x  ] other: 4+ prox, 4-/5 dorsiflexion  LEFT    LE: [  x ] WNL,  [   ] other:    Tone: [   x ] wnl,   [    ]  other:  DTRs: [x  ]symmetric, [   ] other: not assessed  Coordination:   [    ] intact,   [  x  ] other:   impaired RUE ftn                                                                        Sensory: [    ] Intact to light touch,   [   x ] other:  decrease sensation to light touch on the RUE and RLE and proprioception    CLOF  BM Max A  Transfers Mod A  Standing tolerance - ambulates with ModA with RW + knee immobilizer    LABS:                        11.1   4.36  )-----------( 186      ( 02 May 2022 04:30 )             34.5     05-02    144  |  107  |  14  ----------------------------<  143<H>  4.0   |  26  |  0.7    Ca    9.6      02 May 2022 04:30  Mg     1.9     05-02    TPro  5.6<L>  /  Alb  4.0  /  TBili  1.3<H>  /  DBili  x   /  AST  17  /  ALT  15  /  AlkPhos  70  05-02        POCT Blood Glucose.: 151 mg/dL (05-02-22 @ 07:40)  POCT Blood Glucose.: 133 mg/dL (05-01-22 @ 16:14)  POCT Blood Glucose.: 137 mg/dL (04-30-22 @ 07:32)  POCT Blood Glucose.: 113 mg/dL (04-29-22 @ 16:37)  POCT Blood Glucose.: 134 mg/dL (04-29-22 @ 07:33)  POCT Blood Glucose.: 114 mg/dL (04-28-22 @ 16:35)                                                                                            MEDICATIONS  (STANDING):  apixaban 5 milliGRAM(s) Oral every 12 hours  atorvastatin 80 milliGRAM(s) Oral at bedtime  DULoxetine 30 milliGRAM(s) Oral every 12 hours  levothyroxine 112 MICROGram(s) Oral daily  losartan 25 milliGRAM(s) Oral daily  melatonin 5 milliGRAM(s) Oral at bedtime  metFORMIN 500 milliGRAM(s) Oral <User Schedule>  pantoprazole    Tablet 40 milliGRAM(s) Oral before breakfast    MEDICATIONS  (PRN):  acetaminophen     Tablet .. 650 milliGRAM(s) Oral every 6 hours PRN Temp greater or equal to 38C (100.4F)  aluminum hydroxide/magnesium hydroxide/simethicone Suspension 30 milliLiter(s) Oral every 4 hours PRN Dyspepsia  bismuth subsalicylate Liquid 30 milliLiter(s) Oral every 4 hours PRN abdominal pain  and diarrhea  magnesium hydroxide Suspension 30 milliLiter(s) Oral daily PRN Constipation  polyethylene glycol 3350 17 Gram(s) Oral daily PRN Constipation

## 2022-05-03 ENCOUNTER — TRANSCRIPTION ENCOUNTER (OUTPATIENT)
Age: 77
End: 2022-05-03

## 2022-05-03 VITALS
SYSTOLIC BLOOD PRESSURE: 140 MMHG | TEMPERATURE: 98 F | HEART RATE: 94 BPM | RESPIRATION RATE: 18 BRPM | DIASTOLIC BLOOD PRESSURE: 63 MMHG

## 2022-05-03 LAB — GLUCOSE BLDC GLUCOMTR-MCNC: 136 MG/DL — HIGH (ref 70–99)

## 2022-05-03 RX ORDER — APIXABAN 2.5 MG/1
1 TABLET, FILM COATED ORAL
Qty: 0 | Refills: 0 | DISCHARGE
Start: 2022-05-03

## 2022-05-03 RX ORDER — LEVOTHYROXINE SODIUM 125 MCG
1 TABLET ORAL
Qty: 0 | Refills: 0 | DISCHARGE

## 2022-05-03 RX ORDER — MAGNESIUM HYDROXIDE 400 MG/1
30 TABLET, CHEWABLE ORAL
Qty: 0 | Refills: 0 | DISCHARGE
Start: 2022-05-03

## 2022-05-03 RX ORDER — LANOLIN ALCOHOL/MO/W.PET/CERES
1 CREAM (GRAM) TOPICAL
Qty: 0 | Refills: 0 | DISCHARGE
Start: 2022-05-03

## 2022-05-03 RX ORDER — DOCUSATE SODIUM 100 MG
1 CAPSULE ORAL
Qty: 0 | Refills: 0 | DISCHARGE

## 2022-05-03 RX ORDER — LOSARTAN POTASSIUM 100 MG/1
1 TABLET, FILM COATED ORAL
Qty: 0 | Refills: 0 | DISCHARGE
Start: 2022-05-03

## 2022-05-03 RX ORDER — FAMOTIDINE 10 MG/ML
1 INJECTION INTRAVENOUS
Qty: 0 | Refills: 0 | DISCHARGE

## 2022-05-03 RX ORDER — PANTOPRAZOLE SODIUM 20 MG/1
1 TABLET, DELAYED RELEASE ORAL
Qty: 0 | Refills: 0 | DISCHARGE
Start: 2022-05-03

## 2022-05-03 RX ORDER — METFORMIN HYDROCHLORIDE 850 MG/1
1 TABLET ORAL
Qty: 0 | Refills: 0 | DISCHARGE
Start: 2022-05-03

## 2022-05-03 RX ADMIN — APIXABAN 5 MILLIGRAM(S): 2.5 TABLET, FILM COATED ORAL at 05:30

## 2022-05-03 RX ADMIN — PANTOPRAZOLE SODIUM 40 MILLIGRAM(S): 20 TABLET, DELAYED RELEASE ORAL at 05:31

## 2022-05-03 RX ADMIN — DULOXETINE HYDROCHLORIDE 30 MILLIGRAM(S): 30 CAPSULE, DELAYED RELEASE ORAL at 05:31

## 2022-05-03 RX ADMIN — LOSARTAN POTASSIUM 25 MILLIGRAM(S): 100 TABLET, FILM COATED ORAL at 05:31

## 2022-05-03 RX ADMIN — Medication 112 MICROGRAM(S): at 05:31

## 2022-05-03 RX ADMIN — METFORMIN HYDROCHLORIDE 500 MILLIGRAM(S): 850 TABLET ORAL at 10:39

## 2022-05-03 NOTE — PROGRESS NOTE ADULT - TIME BILLING
30 mins patient and family contact, completed 5.2.22
120 mins patient contact, including scoring and writing; pt. tested on 5.2.22
120 mins patient contact
pt contact and family contact

## 2022-05-03 NOTE — PROGRESS NOTE ADULT - ATTENDING COMMENTS
Patient seen with resident this morning. No new complaints. Medically stable. VSS. Function slowly improving. Requires mod assistance to stand and take steps.   Medically stable. Appropriate for discharge to STR to continue therapies.  EZ to chew diet, activities as tolerated.   F/U with neuro in 3 weeks. (notified about discharge.  Continue Eliquis and Lipitor. Loop monitor functioning.  F/U PMD and EPS.  I read, edited and agree with the Assessment.

## 2022-05-03 NOTE — PROGRESS NOTE ADULT - REASON FOR ADMISSION
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
Rehab for B/L CVA with right hemiplegia (dominant), dysphagia, aphasia, dysarthria
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

## 2022-05-03 NOTE — PROGRESS NOTE ADULT - SUBJECTIVE AND OBJECTIVE BOX
HPI:  77 yo female w/ PMH of HTN, hypothyroidism, DM, dm peripheral neuropathy, GERD, obesity who presented to the ED after a fall on 4/10/22. Patient started having R sided arm and leg weakness, and difficulty finding words, but could not recall if symptoms started before or after fall. Patient denied pain, head trauma or LOC but endorsed vomiting a couple times. Stroke code was called upon presentation. CTA showed a left M2 occlusion.  The patient was given tPA and taken for an emergent mechanical thrombectomy, but this was not completed due to dislodgement of clot at the time of procedure. Patient's dysarthria and LLE weakness briefly worsened, but subsequently improved again. Repeat CTH did not reveal any hemorrhage, patient was receiving heparin drip for right sided lower DVT, and now switched to Eliquis 10 BID, 5 mg BID starting 4/22. EP consulted and patient getting a loop recorder   PM&R consulted for admission to  for acute inpatient rehabilitation. Prior to admission, patient was independent with  ADLs and ambulation without an assistive device. Resides w/ sister, pvt home, +JODIE, no stairs in home, +bathtub    Admission LOF evaluated by OT  BM - Max A  Transfers - Mod-Max A  UBD - Max A  LBD - Dependent    Admission LOF evaluated by PT  BM - Max A  Transfers - Max A  Ambulation - Max A standing tolerance 3-5s    Also seen by SLP - FEES completed on 4/14, Results indicate mild pharyngeal dysphagia for puree, easy to chew, and mildly thick liquids; moderate pharyngeal dysphagia for thin liquids. Recommendations for an easy to chew diet w/ thin liquids    Patient is a good candidate for admission to acute inpatient rehabilitation for CVA with right sided hemiplegia (dominant) with comorbidities of dm, htn, hld, hyperthyroidism, obesity, dm peripheral neuropathy, and rle dvt requiring hospital level supervision. She was deemed medically stable for discharge to  on 4/19/22 where she will undergo intensive restorative therapies 3 hours a day for at least 5 days a week with the goal of regaining the patients independence and discharging her home.   (19 Apr 2022 16:13)      TODAY'S SUBJECTIVE & REVIEW OF SYMPTOMS:  Patient was evaluated by bedside with Dr. Fernandez. Vitals are within normal limits. No acute distress. No complaints this am. Patient is pending discharge today.    CLOF  BM Max A  Transfers Mod A  Standing tolerance - ambulates with ModA with RW + knee immobilizer    Review of Systems:     Patient denies CP, SOB, weakness, constipation/diarrhea, dizziness, or headache.   Constitutional    [x ] WNL           [   ] poor appetite   [   ] insomnia   [   ] tired   Cardio:                [ x  ] WNL           [   ] CP   [   ] ALVAREZ   [   ] palpitations               Resp:                   [ x  ] WNL           [   ] SOB   [   ] cough   [   ] wheezing   GI:                        [ x  ] WNL           [   ] constipation   [   ] diarrhea   [   ] abdominal pain   [   ] nausea   [   ] emesis                                :                      [x   ] WNL           [   ] NARANJO  [   ] dysuria   [   ] difficulty voiding             Endo:                   [ x  ] WNL          [   ] polyuria   [   ] temperature intolerance                 Skin:                     [ x  ] WNL          [   ] pain   [   ] wound   [   ] rash   MSK:                    [x   ] WNL          [   ] muscle pain   [   ] joint pain/ stiffness   [   ] muscle tenderness   [   ] swelling   Neuro:                 [   ] WNL     per HPI     [   ] HA   [   ] change in vision   [   ] tremor   [   ] weakness   [   ]dysphagia              Cognitive:           [  x ] WNL           [   ]confusion      Psych:                  [ x  ] WNL           [   ] hallucinations   [   ]agitation   [   ] delusion   [   ]depression        PHYSICAL EXAM    Vital Signs Last 24 Hrs  T(C): 36 (02 May 2022 04:40), Max: 36.6 (01 May 2022 17:09)  T(F): 96.8 (02 May 2022 04:40), Max: 97.9 (01 May 2022 17:09)  HR: 82 (02 May 2022 04:40) (82 - 91)  BP: 132/66 (02 May 2022 04:40) (127/58 - 148/67)  BP(mean): --  RR: 18 (02 May 2022 04:40) (18 - 18)  SpO2: 98% (01 May 2022 20:58) (98% - 98%)        General:[  x] NAD, Resting Comfortable,   [   ] other:                                HEENT: [ x  ] NC/AT, EOMI, PERRL , Normal Conjunctivae,   [   ] other:  Cardio: [ x  ] RRR, no murmer,   [   ] other:                              Pulm: [ x  ] No Respiratory Distress,  Lungs CTAB,   [   ] other:                       Abdomen: [ x  ]ND/NT, Soft,   [   ] other:    : [ x  ] NO NARANJO CATHETER, [   ] NARANJO CATHETER- no meatal tear, no discharge, [   ] other:                                            MSK: [  x ] No joint swelling, Full ROM,   [   ] other:                                         Ext: [   ]No C/C/E, No calf tenderness,   [ x  ]other:  RLE swelling present. soft, NT  Skin: [   ]intact,   [x   ] other:    skin tear right elbow, ecchymosis right groin/hip                                                               Neurological Examination:  Cognitive: [  x  ] AAO x 3,   [    ]  other:                                                                      Attention:  [   x ] intact,   [    ]  other:                            Mood/Affect: [ x   ] wnl,    [    ]  other:                                                                             Communication: [    ]Fluent, no dysarthria, following commands:  [  x  ] other: dysarthria, difficulty with word finding, able to follow 2 step commands  CN II - XII:  [  x  ] intact,  [    ] other:    Motor:   RIGHT UE: [   ] WNL,  [   x] other: 4+/5 shoulder, 4+ distal  LEFT    UE: [ x  ] WNL,  [   ] other:  RIGHT LE: [   ] WNL,  [ x  ] other: 4+ prox, 4-/5 dorsiflexion  LEFT    LE: [  x ] WNL,  [   ] other:    Tone: [   x ] wnl,   [    ]  other:  DTRs: [x  ]symmetric, [   ] other: not assessed  Coordination:   [    ] intact,   [  x  ] other:   impaired RUE ftn                                                                        Sensory: [    ] Intact to light touch,   [   x ] other:  decrease sensation to light touch on the RUE and RLE and proprioception      LABS:                        11.1   4.36  )-----------( 186      ( 02 May 2022 04:30 )             34.5     05-02    144  |  107  |  14  ----------------------------<  143<H>  4.0   |  26  |  0.7    Ca    9.6      02 May 2022 04:30  Mg     1.9     05-02    TPro  5.6<L>  /  Alb  4.0  /  TBili  1.3<H>  /  DBili  x   /  AST  17  /  ALT  15  /  AlkPhos  70  05-02        POCT Blood Glucose.: 151 mg/dL (05-02-22 @ 07:40)  POCT Blood Glucose.: 133 mg/dL (05-01-22 @ 16:14)  POCT Blood Glucose.: 137 mg/dL (04-30-22 @ 07:32)  POCT Blood Glucose.: 113 mg/dL (04-29-22 @ 16:37)  POCT Blood Glucose.: 134 mg/dL (04-29-22 @ 07:33)  POCT Blood Glucose.: 114 mg/dL (04-28-22 @ 16:35)                                                                                            MEDICATIONS  (STANDING):  apixaban 5 milliGRAM(s) Oral every 12 hours  atorvastatin 80 milliGRAM(s) Oral at bedtime  DULoxetine 30 milliGRAM(s) Oral every 12 hours  levothyroxine 112 MICROGram(s) Oral daily  losartan 25 milliGRAM(s) Oral daily  melatonin 5 milliGRAM(s) Oral at bedtime  metFORMIN 500 milliGRAM(s) Oral <User Schedule>  pantoprazole    Tablet 40 milliGRAM(s) Oral before breakfast    MEDICATIONS  (PRN):  acetaminophen     Tablet .. 650 milliGRAM(s) Oral every 6 hours PRN Temp greater or equal to 38C (100.4F)  aluminum hydroxide/magnesium hydroxide/simethicone Suspension 30 milliLiter(s) Oral every 4 hours PRN Dyspepsia  bismuth subsalicylate Liquid 30 milliLiter(s) Oral every 4 hours PRN abdominal pain  and diarrhea  magnesium hydroxide Suspension 30 milliLiter(s) Oral daily PRN Constipation  polyethylene glycol 3350 17 Gram(s) Oral daily PRN Constipation     HPI:  77 yo female w/ PMH of HTN, hypothyroidism, DM, dm peripheral neuropathy, GERD, obesity who presented to the ED after a fall on 4/10/22. Patient started having R sided arm and leg weakness, and difficulty finding words, but could not recall if symptoms started before or after fall. Patient denied pain, head trauma or LOC but endorsed vomiting a couple times. Stroke code was called upon presentation. CTA showed a left M2 occlusion.  The patient was given tPA and taken for an emergent mechanical thrombectomy, but this was not completed due to dislodgement of clot at the time of procedure. Patient's dysarthria and LLE weakness briefly worsened, but subsequently improved again. Repeat CTH did not reveal any hemorrhage, patient was receiving heparin drip for right sided lower DVT, and now switched to Eliquis 10 BID, 5 mg BID starting 4/22. EP consulted and patient getting a loop recorder   PM&R consulted for admission to  for acute inpatient rehabilitation. Prior to admission, patient was independent with  ADLs and ambulation without an assistive device. Resides w/ sister, pvt home, +JODIE, no stairs in home, +bathtub    Admission LOF evaluated by OT  BM - Max A  Transfers - Mod-Max A  UBD - Max A  LBD - Dependent    Admission LOF evaluated by PT  BM - Max A  Transfers - Max A  Ambulation - Max A standing tolerance 3-5s    Also seen by SLP - FEES completed on 4/14, Results indicate mild pharyngeal dysphagia for puree, easy to chew, and mildly thick liquids; moderate pharyngeal dysphagia for thin liquids. Recommendations for an easy to chew diet w/ thin liquids    Patient is a good candidate for admission to acute inpatient rehabilitation for CVA with right sided hemiplegia (dominant) with comorbidities of dm, htn, hld, hyperthyroidism, obesity, dm peripheral neuropathy, and rle dvt requiring hospital level supervision. She was deemed medically stable for discharge to  on 4/19/22 where she will undergo intensive restorative therapies 3 hours a day for at least 5 days a week with the goal of regaining the patients independence and discharging her home.   (19 Apr 2022 16:13)      TODAY'S SUBJECTIVE & REVIEW OF SYMPTOMS:  Patient was evaluated by bedside with Dr. Fernandez. Vitals are within normal limits. No acute distress. No complaints this am. Patient is pending discharge today.    CLOF  BM Max A  Transfers Mod A  Standing tolerance - ambulates with ModA with RW + knee immobilizer    Review of Systems:     Patient denies CP, SOB, weakness, constipation/diarrhea, dizziness, or headache.   Constitutional    [x ] WNL           [   ] poor appetite   [   ] insomnia   [   ] tired   Cardio:                [ x  ] WNL           [   ] CP   [   ] ALVAREZ   [   ] palpitations               Resp:                   [ x  ] WNL           [   ] SOB   [   ] cough   [   ] wheezing   GI:                        [ x  ] WNL           [   ] constipation   [   ] diarrhea   [   ] abdominal pain   [   ] nausea   [   ] emesis                                :                      [x   ] WNL           [   ] NARANJO  [   ] dysuria   [   ] difficulty voiding             Endo:                   [ x  ] WNL          [   ] polyuria   [   ] temperature intolerance                 Skin:                     [ x  ] WNL          [   ] pain   [   ] wound   [   ] rash   MSK:                    [x   ] WNL          [   ] muscle pain   [   ] joint pain/ stiffness   [   ] muscle tenderness   [   ] swelling   Neuro:                 [   ] WNL     per HPI     [   ] HA   [   ] change in vision   [   ] tremor   [   ] weakness   [   ]dysphagia              Cognitive:           [  x ] WNL           [   ]confusion      Psych:                  [ x  ] WNL           [   ] hallucinations   [   ]agitation   [   ] delusion   [   ]depression        PHYSICAL EXAM    Vital Signs Last 24 Hrs  T(C): 36.2 (03 May 2022 05:28), Max: 36.2 (03 May 2022 05:28)  T(F): 97.1 (03 May 2022 05:28), Max: 97.1 (03 May 2022 05:28)  HR: 75 (03 May 2022 05:28) (75 - 87)  BP: 133/63 (03 May 2022 05:28) (131/60 - 140/62)  BP(mean): 89 (02 May 2022 21:04) (89 - 89)  RR: 18 (03 May 2022 05:28) (18 - 18)  SpO2: --    General:[  x] NAD, Resting Comfortable,   [   ] other:                                HEENT: [ x  ] NC/AT, EOMI, PERRL , Normal Conjunctivae,   [   ] other:  Cardio: [ x  ] RRR, no murmer,   [   ] other:                              Pulm: [ x  ] No Respiratory Distress,  Lungs CTAB,   [   ] other:                       Abdomen: [ x  ]ND/NT, Soft,   [   ] other:    : [ x  ] NO NARANJO CATHETER, [   ] NARANJO CATHETER- no meatal tear, no discharge, [   ] other:                                            MSK: [  x ] No joint swelling, Full ROM,   [   ] other:                                         Ext: [   ]No C/C/E, No calf tenderness,   [ x  ]other:  RLE swelling present. soft, NT  Skin: [   ]intact,   [x   ] other:    skin tear right elbow, ecchymosis right groin/hip                                                               Neurological Examination:  Cognitive: [  x  ] AAO x 3,   [    ]  other:                                                                      Attention:  [   x ] intact,   [    ]  other:                            Mood/Affect: [ x   ] wnl,    [    ]  other:                                                                             Communication: [    ]Fluent, no dysarthria, following commands:  [  x  ] other: dysarthria, difficulty with word finding, able to follow 2 step commands  CN II - XII:  [  x  ] intact,  [    ] other:    Motor:   RIGHT UE: [   ] WNL,  [   x] other: 4+/5 shoulder, 4+ distal  LEFT    UE: [ x  ] WNL,  [   ] other:  RIGHT LE: [   ] WNL,  [ x  ] other: 4+ prox, 4-/5 dorsiflexion  LEFT    LE: [  x ] WNL,  [   ] other:    Tone: [   x ] wnl,   [    ]  other:  DTRs: [x  ]symmetric, [   ] other: not assessed  Coordination:   [    ] intact,   [  x  ] other:   impaired RUE ftn                                                                        Sensory: [    ] Intact to light touch,   [   x ] other:  decrease sensation to light touch on the RUE and RLE and proprioception      LABS:                        11.1   4.36  )-----------( 186      ( 02 May 2022 04:30 )             34.5     05-02    144  |  107  |  14  ----------------------------<  143<H>  4.0   |  26  |  0.7    Ca    9.6      02 May 2022 04:30  Mg     1.9     05-02    TPro  5.6<L>  /  Alb  4.0  /  TBili  1.3<H>  /  DBili  x   /  AST  17  /  ALT  15  /  AlkPhos  70  05-02        POCT Blood Glucose.: 151 mg/dL (05-02-22 @ 07:40)  POCT Blood Glucose.: 133 mg/dL (05-01-22 @ 16:14)  POCT Blood Glucose.: 137 mg/dL (04-30-22 @ 07:32)  POCT Blood Glucose.: 113 mg/dL (04-29-22 @ 16:37)  POCT Blood Glucose.: 134 mg/dL (04-29-22 @ 07:33)  POCT Blood Glucose.: 114 mg/dL (04-28-22 @ 16:35)                                                                                            MEDICATIONS  (STANDING):  apixaban 5 milliGRAM(s) Oral every 12 hours  atorvastatin 80 milliGRAM(s) Oral at bedtime  DULoxetine 30 milliGRAM(s) Oral every 12 hours  levothyroxine 112 MICROGram(s) Oral daily  losartan 25 milliGRAM(s) Oral daily  melatonin 5 milliGRAM(s) Oral at bedtime  metFORMIN 500 milliGRAM(s) Oral <User Schedule>  pantoprazole    Tablet 40 milliGRAM(s) Oral before breakfast    MEDICATIONS  (PRN):  acetaminophen     Tablet .. 650 milliGRAM(s) Oral every 6 hours PRN Temp greater or equal to 38C (100.4F)  aluminum hydroxide/magnesium hydroxide/simethicone Suspension 30 milliLiter(s) Oral every 4 hours PRN Dyspepsia  bismuth subsalicylate Liquid 30 milliLiter(s) Oral every 4 hours PRN abdominal pain  and diarrhea  magnesium hydroxide Suspension 30 milliLiter(s) Oral daily PRN Constipation  polyethylene glycol 3350 17 Gram(s) Oral daily PRN Constipation

## 2022-05-03 NOTE — DISCHARGE NOTE PROVIDER - NSDCMRMEDTOKEN_GEN_ALL_CORE_FT
acetaminophen 325 mg oral tablet: 2 tab(s) orally every 6 hours, As needed, for pain or fever  aluminum hydroxide-magnesium hydroxide 200 mg-200 mg/5 mL oral suspension: 30 milliliter(s) orally every 6 hours, As Needed  apixaban 5 mg oral tablet: 1 tab(s) orally every 12 hours  atorvastatin 80 mg oral tablet: 1 tab(s) orally once a day (at bedtime)  docusate sodium 100 mg oral capsule: 1 cap(s) orally 3 times a day, As Needed  DULoxetine 30 mg oral delayed release capsule: 1 cap(s) orally 2 times a day  levothyroxine 112 mcg (0.112 mg) oral tablet: 1 tab(s) orally once a day in the morning, take one hour before breakfast and other medications  losartan 25 mg oral tablet: 1 tab(s) orally once a day in the morning; please hold for SBP less than 110  magnesium hydroxide 8% oral suspension: 30 milliliter(s) orally once a day, As needed, Constipation  melatonin 5 mg oral tablet: 1 tab(s) orally once a day (at bedtime)  metFORMIN 500 mg oral tablet: 1 tab(s) orally once a day (in the morning) with breakfast  pantoprazole 40 mg oral delayed release tablet: 1 tab(s) orally once a day in the morning, take 30 minutes befoe breakfast

## 2022-05-03 NOTE — PROGRESS NOTE ADULT - PROVIDER SPECIALTY LIST ADULT
Physiatry
Rehab Medicine
Neuropsychology
Physiatry
Rehab Medicine
Neuropsychology
Rehab Medicine

## 2022-05-03 NOTE — DISCHARGE NOTE PROVIDER - NSDCFUSCHEDAPPT_GEN_ALL_CORE_FT
Marixa Coburn  [facility]  Cardio [id number]  Scheduled Appointment: 05/27/2022    [facility]  Cardio [address]  Scheduled Appointment: 07/01/2022

## 2022-05-03 NOTE — DISCHARGE NOTE PROVIDER - HOSPITAL COURSE
75 yo female w/ PMH of HTN, hypothyroidism, DM, dm peripheral neuropathy, GERD, obesity who presented to the ED after a fall on 4/10/22. She started having R sided arm and leg weakness, and difficulty finding words, but could not recall if symptoms started before or after fall. Patient denied pain, head trauma or LOC but endorsed vomiting a couple of times. Stroke code was called upon presentation. CTA showed a left M2 occlusion.  The patient was given tPA and taken for an emergent mechanical thrombectomy, but this was not completed due to dislodgement of clot at the time of procedure. Patient's dysarthria and LLE weakness briefly worsened, but subsequently improved again. Repeat CTH did not reveal any hemorrhage, patient was receiving heparin drip for right sided lower DVT (R peroneal and R PT branch), and now switched to Eliquis 10 BID, 5 mg BID starting 4/22. EP was consulted and a loop recorder was placed on 4/19/22:          EQUIPMENT IMPLANTED  : [employer]  Model Number: [id number]  Serial Number: [id number]    PM&R consulted for admission to  for acute inpatient rehabilitation. Prior to admission, patient was independent with  ADLs and ambulation without an assistive device. Resides w/ sister, pvt home, +JODIE, no stairs in home, +bathtub. The patient was also seen by SLP - FEES completed on 4/14, Results indicate mild pharyngeal dysphagia for puree, easy to chew, and mildly thick liquids; moderate pharyngeal dysphagia for thin liquids. Recommendations for an easy to chew diet w/ thin liquids. She was admitted to Rehab medicine service on 4/19/22.  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   She warrants acute rehab with 3 hours of daily therapies at least 5 out of 7 days including PT, OT and Speech. Additionally she requires close physiatry follow up for CVA and the many complex comorbidities.      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   - MRI Brain w/o contrast showed acute left MCA territory infarct and right temporal cortical infarct  - Apixaban 5mg q12 (for DVT)  - Lipitor 80 mg bedtime   - JOEL showed no PFO or intracardiac thrombosis.   - Loop recorder placed 4/19/22    #Dysphagia  - Diet, Easy to Chew:  Consistent Carbohydrate No Snacks ;   Free Water Flush Instructions:  SMALL BITES/SIPS; ALLOW TIME TO SWALLOW; SIT UPRIGHT DURING MEALS AND FOR 2 HOURS AFTER  - SLP following    #HTN  - Goal -150  - Losartan 25mg daily  - JOEL completed, LVEF 65%, no PFO  - ILR placed  - Running on high side. May need to increase Losartan if it continues    #Dyspnea on exertion  - Obesity and debility. Lungs clear  - Continue to Monitor     #HLD  - c/w lipitor 80mg qhs     #DM2 - well controlled  - A1C results: 7.5%  - Metformin 500mg po q AM with breakfast    #DM Neuropathy  -Duloxetine 30 mg oral delayed release PO twice Daily    #GERD  - PPI    #Hypothyroid  -C/w levothyroxine 112 mcg    #RLE DVT  - US Duplex showed +DVT of the right peroneal vein and a branch of the right posterior tibial vein  - Continue with Eliquis 5 mg BID     -GI/Bowel Mgmt:  No active issues at this time, having normal bm's  s/p LBM for a few days due to metformin; resolved when dose was decreased    -Bladder management:   No active issues at this time      Precautions / PROPHYLAXIS:    - Falls  - DVT prophylaxis: Has RLE DVTs, on  Eliquis    The patient is being discharged to [facility] on 5/3/22 in order to continue therapy. She will need to follow up with her PCP for DM and hypothyroid, with Neurology Dr. Hedrick at Stroke clinic, with EP Dr. Davenport for follow up of loop recorder, with Cardiology Dr. Boyd, with GI Dr. Denney for GERD, with Vascular in 2 months for the RLE DVT's and duration of apixaban. She already has appointments with Cardiology for 5/27 and 7/1/22.   75 yo female w/ PMH of HTN, hypothyroidism, DM, dm peripheral neuropathy, GERD, obesity who presented to the ED after a fall on 4/10/22. She started having R sided arm and leg weakness, and difficulty finding words, but could not recall if symptoms started before or after fall. Patient denied pain, head trauma or LOC but endorsed vomiting a couple of times. Stroke code was called upon presentation. CTA showed a left M2 occlusion.  The patient was given tPA and taken for an emergent mechanical thrombectomy, but this was not completed due to dislodgement of clot at the time of procedure. Patient's dysarthria and LLE weakness briefly worsened, but subsequently improved again. Repeat CTH did not reveal any hemorrhage, patient was receiving heparin drip for right sided lower DVT (R peroneal and R PT branch), and now switched to Eliquis 10 BID, 5 mg BID starting 4/22. EP was consulted and a loop recorder was placed on 4/19/22:          EQUIPMENT IMPLANTED  : [employer]  Model Number: LNQ11  Serial Number: [id number]    PM&R consulted for admission to  for acute inpatient rehabilitation. Prior to admission, patient was independent with  ADLs and ambulation without an assistive device. Resides w/ sister, pvt home, +JODIE, no stairs in home, +bathtub. The patient was also seen by SLP - FEES completed on 4/14, Results indicate mild pharyngeal dysphagia for puree, easy to chew, and mildly thick liquids; moderate pharyngeal dysphagia for thin liquids. Recommendations for an easy to chew diet w/ thin liquids. She was admitted to Rehab medicine service on 4/19/22.  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   She warrants acute rehab with 3 hours of daily therapies at least 5 out of 7 days including PT, OT and Speech. Additionally she requires close physiatry follow up for CVA and the many complex comorbidities.      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   - MRI Brain w/o contrast showed acute left MCA territory infarct and right temporal cortical infarct  - Apixaban 5mg q12 (for DVT)  - Lipitor 80 mg bedtime   - JOEL showed no PFO or intracardiac thrombosis.   - Loop recorder placed 4/19/22    #Dysphagia  - Diet, Easy to Chew:  Consistent Carbohydrate No Snacks ;   Free Water Flush Instructions:  SMALL BITES/SIPS; ALLOW TIME TO SWALLOW; SIT UPRIGHT DURING MEALS AND FOR 2 HOURS AFTER  - SLP following    #HTN  - Goal -150  - Losartan 25mg daily  - JOEL completed, LVEF 65%, no PFO  - ILR placed  - Running on high side. May need to increase Losartan if it continues    #Dyspnea on exertion  - Obesity and debility. Lungs clear  - Continue to Monitor     #HLD  - c/w lipitor 80mg qhs     #DM2 - well controlled  - A1C results: 7.5%  - Metformin 500mg po q AM with breakfast    #DM Neuropathy  -Duloxetine 30 mg oral delayed release PO twice Daily    #GERD  - PPI    #Hypothyroid  - TSH is normal = 1.07 on 4/11/22  -C/w levothyroxine 112 mcg q 6AM    #RLE DVT  - US Duplex showed +DVT of the right peroneal vein and a branch of the right posterior tibial vein  - Continue with Eliquis 5 mg BID     -GI/Bowel Mgmt:  No active issues at this time, having normal bm's  s/p LBM for a few days due to metformin; resolved when dose was decreased    -Bladder management:   No active issues at this time      Precautions / PROPHYLAXIS:    - Falls  - DVT prophylaxis: Has RLE DVTs, on  Eliquis    The patient is being discharged to [facility] on 5/3/22 in order to continue therapy. She will need to follow up with her PCP for DM and hypothyroid, with Neurology Dr. Hedrick at Stroke clinic, with EP Dr. Davenport for follow up of loop recorder, with Cardiology Dr. Boyd, with GI Dr. Denney for GERD, with Vascular in 2 months for the RLE DVT's and duration of apixaban. She already has appointments with Cardiology for 5/27 and 7/1/22.   75 yo female w/ PMH of HTN, hypothyroidism, DM, dm peripheral neuropathy, GERD, obesity who presented to the ED after a fall on 4/10/22. She started having R sided arm and leg weakness, and difficulty finding words, but could not recall if symptoms started before or after fall. Patient denied pain, head trauma or LOC but endorsed vomiting a couple of times. Stroke code was called upon presentation. CTA showed a left M2 occlusion.  The patient was given tPA and taken for an emergent mechanical thrombectomy, but this was not completed due to dislodgement of clot at the time of procedure. Patient's dysarthria and LLE weakness briefly worsened, but subsequently improved again. Repeat CTH did not reveal any hemorrhage, patient was receiving heparin drip for right sided lower DVT (R peroneal and R PT branch), and now switched to Eliquis 10 BID, 5 mg BID starting 4/22. EP was consulted and a loop recorder was placed on 4/19/22:          EQUIPMENT IMPLANTED  : [employer]  Model Number: LNQ11  Serial Number: [id number]    PM&R consulted for admission to  for acute inpatient rehabilitation. Prior to admission, patient was independent with  ADLs and ambulation without an assistive device. Resides w/ sister, pvt home, +JODIE, no stairs in home, +bathtub. The patient was also seen by SLP - FEES completed on 4/14, Results indicate mild pharyngeal dysphagia for puree, easy to chew, and mildly thick liquids; moderate pharyngeal dysphagia for thin liquids. Recommendations for an easy to chew diet w/ thin liquids. She was admitted to Rehab medicine service on 4/19/22.  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   She warrants acute rehab with 3 hours of daily therapies at least 5 out of 7 days including PT, OT and Speech. Additionally she requires close physiatry follow up for CVA and the many complex comorbidities.      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   - MRI Brain w/o contrast showed acute left MCA territory infarct and right temporal cortical infarct  - Apixaban 5mg q12 (for DVT)  - Lipitor 80 mg bedtime   - JOEL showed no PFO or intracardiac thrombosis.   - Loop recorder placed 4/19/22    #Dysphagia  - Diet, Easy to Chew:  Consistent Carbohydrate No Snacks ;   Free Water Flush Instructions:  SMALL BITES/SIPS; ALLOW TIME TO SWALLOW; SIT UPRIGHT DURING MEALS AND FOR 2 HOURS AFTER  - SLP following    #HTN  - Goal -150  - Losartan 25mg daily  - JOEL completed, LVEF 65%, no PFO  - ILR placed  - Running on high side. May need to increase Losartan if it continues    #Dyspnea on exertion  - Obesity and debility. Lungs clear  - Continue to Monitor     #HLD  - c/w lipitor 80mg qhs     #DM2 - well controlled  - A1C results: 7.5%  - Metformin 500mg po q AM with breakfast    #DM Neuropathy  -Duloxetine 30 mg oral delayed release PO twice Daily    #GERD  - PPI    #Hypothyroid  - TSH is normal = 1.07 on 4/11/22  -C/w levothyroxine 112 mcg q 6AM    #RLE DVT  - US Duplex showed +DVT of the right peroneal vein and a branch of the right posterior tibial vein  - Continue with Eliquis 5 mg BID     -GI/Bowel Mgmt:  No active issues at this time, having normal bm's  s/p LBM for a few days due to metformin; resolved when dose was decreased    -Bladder management:   No active issues at this time      Precautions / PROPHYLAXIS:    - Falls  - DVT prophylaxis: Has RLE DVTs, on  Eliquis    The patient is being discharged to [facility] on 5/3/22 in order to continue therapy. She will need to follow up with her PCP Dr. Wei for DM and hypothyroid, with Neurology Dr. Hedrick at Stroke clinic (spoke with Neuro office, they will call the patient's sister Zoila to arrange appointment), with EP Dr. Davenport for follow up of loop recorder, with Cardiology Dr. Boyd, with GI Dr. Denney for GERD, with Vascular in 2 months for the RLE DVT's and duration of apixaban. She already has appointments with Cardiology for 5/27 and 7/1/22.    Covid swab was negative on 5/2/22; the patient is not vaccinated.   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 She started having R sided arm and leg weakness, and difficulty finding words, but could not recall if symptoms started before or after fall. Patient denied pain, head trauma or LOC but endorsed vomiting a couple of times. Stroke code was called upon presentation. CTA showed a left M2 occlusion.  The patient was given tPA and taken for an emergent mechanical thrombectomy, but this was not completed due to dislodgement of clot at the time of procedure. Patient's dysarthria and LLE weakness briefly worsened, but subsequently improved again. Repeat CTH did not reveal any hemorrhage, patient was receiving heparin drip for right sided lower DVT (R peroneal and R PT branch), and now switched to Eliquis 10 BID, 5 mg BID starting 4/22. EP was consulted and a loop recorder was placed on 4/19/22:          EQUIPMENT IMPLANTED  : [employer]  Model Number: LNQ11  Serial Number: [id number]    PM&R consulted for admission to  for acute inpatient rehabilitation. Prior to admission, patient was independent with  ADLs and ambulation without an assistive device. Resides w/ sister, pvt home, +JODIE, no stairs in home, +bathtub. The patient was also seen by SLP - FEES completed on 4/14, Results indicate mild pharyngeal dysphagia for puree, easy to chew, and mildly thick liquids; moderate pharyngeal dysphagia for thin liquids. Recommendations for an easy to chew diet w/ thin liquids. She was admitted to Rehab medicine service on 4/19/22.  CLOF  BM Max A  Transfers Mod A  Standing tolerance - ambulates with ModA with RW + knee immobilizer    Allergies: Sulfacet-R, nickel, gold, silver, copper, adhesives    ASSESSMENT/PLAN:    75 yo F with a bilateral acute infarcts including acute left MCA territory and right temporal cortical with a right hemiparesis, aphasia dysphagia and dysarthria with comorbidities including untreated NIDDM, obesity, diabetic peripheral neuropathy, HTN, obesity, HTN, HLD, GERD, hypothyroidism and RLE DVT on Eliquis.   She warrants acute rehab with 3 hours of daily therapies at least 5 out of 7 days including PT, OT and Speech. Additionally she requires close physiatry follow up for CVA and the many complex comorbidities.      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   - MRI Brain w/o contrast showed acute left MCA territory infarct and right temporal cortical infarct  - Apixaban 5mg q12 (for DVT)  - Lipitor 80 mg bedtime   - JOEL showed no PFO or intracardiac thrombosis.   - Loop recorder placed 4/19/22    #Dysphagia  - Diet, Easy to Chew:  Consistent Carbohydrate No Snacks ;   Free Water Flush Instructions:  SMALL BITES/SIPS; ALLOW TIME TO SWALLOW; SIT UPRIGHT DURING MEALS AND FOR 2 HOURS AFTER  - SLP following    #HTN  - Goal -150  - Losartan 25mg daily  - JOEL completed, LVEF 65%, no PFO  - ILR placed  - Running on high side. May need to increase Losartan if it continues    #Dyspnea on exertion  - Obesity and debility. Lungs clear  - Continue to Monitor     #HLD  - c/w lipitor 80mg qhs     #DM2 - well controlled  - A1C results: 7.5%  - Metformin 500mg po q AM with breakfast    #DM Neuropathy  -Duloxetine 30 mg oral delayed release PO twice Daily    #GERD  - PPI    #Hypothyroid  - TSH is normal = 1.07 on 4/11/22  -C/w levothyroxine 112 mcg q 6AM    #RLE DVT  - US Duplex showed +DVT of the right peroneal vein and a branch of the right posterior tibial vein  - Continue with Eliquis 5 mg BID     -GI/Bowel Mgmt:  No active issues at this time, having normal bm's  s/p LBM for a few days due to metformin; resolved when dose was decreased    -Bladder management:   No active issues at this time      Precautions / PROPHYLAXIS:    - Falls  - DVT prophylaxis: Has RLE DVTs, on  Eliquis    The patient is being discharged to [facility] on 5/3/22 in order to continue therapy. She will need to follow up with her PCP Dr. Wei for DM and hypothyroid, with Neurology Dr. Hedrick at Stroke clinic (spoke with Neuro office, they will call the patient's sister Zoila to arrange appointment), with EP Dr. Davenport for follow up of loop recorder, with Cardiology Dr. Boyd, with GI Dr. Denney for GERD, with Vascular in 2 months for the RLE DVT's and duration of apixaban. She already has appointments with Cardiology for 5/27 (EP) and 7/1/22. She should also follow up with Neuroendovascular Dr. Butler and with Hem/Onc Dr. Spears.    Covid swab was negative on 5/2/22; the patient is not vaccinated.   no

## 2022-05-03 NOTE — DISCHARGE NOTE NURSING/CASE MANAGEMENT/SOCIAL WORK - PATIENT PORTAL LINK FT
You can access the FollowMyHealth Patient Portal offered by [facility] by registering at the following website: [email or web address]. By joining [employer]’s FollowMyHealth portal, you will also be able to view your health information using other applications (apps) compatible with our system.

## 2022-05-03 NOTE — PROGRESS NOTE ADULT - ASSESSMENT
ASSESSMENT/PLAN:    77 yo F with a bilateral acute infacts including acute left MCA territory and right temporal cortical with a right hemiparesis, aphasia dysphagia and dysarthria with comorbidities including untreated NIDDM, obesity, diabetic peripheral neuropathy, HTN, obesity, HTN, HLD, GERD, hypothyroidism and RLE DVT on Eliquis.   She warrants acute rehab with 3 hours of daily therapies at least 5 out of 7 days including PT, OT and Speech. Additionally she requires close physiatry follow up for CVA and the many complex comorbdities.      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   - MRI Brain w/o contrast showed acute left MCA territory infarct and right temporal cortical infarct  - Apixaban 5mg q12 (for DVT)  - Lipitor 80 mg bedtime   - JOEL showed no PFO or intracardiac thrombosis.     #Dysphagia  - Diet, Easy to Chew:  Consistent Carbohydrate No Snacks ; Fiber/Residue Restricted; No Lactose  Free Water Flush Instructions:  SMALL BITES/SIPS; ALLOW TIME TO SWALLOW; SIT UPRIGHT DURING MEALS AND FOR 2 HOURS AFTER; CONTINUE LOW RESIDUE LACTOSE-FREE UNTIL DIARRHEA RESOLVES--CAN HAVE LACTOSE-FREE MILK (04-23-22 @ 14:45) [Active]  - SLP following    #HTN  - Goal -150  - Losartan 25mg daily  - JOEL completed, LVEF 65%, no PFO  - ILR placed  - Running on high side. May need to increase Losartan if it continues    #Dyspnea on exertion  - Obesity and debility. Lungs clear  - Continue to Monitor     #HLD  - c/w lipitor 80mg qhs     #DM2 - well controlled  - A1C results: 7.5%  - Metformin 500mg BID    #DM Neuropathy  -Duloxetine 30 mg oral delayed release PO Daily    #GERD  - PPI    #Hypothyroid  -C/w levothyroxine 112 mcg    #RLE DVT  - US Duplex showed +DVT of the right peroneal vein and a branch of the right posterior tibial vein  - Continue with Eliquis 5 mg BID     -GI/Bowel Mgmt:  No active issues at this time  Miralax    -Bladder management:   No active issues at this time      Precautions / PROPHYLAXIS:    - Falls  - DVT prophylaxis: Has RLE DVT,  Eliquis   ASSESSMENT/PLAN:    75 yo F with a bilateral acute infacts including acute left MCA territory and right temporal cortical with a right hemiparesis, aphasia dysphagia and dysarthria with comorbidities including untreated NIDDM, obesity, diabetic peripheral neuropathy, HTN, obesity, HTN, HLD, GERD, hypothyroidism and RLE DVT on Eliquis.   She warrants acute rehab with 3 hours of daily therapies at least 5 out of 7 days including PT, OT and Speech. Additionally she requires close physiatry follow up for CVA and the many complex comorbdities.      #Rehab of B/L CVA with R hemiparesis (dominant), dysphagia, aphasia, dysarthria  - Patient received 3 hrs daily of interdisciplinary inpatient rehab at least 5 days a week.   - MRI Brain w/o contrast showed acute left MCA territory infarct and right temporal cortical infarct  - Apixaban 5mg q12 (for DVT)  - Lipitor 80 mg bedtime   - JOEL showed no PFO or intracardiac thrombosis.     #Dysphagia  - Diet, Easy to Chew:  Consistent Carbohydrate No Snacks ; Fiber/Residue Restricted; No Lactose  Free Water Flush Instructions:  SMALL BITES/SIPS; ALLOW TIME TO SWALLOW; SIT UPRIGHT DURING MEALS AND FOR 2 HOURS AFTER; CONTINUE LOW RESIDUE LACTOSE-FREE UNTIL DIARRHEA RESOLVES--CAN HAVE LACTOSE-FREE MILK (04-23-22 @ 14:45) [Active]  - SLP f/u at [facility] recommeneded    #HTN  - Goal -150  - Losartan 25mg daily      #Dyspnea on exertion  - Obesity and debility. Lungs clear  - Continue to Monitor     #HLD  - c/w lipitor 80mg qhs     #DM2 - well controlled  - A1C results: 7.5%  - Metformin 500mg BID    #DM Neuropathy  -Duloxetine 30 mg oral delayed release PO Daily    #GERD  - PPI    #Hypothyroid  -C/w levothyroxine 112 mcg

## 2022-05-03 NOTE — DISCHARGE NOTE NURSING/CASE MANAGEMENT/SOCIAL WORK - NSDCPEFALRISK_GEN_ALL_CORE
For information on Fall & Injury Prevention, visit: [email or web address]  [email or web address] OR  https://www.cdc.gov/steadi/patient.html

## 2022-05-03 NOTE — DISCHARGE NOTE NURSING/CASE MANAGEMENT/SOCIAL WORK - NSDCPEPTSTRK_GEN_ALL_CORE
English Call 911 for stroke/Need for follow up after discharge/Prescribed medications/Risk factors for stroke/Stroke education booklet/Stroke support groups for patients, families, and friends/Stroke warning signs and symptoms/Signs and symptoms of stroke

## 2022-05-03 NOTE — PROGRESS NOTE ADULT - ASSESSMENT
Pain: Denied   Location: N/A                    Ratin/10     Intervention: N/A  Pain rating after Intervention: N/A  Orientation: Patient was oriented to self, place, event, month, date, year, day, and time.   Arousal Level: Alert  Behavior: Pleasant and cooperative  Affect Range: WFL     Needed: ? No   #: N/A  Attention: Internally distracted  Insight into illness/deficits: Fair  Tests Administered: ? CVLT-3 Brief Form; WAIS-IV Digit Span  Treatment Session Focused on Cognition     Patient was seen to conduct neurocognitive testing. Due to concerns regarding attention and memory, the CVLT-3 Brief Form and WAIS-IV Digit Span were administered. Performance on a measure of verbal learning and memory that required her to learn and recall a list of 9 words over 4 trials was mildly impaired. She benefited from repetition (3-7-6-4), but demonstrated an inconsistent learning curve. Notably, she reported that she had difficulty focusing during item presentation. After a short delay, she remembered 5 of the words (borderline). After a longer delay, she remembered 4 of the words (borderline). On a yes/no recognition condition, her performance was borderline. On a measure of immediate auditory attention, patient demonstrated mildly impaired performance. Consistent with previous testing, she was only able to repeat back up to 4 digits. On measures of verbal working memory, her performance ranged from borderline to mildly impaired.     Consistent with previous testing, results suggest deficits in verbal learning and memory, immediate auditory attention, and working memory. To some extent, learning and memory deficits may be partially impacted by impairments in attention. Patient is scheduled to be discharged from the hospital on 22, and she will be transitioning to a short-term rehab facility. Recommendations include obtaining a comprehensive neuropsychological evaluation once she returns home to assess further improvement and so that more targeted recommendations could be generated.     CVLT-3 Brief Form  Domain	Index/Scaled Score	Percentile Rank	Range  Subtest	 	 	   Trials 1-4 Correct	75	5	Mildly Impaired  Short Delay Free Recall	6	9	Borderline  Long Delay Free Recall	6	9	Borderline  Long Delay Cued Recall	6	9	Borderline  Recognition Discriminability	6	9	Borderline                    WAIS-IV Digit Span  Domain	Index/Scaled Score	Percentile Rank	Range  Subtest	 	 	   Digit Span	4	2	Impaired  Forward	5	5	Mildly Impaired  Backward	6	9	Borderline  Sequencing	5	5	Mildly Impaired      Goals: No further goals at this time    Plan: Discharge from neuropsychology services; reconsult as needed    Brain Injury Protocol: ? No          Cognitive Behavioral Guidelines:  ? No

## 2022-05-03 NOTE — DISCHARGE NOTE PROVIDER - NPI NUMBER (FOR SYSADMIN USE ONLY) :
[[id number]] [[id number]],[[id number]],[[id number]],[[id number]],[[id number]],[[id number]] [[id number]],[[id number]],[[id number]],[[id number]],[[id number]],[[id number]],[[id number]] [[id number]],[[id number]],[[id number]],[[id number]],[[id number]],[[id number]],[UNKNOWN],[[id number]]

## 2022-05-03 NOTE — DISCHARGE NOTE PROVIDER - CARE PROVIDER_API CALL
Redd Boyd)  Cardiovascular Disease; Internal Medicine  [address] [facility] [address]  [place] [address]  Phone: [phone]  Fax: [phone]  Follow Up Time:     Natali Hedrick)  Neurology  [address] [place] [address]  Phone: [phone]  Fax: [phone]  Follow Up Time: 2 weeks    Pj Butler; [facility])  [facility] Neurosurgery Surgery  [address] [place] [address]  Phone: [phone]  Fax: [phone]  Follow Up Time: 1 month    Popeye Denney)  Gastroenterology; Internal Medicine  [address] [place] [address]  Phone: [phone]  Fax: [phone]  Established Patient  Follow Up Time: Routine    Cody Davenport; [facility])  CardiologyElectrophyslgy [address]  [place] [address]  Phone: [phone]  Fax: [phone]  Follow Up Time: 2 weeks   Redd Boyd)  Cardiovascular Disease; Internal Medicine  [address] [facility] [address]  [place] [address]  Phone: [phone]  Fax: [phone]  Follow Up Time:     Natali Hedrick)  Neurology  [address] [place] [address]  Phone: [phone]  Fax: [phone]  Follow Up Time: 2 weeks    Pj Butler; [facility])  [facility] Neurosurgery Surgery  [address] [place] [address]  Phone: [phone]  Fax: [phone]  Follow Up Time: 1 month    Popeye Denney)  Gastroenterology; Internal Medicine  [address] [place] [address]  Phone: [phone]  Fax: [phone]  Established Patient  Follow Up Time: Routine    Cody Davenport; MAGALIS)  CardiologyElectrophyslgy [facility]  [address] [place] [address] [address]  [place] [address]  Phone: [phone]  Fax: [phone]  Follow Up Time: 2 weeks    MICHELLE MELCHOR  Internal Medicine  [address] [place] [address]  Phone: [phone]  Fax: [phone]  Established Patient  Follow Up Time:    Redd Boyd)  Cardiovascular Disease; Internal Medicine  [address] [facility] [place] [facility] [address]  [place] [address]  Phone: [phone]  Fax: [phone]  Follow Up Time:     Natali Hedrick)  Neurology  [address] [place] [address]  Phone: [phone]  Fax: [phone]  Follow Up Time: 2 weeks    Pj Butler; [facility])  [facility] Neurosurgery Surgery  [address] [place] [address]  Phone: [phone]  Fax: [phone]  Follow Up Time: 1 month    Popeye Denney)  Gastroenterology; Internal Medicine  [address] [place] [address]  Phone: [phone]  Fax: [phone]  Established Patient  Follow Up Time: Routine    Cody Davenport; [facility])  CardiologyElectrophyslgy Ctr  [address] [facility] [address]  [place] [address]  Phone: [phone]  Fax: [phone]  Follow Up Time: 2 weeks    MICHELLE MELCHOR  Internal Medicine  [address] [place] [address]  Phone: [phone]  Fax: [phone]  Established Patient  Follow Up Time:     VASCULAR Surgery,   [address] [place] [address]  Phone: [phone]  Fax: (   )    -  Follow Up Time: 1 month   Redd Boyd)  Cardiovascular Disease; Internal Medicine  [address] [facility] [address]  [place] [address]  Phone: [phone]  Fax: [phone]  Follow Up Time:     Natali Hedrick)  Neurology  [address] [place] [address]  Phone: [phone]  Fax: [phone]  Follow Up Time: 2 weeks    Pj Butler; [facility])  [facility] Neurosurgery Surgery  [address] [place] [address]  Phone: [phone]  Fax: [phone]  Follow Up Time: 1 month    Popeye Denney)  Gastroenterology; Internal Medicine  [address] [place] [address]  Phone: [phone]  Fax: [phone]  Established Patient  Follow Up Time: Routine    Cody Davenport; [facility])  CardiologyElectrophyslgy Ctr  [address] [place] [address] [address]  [place] [address]  Phone: [phone]  Fax: [phone]  Scheduled Appointment: 05/27/2022    MICHELLE MELCHOR  Internal Medicine  [address] [place] [address]  Phone: [phone]  Fax: [phone]  Established Patient  Follow Up Time:     VASCULAR Surgery,   [address] [place] [address]  Phone: [phone]  Fax: (   )    -  Follow Up Time: 1 month    Jazmín Spears  HEMATOLOGY  [address] [place] [address]  Phone: [phone]  Fax: [phone]  Established Patient  Follow Up Time: Routine

## 2022-05-03 NOTE — PROGRESS NOTE ADULT - ASSESSMENT
Patient and Family Contact  Primary Contact Name: Zoila Armstrong     Relationship: Sister    Family contact was held with patient’s sister on 05/02/2022. General results of neurocognitive testing were shared with the patient and her sister. They demonstrated an understanding of the results and agreed with the recommendation of pursuing an outpatient neuropsychological evaluation to further assess underlying weaknesses in attention and memory. A brochure with contact information for neuropsychology was provided to patient’s sister. Patient will be discharged from the hospital on 05/03/2022, where she will transition to a short-term rehab facility.

## 2022-05-03 NOTE — DISCHARGE NOTE PROVIDER - DISCHARGE DIET
Consistent Carbohydrate Diabetic Diets/Easy to Chew Diet Consistent Carbohydrate Diabetic Diets/Other Diet Instructions/Easy to Chew Diet

## 2022-05-03 NOTE — DISCHARGE NOTE PROVIDER - CARE PROVIDERS DIRECT ADDRESSES
,[email or web address],DirectAddress_Unknown,DirectAddress_Unknown,DirectAddress_Unknown,[email or web address] ,[email or web address],DirectAddress_Unknown,DirectAddress_Unknown,DirectAddress_Unknown,[email or web address],DirectAddress_Unknown ,[email or web address],DirectAddress_Unknown,DirectAddress_Unknown,DirectAddress_Unknown,[email or web address],DirectAddress_Unknown,DirectAddress_Unknown ,[email or web address],DirectAddress_Unknown,DirectAddress_Unknown,DirectAddress_Unknown,[email or web address],DirectAddress_Unknown,DirectAddress_Unknown,[email or web address]

## 2022-05-03 NOTE — DISCHARGE NOTE PROVIDER - PROVIDER TOKENS
PROVIDER:[TOKEN:[[id number]]],PROVIDER:[TOKEN:[[id number]],FOLLOWUP:[2 weeks]],PROVIDER:[TOKEN:[[id number]],FOLLOWUP:[1 month]],PROVIDER:[TOKEN:[[id number]],FOLLOWUP:[Routine],ESTABLISHEDPATIENT:[T]],PROVIDER:[TOKEN:[[id number]],FOLLOWUP:[2 weeks]] PROVIDER:[TOKEN:[[id number]]],PROVIDER:[TOKEN:[[id number]],FOLLOWUP:[2 weeks]],PROVIDER:[TOKEN:[[id number]],FOLLOWUP:[1 month]],PROVIDER:[TOKEN:[[id number]],FOLLOWUP:[Routine],ESTABLISHEDPATIENT:[T]],PROVIDER:[TOKEN:[[id number]],FOLLOWUP:[2 weeks]],PROVIDER:[TOKEN:[[id number]],ESTABLISHEDPATIENT:[T]] PROVIDER:[TOKEN:[[id number]]],PROVIDER:[TOKEN:[[id number]],FOLLOWUP:[2 weeks]],PROVIDER:[TOKEN:[[id number]],FOLLOWUP:[1 month]],PROVIDER:[TOKEN:[[id number]],FOLLOWUP:[Routine],ESTABLISHEDPATIENT:[T]],PROVIDER:[TOKEN:[[id number]],FOLLOWUP:[2 weeks]],PROVIDER:[TOKEN:[[id number]],ESTABLISHEDPATIENT:[T]],FREE:[LAST:[VASCULAR Surgery],PHONE:[phone]],FAX:[(   )    -],ADDRESS:[[address] [place] [address]],FOLLOWUP:[1 month]] PROVIDER:[TOKEN:[[id number]]],PROVIDER:[TOKEN:[[id number]],FOLLOWUP:[2 weeks]],PROVIDER:[TOKEN:[[id number]],FOLLOWUP:[1 month]],PROVIDER:[TOKEN:[[id number]],FOLLOWUP:[Routine],ESTABLISHEDPATIENT:[T]],PROVIDER:[TOKEN:[[id number]],SCHEDULEDAPPT:[05/27/2022]],PROVIDER:[TOKEN:[[id number]],ESTABLISHEDPATIENT:[T]],FREE:[LAST:[VASCULAR Surgery],PHONE:[phone]],FAX:[(   )    -],ADDRESS:[[address] [place] [address]],FOLLOWUP:[1 month]],PROVIDER:[TOKEN:[[id number]],FOLLOWUP:[Routine],ESTABLISHEDPATIENT:[T]]

## 2022-05-03 NOTE — DISCHARGE NOTE PROVIDER - NSDCCPCAREPLAN_GEN_ALL_CORE_FT
PRINCIPAL DISCHARGE DIAGNOSIS  Diagnosis: Stroke  Assessment and Plan of Treatment:       SECONDARY DISCHARGE DIAGNOSES  Diagnosis: Hypertension  Assessment and Plan of Treatment:     Diagnosis: DVT of leg (deep venous thrombosis)  Assessment and Plan of Treatment:     Diagnosis: Type 2 diabetes mellitus  Assessment and Plan of Treatment:     Diagnosis: GERD (gastroesophageal reflux disease)  Assessment and Plan of Treatment:     Diagnosis: Hypothyroid  Assessment and Plan of Treatment:      PRINCIPAL DISCHARGE DIAGNOSIS  Diagnosis: Stroke  Assessment and Plan of Treatment: You were admitted to the hospital because you had a stroke that affected both sides of your brain and resulted in weakness and difficulty with speech and swallowing. You have been receiving intense therapy during your hospital stay, including physical, occupational, speech, recreation and neuropsychology. Therapy will continue at the short-term rehab facility. It is also important that you follow up with your doctors, including the [facility]ist [facility] (the office will call your sister Zoila to arrange a follow up appointment), Cardiologists (Dr. Davenport to check the loop recorder that was implanted on 4/19/22 to look for irregular heart rhythms that can cause a stroke, and Dr. Boyd), and also with your Primary Care Provider (PCP) Dr. Wei.      SECONDARY DISCHARGE DIAGNOSES  Diagnosis: Hypertension  Assessment and Plan of Treatment: You are taking losartan 25mg orally daily to control your blood pressure and it also protects your heart and kidneys. Also continue a heart healthy (low fat low cholesterol) diet and follow up often with your PCP and Cardiologists.    Diagnosis: DVT of leg (deep venous thrombosis)  Assessment and Plan of Treatment: You were found to have blood clots in your right calf, which are being treated with the blood-thinner apixaban (Eliquis). It is very important that this medication is taken twice a day as prescribed, and that you do not skip doses or stop it unless your doctor tells you to stop taking it. Treatment usually continues for 3 months, so please follow up with the Vascular Surgery Team in about one month and the doctor will let you know how long you need to continue this medication.    Diagnosis: Type 2 diabetes mellitus  Assessment and Plan of Treatment: Your diabetes is well-controlled on curent medication and diet. You are taking metformin 500mg orally daily with breakfast and you should also continue to follow a diabetic heart healthy diet. Check your blood sugar before meals and follow up often with your PCP. It is important to see a Podiatrist (foot doctor) and Ophthalmologist (eye doctor) regularly as well.    Diagnosis: GERD (gastroesophageal reflux disease)  Assessment and Plan of Treatment: Continue Protonix in the morning 30 minutes before breakfast, avoid foods that can aggravate reflux (see diet section) and follow anti-reflux precautions, such as eating 5 or 6 small meals instead of 3 large meals daily, never lie flat--keep head of bed raised at least 45 degrees and wait at least 3 hours before lying down. Follow up with Dr. Denney, your GI (gastroenterologist) physician,  as needed.    Diagnosis: Hypothyroid  Assessment and Plan of Treatment: Your thyroid function tests were normal; continue levothyroxine every morning, take it on an empty stomach at least one hour before breakfast daily, and follow up with your PCP regularly.

## 2022-05-10 DIAGNOSIS — I69.322 DYSARTHRIA FOLLOWING CEREBRAL INFARCTION: ICD-10-CM

## 2022-05-10 DIAGNOSIS — Z79.84 LONG TERM (CURRENT) USE OF ORAL HYPOGLYCEMIC DRUGS: ICD-10-CM

## 2022-05-10 DIAGNOSIS — E66.9 OBESITY, UNSPECIFIED: ICD-10-CM

## 2022-05-10 DIAGNOSIS — I69.320 APHASIA FOLLOWING CEREBRAL INFARCTION: ICD-10-CM

## 2022-05-10 DIAGNOSIS — I69.351 HEMIPLEGIA AND HEMIPARESIS FOLLOWING CEREBRAL INFARCTION AFFECTING RIGHT DOMINANT SIDE: ICD-10-CM

## 2022-05-10 DIAGNOSIS — I69.391 DYSPHAGIA FOLLOWING CEREBRAL INFARCTION: ICD-10-CM

## 2022-05-10 DIAGNOSIS — E11.40 TYPE 2 DIABETES MELLITUS WITH DIABETIC NEUROPATHY, UNSPECIFIED: ICD-10-CM

## 2022-05-10 DIAGNOSIS — I10 ESSENTIAL (PRIMARY) HYPERTENSION: ICD-10-CM

## 2022-05-10 DIAGNOSIS — E03.9 HYPOTHYROIDISM, UNSPECIFIED: ICD-10-CM

## 2022-05-10 DIAGNOSIS — R13.13 DYSPHAGIA, PHARYNGEAL PHASE: ICD-10-CM

## 2022-05-10 DIAGNOSIS — I82.451 ACUTE EMBOLISM AND THROMBOSIS OF RIGHT PERONEAL VEIN: ICD-10-CM

## 2022-05-10 DIAGNOSIS — Z85.72 PERSONAL HISTORY OF NON-HODGKIN LYMPHOMAS: ICD-10-CM

## 2022-05-10 DIAGNOSIS — I82.441 ACUTE EMBOLISM AND THROMBOSIS OF RIGHT TIBIAL VEIN: ICD-10-CM

## 2022-05-10 DIAGNOSIS — K21.9 GASTRO-ESOPHAGEAL REFLUX DISEASE WITHOUT ESOPHAGITIS: ICD-10-CM

## 2022-05-10 DIAGNOSIS — E78.5 HYPERLIPIDEMIA, UNSPECIFIED: ICD-10-CM

## 2022-05-10 DIAGNOSIS — Z88.8 ALLERGY STATUS TO OTHER DRUGS, MEDICAMENTS AND BIOLOGICAL SUBSTANCES STATUS: ICD-10-CM

## 2022-05-10 DIAGNOSIS — Z90.49 ACQUIRED ABSENCE OF OTHER SPECIFIED PARTS OF DIGESTIVE TRACT: ICD-10-CM

## 2022-05-24 ENCOUNTER — NON-APPOINTMENT (OUTPATIENT)
Age: 77
End: 2022-05-24

## 2022-05-25 ENCOUNTER — NON-APPOINTMENT (OUTPATIENT)
Age: 77
End: 2022-05-25

## 2022-05-26 ENCOUNTER — APPOINTMENT (OUTPATIENT)
Dept: NEUROSURGERY | Facility: CLINIC | Age: 77
End: 2022-05-26

## 2022-06-09 ENCOUNTER — APPOINTMENT (OUTPATIENT)
Dept: CARDIOLOGY | Facility: CLINIC | Age: 77
End: 2022-06-09

## 2022-06-09 VITALS
DIASTOLIC BLOOD PRESSURE: 75 MMHG | HEART RATE: 102 BPM | SYSTOLIC BLOOD PRESSURE: 125 MMHG | RESPIRATION RATE: 16 BRPM | TEMPERATURE: 98 F | WEIGHT: 208 LBS | BODY MASS INDEX: 38.28 KG/M2 | HEIGHT: 62 IN

## 2022-06-09 DIAGNOSIS — Z48.89 ENCOUNTER FOR OTHER SPECIFIED SURGICAL AFTERCARE: ICD-10-CM

## 2022-06-09 DIAGNOSIS — R42 DIZZINESS AND GIDDINESS: ICD-10-CM

## 2022-06-09 DIAGNOSIS — K57.90 DIVERTICULOSIS OF INTESTINE, PART UNSPECIFIED, W/OUT PERFORATION OR ABSCESS W/OUT BLEEDING: ICD-10-CM

## 2022-06-09 DIAGNOSIS — K21.9 GASTRO-ESOPHAGEAL REFLUX DISEASE W/OUT ESOPHAGITIS: ICD-10-CM

## 2022-06-09 DIAGNOSIS — Z82.49 FAMILY HISTORY OF ISCHEMIC HEART DISEASE AND OTHER DISEASES OF THE CIRCULATORY SYSTEM: ICD-10-CM

## 2022-06-09 DIAGNOSIS — Z87.42 PERSONAL HISTORY OF OTHER DISEASES OF THE FEMALE GENITAL TRACT: ICD-10-CM

## 2022-06-09 DIAGNOSIS — A09 INFECTIOUS GASTROENTERITIS AND COLITIS, UNSPECIFIED: ICD-10-CM

## 2022-06-09 DIAGNOSIS — Z85.71 PERSONAL HISTORY OF HODGKIN LYMPHOMA: ICD-10-CM

## 2022-06-09 PROCEDURE — 93000 ELECTROCARDIOGRAM COMPLETE: CPT

## 2022-06-09 PROCEDURE — 99212 OFFICE O/P EST SF 10 MIN: CPT

## 2022-06-09 PROCEDURE — 93010 ELECTROCARDIOGRAM REPORT: CPT

## 2022-06-14 ENCOUNTER — APPOINTMENT (OUTPATIENT)
Dept: VASCULAR SURGERY | Facility: CLINIC | Age: 77
End: 2022-06-14
Payer: MEDICARE

## 2022-06-14 VITALS — BODY MASS INDEX: 38.28 KG/M2 | HEIGHT: 62 IN | WEIGHT: 208 LBS

## 2022-06-14 VITALS — DIASTOLIC BLOOD PRESSURE: 79 MMHG | SYSTOLIC BLOOD PRESSURE: 129 MMHG

## 2022-06-14 PROCEDURE — 93971 EXTREMITY STUDY: CPT

## 2022-06-14 PROCEDURE — 99204 OFFICE O/P NEW MOD 45 MIN: CPT

## 2022-06-14 NOTE — ASSESSMENT
[FreeTextEntry1] : \par The patient is a 76-year-old female who was admitted to [facility] hospital status post fall and noted to have right arm and leg weakness. The patient was diagnosed with a stroke. CTA showed a left MCA occlusion. The patient was treated with t-PA and taken for the vertical mechanical thrombectomy by Dr. Pierre. During her hospital admission she developed a right leg DVT in the perineal and a branch of the right posterior tibial vein. She is currently on Eliquis 5 mg b.i.d. which was started on April 22. She currently resides in rehabilitation receiving physical therapy\par \par venous duplex today shows no evidence of DVT.\par I recommend she complete a course of  of 3 months of anticoagulation,\par I will see her back in my office in 3 months time or sooner if any new symptoms develop

## 2022-06-14 NOTE — HISTORY OF PRESENT ILLNESS
[FreeTextEntry1] : \par The patient is a 76-year-old female who was admitted to [facility] hospital status post fall and noted to have right arm and leg weakness. The patient was diagnosed with a stroke. CTA showed a left MCA occlusion. The patient was treated with t-PA and taken for the vertical mechanical thrombectomy by Dr. Pierre. During her hospital admission she developed a right leg DVT in the perineal and a branch of the right posterior tibial vein. She is currently on Eliquis 5 mg b.i.d. which was started on April 22. She currently resides in rehabilitation receiving physical therapy

## 2022-06-16 ENCOUNTER — APPOINTMENT (OUTPATIENT)
Dept: NEUROSURGERY | Facility: CLINIC | Age: 77
End: 2022-06-16
Payer: MEDICARE

## 2022-06-16 PROCEDURE — 99213 OFFICE O/P EST LOW 20 MIN: CPT

## 2022-06-19 NOTE — END OF VISIT
[FreeTextEntry3] : This lady was reviewed in routine follow-up neurosurgery clinic for a previous catheter angiogram that was performed after she presented with an acute stroke secondary to an M2 occlusion.  The clot  migrated likely secondary to tPA administration.  She has improved dramatically from a neurological point of view and from my perspective requires ongoing care in the stroke clinic for medication and investigation of her underlying risk factors for a thrombotic stroke.  We will be happy to see her back in the clinic should the need arise.\par \par Time spent during consultation was approximately 25 minutes

## 2022-06-19 NOTE — HISTORY OF PRESENT ILLNESS
[FreeTextEntry1] : Briefly, Ms. Armstrong, presented to [facility] ED after having right sided weakness and dysarthria, found to have left M2 occlusion on CTA, TPA was given, then was going to have a mechanical thrombectomy, but then clot dislodged. SHe had a L MCA CVA\par \par Today she is overall doing well. Resides at [facility], will be going home next friday. Reports she no longer have right sided weakness, her speech is clear, she uses a walker to ambulate. \par \par

## 2022-06-23 ENCOUNTER — APPOINTMENT (OUTPATIENT)
Dept: NEUROLOGY | Facility: CLINIC | Age: 77
End: 2022-06-23
Payer: MEDICARE

## 2022-06-23 VITALS
DIASTOLIC BLOOD PRESSURE: 60 MMHG | TEMPERATURE: 98.6 F | BODY MASS INDEX: 38.28 KG/M2 | WEIGHT: 208 LBS | OXYGEN SATURATION: 97 % | SYSTOLIC BLOOD PRESSURE: 113 MMHG | HEIGHT: 62 IN | HEART RATE: 93 BPM

## 2022-06-23 PROCEDURE — 99215 OFFICE O/P EST HI 40 MIN: CPT

## 2022-06-23 NOTE — HISTORY OF PRESENT ILLNESS
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 Butler; etiology per Dr. Hedrick, cardioembolic vs hypercoagulable state.  \par \par She presented after a fall associated with RUE/RLE weakness, word finding difficulty, and vomiting (couldn’t remember whether it was before or after fall). On arrival NIHSS 7, /80. NIHSS then became 4, tPA was given.  \par \par -CTP: Small volume mismatch defect of 18 cc corresponding to the region of MCA territory stroke visualized on CT head. There is no associated core infarct. \par -CTA H: Demonstration of superior left MCA M2 segment occlusion versus severe stenosis with minimal opacification/distal reconstitution of its distal branches. Calcified plaque within the carotid siphons without significant stenosis.\par -CTA N: No evidence of greater than 50% carotid or vertebral artery stenosis.  \par \par She had fluctuating NIHSS score after receiving TPA for a presumed left sided M2 occlusion with a significant mismatch on CT perfusion imaging. It was eventually decided to do cerebral angiogram +/- MT with Dr. Butler with above findings. No thrombectomy was performed due to distal location of thrombus and borderline NIH status. \par \par JOEL 4/15/22: LVEF, 60-65%, nml LVSF, NO PFO/shunt, NO RODY, Lipomatous hypertrophy of intra-atrial septum  \par \par MRI H waw 4/13/22: Acute left MCA territory infarct as well as additional focal infarct involving the Right temporal cortical region. No hemorrhagic transformation. MILD chronic microvascular type changes. \par \par B/l LE DVT US: Acute DVT of Right peroneal vein and a branch of the right posterior tibial vein. She was started on Eliquis 5 BID. \par \par ILR placed 4/19/22 \par  \par LDL 80 \par A1c 7.5  \par \par Since, she feels well. Saw Dr. Butler who signed off and Dr. Sparks where repeat LE duplex was negative for DVT and he recommended c/w AC for 3 mo. PTA she was complete independent and not any any antiplatelet for statin \par \par \par

## 2022-06-23 NOTE — REASON FOR VISIT
[Post Hospitalization] : a post hospitalization visit [FreeTextEntry1] : cryptogenic stroke of L MCA territory AS WELL AS Right temporal cortical region focal infarct, with finding of Acute superior M3 branch occlusion secondary to likely distal migration of thrombus following TPA

## 2022-06-23 NOTE — ASSESSMENT
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 Butler; etiology per Dr. Hedrick, cardioembolic vs hypercoagulable state. Exam significant for RLE mild drift and R sided hypoesthesia, NIHSS 2\par \par PLAN: \par -F/u ILR report \par -C/w Eliquis 5 BID \par -C/w Lipitor 80 \par -Endocrinologist for sugar mgmt \par -Nutritionist referral \par -C/w PT/OT \par -RTC in 3 mo \par \par Aggressive risk factor modification should be continued for secondary stroke prevention, consisting of intensive blood pressure control and cholesterol monitoring. I encouraged the patient to discuss these important issues with her primary care doctor.  \par \par I have reviewed the goals of stroke risk factor modification. I counseled the patient on measures to reduce stroke risk, including the importance of medication compliance, risk factor control, exercise, healthy diet and avoidance of smoking. I reviewed stroke warning signs and symptoms and appropriate actions to take if such occur.\par

## 2022-07-01 ENCOUNTER — NON-APPOINTMENT (OUTPATIENT)
Age: 77
End: 2022-07-01

## 2022-07-01 PROBLEM — Z48.89 ENCOUNTER FOR POSTOPERATIVE WOUND CHECK: Status: ACTIVE | Noted: 2022-07-01

## 2022-07-26 ENCOUNTER — APPOINTMENT (OUTPATIENT)
Dept: VASCULAR SURGERY | Facility: CLINIC | Age: 77
End: 2022-07-26

## 2022-07-26 VITALS — SYSTOLIC BLOOD PRESSURE: 149 MMHG | DIASTOLIC BLOOD PRESSURE: 79 MMHG

## 2022-07-26 DIAGNOSIS — M79.89 OTHER SPECIFIED SOFT TISSUE DISORDERS: ICD-10-CM

## 2022-07-26 PROCEDURE — 99214 OFFICE O/P EST MOD 30 MIN: CPT

## 2022-07-26 PROCEDURE — 93970 EXTREMITY STUDY: CPT

## 2022-07-26 RX ORDER — MAGNESIUM HYDROXIDE 400 MG/5ML
400 SUSPENSION ORAL
Refills: 0 | Status: DISCONTINUED | COMMUNITY
End: 2022-07-26

## 2022-07-26 RX ORDER — ALUMINUM HYDROXIDE, MAGNESIUM HYDROXIDE, DIMETHICONE 200; 200; 20 MG/5ML; MG/5ML; MG/5ML
200-200-20 LIQUID ORAL
Refills: 0 | Status: DISCONTINUED | COMMUNITY
End: 2022-07-26

## 2022-07-26 RX ORDER — SENNOSIDES 8.6 MG/1
8.6 CAPSULE, GELATIN COATED ORAL
Qty: 2 | Refills: 0 | Status: DISCONTINUED | COMMUNITY
End: 2022-07-26

## 2022-07-26 RX ORDER — MELATONIN 3 MG
3 CAPSULE ORAL
Refills: 0 | Status: DISCONTINUED | COMMUNITY
End: 2022-07-26

## 2022-07-26 RX ORDER — FAMOTIDINE 10 MG/1
TABLET, FILM COATED ORAL
Refills: 0 | Status: DISCONTINUED | COMMUNITY
End: 2022-07-26

## 2022-07-26 RX ORDER — MENTHOL AND ZINC OXIDE .44; 20.625 G/100G; G/100G
0.44-20.625 OINTMENT TOPICAL
Refills: 0 | Status: DISCONTINUED | COMMUNITY
End: 2022-07-26

## 2022-07-26 NOTE — ASSESSMENT
[FreeTextEntry1] : 76 y/o female with h/o stroke with right sided weakness in April 2022, hospitalization complicated by right LE DVT on Eliquis for 3 months now.\par \par No evidence of DVT in the lower extremities bilaterally.\par \par She was advised to continue on Eliquis by Neurology as the etiology of stroke could be cardioembolic or hypercoagulable state. So she was advised continue on Eliquis for now. I will discuss this with Neurology Dr. Hedrick and will inform the patient of the plan.\par \par She has received adequate anticoagulation from a vascular standpoint.

## 2022-07-26 NOTE — HISTORY OF PRESENT ILLNESS
[FreeTextEntry1] : 76 y/o female who was admitted for right sided weakness, diagnosed with stroke in April 2022, underwent TPA by Neurosurgery, developed right calf DVT and has been on Eliquis for 3 months now. C/o leg swelling. She is now able to ambulate with assistance of walker.\par She had a fall recently on the stairs and hurt her left and now with eft leg swelling, bruise is improving.

## 2022-07-26 NOTE — DATA REVIEWED
[FreeTextEntry1] : I performed a venous duplex which was medically necessary to evaluate for resolution of DVT. It showed no evidence of DVT in the lower extremities bilaterally. \par

## 2022-07-28 ENCOUNTER — NON-APPOINTMENT (OUTPATIENT)
Age: 77
End: 2022-07-28

## 2022-07-28 ENCOUNTER — APPOINTMENT (OUTPATIENT)
Dept: OTOLARYNGOLOGY | Facility: CLINIC | Age: 77
End: 2022-07-28

## 2022-07-29 ENCOUNTER — NON-APPOINTMENT (OUTPATIENT)
Age: 77
End: 2022-07-29

## 2022-08-01 ENCOUNTER — NON-APPOINTMENT (OUTPATIENT)
Age: 77
End: 2022-08-01

## 2022-08-01 ENCOUNTER — APPOINTMENT (OUTPATIENT)
Dept: CARDIOLOGY | Facility: CLINIC | Age: 77
End: 2022-08-01

## 2022-08-01 PROCEDURE — 93298 REM INTERROG DEV EVAL SCRMS: CPT

## 2022-08-01 PROCEDURE — G2066: CPT

## 2022-08-04 ENCOUNTER — NON-APPOINTMENT (OUTPATIENT)
Age: 77
End: 2022-08-04

## 2022-08-05 NOTE — HISTORY OF PRESENT ILLNESS
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 The patient was given tPA and taken for an emergent mechanical thrombectomy, but this was not completed due to dislodgement of clot at the time of procedure. Patient's dysarthria and LLE weakness briefly worsened, but subsequently improved again. Repeat CTH did not\par reveal any hemorrhage, patient was receiving heparin drip for right sided lower DVT (R peroneal and R PT branch), and now switched to Eliquis.\par EP was consulted and a loop recorder was placed on 4/19/22: She presents for wound check and device interrogation. \par  \par (PCP) Dr. Wei.\par \par

## 2022-08-05 NOTE — ASSESSMENT
[FreeTextEntry1] : CVA s/p ILR\par - Wound is well healed. There are no signs or symptoms of infection of inflammation. There is no redness, no swelling, no erythema. The patient has no pain.\par - Device interrogation normal. I interrogated and reprogrammed her device as described above. NO AFIB.\par \par - Patient is enrolled in remote monitoring services. I reviewed remote transmission process with the patient, as well as schedule and ability to do manual transmission. We also spoke about associated co-payments with remote monitoring that may not be covered by insurance. \par \par On Eliquis for DVT. F/U vascular.\par RTO in 6 months and PRN

## 2022-08-05 NOTE — REASON FOR VISIT
[New Patient Device Check] : is here today for a new patient device check visit for [Other: _____] : [unfilled]

## 2022-08-05 NOTE — PROCEDURE
[See Device Printout] : See device printout [de-identified] : JONNY [de-identified] : LNQ11 [de-identified] : [id number] [de-identified] : 4/19/2022 [de-identified] : 51 pause events - false\par Decreased sensitivity to 0.25mV

## 2022-08-09 ENCOUNTER — NON-APPOINTMENT (OUTPATIENT)
Age: 77
End: 2022-08-09

## 2022-09-09 ENCOUNTER — APPOINTMENT (OUTPATIENT)
Dept: CARDIOLOGY | Facility: CLINIC | Age: 77
End: 2022-09-09

## 2022-09-09 ENCOUNTER — NON-APPOINTMENT (OUTPATIENT)
Age: 77
End: 2022-09-09

## 2022-09-09 PROCEDURE — 93298 REM INTERROG DEV EVAL SCRMS: CPT

## 2022-09-09 PROCEDURE — G2066: CPT

## 2022-09-27 ENCOUNTER — APPOINTMENT (OUTPATIENT)
Dept: NEUROLOGY | Facility: CLINIC | Age: 77
End: 2022-09-27

## 2022-09-27 VITALS
BODY MASS INDEX: 36.44 KG/M2 | HEART RATE: 80 BPM | SYSTOLIC BLOOD PRESSURE: 122 MMHG | OXYGEN SATURATION: 97 % | WEIGHT: 198 LBS | TEMPERATURE: 97.8 F | HEIGHT: 62 IN | DIASTOLIC BLOOD PRESSURE: 70 MMHG

## 2022-09-27 PROCEDURE — 99214 OFFICE O/P EST MOD 30 MIN: CPT

## 2022-09-27 RX ORDER — ATORVASTATIN CALCIUM 80 MG/1
80 TABLET, FILM COATED ORAL
Refills: 0 | Status: DISCONTINUED | COMMUNITY
End: 2022-09-27

## 2022-09-27 NOTE — HISTORY OF PRESENT ILLNESS
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 Butler; etiology per Dr. Hedrick, cardioembolic vs hypercoagulable state. She was started on Eliquis for acute DVT of R peroneal vein and branch of R posterior tibial vein. \par \par Since,\par She's no longer on Eliquis, just ASA 81 \par ILR reports no event to date\par Recent A1c 5.7, LDL 34\par Says PET scan shows that there is tiny lymph node near liver ?cancer recurrence, pending w/u \par Per chart note 8/4/22----Heme says NO hypercoag state and no need for AC which as approved by Dr. Hedrick\par Per 7/2022 [facility] sx note, no evidence of LE dvt b/l \par She is doing her ADLs well as well as self-exercises at home \par She's eating less, having salads, but craving sweets\par We discussed healthy sweets like fruit, nuts, and using honey/agave \par \par \par

## 2022-09-27 NOTE — PHYSICAL EXAM
[FreeTextEntry1] : Focal neurological exam:\par \par MS: Awake, alert, oriented to person, place, situation and time. Normal affect. Follows commands. \par \par Language: Speech is clear, fluent with good repetition & comprehension. No dysarthria. \par \par CNs 2 - 12 intact. EOMI no nystagmus, no diplopia. VFF. No facial asymmetry b/l, full eye closure strength b/l. Hearing grossly normal. Head turning & shoulder shrug intact b/l. Tongue midline, normal movements, no atrophy.\par \par Motor: Normal muscle bulk & tone. No noticeable tremor or seizure. No pronator drift. RUE 4+/5 and RLE 5-/5, L side is 5/5 UE/LE. NO drift. \par \par Reflexes: DTR of biceps, knee and ankle normal \par \par Sensation: R knee decr to vibration \par \par Cortical: No extinction\par \par Coordination: No dysmetria to FTN.\par \par Gait: Ambulating well with walker\par \par NIHSS 1 (sensory deficit, but may not be related to stroke) \par \par \par \par \par

## 2022-09-27 NOTE — ASSESSMENT
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 Butler; etiology per Dr. Hedrick, ?cardioembolic vs hypercoagulable state. However, since ILR has shown no events and she is off Eliquis as DVTs have resolved. She is doing well, using walker now and w increase RLE strength, NIHSS 1 for sensory deficit of R side. \par \par PLAN:\par -C/w Lipitor 10 and repeat lipid panel in 1 mo \par -C/w more PT/OT \par -C/w ASA 81 \par -F/u in 6 mo \par \par Aggressive risk factor modification should be continued for secondary stroke prevention, consisting of intensive blood pressure control and cholesterol monitoring. I encouraged the patient to discuss these important issues with her primary care doctor.  \par \par I have reviewed the goals of stroke risk factor modification. I counseled the patient on measures to reduce stroke risk, including the importance of medication compliance, risk factor control, exercise, healthy diet and avoidance of smoking. I reviewed stroke warning signs and symptoms and appropriate actions to take if such occur.\par

## 2022-09-28 LAB
CHOLEST SERPL-MCNC: 83 MG/DL
ESTIMATED AVERAGE GLUCOSE: 117 MG/DL
HBA1C MFR BLD HPLC: 5.7 %
HDLC SERPL-MCNC: 32 MG/DL
LDLC SERPL CALC-MCNC: 34 MG/DL
NONHDLC SERPL-MCNC: 51 MG/DL
TRIGL SERPL-MCNC: 83 MG/DL

## 2022-10-14 ENCOUNTER — APPOINTMENT (OUTPATIENT)
Dept: CARDIOLOGY | Facility: CLINIC | Age: 77
End: 2022-10-14

## 2022-10-14 ENCOUNTER — NON-APPOINTMENT (OUTPATIENT)
Age: 77
End: 2022-10-14

## 2022-10-14 PROCEDURE — 93298 REM INTERROG DEV EVAL SCRMS: CPT

## 2022-10-14 PROCEDURE — G2066: CPT

## 2022-11-03 ENCOUNTER — NON-APPOINTMENT (OUTPATIENT)
Age: 77
End: 2022-11-03

## 2022-11-03 LAB
CHOLEST SERPL-MCNC: 120 MG/DL
HDLC SERPL-MCNC: 43 MG/DL
LDLC SERPL CALC-MCNC: 61 MG/DL
NONHDLC SERPL-MCNC: 77 MG/DL
TRIGL SERPL-MCNC: 82 MG/DL

## 2022-11-18 ENCOUNTER — NON-APPOINTMENT (OUTPATIENT)
Age: 77
End: 2022-11-18

## 2022-11-18 ENCOUNTER — APPOINTMENT (OUTPATIENT)
Dept: CARDIOLOGY | Facility: CLINIC | Age: 77
End: 2022-11-18

## 2022-11-18 PROCEDURE — 93298 REM INTERROG DEV EVAL SCRMS: CPT

## 2022-11-18 PROCEDURE — G2066: CPT

## 2022-12-08 ENCOUNTER — APPOINTMENT (OUTPATIENT)
Dept: CARDIOLOGY | Facility: CLINIC | Age: 77
End: 2022-12-08

## 2022-12-08 VITALS
WEIGHT: 193 LBS | SYSTOLIC BLOOD PRESSURE: 135 MMHG | HEART RATE: 98 BPM | RESPIRATION RATE: 16 BRPM | DIASTOLIC BLOOD PRESSURE: 80 MMHG | HEIGHT: 62 IN | BODY MASS INDEX: 35.51 KG/M2

## 2022-12-08 DIAGNOSIS — R00.0 TACHYCARDIA, UNSPECIFIED: ICD-10-CM

## 2022-12-08 PROCEDURE — 99214 OFFICE O/P EST MOD 30 MIN: CPT

## 2022-12-08 PROCEDURE — 93000 ELECTROCARDIOGRAM COMPLETE: CPT

## 2022-12-08 RX ORDER — LOSARTAN POTASSIUM 25 MG/1
25 TABLET, FILM COATED ORAL DAILY
Refills: 0 | Status: ACTIVE | COMMUNITY

## 2022-12-08 RX ORDER — DULOXETINE HYDROCHLORIDE 20 MG/1
20 CAPSULE, DELAYED RELEASE PELLETS ORAL
Refills: 0 | Status: COMPLETED | COMMUNITY
End: 2022-12-08

## 2022-12-08 RX ORDER — LEVOTHYROXINE SODIUM 112 UG/1
112 TABLET ORAL DAILY
Refills: 0 | Status: ACTIVE | COMMUNITY

## 2022-12-08 RX ORDER — ASPIRIN 81 MG/1
81 TABLET, COATED ORAL DAILY
Refills: 0 | Status: ACTIVE | COMMUNITY

## 2022-12-08 RX ORDER — ACETAMINOPHEN 325 MG/1
325 TABLET ORAL EVERY 6 HOURS
Refills: 0 | Status: ACTIVE | COMMUNITY

## 2022-12-08 RX ORDER — METFORMIN HYDROCHLORIDE 500 MG/1
500 TABLET, COATED ORAL DAILY
Refills: 0 | Status: ACTIVE | COMMUNITY

## 2022-12-08 RX ORDER — DOCUSATE SODIUM 100 MG/1
100 CAPSULE, LIQUID FILLED ORAL 3 TIMES DAILY
Refills: 0 | Status: ACTIVE | COMMUNITY

## 2022-12-08 RX ORDER — DULOXETINE HYDROCHLORIDE 30 MG/1
30 CAPSULE, DELAYED RELEASE PELLETS ORAL TWICE DAILY
Refills: 0 | Status: ACTIVE | COMMUNITY

## 2022-12-11 NOTE — PROCEDURE
[See Device Printout] : See device printout [de-identified] : JONNY [de-identified] : LNQ11 [de-identified] : [id number] [de-identified] : 4/19/2022 [de-identified] : 2 Tachy episodes lasting up to 1 min 25 sec (avg v-rate: 162 bpm).\par 2 false AF episodes due to t-wave oversensing\par Multiple pause events - false\par Decreased sensitivity to 0.25mV

## 2022-12-11 NOTE — ASSESSMENT
[FreeTextEntry1] : #CVA s/p ILR (4/19/22)\par - Wound is well healed. There are no signs or symptoms of infection of inflammation. There is no redness, no swelling, no erythema. The patient has no pain.\par - 2 Tachy episodes lasting up to 1 min 25 sec (avg v-rate: 162 bpm). I interrogated and reprogrammed her device as described above. NO AFIB.\par - Patient is enrolled in remote monitoring services. I reviewed remote transmission process with the patient, as well as schedule and ability to do manual transmission. We also spoke about associated co-payments with remote monitoring that may not be covered by insurance. \par \par #H/O DVT\par - Eliquis was D/C'd by vascular/hem. Pt taking ASA 81mg QD.\par - F/U vascular.\par \par #HTN\par - continue losartan 25mg and atorvastatin 10mg\par - low sodium/sat fat diet discussed in length \par - diet and weight loss encouraged\par - Pt to set up appt with cardiologist \par \par \par \par RTO in 9 months and PRN

## 2022-12-11 NOTE — HISTORY OF PRESENT ILLNESS
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 \par -Pt was admitted to the hospital in April of 2022 for B/L CVA with R hemiplegia (dominant), dysphagia, aphasia, dysarthria.\par -CTA showed a left M2 occlusion. The patient was given tPA and taken for an emergent mechanical thrombectomy, but this was not completed due to dislodgement of clot at the time of procedure. Patient's dysarthria and LLE weakness briefly worsened, but subsequently improved again. Repeat CTH did not\par reveal any hemorrhage, patient was receiving heparin drip for right sided lower DVT (R peroneal and R PT branch), and now switched to Eliquis.\par -EP was consulted and a loop recorder was placed on 4/19/22\par \par Pt presents today for device interrogation. She is feeling "really good" with  no acute complaints. She is in rehab post CVA w/ right-sided hemiparesis, now upgraded from rolling walker to cane.\par Pt expresses she would like to see a cardiologist in [facility].\par \par (PCP) Dr. Wei.\par \par

## 2023-01-10 ENCOUNTER — APPOINTMENT (OUTPATIENT)
Dept: CARDIOLOGY | Facility: CLINIC | Age: 78
End: 2023-01-10
Payer: MEDICARE

## 2023-01-10 ENCOUNTER — NON-APPOINTMENT (OUTPATIENT)
Age: 78
End: 2023-01-10

## 2023-01-10 PROCEDURE — 93298 REM INTERROG DEV EVAL SCRMS: CPT

## 2023-01-10 PROCEDURE — G2066: CPT

## 2023-01-17 ENCOUNTER — APPOINTMENT (OUTPATIENT)
Dept: CARDIOLOGY | Facility: CLINIC | Age: 78
End: 2023-01-17
Payer: MEDICARE

## 2023-01-17 ENCOUNTER — RX RENEWAL (OUTPATIENT)
Age: 78
End: 2023-01-17

## 2023-01-17 VITALS
HEART RATE: 85 BPM | BODY MASS INDEX: 36.25 KG/M2 | DIASTOLIC BLOOD PRESSURE: 80 MMHG | OXYGEN SATURATION: 94 % | HEIGHT: 62 IN | WEIGHT: 197 LBS | SYSTOLIC BLOOD PRESSURE: 138 MMHG

## 2023-01-17 DIAGNOSIS — I82.401 ACUTE EMBOLISM AND THROMBOSIS OF UNSPECIFIED DEEP VEINS OF RIGHT LOWER EXTREMITY: ICD-10-CM

## 2023-01-17 PROCEDURE — 99214 OFFICE O/P EST MOD 30 MIN: CPT

## 2023-01-17 PROCEDURE — 93000 ELECTROCARDIOGRAM COMPLETE: CPT

## 2023-01-17 NOTE — HISTORY OF PRESENT ILLNESS
[FreeTextEntry1] : Ms. Armstrong is a 77-year-old woman with history of CVA (4/2022) s/p ILR, composite lymphoma (2019), moderate MR, DVT, HTN, DM2, hypothyroidism, and GERD presenting to establish care with a primary cardiologist.\par \par Patient follows with Dr. Davenport and was last seen in office 12/8/22. ILR interrogation showed two false AF episodes due to T-wave oversensing and multiple false pause events. Sensitivity was decreased.\par \par Today overall feels well. Is recovering since her stroke and is able to ambulate with a cane at home. Completed outpatient PT and is hoping to get more outpatient sessions covered in the near future. Lives with her sister who helps take care of her. Denies cardiopulmonary complaints including chest pain, dyspnea, significant fatigue, palpitations, PND, or orthopnea.\par \par TTE 4/2022\par - Normal biventricular systolic function with G1DD, mild MR, sclerotic AoV\par \par JOEL 4/2022\par - Moderate MR, negative bubble study\par \par LE Duplex 7/2022\par - Negative for DVT\par \par Labs:\par - , TG 82, HDL 43, LDL 61, non-HDL 77\par - A1c 5.7%\par \par Meds:\par - ASA 81mg\par - Atorva 10mg\par - Losartan 25mg\par - Metformin 500mg daily, synthroid 112mcg daily\par \par

## 2023-01-17 NOTE — ASSESSMENT
[FreeTextEntry1] : Ms. Armstrong is a 77-year-old woman with history of CVA (4/2022) s/p ILR, composite lymphoma (2019), moderate MR, DVT, HTN, DM2, hypothyroidism, and GERD presenting to establish care with a primary cardiologist.\par \par Impression:\par (1) Hx CVA 4/2022, s/p ILR, thus far negative for arrhythmia, ambulatory with hemiparesis\par (2) Hx of DVT, follows with vascular surgery, no longer on AC\par (3) Mitral regurgitation (mild on TTE, moderate on JOEL in 2022)\par (4) DM2, well controlled\par (5) HTN, well controlled\par (6) MICHAEL by chart history\par (7) Composite lymphoma, diagnosed 2019, treated to early 2020, not active\par \par Plan:\par - Repeat TTE to evaluate MR\par - Low threshold to obtain nuclear stress testing if she were to become symptomatic\par - Continue aspirin and statin for secondary ASCVD prevention\par - Ongoing EP follow up for ILR checks\par \par Follow up in 6 months

## 2023-01-25 LAB
ALBUMIN SERPL ELPH-MCNC: 4.6 G/DL
ALP BLD-CCNC: 72 U/L
ALT SERPL-CCNC: 8 U/L
ANION GAP SERPL CALC-SCNC: 13 MMOL/L
AST SERPL-CCNC: 13 U/L
BASOPHILS # BLD AUTO: 0.03 K/UL
BASOPHILS NFR BLD AUTO: 0.8 %
BILIRUB SERPL-MCNC: 0.8 MG/DL
BUN SERPL-MCNC: 18 MG/DL
CALCIUM SERPL-MCNC: 9.5 MG/DL
CHLORIDE SERPL-SCNC: 103 MMOL/L
CHOLEST SERPL-MCNC: 112 MG/DL
CO2 SERPL-SCNC: 25 MMOL/L
CREAT SERPL-MCNC: 0.9 MG/DL
CRP SERPL HS-MCNC: 2.38 MG/L
EGFR: 67 ML/MIN/1.73M2
EOSINOPHIL # BLD AUTO: 0.12 K/UL
EOSINOPHIL NFR BLD AUTO: 3.3 %
GLUCOSE SERPL-MCNC: 110 MG/DL
HCT VFR BLD CALC: 35.5 %
HDLC SERPL-MCNC: 45 MG/DL
HGB BLD-MCNC: 11.4 G/DL
IMM GRANULOCYTES NFR BLD AUTO: 0.3 %
LDLC SERPL CALC-MCNC: 52 MG/DL
LYMPHOCYTES # BLD AUTO: 1.28 K/UL
LYMPHOCYTES NFR BLD AUTO: 35.4 %
MAN DIFF?: NORMAL
MCHC RBC-ENTMCNC: 25.4 PG
MCHC RBC-ENTMCNC: 32.1 G/DL
MCV RBC AUTO: 79.1 FL
MONOCYTES # BLD AUTO: 0.22 K/UL
MONOCYTES NFR BLD AUTO: 6.1 %
NEUTROPHILS # BLD AUTO: 1.96 K/UL
NEUTROPHILS NFR BLD AUTO: 54.1 %
NONHDLC SERPL-MCNC: 67 MG/DL
NT-PROBNP SERPL-MCNC: 216 PG/ML
PLATELET # BLD AUTO: 150 K/UL
POTASSIUM SERPL-SCNC: 4.8 MMOL/L
PROT SERPL-MCNC: 6.2 G/DL
RBC # BLD: 4.49 M/UL
RBC # FLD: 14.3 %
SODIUM SERPL-SCNC: 141 MMOL/L
TRIGL SERPL-MCNC: 73 MG/DL
TSH SERPL-ACNC: 2.05 UIU/ML
WBC # FLD AUTO: 3.62 K/UL

## 2023-02-13 ENCOUNTER — APPOINTMENT (OUTPATIENT)
Dept: CARDIOLOGY | Facility: CLINIC | Age: 78
End: 2023-02-13
Payer: MEDICARE

## 2023-02-13 PROCEDURE — 93306 TTE W/DOPPLER COMPLETE: CPT

## 2023-02-14 ENCOUNTER — APPOINTMENT (OUTPATIENT)
Dept: CARDIOLOGY | Facility: CLINIC | Age: 78
End: 2023-02-14
Payer: MEDICARE

## 2023-02-14 ENCOUNTER — NON-APPOINTMENT (OUTPATIENT)
Age: 78
End: 2023-02-14

## 2023-02-14 PROCEDURE — G2066: CPT

## 2023-02-14 PROCEDURE — 93298 REM INTERROG DEV EVAL SCRMS: CPT

## 2023-02-28 ENCOUNTER — TRANSCRIPTION ENCOUNTER (OUTPATIENT)
Age: 78
End: 2023-02-28

## 2023-03-16 ENCOUNTER — NON-APPOINTMENT (OUTPATIENT)
Age: 78
End: 2023-03-16

## 2023-03-16 ENCOUNTER — APPOINTMENT (OUTPATIENT)
Dept: CARDIOLOGY | Facility: CLINIC | Age: 78
End: 2023-03-16
Payer: MEDICARE

## 2023-03-16 PROCEDURE — G2066: CPT

## 2023-03-16 PROCEDURE — 93298 REM INTERROG DEV EVAL SCRMS: CPT

## 2023-03-17 ENCOUNTER — APPOINTMENT (OUTPATIENT)
Dept: NEUROLOGY | Facility: CLINIC | Age: 78
End: 2023-03-17
Payer: MEDICARE

## 2023-03-17 VITALS
WEIGHT: 193 LBS | HEIGHT: 62 IN | TEMPERATURE: 98.7 F | BODY MASS INDEX: 35.51 KG/M2 | DIASTOLIC BLOOD PRESSURE: 80 MMHG | OXYGEN SATURATION: 97 % | HEART RATE: 98 BPM | SYSTOLIC BLOOD PRESSURE: 161 MMHG

## 2023-03-17 VITALS — DIASTOLIC BLOOD PRESSURE: 80 MMHG | SYSTOLIC BLOOD PRESSURE: 134 MMHG | HEART RATE: 87 BPM

## 2023-03-17 DIAGNOSIS — R26.81 UNSTEADINESS ON FEET: ICD-10-CM

## 2023-03-17 PROCEDURE — 99214 OFFICE O/P EST MOD 30 MIN: CPT

## 2023-03-19 NOTE — PHYSICAL EXAM
[FreeTextEntry1] : Focal neurological exam:\par \par MS: Awake, alert, oriented to person, place, situation and time. Normal affect. Follows commands. \par \par Language: Speech is clear, fluent with good repetition & comprehension. No dysarthria. \par \par CNs 2 - 12 intact. EOMI no nystagmus, no diplopia. VFF. No facial asymmetry b/l, full eye closure strength b/l. Hearing grossly normal. Head turning & shoulder shrug intact b/l. Tongue midline, normal movements, no atrophy.\par \par Motor: Normal muscle bulk & tone. No noticeable tremor or seizure. No pronator drift. RUE 5-/5 and RLE 5-/5, L side is 5/5 UE/LE. NO drift. \par \par Reflexes: DTR of biceps, knee and ankle normal \par \par Sensation: R knee decr to vibration \par \par Cortical: No extinction\par \par Coordination: No dysmetria to FTN.\par \par Gait: Ambulating well with walker\par \par NIHSS 1 (sensory deficit, but may not be related to stroke) \par \par Other: b/l ankles symmetric, no ROM problems with R ankle, b/l DP pulses palpable in both feet, <2s cap refill of  b/l toes but foot is cool

## 2023-03-19 NOTE — HISTORY OF PRESENT ILLNESS
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 Butler; etiology per Dr. Hedrick, ?cardioembolic vs less likely hypercoagulable state per Heme. However, since, ILR w/o events and she is off Eliquis as DVTs have resolved. She is doing well, using walker now and w increase RLE strength, NIHSS 1 for sensory deficit of R side. \par \par Labs:\par -  TG 73, HDL 45, LDL 52, non-HDL 67\par - A1c 5.7%\par - no afib on ILR \par \par

## 2023-03-19 NOTE — ASSESSMENT
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 Butler; etiology per Dr. Hedrick, ?cardioembolic vs (less likely) hypercoagulable state. However, since, ILR w/o events and she is off Eliquis as DVTs have resolved. She is doing well, using walker now and w increase RLE strength, NIHSS 1 for sensory deficit of R side. She is STABLE. \par \par PLAN:\par -C/w Lipitor 10 and repeat lipid panel in 1 mo \par -C/w PT/OT \par -C/w ASA 81 \par -F/u w/ PCP for R ankle mechanical injury related pain \par -Driving Eval\par -Weight loss \par -F/u in 8 mo w Dr. Hedrick

## 2023-03-23 ENCOUNTER — APPOINTMENT (OUTPATIENT)
Dept: OTOLARYNGOLOGY | Facility: CLINIC | Age: 78
End: 2023-03-23
Payer: MEDICARE

## 2023-03-23 DIAGNOSIS — J31.0 CHRONIC RHINITIS: ICD-10-CM

## 2023-03-23 DIAGNOSIS — H61.21 IMPACTED CERUMEN, RIGHT EAR: ICD-10-CM

## 2023-03-23 DIAGNOSIS — H81.10 BENIGN PAROXYSMAL VERTIGO, UNSPECIFIED EAR: ICD-10-CM

## 2023-03-23 PROCEDURE — 99203 OFFICE O/P NEW LOW 30 MIN: CPT | Mod: 25

## 2023-03-23 PROCEDURE — 31231 NASAL ENDOSCOPY DX: CPT

## 2023-03-23 PROCEDURE — 69210 REMOVE IMPACTED EAR WAX UNI: CPT

## 2023-03-23 NOTE — PROCEDURE
[Congested] : congested [Freddie] : freddie [Red] : red [Normal] : the septum showed no abnormalities [FreeTextEntry6] : The following anatomic sites were directly examined in a sequential fashion:\par The scope was introduced in the nasal passage between the middle and inferior turbinates to exam the inferior portion of the middle meatus and the fontanelle, as well as the maxillary ostia. Next, the scope was passed medically and posteriorly to the middle turbinates to examine the sphenoethmoid recess and the superior turbinate region.\par \par turbinate hypertrophy\par  [de-identified] : chronic rhinitis

## 2023-03-23 NOTE — REASON FOR VISIT
[Initial Evaluation] : an initial evaluation for [FreeTextEntry2] : clogged ears, popping ears , PND

## 2023-03-23 NOTE — PHYSICAL EXAM
[Normal] : mucosa is normal [Midline] : trachea located in midline position [de-identified] : clicking  [de-identified] : cerumen in the right  [de-identified] : edema

## 2023-03-23 NOTE — HISTORY OF PRESENT ILLNESS
[de-identified] : Patient presents today clogged ears . Patient is also having  some dizziness especially when laying flat and she spins for a few seconds.  She has been having some PND. Pt has history of stroke a year ago. Pt has history of sinsu surgery.

## 2023-04-17 ENCOUNTER — APPOINTMENT (OUTPATIENT)
Dept: CARDIOLOGY | Facility: CLINIC | Age: 78
End: 2023-04-17
Payer: MEDICARE

## 2023-04-17 ENCOUNTER — NON-APPOINTMENT (OUTPATIENT)
Age: 78
End: 2023-04-17

## 2023-04-17 PROCEDURE — G2066: CPT

## 2023-04-17 PROCEDURE — 93298 REM INTERROG DEV EVAL SCRMS: CPT

## 2023-05-22 ENCOUNTER — NON-APPOINTMENT (OUTPATIENT)
Age: 78
End: 2023-05-22

## 2023-05-22 ENCOUNTER — APPOINTMENT (OUTPATIENT)
Dept: CARDIOLOGY | Facility: CLINIC | Age: 78
End: 2023-05-22
Payer: MEDICARE

## 2023-05-22 PROCEDURE — 93298 REM INTERROG DEV EVAL SCRMS: CPT

## 2023-05-22 PROCEDURE — G2066: CPT

## 2023-05-23 ENCOUNTER — RX RENEWAL (OUTPATIENT)
Age: 78
End: 2023-05-23

## 2023-05-23 RX ORDER — ATORVASTATIN CALCIUM 10 MG/1
10 TABLET, FILM COATED ORAL
Qty: 60 | Refills: 1 | Status: ACTIVE | COMMUNITY
Start: 2022-09-27 | End: 1900-01-01

## 2023-06-01 NOTE — ED ADULT NURSE NOTE - NS ED NURSE TRANSPORT WITH
Patient has given consent to record this visit for documentation in their clinical record.    I am seeing Katelin Mcguire 88 year old female for a 6 month(s) follow up evaluation with a history of hypertension  hyperlipidemia  coronary artery disease with no lab results to review.  Historian: self.    PROBLEM LIST  Patient Active Problem List   Diagnosis   • Other and unspecified hyperlipidemia   • Essential hypertension   • Dysplasia of cervix (uteri)   • Disorder of bone and cartilage, unspecified   • ABNORMAL GLANDULAR PAP SMEAR OF CERVIX   • FAMILY HX GI MALIGNANCY- COLON   • Ganglion, wrist   • Hx of colonic polyps   • Vaginal dryness, menopausal   • Benign paroxysmal positional vertigo   • Colitis   • Rheumatic mitral regurgitation   • Arterial thromboembolism (CMD)   • Long term (current) use of anticoagulants [Z79.01]   • Long term current use of anticoagulant   • Deep vein thrombosis (DVT) of upper extremity, unspecified chronicity, unspecified laterality, unspecified vein (CMD)   • Permanent atrial fibrillation (CMD)   • Nonrheumatic tricuspid (valve) insufficiency   • COVID-19 virus infection   • Therapeutic drug monitoring   • Closed blow-out fracture of right orbital floor (CMD)   • Facial laceration   • Syncope and collapse     MEDICATIONS  Current Outpatient Medications   Medication Sig Dispense Refill   • dilTIAZem (CARDIZEM CD) 180 MG 24 hr capsule TAKE 1 CAPSULE DAILY 90 capsule 2   • warfarin (COUMADIN) 5 MG tablet Take, by mouth, 1/2 to 1 tablet daily as directed by anticoagulation clinic.  BLOOD THINNER 90 tablet 1   • cholecalciferol (VITAMIN D) 25 mcg (1,000 units) tablet Take 25 mcg by mouth daily. D3     • Cyanocobalamin (VITAMIN B12 PO) Take 1 tablet by mouth daily.       No current facility-administered medications for this visit.     PAST MEDICAL HISTORY  Past Medical History:   Diagnosis Date   • Arterial thromboembolism (CMD) 10/16/2018    Acute right brachial artery thrombosis   •  Asymptomatic varicose veins    • Atherosclerotic heart disease of native coronary artery without angina pectoris 2009    detected on cardiac catheterization which was performed after patient had abnormal stress test. There was minor ( 20%) narrowing in the proximal LAD. Patient has also had calcifications described incidentally on arteries with cervical spine films.   • Atrial fibrillation (CMD)     on warfarin   • Cervical dysplasia 1980s    underwent 2 colonizations in the 1980s. Subsequent Pap smears have been consistently normal.   • Cervical spondylosis with radiculopathy     Will have occasional pain across face from right side of neck which will resolve in 3-4 days.   • Colitis 11/2015    Hospitalized at [facility] [place]. CT scan revealed thickening in colon consistent with focal colitis, resolved with antibiotic treatment.   • Diverticulosis    • Esophageal reflux    • fibrocystic breast disease    • History of rheumatic fever 1952    echocardiogram revealed mild mitral regurgitation   • History of rib fractures     left 6th, 7 and 8   • Hx of adenomatous colonic polyps 4/2011    removed at colonoscopy. 5 year followup recommended.   • hyperlipidemia     On medication   • Hypertension     On medication   • interdigital neuroma right foot 3/99   • Mitral regurgitation     Presume secondary to rheumatic fever. Described on echocardiogram.    • Osteopenia 2004    Very mild   • Positional vertigo 4/2015    Patient directed to physical therapy for vestibular rehabilitation   • Postmenopausal bleeding 2002    underwent endometrial biopsy which was nondiagnostic. Patient had a second endometrial biopsy in 2009 because of right-sided lower abdominal pain and abnormal transvaginal ultrasound. This was also benign. No subsequent bleeding.   • Raynaud phenomenon     Affecting fingers on both hands. Well controlled with diltiazem     PAST SURGICAL HISTORY  Past Surgical History:   Procedure Laterality Date   • Appendectomy     •  Biopsy of uterus lining  12/90    Endometrial Biopsy -  benign   • Biopsy of uterus lining  2009    benign   • Cardiac stress test complete  1-10-01    Cardiolite imaging  wnl   • Cataract extraction, bilateral  5/13   • Colonoscopy diagnostic  10/99    Colonoscopy- Negative.  7 year recheck recommended.  Dr. Blake   • Colonoscopy diagnostic  3/7/06    Dr. Andrade,Diverticulosis/Family Hx of GI Malignancy, F/u 5 years   • Colonoscopy remove lesions by snare  4/26/11    Dr. nAdrade, Polyps/Fam Hx of GI Malig, F/U 5 years   • Colonoscopy w biopsy  5/26/16    Dr. Andrade, Diverticulosis/Hemorrhoids, F/U PRN   • Colposcopy cervix & upper / adjacent vagina  8/84    Colposcopy and conization for cervical dysplasia.  Dr. Victoria   • D and c  1983    D&C for heavy bleeding   • Fl guided lumbar puncture with imaging  6/19/2019    with pre post physical therapy   • Foot neuroma surgery  2006    right foot   • Left heart cath,percutaneous  1/12/99    Cardiac Cath.  Dr. Ortiz, Levine, Susan. \Hospital Has a New Name and Outlook.\"":  LV ejection fraction 80, LV end-diastolic pressure 15, No mitral regurg.  No significant epicardial coronary disease.  Minimal proximal L anterior descending narrowing.  Normal LV function and filling.   • Ptca      stress test/ heart cath 2000   • Remove tonsils/adenoids,<15 y/o  1952    Tonsillectomy w Adenoids   • Thrombectomy w/ embolectomy Right 10/16/2018    Thrombectomy right brachial artery (Dr. Osorio)     FAMILY HISTORY  Family History   Problem Relation Age of Onset   • Musculoskeletal Mother         Osteoporosis   • Heart Mother         MI age 88   • Cancer Father         Colon cancer with metastases   • Dementia/Alzheimers Sister         Dementia   • Parkinsonism Brother    • Patient is unaware of any medical problems Maternal Grandmother    • Patient is unaware of any medical problems Maternal Grandfather    • Patient is unaware of any medical problems Paternal Grandmother    • Patient is unaware of any medical problems  Paternal Grandfather    • Other Daughter         raynauds   • Other Daughter         raynauds   • Other Son         raynauds   • Other Son         raynauds   • Patient is unaware of any medical problems Other      SOCIAL HISTORY  Social History     Socioeconomic History   • Marital status: /Civil Union     Spouse name: Not on file   • Number of children: 4   • Years of education: Not on file   • Highest education level: Not on file   Occupational History   • Occupation:  - retired     Employer: [employer]   Tobacco Use   • Smoking status: Never   • Smokeless tobacco: Never   Vaping Use   • Vaping Use: never used   Substance and Sexual Activity   • Alcohol use: Yes     Alcohol/week: 1.0 standard drink of alcohol     Types: 1 Glasses of wine per week     Comment: 0-1 weekly   • Drug use: No   • Sexual activity: Yes     Partners: Male     Birth control/protection: Post-menopausal, None   Other Topics Concern   • Not on file   Social History Narrative    She is . She has never smoked. She drinks alcohol rarely. She is now retired elementary schoolteacher. She remains very active.     Social Determinants of Health     Financial Resource Strain: Not on file   Food Insecurity: Not on file   Transportation Needs: Not on file   Physical Activity: Not on file   Stress: Not on file   Social Connections: Not on file   Intimate Partner Violence: Not At Risk (12/27/2020)    Intimate Partner Violence    • Social Determinants: Intimate Partner Violence Past Fear: No    • Social Determinants: Intimate Partner Violence Current Fear: No     ALLERGIES  ALLERGIES:   Allergen Reactions   • Bee Sting SWELLING   • Benzodiazepines Other (See Comments)     Versed- altered mentation   • Lovastatin Other (See Comments)     Muscle pains   • Nalbuphine Hcl Other (See Comments)     Nubain--altered mentation   • Penicillins Other (See Comments)     rash   • Pravastatin      Pravachol--leg pain.  Only high dose       ROS  GENERAL: Denies weight loss, weight gain, fatigue and fever or chills.  SKIN: Denies rashes, itching and lumps or bumps or moles that are changing.  EYES: Denies vision, pain, redness and  blurry or double vision.  EARS: Denies decreased hearing, ringing in ears (tinnitus), earache and drainage.  NOSE: Denies stuffiness, discharge, nasal pain, itching and nose bleeds.  MOUTH: Denies teeth pain, gum pain, bleeding of gums, dry mouth, sore throat and hoarseness or voice change.  NECK: Denies lumps or bumps, swollen glands, pain, stiffness   and limited range of motion in the neck.  PULMONARY: Denies cough or production of sputum. No coughing up blood. No shortness of breath, wheezing, or pain with breathing or coughing.  CARDIO: Denies chest pain or discomfort, tightness, palpitations, shortness of breath with activity, difficulty breathing lying down, swelling, cyanosis and sudden awakening from sleep with shortness of breath.  GASTROINTESTINAL: Denies abdominal pain, nausea, vomiting, diarrhea, constipation.  No black stool or blood in the stool. No swallowing difficulties, heartburn, or acid stomach. No change in appetite.  No hematemesis, change in bowel habits,  flatulence, or hemorrhoids.  GENITOURINARY/FEMALE: Denies  dysuria and hematuria.  NEUROLOGICAL: Denies dizziness, fainting, seizures, confusion, weakness, numbness, tingling, tremor and trouble with balance or co-ordination.  Denies memory loss.  MUSCULOSKELETAL: Denies joint stiffness, joint pain, joint swelling, muscle pain, back pain and redness of joints.  HEMATOLOGIC: Denies ease of bruising and ease of bleeding.  ENDOCRINE: Denies heat or cold intolerance, sweating, frequent urination, thirst and change in appetite.  PSYCHIATRIC: Denies nervousness, depression, memory loss, hopelessness and helplessness.   All other Review of Systems negative.      PHYSICAL EXAMINATION  Vitals: Blood pressure 112/72, pulse 83, weight 63.5  kg (140 lb), SpO2 97 %.   HEAD: Normocephalic.  Atraumatic. No masses, lesions, tenderness or abnormalities noted.  EYES:  PERRLA, EOMI, fundi grossly normal, no papilledema, no AV nicking, sclerae clear.  EARS:  TM's clear bilaterally, NL light reflex, normal external canals.  NOSE:  Nares normal. Septum midline. Mucosa normal.  No erythema.  No drainage.  THROAT: No erythema.  mmm, no tonsillar hypertrophy, no exudates or petechiae noted  SINUS: Maxillary and frontal sinuses are non tender to palpation.  NECK: Supple, no JVD, no carotid bruits, no lymphadenopathy.  LUNGS:  :  Lungs are clear to auscultation and percussion.  No wheezes, rales or rhonchi. Chest symmetric with normal A/P diameter, no chest deformities noted, normal respiratory rate and rhythm, no chest wall tenderness, diaphragmatic excursion normal.  HEART:  S1,S2, regular rate and rhythm no murmur, S3, or S4 auscultated  CHEST: symmetrical, non-tender, no retractions, no bony abnormality  ABDOMEN:   Soft, non-tender, no masses, no hepatosplenomegaly, no distension, bowel sounds are normoactive.  EXTREMITIES: no erythema, no edema.  NEUROLOGICAL: CN 2-12 grossly intact, DTR's 2+/4+ bilaterally and symmetrical, normal strength.  SKIN: warm, moist, without erythema, rash, or lesions.   MENTAL STATUS: A&O X 3, normal thought, mood and affect, no hallucinations noted.    LAB:   Labs ordered.    ASSESSMENT / PLAN:  1. Essential hypertension  Blood pressure controlled on current medication without problems or symptoms.  Continue current medication.       2. Hyperlipidemia LDL goal <100  Patient is taking Pravastatin 20 mg daily. She is having difficulties with increased fatigue.  We are going to check her LDL now.  Advised to stop her Pravastatin to see if improvement of fatigue occurs.    - Cholesterol, LDL Direct    3. Permanent atrial fibrillation (CMD)  4. Long term current use of anticoagulant  Stable heart rate, and rhythm.  Continue on Warfarin.  Followed by Coumadin clinic.    - SERVICE TO CARDIOLOGY    5. History of arterial thrombosis  Stable.    6. Stage 3a chronic kidney disease (CMD)  Reviewed and discussed previous reports: GFR: 50 ml/min. Creatinine: 1.09 mg/dL. Continue to monitor.    7. Fatigue, unspecified type  Patient reports increased fatigue over the last several months.  Will see if this is due to the statin medication.  Will check CBC, CMP, and thyroid stimulating hormone reflex for evaluation.     - CBC with Automated Differential; Future  - Comprehensive Metabolic Panel; Future  - Thyroid Stimulating Hormone Reflex; Future    8. Severe tricuspid regurgitation  Patient will be aligning new cardiologist for evaluation.She does not wish to have any valve surgery, but would like to have cardiology input to make a choice at this time. She is having no difficulty with congestive heart failure concerns this time.     - SERVICE TO CARDIOLOGY    9. Encounter for long-term (current) use of medications  Ordered comprehensive metabolic panel, CBC with differential.    - CBC with Automated Differential; Future  - Comprehensive Metabolic Panel; Future      Return in about 1 month (around 6/30/2023) for pravastatin stopped due tp fatigue.  Call if problems occur.  Patient in verbal agreement with plans as noted above.       Refer to orders.  Medical compliance with plan discussed and risks of non-compliance reviewed.  Patient education completed on disease process, etiology & prognosis.  Proper usage and side effects of medications reviewed & discussed.  Return to clinic as clinically indicated as discussed with patient who verbalized understanding of the plan and is in agreement with the plan.    I,  Dr. Era Bolaños, have created a visit summary document based on the audio recording between Dr. Colin Young MD and this patient for the physician to review, edit as needed, and authenticate.  Creation Date: 6/1/2023 Time: 5:27 AM    The  documentation recorded by the scribe accurately and completely reflects the service(s) I personally performed and the decisions made by me. /TEV    Cardiac Monitor/Defib/ACLS/Rescue Kit/O2/BVM

## 2023-06-26 ENCOUNTER — NON-APPOINTMENT (OUTPATIENT)
Age: 78
End: 2023-06-26

## 2023-06-26 ENCOUNTER — APPOINTMENT (OUTPATIENT)
Dept: CARDIOLOGY | Facility: CLINIC | Age: 78
End: 2023-06-26
Payer: MEDICARE

## 2023-06-26 PROCEDURE — 93298 REM INTERROG DEV EVAL SCRMS: CPT

## 2023-06-26 PROCEDURE — G2066: CPT

## 2023-07-18 ENCOUNTER — APPOINTMENT (OUTPATIENT)
Dept: CARDIOLOGY | Facility: CLINIC | Age: 78
End: 2023-07-18
Payer: MEDICARE

## 2023-07-18 VITALS
HEIGHT: 62 IN | BODY MASS INDEX: 35.51 KG/M2 | WEIGHT: 193 LBS | HEART RATE: 80 BPM | DIASTOLIC BLOOD PRESSURE: 70 MMHG | SYSTOLIC BLOOD PRESSURE: 122 MMHG

## 2023-07-18 DIAGNOSIS — I34.0 NONRHEUMATIC MITRAL (VALVE) INSUFFICIENCY: ICD-10-CM

## 2023-07-18 PROCEDURE — 93000 ELECTROCARDIOGRAM COMPLETE: CPT

## 2023-07-18 PROCEDURE — 99214 OFFICE O/P EST MOD 30 MIN: CPT

## 2023-07-18 NOTE — HISTORY OF PRESENT ILLNESS
[FreeTextEntry1] : Ms. Armstrong is a 78-year-old woman with history of CVA (4/2022) s/p ILR, composite lymphoma (2019), moderate MR, DVT, HTN, DM2, hypothyroidism, and GERD presenting to establish care with a primary cardiologist.\par \par Patient follows with Dr. Davenport and was last seen in office 12/8/22. ILR interrogation showed two false AF episodes due to T-wave oversensing and multiple false pause events. Sensitivity was decreased.\par \par First visit with me 1/2023:\par "...Is recovering since her stroke and is able to ambulate with a cane at home. Completed outpatient PT and is hoping to get more outpatient sessions covered in the near future. Lives with her sister who helps take care of her. Denies cardiopulmonary complaints including chest pain, dyspnea, significant fatigue, palpitations, PND, or orthopnea."\par \par Today notes feeling well. Denies active cardiopulmonary complaints though admittedly lives a sedentary lifestyle. Mostly complains of dizziness (room spinning) when moving her head in bed.\par \par TTE 4/2022 - Normal biventricular systolic function with G1DD, mild MR, sclerotic AoV\par TTE 2/2023 - Normal systolic function, G1DD, mild MS, no pHTN, no signif change from prior\par \par JOEL 4/2022\par - Moderate MR, negative bubble study\par \par LE Duplex 7/2022\par - Negative for DVT\par \par Labs:\par - , TG 73, HDL 45, LDL 52, non-HDL 67\par - A1c 5.7%, TSH 2.05, pBNP 216, hsCRP 2.38\par - Cr 0.9, K 4.8, Hgb 11.4 (MCV 79), Plt 150\par \par Meds:\par - ASA 81mg\par - Atorva 10mg\par - Losartan 25mg\par - Metformin 500mg daily, synthroid 112mcg daily\par \par

## 2023-07-18 NOTE — ASSESSMENT
[FreeTextEntry1] : Ms. Armstrong is a 77-year-old woman with history of CVA (4/2022) s/p ILR, composite lymphoma (2019), moderate MR, DVT, HTN, DM2, hypothyroidism, and GERD presenting to establish care with a primary cardiologist.\par \par Impression:\par (1) Hx CVA 4/2022, s/p ILR, thus far negative for arrhythmia, ambulatory with hemiparesis\par (2) Hx of DVT, follows with vascular surgery, no longer on AC\par (3) Mitral regurgitation (mild on TTE, moderate on JOEL in 2022)\par (4) DM2, well controlled\par (5) HTN, well controlled\par (6) MICHAEL by chart history\par (7) Composite lymphoma, diagnosed 2019, treated to early 2020, 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 Follow up with EP for eventual ILR removal.\par \par Follow up in 6-12 months

## 2023-07-31 ENCOUNTER — NON-APPOINTMENT (OUTPATIENT)
Age: 78
End: 2023-07-31

## 2023-07-31 ENCOUNTER — APPOINTMENT (OUTPATIENT)
Dept: CARDIOLOGY | Facility: CLINIC | Age: 78
End: 2023-07-31
Payer: MEDICARE

## 2023-07-31 PROCEDURE — G2066: CPT

## 2023-07-31 PROCEDURE — 93298 REM INTERROG DEV EVAL SCRMS: CPT

## 2023-08-31 ENCOUNTER — NON-APPOINTMENT (OUTPATIENT)
Age: 78
End: 2023-08-31

## 2023-08-31 ENCOUNTER — APPOINTMENT (OUTPATIENT)
Dept: CARDIOLOGY | Facility: CLINIC | Age: 78
End: 2023-08-31
Payer: MEDICARE

## 2023-08-31 PROCEDURE — G2066: CPT

## 2023-08-31 PROCEDURE — 93298 REM INTERROG DEV EVAL SCRMS: CPT

## 2023-09-06 NOTE — ED ADULT TRIAGE NOTE - HEIGHT IN CM
Show Curettage Variables: Yes Anesthesia Volume In Cc: 6 Path Notes (To The Dermatopathologist): Please check margins. Bilateral Rotation Flap Text: The defect edges were debeveled with a #15 scalpel blade. Given the location of the defect, shape of the defect and the proximity to free margins a bilateral rotation flap was deemed most appropriate. Using a sterile surgical marker, an appropriate rotation flap was drawn incorporating the defect and placing the expected incisions within the relaxed skin tension lines where possible. The area thus outlined was incised deep to adipose tissue with a #15 scalpel blade. The skin margins were undermined to an appropriate distance in all directions utilizing iris scissors. Following this, the designed flap was carried over into the primary defect and sutured into place. M-Plasty Intermediate Repair Preamble Text (Leave Blank If You Do Not Want): Undermining was performed with blunt dissection. Did You Provide Opioid Counseling: No Burow's Advancement Flap Text: The defect edges were debeveled with a #15 scalpel blade. Given the location of the defect and the proximity to free margins a Burow's advancement flap was deemed most appropriate. Using a sterile surgical marker, the appropriate advancement flap was drawn incorporating the defect and placing the expected incisions within the relaxed skin tension lines where possible. The area thus outlined was incised deep to adipose tissue with a #15 scalpel blade. The skin margins were undermined to an appropriate distance in all directions utilizing iris scissors. Following this, the designed flap was advanced and carried over into the primary defect and sutured into place. Mucosal Advancement Flap Text: Given the location of the defect, shape of the defect and the proximity to free margins a mucosal advancement flap was deemed most appropriate. Incisions were made with a 15 blade scalpel in the appropriate fashion along the cutaneous vermilion border and the mucosal lip. The remaining actinically damaged mucosal tissue was excised.  The mucosal advancement flap was then elevated to the gingival sulcus with care taken to preserve the neurovascular structures and advanced into the primary defect. Care was taken to ensure that precise realignment of the vermilion border was achieved. Graft Donor Site Bandage (Optional-Leave Blank If You Don't Want In Note): Steri-strips and a pressure bandage were applied to the donor site. Adjacent Tissue Transfer Text: The defect edges were debeveled with a #15 scalpel blade. Given the location of the defect and the proximity to free margins an adjacent tissue transfer was deemed most appropriate. Using a sterile surgical marker, an appropriate flap was drawn incorporating the defect and placing the expected incisions within the relaxed skin tension lines where possible. The area thus outlined was incised deep to adipose tissue with a #15 scalpel blade. The skin margins were undermined to an appropriate distance in all directions utilizing iris scissors and carried over to close the primary defect. Pinch Graft Text: The defect edges were debeveled with a #15 scalpel blade. Given the location of the defect, shape of the defect and the proximity to free margins a pinch graft was deemed most appropriate. Using a sterile surgical marker, the primary defect shape was transferred to the donor site. The area thus outlined was incised deep to adipose tissue with a #15 scalpel blade.  The harvested graft was then trimmed of adipose tissue until only dermis and epidermis was left. The skin margins of the secondary defect were undermined to an appropriate distance in all directions utilizing iris scissors.  The secondary defect was closed with interrupted buried subcutaneous sutures.  The skin edges were then re-apposed with running  sutures.  The skin graft was then placed in the primary defect and oriented appropriately. Rhomboid Transposition Flap Text: The defect edges were debeveled with a #15 scalpel blade. Given the location of the defect and the proximity to free margins a rhomboid transposition flap was deemed most appropriate. Using a sterile surgical marker, an appropriate rhomboid flap was drawn incorporating the defect. The area thus outlined was incised deep to adipose tissue with a #15 scalpel blade. The skin margins were undermined to an appropriate distance in all directions utilizing iris scissors. Following this, the designed flap was carried over into the primary defect and sutured into place. Peng Advancement Flap Text: The defect edges were debeveled with a #15 scalpel blade. Given the location of the defect, shape of the defect and the proximity to free margins a Peng advancement flap was deemed most appropriate. Using a sterile surgical marker, an appropriate advancement flap was drawn incorporating the defect and placing the expected incisions within the relaxed skin tension lines where possible. The area thus outlined was incised deep to adipose tissue with a #15 scalpel blade. The skin margins were undermined to an appropriate distance in all directions utilizing iris scissors. Following this, the designed flap was advanced and carried over into the primary defect and sutured into place. Curvilinear Excision Additional Text (Leave Blank If You Do Not Want): The margin was drawn around the clinically apparent lesion.  A curvilinear shape was then drawn on the skin incorporating the lesion and margins.  Incisions were then made along these lines to the appropriate tissue plane and the lesion was extirpated. 157.48 Rotation Flap Text: The defect edges were debeveled with a #15 scalpel blade. Given the location of the defect, shape of the defect and the proximity to free margins a rotation flap was deemed most appropriate. Using a sterile surgical marker, an appropriate rotation flap was drawn incorporating the defect and placing the expected incisions within the relaxed skin tension lines where possible. The area thus outlined was incised deep to adipose tissue with a #15 scalpel blade. The skin margins were undermined to an appropriate distance in all directions utilizing iris scissors. Following this, the designed flap was carried over into the primary defect and sutured into place. Dressing: pressure dressing Cheek Interpolation Flap Text: A decision was made to reconstruct the defect utilizing an interpolation axial flap and a staged reconstruction.  A telfa template was made of the defect.  This telfa template was then used to outline the Cheek Interpolation flap.  The donor area for the pedicle flap was then injected with anesthesia.  The flap was excised through the skin and subcutaneous tissue down to the layer of the underlying musculature.  The interpolation flap was carefully excised within this deep plane to maintain its blood supply.  The edges of the donor site were undermined.   The donor site was closed in a primary fashion.  The pedicle was then rotated into position and sutured.  Once the tube was sutured into place, adequate blood supply was confirmed with blanching and refill.  The pedicle was then wrapped with xeroform gauze and dressed appropriately with a telfa and gauze bandage to ensure continued blood supply and protect the attached pedicle. Primary Defect Width (In Cm): 0 Complex Repair And Single Advancement Flap Text: The defect edges were debeveled with a #15 scalpel blade.  The primary defect was closed partially with a complex linear closure.  Given the location of the remaining defect, shape of the defect and the proximity to free margins a single advancement flap was deemed most appropriate for complete closure of the defect.  Using a sterile surgical marker, an appropriate advancement flap was drawn incorporating the defect and placing the expected incisions within the relaxed skin tension lines where possible. The area thus outlined was incised deep to adipose tissue with a #15 scalpel blade. The skin margins were undermined to an appropriate distance in all directions utilizing iris scissors and carried over to close the primary defect. Complex Repair And A-T Advancement Flap Text: The defect edges were debeveled with a #15 scalpel blade.  The primary defect was closed partially with a complex linear closure.  Given the location of the remaining defect, shape of the defect and the proximity to free margins an A-T advancement flap was deemed most appropriate for complete closure of the defect.  Using a sterile surgical marker, an appropriate advancement flap was drawn incorporating the defect and placing the expected incisions within the relaxed skin tension lines where possible. The area thus outlined was incised deep to adipose tissue with a #15 scalpel blade. The skin margins were undermined to an appropriate distance in all directions utilizing iris scissors and carried over to close the primary defect. Intermediate / Complex Repair - Final Wound Length In Cm: 5.5 Estimated Blood Loss (Cc): minimal Lab: [id number] Trilobed Flap Text: The defect edges were debeveled with a #15 scalpel blade. Given the location of the defect and the proximity to free margins a trilobed flap was deemed most appropriate. Using a sterile surgical marker, an appropriate trilobed flap was drawn around the defect. The area thus outlined was incised deep to adipose tissue with a #15 scalpel blade. The skin margins were undermined to an appropriate distance in all directions utilizing iris scissors. Following this, the designed flap was carried into the primary defect and sutured into place. Burow's Graft Text: The defect edges were debeveled with a #15 scalpel blade. Given the location of the defect, shape of the defect, the proximity to free margins and the presence of a standing cone deformity a Burow's skin graft was deemed most appropriate. The standing cone was removed and this tissue was then trimmed to the shape of the primary defect. The adipose tissue was also removed until only dermis and epidermis were left.  The skin margins of the secondary defect were undermined to an appropriate distance in all directions utilizing iris scissors.  The secondary defect was closed with interrupted buried subcutaneous sutures.  The skin edges were then re-apposed with running  sutures.  The skin graft was then placed in the primary defect and oriented appropriately. Cheek-To-Nose Interpolation Flap Text: A decision was made to reconstruct the defect utilizing an interpolation axial flap and a staged reconstruction.  A telfa template was made of the defect.  This telfa template was then used to outline the Cheek-To-Nose Interpolation flap.  The donor area for the pedicle flap was then injected with anesthesia.  The flap was excised through the skin and subcutaneous tissue down to the layer of the underlying musculature.  The interpolation flap was carefully excised within this deep plane to maintain its blood supply.  The edges of the donor site were undermined.   The donor site was closed in a primary fashion.  The pedicle was then rotated into position and sutured.  Once the tube was sutured into place, adequate blood supply was confirmed with blanching and refill.  The pedicle was then wrapped with xeroform gauze and dressed appropriately with a telfa and gauze bandage to ensure continued blood supply and protect the attached pedicle. Double Z Plasty Text: The lesion was extirpated to the level of the fat with a #15 scalpel blade. Given the location of the defect, shape of the defect and the proximity to free margins a double Z-plasty was deemed most appropriate for repair. Using a sterile surgical marker, the appropriate transposition arms of the double Z-plasty were drawn incorporating the defect and placing the expected incisions within the relaxed skin tension lines where possible. The area thus outlined was incised deep to adipose tissue with a #15 scalpel blade. The skin margins were undermined to an appropriate distance in all directions utilizing iris scissors. The opposing transposition arms were then transposed and carried over into place in opposite direction and anchored with interrupted buried subcutaneous sutures. Suturegard Body: The suture ends were repeatedly re-tightened and re-clamped to achieve the desired tissue expansion. Slit Excision Additional Text (Leave Blank If You Do Not Want): A linear line was drawn on the skin overlying the lesion. An incision was made slowly until the lesion was visualized.  Once visualized, the lesion was removed with blunt dissection. Debridement Text: The wound edges were debrided prior to proceeding with the closure to facilitate wound healing. Body Location Override (Optional - Billing Will Still Be Based On Selected Body Map Location If Applicable): left proximal dorsal forearm Length To Time In Minutes Device Was In Place: 10 Estlander Flap (Upper To Lower Lip) Text: The defect of the lower lip was assessed and measured.  Given the location and size of the defect, an Estlander flap was deemed most appropriate. Using a sterile surgical marker, an appropriate Estlander flap was measured and drawn on the upper lip. Local anesthesia was then infiltrated. A scalpel was then used to incise the lateral aspect of the flap, through skin, muscle and mucosa, leaving the flap pedicled medially.  The flap was then rotated and positioned to fill the lower lip defect.  The flap was then sutured into place with a three layer technique, closing the orbicularis oris muscle layer with subcutaneous buried sutures, followed by a mucosal layer and an epidermal layer. Helical Rim Text: The closure involved the helical rim. Island Pedicle Flap-Requiring Vessel Identification Text: The defect edges were debeveled with a #15 scalpel blade. Given the location of the defect, shape of the defect and the proximity to free margins an island pedicle advancement flap was deemed most appropriate. Using a sterile surgical marker, an appropriate advancement flap was drawn, based on the axial vessel mentioned above, incorporating the defect, outlining the appropriate donor tissue and placing the expected incisions within the relaxed skin tension lines where possible. The area thus outlined was incised deep to adipose tissue with a #15 scalpel blade. The skin margins were undermined to an appropriate distance in all directions around the primary defect and laterally outward around the island pedicle utilizing iris scissors.  There was minimal undermining beneath the pedicle flap. Following this, the designed flap was carried over into the primary defect and sutured into place. O-L Flap Text: The defect edges were debeveled with a #15 scalpel blade. Given the location of the defect, shape of the defect and the proximity to free margins an O-L flap was deemed most appropriate. Using a sterile surgical marker, an appropriate advancement flap was drawn incorporating the defect and placing the expected incisions within the relaxed skin tension lines where possible. The area thus outlined was incised deep to adipose tissue with a #15 scalpel blade. The skin margins were undermined to an appropriate distance in all directions utilizing iris scissors. Following this, the designed flap was advanced and carried over into the primary defect and sutured into place. Complex Repair And M Plasty Text: The defect edges were debeveled with a #15 scalpel blade.  The primary defect was closed partially with a complex linear closure.  Given the location of the remaining defect, shape of the defect and the proximity to free margins an M plasty was deemed most appropriate for complete closure of the defect.  Using a sterile surgical marker, an appropriate advancement flap was drawn incorporating the defect and placing the expected incisions within the relaxed skin tension lines where possible. The area thus outlined was incised deep to adipose tissue with a #15 scalpel blade. The skin margins were undermined to an appropriate distance in all directions utilizing iris scissors and carried over to close the primary defect. Excision Method: Elliptical Nasalis-Muscle-Based Myocutaneous Island Pedicle Flap Text: Using a #15 blade, an incision was made around the donor flap to the level of the nasalis muscle. Wide lateral undermining was then performed in both the subcutaneous plane above the nasalis muscle, and in a submuscular plane just above periosteum. This allowed the formation of a free nasalis muscle axial pedicle (based on the angular artery) which was still attached to the actual cutaneous flap, increasing its mobility and vascular viability. Hemostasis was obtained with pinpoint electrocoagulation. The flap was mobilized into position and the pivotal anchor points positioned and stabilized with buried interrupted sutures. Subcutaneous and dermal tissues were closed in a multilayered fashion with sutures. Tissue redundancies were excised, and the epidermal edges were apposed without significant tension and sutured with sutures. Anesthesia Volume In Cc: 8 Advancement-Rotation Flap Text: The defect edges were debeveled with a #15 scalpel blade. Given the location of the defect, shape of the defect and the proximity to free margins an advancement-rotation flap was deemed most appropriate. Using a sterile surgical marker, an appropriate flap was drawn incorporating the defect and placing the expected incisions within the relaxed skin tension lines where possible. The area thus outlined was incised deep to adipose tissue with a #15 scalpel blade. The skin margins were undermined to an appropriate distance in all directions utilizing iris scissors. Following this, the designed flap was carried over into the primary defect and sutured into place. Ftsg Text: The defect edges were debeveled with a #15 scalpel blade. Given the location of the defect, shape of the defect and the proximity to free margins a full thickness skin graft was deemed most appropriate. Using a sterile surgical marker, the primary defect shape was transferred to the donor site. The area thus outlined was incised deep to adipose tissue with a #15 scalpel blade.  The harvested graft was then trimmed of adipose tissue until only dermis and epidermis was left.  The skin margins of the secondary defect were undermined to an appropriate distance in all directions utilizing iris scissors.  The secondary defect was closed with interrupted buried subcutaneous sutures.  The skin edges were then re-apposed with running  sutures.  The skin graft was then placed in the primary defect and oriented appropriately. Orbicularis Oris Muscle Flap Text: The defect edges were debeveled with a #15 scalpel blade.  Given that the defect affected the competency of the oral sphincter an orbicularis oris muscle flap was deemed most appropriate to restore this competency and normal muscle function.  Using a sterile surgical marker, an appropriate flap was drawn incorporating the defect. The area thus outlined was incised with a #15 scalpel blade. Following this, the designed flap was carried over into the primary defect and sutured into place. Interpolation Flap Text: A decision was made to reconstruct the defect utilizing an interpolation axial flap and a staged reconstruction.  A telfa template was made of the defect.  This telfa template was then used to outline the interpolation flap.  The donor area for the pedicle flap was then injected with anesthesia.  The flap was excised through the skin and subcutaneous tissue down to the layer of the underlying musculature.  The interpolation flap was carefully excised within this deep plane to maintain its blood supply.  The edges of the donor site were undermined.   The donor site was closed in a primary fashion.  The pedicle was then rotated into position and sutured.  Once the tube was sutured into place, adequate blood supply was confirmed with blanching and refill.  The pedicle was then wrapped with xeroform gauze and dressed appropriately with a telfa and gauze bandage to ensure continued blood supply and protect the attached pedicle. Dermal Autograft Text: The defect edges were debeveled with a #15 scalpel blade. Given the location of the defect, shape of the defect and the proximity to free margins a dermal autograft was deemed most appropriate. Using a sterile surgical marker, the primary defect shape was transferred to the donor site. The area thus outlined was incised deep to adipose tissue with a #15 scalpel blade.  The harvested graft was then trimmed of adipose and epidermal tissue until only dermis was left.  The skin graft was then placed in the primary defect and oriented appropriately. Complex Repair And Z Plasty Text: The defect edges were debeveled with a #15 scalpel blade.  The primary defect was closed partially with a complex linear closure.  Given the location of the remaining defect, shape of the defect and the proximity to free margins a Z plasty was deemed most appropriate for complete closure of the defect.  Using a sterile surgical marker, an appropriate advancement flap was drawn incorporating the defect and placing the expected incisions within the relaxed skin tension lines where possible. The area thus outlined was incised deep to adipose tissue with a #15 scalpel blade. The skin margins were undermined to an appropriate distance in all directions utilizing iris scissors and carried over to close the primary defect. O-Z Flap Text: The defect edges were debeveled with a #15 scalpel blade. Given the location of the defect, shape of the defect and the proximity to free margins an O-Z flap was deemed most appropriate. Using a sterile surgical marker, an appropriate transposition flap was drawn incorporating the defect and placing the expected incisions within the relaxed skin tension lines where possible. The area thus outlined was incised deep to adipose tissue with a #15 scalpel blade. The skin margins were undermined to an appropriate distance in all directions utilizing iris scissors. Following this, the designed flap was carried over into the primary defect and sutured into place. Lazy S Complex Repair Preamble Text (Leave Blank If You Do Not Want): Extensive wide undermining was performed. Where Do You Want The Question To Include Opioid Counseling Located?: Case Summary Tab Retention Suture Bite Size: 3 mm Complex Repair And Ftsg Text: The defect edges were debeveled with a #15 scalpel blade.  The primary defect was closed partially with a complex linear closure.  Given the location of the defect, shape of the defect and the proximity to free margins a full thickness skin graft was deemed most appropriate to repair the remaining defect.  The graft was trimmed to fit the size of the remaining defect.  The graft was then placed in the primary defect, oriented appropriately, and sutured into place. V-Y Plasty Text: The defect edges were debeveled with a #15 scalpel blade. Given the location of the defect, shape of the defect and the proximity to free margins an V-Y advancement flap was deemed most appropriate. Using a sterile surgical marker, an appropriate advancement flap was drawn incorporating the defect and placing the expected incisions within the relaxed skin tension lines where possible. The area thus outlined was incised deep to adipose tissue with a #15 scalpel blade. The skin margins were undermined to an appropriate distance in all directions utilizing iris scissors. Following this, the designed flap was advanced and carried over into the primary defect and sutured into place. Suturegard Intro: Intraoperative tissue expansion was performed, utilizing the SUTUREGARD device, in order to reduce wound tension. Composite Graft Text: The defect edges were debeveled with a #15 scalpel blade. Given the location of the defect, shape of the defect, the proximity to free margins and the fact the defect was full thickness a composite graft was deemed most appropriate.  The defect was outline and then transferred to the donor site.  A full thickness graft was then excised from the donor site. The graft was then placed in the primary defect, oriented appropriately and then sutured into place.  The secondary defect was then repaired using a primary closure. Abbe Flap (Lower To Upper Lip) Text: The defect of the upper lip was assessed and measured.  Given the location and size of the defect, an Abbe flap was deemed most appropriate. Using a sterile surgical marker, an appropriate Abbe flap was measured and drawn on the lower lip. Local anesthesia was then infiltrated. A scalpel was then used to incise the upper lip through and through the skin, vermilion, muscle and mucosa, leaving the flap pedicled on the opposite side.  The flap was then rotated and transferred to the lower lip defect.  The flap was then sutured into place with a three layer technique, closing the orbicularis oris muscle layer with subcutaneous buried sutures, followed by a mucosal layer and an epidermal layer. Complex Repair And Modified Advancement Flap Text: The defect edges were debeveled with a #15 scalpel blade.  The primary defect was closed partially with a complex linear closure.  Given the location of the remaining defect, shape of the defect and the proximity to free margins a modified advancement flap was deemed most appropriate for complete closure of the defect.  Using a sterile surgical marker, an appropriate advancement flap was drawn incorporating the defect and placing the expected incisions within the relaxed skin tension lines where possible. The area thus outlined was incised deep to adipose tissue with a #15 scalpel blade. The skin margins were undermined to an appropriate distance in all directions utilizing iris scissors and carried over to close the primary defect. Bi-Rhombic Flap Text: The defect edges were debeveled with a #15 scalpel blade. Given the location of the defect and the proximity to free margins a bi-rhombic flap was deemed most appropriate. Using a sterile surgical marker, an appropriate rhombic flap was drawn incorporating the defect. The area thus outlined was incised deep to adipose tissue with a #15 scalpel blade. The skin margins were undermined to an appropriate distance in all directions utilizing iris scissors. Following this, the designed flap was carried over into the primary defect and sutured into place. Transposition Flap Text: The defect edges were debeveled with a #15 scalpel blade. Given the location of the defect and the proximity to free margins a transposition flap was deemed most appropriate. Using a sterile surgical marker, an appropriate transposition flap was drawn incorporating the defect. The area thus outlined was incised deep to adipose tissue with a #15 scalpel blade. The skin margins were undermined to an appropriate distance in all directions utilizing iris scissors. Following this, the designed flap was carried over into the primary defect and sutured into place. Hemigard Postcare Instructions: The HEMIGARD strips are to remain completely dry for at least 5-7 days. Hemostasis: Electrocautery Double Island Pedicle Flap Text: The defect edges were debeveled with a #15 scalpel blade. Given the location of the defect, shape of the defect and the proximity to free margins a double island pedicle advancement flap was deemed most appropriate. Using a sterile surgical marker, an appropriate advancement flap was drawn incorporating the defect, outlining the appropriate donor tissue and placing the expected incisions within the relaxed skin tension lines where possible. The area thus outlined was incised deep to adipose tissue with a #15 scalpel blade. The skin margins were undermined to an appropriate distance in all directions around the primary defect and laterally outward around the island pedicle utilizing iris scissors.  There was minimal undermining beneath the pedicle flap. Following this, the flap was carried over into the primary defect and sutured into place. Complex Repair And Bilobe Flap Text: The defect edges were debeveled with a #15 scalpel blade.  The primary defect was closed partially with a complex linear closure.  Given the location of the remaining defect, shape of the defect and the proximity to free margins a bilobe flap was deemed most appropriate for complete closure of the defect.  Using a sterile surgical marker, an appropriate advancement flap was drawn incorporating the defect and placing the expected incisions within the relaxed skin tension lines where possible. The area thus outlined was incised deep to adipose tissue with a #15 scalpel blade. The skin margins were undermined to an appropriate distance in all directions utilizing iris scissors and carried over to close the primary defect. Perilesional Excision Additional Text (Leave Blank If You Do Not Want): The margin was drawn around the clinically apparent lesion. Incisions were then made along these lines to the appropriate tissue plane and the lesion was extirpated. Complex Repair And Dorsal Nasal Flap Text: The defect edges were debeveled with a #15 scalpel blade.  The primary defect was closed partially with a complex linear closure.  Given the location of the remaining defect, shape of the defect and the proximity to free margins a dorsal nasal flap was deemed most appropriate for complete closure of the defect.  Using a sterile surgical marker, an appropriate flap was drawn incorporating the defect and placing the expected incisions within the relaxed skin tension lines where possible. The area thus outlined was incised deep to adipose tissue with a #15 scalpel blade. The skin margins were undermined to an appropriate distance in all directions utilizing iris scissors and carried over to close the primary defect. Scalpel Size: 15 blade Hatchet Flap Text: The defect edges were debeveled with a #15 scalpel blade. Given the location of the defect, shape of the defect and the proximity to free margins a hatchet flap was deemed most appropriate. Using a sterile surgical marker, an appropriate hatchet flap was drawn incorporating the defect and placing the expected incisions within the relaxed skin tension lines where possible. The area thus outlined was incised deep to adipose tissue with a #15 scalpel blade. The skin margins were undermined to an appropriate distance in all directions utilizing iris scissors. Following this, the designed flap was carried over into the primary defect and sutured into place. A-T Advancement Flap Text: The defect edges were debeveled with a #15 scalpel blade. Given the location of the defect, shape of the defect and the proximity to free margins an A-T advancement flap was deemed most appropriate. Using a sterile surgical marker, an appropriate advancement flap was drawn incorporating the defect and placing the expected incisions within the relaxed skin tension lines where possible. The area thus outlined was incised deep to adipose tissue with a #15 scalpel blade. The skin margins were undermined to an appropriate distance in all directions utilizing iris scissors. Following this, the designed flap was advanced and carried over into the primary defect and sutured into place. Size Of Margin In Cm: 0.5 O-T Advancement Flap Text: The defect edges were debeveled with a #15 scalpel blade. Given the location of the defect, shape of the defect and the proximity to free margins an O-T advancement flap was deemed most appropriate. Using a sterile surgical marker, an appropriate advancement flap was drawn incorporating the defect and placing the expected incisions within the relaxed skin tension lines where possible. The area thus outlined was incised deep to adipose tissue with a #15 scalpel blade. The skin margins were undermined to an appropriate distance in all directions utilizing iris scissors. Following this, the designed flap was advanced and carried over into the primary defect and sutured into place. Repair Performed By Another Provider Text (Leave Blank If You Do Not Want): After the tissue was excised the defect was repaired by another provider. H Plasty Text: Given the location of the defect, shape of the defect and the proximity to free margins a H-plasty was deemed most appropriate for repair. Using a sterile surgical marker, the appropriate advancement arms of the H-plasty were drawn incorporating the defect and placing the expected incisions within the relaxed skin tension lines where possible. The area thus outlined was incised deep to adipose tissue with a #15 scalpel blade. The skin margins were undermined to an appropriate distance in all directions utilizing iris scissors.  The opposing advancement arms were then advanced and carried over into place in opposite direction and anchored with interrupted buried subcutaneous sutures. Complex Repair And O-L Flap Text: The defect edges were debeveled with a #15 scalpel blade.  The primary defect was closed partially with a complex linear closure.  Given the location of the remaining defect, shape of the defect and the proximity to free margins an O-L flap was deemed most appropriate for complete closure of the defect.  Using a sterile surgical marker, an appropriate flap was drawn incorporating the defect and placing the expected incisions within the relaxed skin tension lines where possible. The area thus outlined was incised deep to adipose tissue with a #15 scalpel blade. The skin margins were undermined to an appropriate distance in all directions utilizing iris scissors and carried over to close the primary defect. Complex Repair And Tissue Cultured Epidermal Autograft Text: The defect edges were debeveled with a #15 scalpel blade.  The primary defect was closed partially with a complex linear closure.  Given the location of the defect, shape of the defect and the proximity to free margins an tissue cultured epidermal autograft was deemed most appropriate to repair the remaining defect.  The graft was trimmed to fit the size of the remaining defect.  The graft was then placed in the primary defect, oriented appropriately, and sutured into place. Size Of Lesion In Cm: 0.7 Melolabial Transposition Flap Text: The defect edges were debeveled with a #15 scalpel blade. Given the location of the defect and the proximity to free margins a melolabial flap was deemed most appropriate. Using a sterile surgical marker, an appropriate melolabial transposition flap was drawn incorporating the defect. The area thus outlined was incised deep to adipose tissue with a #15 scalpel blade. The skin margins were undermined to an appropriate distance in all directions utilizing iris scissors. Following this, the designed flap was carried over into the primary defect and sutured into place. Rhombic Flap Text: The defect edges were debeveled with a #15 scalpel blade. Given the location of the defect and the proximity to free margins a rhombic flap was deemed most appropriate. Using a sterile surgical marker, an appropriate rhombic flap was drawn incorporating the defect. The area thus outlined was incised deep to adipose tissue with a #15 scalpel blade. The skin margins were undermined to an appropriate distance in all directions utilizing iris scissors. Following this, the designed flap was carried over into the primary defect and sutured into place. V-Y Flap Text: The defect edges were debeveled with a #15 scalpel blade. Given the location of the defect, shape of the defect and the proximity to free margins a V-Y flap was deemed most appropriate. Using a sterile surgical marker, an appropriate advancement flap was drawn incorporating the defect and placing the expected incisions within the relaxed skin tension lines where possible. The area thus outlined was incised deep to adipose tissue with a #15 scalpel blade. The skin margins were undermined to an appropriate distance in all directions utilizing iris scissors. Following this, the designed flap was advanced and carried over into the primary defect and sutured into place. Post-Care Instructions: I reviewed with the patient in detail post-care instructions. Patient is not to engage in any heavy lifting, exercise, or swimming for the next 14 days. Should the patient develop any fevers, chills, bleeding, severe pain patient will contact the office immediately. Deep Sutures: 3-0 PGA Partial Purse String (Simple) Text: Given the location of the defect and the characteristics of the surrounding skin a simple purse string closure was deemed most appropriate.  Undermining was performed circumferentially around the surgical defect.  A purse string suture was then placed and tightened. Wound tension of the circular defect prevented complete closure of the wound. Complex Repair And Epidermal Autograft Text: The defect edges were debeveled with a #15 scalpel blade.  The primary defect was closed partially with a complex linear closure.  Given the location of the defect, shape of the defect and the proximity to free margins an epidermal autograft was deemed most appropriate to repair the remaining defect.  The graft was trimmed to fit the size of the remaining defect.  The graft was then placed in the primary defect, oriented appropriately, and sutured into place. Repair Type: Intermediate Complex Repair And V-Y Plasty Text: The defect edges were debeveled with a #15 scalpel blade.  The primary defect was closed partially with a complex linear closure.  Given the location of the remaining defect, shape of the defect and the proximity to free margins a V-Y plasty was deemed most appropriate for complete closure of the defect.  Using a sterile surgical marker, an appropriate advancement flap was drawn incorporating the defect and placing the expected incisions within the relaxed skin tension lines where possible. The area thus outlined was incised deep to adipose tissue with a #15 scalpel blade. The skin margins were undermined to an appropriate distance in all directions utilizing iris scissors and carried over to close the primary defect. Retention Suture Text: Retention sutures were placed to support the closure and prevent dehiscence. Epidermal Autograft Text: The defect edges were debeveled with a #15 scalpel blade. Given the location of the defect, shape of the defect and the proximity to free margins an epidermal autograft was deemed most appropriate. Using a sterile surgical marker, the primary defect shape was transferred to the donor site. The epidermal graft was then harvested.  The skin graft was then placed in the primary defect and oriented appropriately. Complex Repair And O-T Advancement Flap Text: The defect edges were debeveled with a #15 scalpel blade.  The primary defect was closed partially with a complex linear closure.  Given the location of the remaining defect, shape of the defect and the proximity to free margins an O-T advancement flap was deemed most appropriate for complete closure of the defect.  Using a sterile surgical marker, an appropriate advancement flap was drawn incorporating the defect and placing the expected incisions within the relaxed skin tension lines where possible. The area thus outlined was incised deep to adipose tissue with a #15 scalpel blade. The skin margins were undermined to an appropriate distance in all directions utilizing iris scissors and carried over to close the primary defect. Muscle Hinge Flap Text: The defect edges were debeveled with a #15 scalpel blade.  Given the size, depth and location of the defect and the proximity to free margins a muscle hinge flap was deemed most appropriate. Using a sterile surgical marker, an appropriate hinge flap was drawn incorporating the defect. The area thus outlined was incised with a #15 scalpel blade. The skin margins were undermined to an appropriate distance in all directions utilizing iris scissors. Following this, the designed flap was carried into the primary defect and sutured into place. Split-Thickness Skin Graft Text: The defect edges were debeveled with a #15 scalpel blade. Given the location of the defect, shape of the defect and the proximity to free margins a split thickness skin graft was deemed most appropriate. Using a sterile surgical marker, the primary defect shape was transferred to the donor site. The split thickness graft was then harvested.  The skin graft was then placed in the primary defect and oriented appropriately. Anesthesia Type: 1% lidocaine with epinephrine Dorsal Nasal Flap Text: The defect edges were debeveled with a #15 scalpel blade. Given the location of the defect and the proximity to free margins a dorsal nasal flap was deemed most appropriate. Using a sterile surgical marker, an appropriate dorsal nasal flap was drawn around the defect. The area thus outlined was incised deep to adipose tissue with a #15 scalpel blade. The skin margins were undermined to an appropriate distance in all directions utilizing iris scissors. Following this, the designed flap was carried into the primary defect and sutured into place. Excisional Biopsy Additional Text (Leave Blank If You Do Not Want): The margin was drawn around the clinically apparent lesion. An elliptical shape was then drawn on the skin incorporating the lesion and margins.  Incisions were then made along these lines to the appropriate tissue plane and the lesion was extirpated. Saucerization Depth: dermis and superficial adipose tissue Paramedian Forehead Flap Text: A decision was made to reconstruct the defect utilizing an interpolation axial flap and a staged reconstruction.  A telfa template was made of the defect.  This telfa template was then used to outline the paramedian forehead pedicle flap.  The donor area for the pedicle flap was then injected with anesthesia.  The flap was excised through the skin and subcutaneous tissue down to the layer of the underlying musculature.  The pedicle flap was carefully excised within this deep plane to maintain its blood supply.  The edges of the donor site were undermined.   The donor site was closed in a primary fashion.  The pedicle was then rotated into position and sutured.  Once the tube was sutured into place, adequate blood supply was confirmed with blanching and refill.  The pedicle was then wrapped with xeroform gauze and dressed appropriately with a telfa and gauze bandage to ensure continued blood supply and protect the attached pedicle. Alar Island Pedicle Flap Text: The defect edges were debeveled with a #15 scalpel blade. Given the location of the defect, shape of the defect and the proximity to the alar rim an island pedicle advancement flap was deemed most appropriate. Using a sterile surgical marker, an appropriate advancement flap was drawn incorporating the defect, outlining the appropriate donor tissue and placing the expected incisions within the nasal ala running parallel to the alar rim. The area thus outlined was incised with a #15 scalpel blade. The skin margins were undermined minimally to an appropriate distance in all directions around the primary defect and laterally outward around the island pedicle utilizing iris scissors.  There was minimal undermining beneath the pedicle flap. Following this, the designed flap was carried over into the primary defect and sutured into place. Epidermal Closure: running cuticular Excision Depth: adipose tissue Abbe Flap (Upper To Lower Lip) Text: The defect of the lower lip was assessed and measured.  Given the location and size of the defect, an Abbe flap was deemed most appropriate. Using a sterile surgical marker, an appropriate Abbe flap was measured and drawn on the upper lip. Local anesthesia was then infiltrated.  A scalpel was then used to incise the upper lip through and through the skin, vermilion, muscle and mucosa, leaving the flap pedicled on the opposite side.  The flap was then rotated and transferred to the lower lip defect.  The flap was then sutured into place with a three layer technique, closing the orbicularis oris muscle layer with subcutaneous buried sutures, followed by a mucosal layer and an epidermal layer. Ear Star Wedge Flap Text: The defect edges were debeveled with a #15 blade scalpel.  Given the location of the defect and the proximity to free margins (helical rim) an ear star wedge flap was deemed most appropriate. Using a sterile surgical marker, the appropriate flap was drawn incorporating the defect and placing the expected incisions between the helical rim and antihelix where possible.  The area thus outlined was incised through and through with a #15 scalpel blade. Following this, the designed flap was carried over into the primary defect and sutured into place. Xenograft Text: The defect edges were debeveled with a #15 scalpel blade. Given the location of the defect, shape of the defect and the proximity to free margins a xenograft was deemed most appropriate.  The graft was then trimmed to fit the size of the defect.  The graft was then placed in the primary defect and oriented appropriately. Star Wedge Flap Text: The defect edges were debeveled with a #15 scalpel blade. Given the location of the defect, shape of the defect and the proximity to free margins a star wedge flap was deemed most appropriate. Using a sterile surgical marker, an appropriate rotation flap was drawn incorporating the defect and placing the expected incisions within the relaxed skin tension lines where possible. The area thus outlined was incised deep to adipose tissue with a #15 scalpel blade. The skin margins were undermined to an appropriate distance in all directions utilizing iris scissors. Following this, the designed flap was carried over into the primary defect and sutured into place. Suture Removal: 14 days Double O-Z Flap Text: The defect edges were debeveled with a #15 scalpel blade. Given the location of the defect, shape of the defect and the proximity to free margins a Double O-Z flap was deemed most appropriate. Using a sterile surgical marker, an appropriate transposition flap was drawn incorporating the defect and placing the expected incisions within the relaxed skin tension lines where possible. The area thus outlined was incised deep to adipose tissue with a #15 scalpel blade. The skin margins were undermined to an appropriate distance in all directions utilizing iris scissors. Following this, the designed flap was carried over into the primary defect and sutured into place. Home Suture Removal Text: Patient was provided a home suture removal kit and will remove their sutures at home.  If they have any questions or difficulties they will call the office. Helical Rim Advancement Flap Text: The defect edges were debeveled with a #15 blade scalpel.  Given the location of the defect and the proximity to free margins (helical rim) a double helical rim advancement flap was deemed most appropriate. Using a sterile surgical marker, the appropriate advancement flaps were drawn incorporating the defect and placing the expected incisions between the helical rim and antihelix where possible.  The area thus outlined was incised through and through with a #15 scalpel blade.  With a skin hook and iris scissors, the flaps were gently and sharply undermined and freed up. Folllowing this, the designed flaps were carried over into the primary defect and sutured into place. Melolabial Interpolation Flap Text: A decision was made to reconstruct the defect utilizing an interpolation axial flap and a staged reconstruction.  A telfa template was made of the defect.  This telfa template was then used to outline the melolabial interpolation flap.  The donor area for the pedicle flap was then injected with anesthesia.  The flap was excised through the skin and subcutaneous tissue down to the layer of the underlying musculature.  The pedicle flap was carefully excised within this deep plane to maintain its blood supply.  The edges of the donor site were undermined.   The donor site was closed in a primary fashion.  The pedicle was then rotated into position and sutured.  Once the tube was sutured into place, adequate blood supply was confirmed with blanching and refill.  The pedicle was then wrapped with xeroform gauze and dressed appropriately with a telfa and gauze bandage to ensure continued blood supply and protect the attached pedicle. Hemigard Intro: Due to skin fragility and wound tension, it was decided to use HEMIGARD adhesive retention suture devices to permit a linear closure. The skin was cleaned and dried for a 6cm distance away from the wound. Excessive hair, if present, was removed to allow for adhesion. Mercedes Flap Text: The defect edges were debeveled with a #15 scalpel blade. Given the location of the defect, shape of the defect and the proximity to free margins a Mercedes flap was deemed most appropriate. Using a sterile surgical marker, an appropriate advancement flap was drawn incorporating the defect and placing the expected incisions within the relaxed skin tension lines where possible. The area thus outlined was incised deep to adipose tissue with a #15 scalpel blade. The skin margins were undermined to an appropriate distance in all directions utilizing iris scissors. Following this, the designed flap was advanced and carried over into the primary defect and sutured into place. Double O-Z Plasty Text: The defect edges were debeveled with a #15 scalpel blade. Given the location of the defect, shape of the defect and the proximity to free margins a Double O-Z plasty (double transposition flap) was deemed most appropriate. Using a sterile surgical marker, the appropriate transposition flaps were drawn incorporating the defect and placing the expected incisions within the relaxed skin tension lines where possible. The area thus outlined was incised deep to adipose tissue with a #15 scalpel blade. The skin margins were undermined to an appropriate distance in all directions utilizing iris scissors. Hemostasis was achieved with electrocautery. The flaps were then transposed and carried over into place, one clockwise and the other counterclockwise, and anchored with interrupted buried subcutaneous sutures. Epidermal Sutures: 4-0 Nylon Undermining Type: Entire Wound Lip Wedge Excision Repair Text: Given the location of the defect and the proximity to free margins a full thickness wedge repair was deemed most appropriate. Using a sterile surgical marker, the appropriate repair was drawn incorporating the defect and placing the expected incisions perpendicular to the vermilion border.  The vermilion border was also meticulously outlined to ensure appropriate reapproximation during the repair.  The area thus outlined was incised through and through with a #15 scalpel blade.  The muscularis and dermis were reaproximated with deep sutures following hemostasis. Care was taken to realign the vermilion border before proceeding with the superficial closure.  Once the vermilion was realigned the superfical and mucosal closure was finished. Wound Care: Petrolatum Island Pedicle Flap With Canthal Suspension Text: The defect edges were debeveled with a #15 scalpel blade. Given the location of the defect, shape of the defect and the proximity to free margins an island pedicle advancement flap was deemed most appropriate. Using a sterile surgical marker, an appropriate advancement flap was drawn incorporating the defect, outlining the appropriate donor tissue and placing the expected incisions within the relaxed skin tension lines where possible. The area thus outlined was incised deep to adipose tissue with a #15 scalpel blade. The skin margins were undermined to an appropriate distance in all directions around the primary defect and laterally outward around the island pedicle utilizing iris scissors.  There was minimal undermining beneath the pedicle flap. A suspension suture was placed in the canthal tendon to prevent tension and prevent ectropion. Following this, the designed flap was placed into the primary defect and sutured into place. Complex Repair And Split-Thickness Skin Graft Text: The defect edges were debeveled with a #15 scalpel blade.  The primary defect was closed partially with a complex linear closure.  Given the location of the defect, shape of the defect and the proximity to free margins a split thickness skin graft was deemed most appropriate to repair the remaining defect.  The graft was trimmed to fit the size of the remaining defect.  The graft was then placed in the primary defect, oriented appropriately, and sutured into place. Vermilion Border Text: The closure involved the vermilion border. Complex Repair And Burow's Graft Text: The defect edges were debeveled with a #15 scalpel blade.  The primary defect was closed partially with a complex linear closure.  Given the location of the defect, shape of the defect, the proximity to free margins and the presence of a standing cone deformity a Burow's graft was deemed most appropriate to repair the remaining defect.  The graft was trimmed to fit the size of the remaining defect.  The graft was then placed in the primary defect, oriented appropriately, and sutured into place. Complex Repair And Xenograft Text: The defect edges were debeveled with a #15 scalpel blade.  The primary defect was closed partially with a complex linear closure.  Given the location of the defect, shape of the defect and the proximity to free margins a xenograft was deemed most appropriate to repair the remaining defect.  The graft was trimmed to fit the size of the remaining defect.  The graft was then placed in the primary defect, oriented appropriately, and sutured into place. Complex Repair And Skin Substitute Graft Text: The defect edges were debeveled with a #15 scalpel blade.  The primary defect was closed partially with a complex linear closure.  Given the location of the remaining defect, shape of the defect and the proximity to free margins a skin substitute graft was deemed most appropriate to repair the remaining defect.  The graft was trimmed to fit the size of the remaining defect.  The graft was then placed in the primary defect, oriented appropriately, and sutured into place. W Plasty Text: The lesion was extirpated to the level of the fat with a #15 scalpel blade. Given the location of the defect, shape of the defect and the proximity to free margins a W-plasty was deemed most appropriate for repair. Using a sterile surgical marker, the appropriate transposition arms of the W-plasty were drawn incorporating the defect and placing the expected incisions within the relaxed skin tension lines where possible. The area thus outlined was incised deep to adipose tissue with a #15 scalpel blade. The skin margins were undermined to an appropriate distance in all directions utilizing iris scissors. The opposing transposition arms were then transposed and carried over into place in opposite direction and anchored with interrupted buried subcutaneous sutures. Chonodrocutaneous Helical Advancement Flap Text: The defect edges were debeveled with a #15 scalpel blade. Given the location of the defect and the proximity to free margins a chondrocutaneous helical advancement flap was deemed most appropriate. Using a sterile surgical marker, the appropriate advancement flap was drawn incorporating the defect and placing the expected incisions within the relaxed skin tension lines where possible. The area thus outlined was incised deep to adipose tissue with a #15 scalpel blade. The skin margins were undermined to an appropriate distance in all directions utilizing iris scissors. Following this, the designed flap was advanced and carried over into the primary defect and sutured into place. Tissue Cultured Epidermal Autograft Text: The defect edges were debeveled with a #15 scalpel blade. Given the location of the defect, shape of the defect and the proximity to free margins a tissue cultured epidermal autograft was deemed most appropriate.  The graft was then trimmed to fit the size of the defect.  The graft was then placed in the primary defect and oriented appropriately. Advancement Flap (Double) Text: The defect edges were debeveled with a #15 scalpel blade. Given the location of the defect and the proximity to free margins a double advancement flap was deemed most appropriate. Using a sterile surgical marker, the appropriate advancement flaps were drawn incorporating the defect and placing the expected incisions within the relaxed skin tension lines where possible. The area thus outlined was incised deep to adipose tissue with a #15 scalpel blade. The skin margins were undermined to an appropriate distance in all directions utilizing iris scissors. Following this, the designed flaps were advanced and carried over into the primary defect and sutured into place. Keystone Flap Text: The defect edges were debeveled with a #15 scalpel blade. Given the location of the defect, shape of the defect a keystone flap was deemed most appropriate. Using a sterile surgical marker, an appropriate keystone flap was drawn incorporating the defect, outlining the appropriate donor tissue and placing the expected incisions within the relaxed skin tension lines where possible. The area thus outlined was incised deep to adipose tissue with a #15 scalpel blade. The skin margins were undermined to an appropriate distance in all directions around the primary defect and laterally outward around the flap utilizing iris scissors. Following this, the designed flap was carried into the primary defect and sutured into place. Hemigard Retention Suture: 2-0 Nylon Posterior Auricular Interpolation Flap Text: A decision was made to reconstruct the defect utilizing an interpolation axial flap and a staged reconstruction.  A telfa template was made of the defect.  This telfa template was then used to outline the posterior auricular interpolation flap.  The donor area for the pedicle flap was then injected with anesthesia.  The flap was excised through the skin and subcutaneous tissue down to the layer of the underlying musculature.  The pedicle flap was carefully excised within this deep plane to maintain its blood supply.  The edges of the donor site were undermined.   The donor site was closed in a primary fashion.  The pedicle was then rotated into position and sutured.  Once the tube was sutured into place, adequate blood supply was confirmed with blanching and refill.  The pedicle was then wrapped with xeroform gauze and dressed appropriately with a telfa and gauze bandage to ensure continued blood supply and protect the attached pedicle. Skin Substitute Text: The defect edges were debeveled with a #15 scalpel blade. Given the location of the defect, shape of the defect and the proximity to free margins a skin substitute graft was deemed most appropriate.  The graft material was trimmed to fit the size of the defect. The graft was then placed in the primary defect and oriented appropriately. Consent was obtained from the patient. The risks and benefits to therapy were discussed in detail. Specifically, the risks of infection, scarring, bleeding, prolonged wound healing, incomplete removal, allergy to anesthesia, nerve injury and recurrence were addressed. Prior to the procedure, the treatment site was clearly identified and confirmed by the patient. All components of Universal Protocol/PAUSE Rule completed. Pre-Excision Curettage Text (Leave Blank If You Do Not Want): Prior to drawing the surgical margin the visible lesion was removed with curettage to clearly define the lesion size. Number Of Hemigard Strips Per Side: 1 Nasal Turnover Hinge Flap Text: The defect edges were debeveled with a #15 scalpel blade.  Given the size, depth, location of the defect and the defect being full thickness a nasal turnover hinge flap was deemed most appropriate. Using a sterile surgical marker, an appropriate hinge flap was drawn incorporating the defect. The area thus outlined was incised with a #15 scalpel blade. The flap was designed to recreate the nasal mucosal lining and the alar rim. The skin margins were undermined to an appropriate distance in all directions utilizing iris scissors. Following this, the designed flap was carried over into the primary defect and sutured into place Spiral Flap Text: The defect edges were debeveled with a #15 scalpel blade. Given the location of the defect, shape of the defect and the proximity to free margins a spiral flap was deemed most appropriate. Using a sterile surgical marker, an appropriate rotation flap was drawn incorporating the defect and placing the expected incisions within the relaxed skin tension lines where possible. The area thus outlined was incised deep to adipose tissue with a #15 scalpel blade. The skin margins were undermined to an appropriate distance in all directions utilizing iris scissors. Following this, the designed flap was carried over into the primary defect and sutured into place. Bilateral Helical Rim Advancement Flap Text: The defect edges were debeveled with a #15 blade scalpel.  Given the location of the defect and the proximity to free margins (helical rim) a bilateral helical rim advancement flap was deemed most appropriate. Using a sterile surgical marker, the appropriate advancement flaps were drawn incorporating the defect and placing the expected incisions between the helical rim and antihelix where possible.  The area thus outlined was incised through and through with a #15 scalpel blade.  With a skin hook and iris scissors, the flaps were gently and sharply undermined and freed up. Following this, the designed flaps were placed into the primary defect and sutured into place. Cartilage Graft Text: The defect edges were debeveled with a #15 scalpel blade. Given the location of the defect, shape of the defect, the fact the defect involved a full thickness cartilage defect a cartilage graft was deemed most appropriate.  An appropriate donor site was identified, cleansed, and anesthetized. The cartilage graft was then harvested and transferred to the recipient site, oriented appropriately and then sutured into place.  The secondary defect was then repaired using a primary closure. Banner Transposition Flap Text: The defect edges were debeveled with a #15 scalpel blade. Given the location of the defect and the proximity to free margins a Banner transposition flap was deemed most appropriate. Using a sterile surgical marker, an appropriate flap was drawn around the defect. The area thus outlined was incised deep to adipose tissue with a #15 scalpel blade. The skin margins were undermined to an appropriate distance in all directions utilizing iris scissors. Following this, the designed flap was carried into the primary defect and sutured into place. Eye Clamp Note Details: An eye clamp was used during the procedure. O-T Plasty Text: The defect edges were debeveled with a #15 scalpel blade. Given the location of the defect, shape of the defect and the proximity to free margins an O-T plasty was deemed most appropriate. Using a sterile surgical marker, an appropriate O-T plasty was drawn incorporating the defect and placing the expected incisions within the relaxed skin tension lines where possible. The area thus outlined was incised deep to adipose tissue with a #15 scalpel blade. The skin margins were undermined to an appropriate distance in all directions utilizing iris scissors. Following this, the designed flap was carried over into the primary defect and sutured into place. Crescentic Advancement Flap Text: The defect edges were debeveled with a #15 scalpel blade. Given the location of the defect and the proximity to free margins a crescentic advancement flap was deemed most appropriate. Using a sterile surgical marker, the appropriate advancement flap was drawn incorporating the defect and placing the expected incisions within the relaxed skin tension lines where possible. The area thus outlined was incised deep to adipose tissue with a #15 scalpel blade. The skin margins were undermined to an appropriate distance in all directions utilizing iris scissors. Following this, the designed flap was advanced and carried over into the primary defect and sutured into place. Complex Repair And Dermal Autograft Text: The defect edges were debeveled with a #15 scalpel blade.  The primary defect was closed partially with a complex linear closure.  Given the location of the defect, shape of the defect and the proximity to free margins an dermal autograft was deemed most appropriate to repair the remaining defect.  The graft was trimmed to fit the size of the remaining defect.  The graft was then placed in the primary defect, oriented appropriately, and sutured into place. Modified Advancement Flap Text: The defect edges were debeveled with a #15 scalpel blade. Given the location of the defect, shape of the defect and the proximity to free margins a modified advancement flap was deemed most appropriate. Using a sterile surgical marker, an appropriate advancement flap was drawn incorporating the defect and placing the expected incisions within the relaxed skin tension lines where possible. The area thus outlined was incised deep to adipose tissue with a #15 scalpel blade. The skin margins were undermined to an appropriate distance in all directions utilizing iris scissors. Following this, the designed flap was advanced and carried over into the primary defect and sutured into place. Nostril Rim Text: The closure involved the nostril rim. Island Pedicle Flap Text: The defect edges were debeveled with a #15 scalpel blade. Given the location of the defect, shape of the defect and the proximity to free margins an island pedicle advancement flap was deemed most appropriate. Using a sterile surgical marker, an appropriate advancement flap was drawn incorporating the defect, outlining the appropriate donor tissue and placing the expected incisions within the relaxed skin tension lines where possible. The area thus outlined was incised deep to adipose tissue with a #15 scalpel blade. The skin margins were undermined to an appropriate distance in all directions around the primary defect and laterally outward around the island pedicle utilizing iris scissors.  There was minimal undermining beneath the pedicle flap. Following this, the flap was carried over into the primary defect and sutured into place. Z Plasty Text: The lesion was extirpated to the level of the fat with a #15 scalpel blade. Given the location of the defect, shape of the defect and the proximity to free margins a Z-plasty was deemed most appropriate for repair. Using a sterile surgical marker, the appropriate transposition arms of the Z-plasty were drawn incorporating the defect and placing the expected incisions within the relaxed skin tension lines where possible. The area thus outlined was incised deep to adipose tissue with a #15 scalpel blade. The skin margins were undermined to an appropriate distance in all directions utilizing iris scissors. The opposing transposition arms were then transposed and carried over into place in opposite direction and anchored with interrupted buried subcutaneous sutures. Complex Repair And Double Advancement Flap Text: The defect edges were debeveled with a #15 scalpel blade.  The primary defect was closed partially with a complex linear closure.  Given the location of the remaining defect, shape of the defect and the proximity to free margins a double advancement flap was deemed most appropriate for complete closure of the defect.  Using a sterile surgical marker, an appropriate advancement flap was drawn incorporating the defect and placing the expected incisions within the relaxed skin tension lines where possible. The area thus outlined was incised deep to adipose tissue with a #15 scalpel blade. The skin margins were undermined to an appropriate distance in all directions utilizing iris scissors and carried over to close the primary defect. Purse String (Simple) Text: Given the location of the defect and the characteristics of the surrounding skin a purse string simple closure was deemed most appropriate.  Undermining was performed circumferentially around the surgical defect.  A purse string suture was then placed and tightened. Purse String (Intermediate) Text: Given the location of the defect and the characteristics of the surrounding skin a purse string intermediate closure was deemed most appropriate.  Undermining was performed circumferentially around the surgical defect.  A purse string suture was then placed and tightened. Positioning (Leave Blank If You Do Not Want): The patient was placed in a comfortable position exposing the surgical site. O-Z Plasty Text: The defect edges were debeveled with a #15 scalpel blade. Given the location of the defect, shape of the defect and the proximity to free margins an O-Z plasty (double transposition flap) was deemed most appropriate. Using a sterile surgical marker, the appropriate transposition flaps were drawn incorporating the defect and placing the expected incisions within the relaxed skin tension lines where possible. The area thus outlined was incised deep to adipose tissue with a #15 scalpel blade. The skin margins were undermined to an appropriate distance in all directions utilizing iris scissors. Hemostasis was achieved with electrocautery. The flaps were then transposed and carried over into place, one clockwise and the other counterclockwise, and anchored with interrupted buried subcutaneous sutures. Saucerization Excision Additional Text (Leave Blank If You Do Not Want): The margin was drawn around the clinically apparent lesion.  Incisions were then made along these lines, in a tangential fashion, to the appropriate tissue plane and the lesion was extirpated. Complex Repair And Double M Plasty Text: The defect edges were debeveled with a #15 scalpel blade.  The primary defect was closed partially with a complex linear closure.  Given the location of the remaining defect, shape of the defect and the proximity to free margins a double M plasty was deemed most appropriate for complete closure of the defect.  Using a sterile surgical marker, an appropriate advancement flap was drawn incorporating the defect and placing the expected incisions within the relaxed skin tension lines where possible. The area thus outlined was incised deep to adipose tissue with a #15 scalpel blade. The skin margins were undermined to an appropriate distance in all directions utilizing iris scissors and carried over to close the primary defect. Epidermal Closure Graft Donor Site (Optional): simple interrupted Detail Level: Detailed Dermal Closure: buried vertical mattress Partial Purse String (Intermediate) Text: Given the location of the defect and the characteristics of the surrounding skin an intermediate purse string closure was deemed most appropriate.  Undermining was performed circumferentially around the surgical defect.  A purse string suture was then placed and tightened. Wound tension of the circular defect prevented complete closure of the wound. Staged Advancement Flap Text: The defect edges were debeveled with a #15 scalpel blade. Given the location of the defect, shape of the defect and the proximity to free margins a staged advancement flap was deemed most appropriate. Using a sterile surgical marker, an appropriate advancement flap was drawn incorporating the defect and placing the expected incisions within the relaxed skin tension lines where possible. The area thus outlined was incised deep to adipose tissue with a #15 scalpel blade. The skin margins were undermined to an appropriate distance in all directions utilizing iris scissors. Following this, the designed flap was carried over into the primary defect and sutured into place. Bilobed Flap Text: The defect edges were debeveled with a #15 scalpel blade. Given the location of the defect and the proximity to free margins a bilobe flap was deemed most appropriate. Using a sterile surgical marker, an appropriate bilobe flap drawn around the defect. The area thus outlined was incised deep to adipose tissue with a #15 scalpel blade. The skin margins were undermined to an appropriate distance in all directions utilizing iris scissors. Following this, the designed flap was carried over into the primary defect and sutured into place. Information: Selecting Yes will display possible errors in your note based on the variables you have selected. This validation is only offered as a suggestion for you. PLEASE NOTE THAT THE VALIDATION TEXT WILL BE REMOVED WHEN YOU FINALIZE YOUR NOTE. IF YOU WANT TO FAX A PRELIMINARY NOTE YOU WILL NEED TO TOGGLE THIS TO 'NO' IF YOU DO NOT WANT IT IN YOUR FAXED NOTE. Complex Repair And Melolabial Flap Text: The defect edges were debeveled with a #15 scalpel blade.  The primary defect was closed partially with a complex linear closure.  Given the location of the remaining defect, shape of the defect and the proximity to free margins a melolabial flap was deemed most appropriate for complete closure of the defect.  Using a sterile surgical marker, an appropriate advancement flap was drawn incorporating the defect and placing the expected incisions within the relaxed skin tension lines where possible. The area thus outlined was incised deep to adipose tissue with a #15 scalpel blade. The skin margins were undermined to an appropriate distance in all directions utilizing iris scissors and carried over to close the primary defect. Billing Type: Third-Party Bill Complex Repair And Rhombic Flap Text: The defect edges were debeveled with a #15 scalpel blade.  The primary defect was closed partially with a complex linear closure.  Given the location of the remaining defect, shape of the defect and the proximity to free margins a rhombic flap was deemed most appropriate for complete closure of the defect.  Using a sterile surgical marker, an appropriate advancement flap was drawn incorporating the defect and placing the expected incisions within the relaxed skin tension lines where possible. The area thus outlined was incised deep to adipose tissue with a #15 scalpel blade. The skin margins were undermined to an appropriate distance in all directions utilizing iris scissors and carried over to close the primary defect. Elliptical Excision Additional Text (Leave Blank If You Do Not Want): The margin was drawn around the clinically apparent lesion.  An elliptical shape was then drawn on the skin incorporating the lesion and margins.  Incisions were then made along these lines to the appropriate tissue plane and the lesion was extirpated. Complex Repair And Transposition Flap Text: The defect edges were debeveled with a #15 scalpel blade.  The primary defect was closed partially with a complex linear closure.  Given the location of the remaining defect, shape of the defect and the proximity to free margins a transposition flap was deemed most appropriate for complete closure of the defect.  Using a sterile surgical marker, an appropriate advancement flap was drawn incorporating the defect and placing the expected incisions within the relaxed skin tension lines where possible. The area thus outlined was incised deep to adipose tissue with a #15 scalpel blade. The skin margins were undermined to an appropriate distance in all directions utilizing iris scissors and carried over to close the primary defect. No Repair - Repaired With Adjacent Surgical Defect Text (Leave Blank If You Do Not Want): After the excision the defect was repaired concurrently with another surgical defect which was in close approximation. Complex Repair And W Plasty Text: The defect edges were debeveled with a #15 scalpel blade.  The primary defect was closed partially with a complex linear closure.  Given the location of the remaining defect, shape of the defect and the proximity to free margins a W plasty was deemed most appropriate for complete closure of the defect.  Using a sterile surgical marker, an appropriate advancement flap was drawn incorporating the defect and placing the expected incisions within the relaxed skin tension lines where possible. The area thus outlined was incised deep to adipose tissue with a #15 scalpel blade. The skin margins were undermined to an appropriate distance in all directions utilizing iris scissors and carried over to close the primary defect. Bilobed Transposition Flap Text: The defect edges were debeveled with a #15 scalpel blade. Given the location of the defect and the proximity to free margins a bilobed transposition flap was deemed most appropriate. Using a sterile surgical marker, an appropriate bilobe flap drawn around the defect. The area thus outlined was incised deep to adipose tissue with a #15 scalpel blade. The skin margins were undermined to an appropriate distance in all directions utilizing iris scissors. Following this, the designed flap was carried over into the primary defect and sutured into place. Mastoid Interpolation Flap Text: A decision was made to reconstruct the defect utilizing an interpolation axial flap and a staged reconstruction.  A telfa template was made of the defect.  This telfa template was then used to outline the mastoid interpolation flap.  The donor area for the pedicle flap was then injected with anesthesia.  The flap was excised through the skin and subcutaneous tissue down to the layer of the underlying musculature.  The pedicle flap was carefully excised within this deep plane to maintain its blood supply.  The edges of the donor site were undermined.   The donor site was closed in a primary fashion.  The pedicle was then rotated into position and sutured.  Once the tube was sutured into place, adequate blood supply was confirmed with blanching and refill.  The pedicle was then wrapped with xeroform gauze and dressed appropriately with a telfa and gauze bandage to ensure continued blood supply and protect the attached pedicle. Mustarde Flap Text: The defect edges were debeveled with a #15 scalpel blade.  Given the size, depth and location of the defect and the proximity to free margins a Mustarde flap was deemed most appropriate. Using a sterile surgical marker, an appropriate flap was drawn incorporating the defect. The area thus outlined was incised with a #15 scalpel blade. The skin margins were undermined to an appropriate distance in all directions utilizing iris scissors. Following this, the designed flap was carried into the primary defect and sutured into place. Zygomaticofacial Flap Text: Given the location of the defect, shape of the defect and the proximity to free margins a zygomaticofacial flap was deemed most appropriate for repair. Using a sterile surgical marker, the appropriate flap was drawn incorporating the defect and placing the expected incisions within the relaxed skin tension lines where possible. The area thus outlined was incised deep to adipose tissue with a #15 scalpel blade with preservation of a vascular pedicle.  The skin margins were undermined to an appropriate distance in all directions utilizing iris scissors. The flap was then carried over into the defect and anchored with interrupted buried subcutaneous sutures. Complex Repair And Rotation Flap Text: The defect edges were debeveled with a #15 scalpel blade.  The primary defect was closed partially with a complex linear closure.  Given the location of the remaining defect, shape of the defect and the proximity to free margins a rotation flap was deemed most appropriate for complete closure of the defect.  Using a sterile surgical marker, an appropriate advancement flap was drawn incorporating the defect and placing the expected incisions within the relaxed skin tension lines where possible. The area thus outlined was incised deep to adipose tissue with a #15 scalpel blade. The skin margins were undermined to an appropriate distance in all directions utilizing iris scissors and carried over to close the primary defect. Fusiform Excision Additional Text (Leave Blank If You Do Not Want): The margin was drawn around the clinically apparent lesion.  A fusiform shape was then drawn on the skin incorporating the lesion and margins.  Incisions were then made along these lines to the appropriate tissue plane and the lesion was extirpated. Advancement Flap (Single) Text: The defect edges were debeveled with a #15 scalpel blade. Given the location of the defect and the proximity to free margins a single advancement flap was deemed most appropriate. Using a sterile surgical marker, an appropriate advancement flap was drawn incorporating the defect and placing the expected incisions within the relaxed skin tension lines where possible. The area thus outlined was incised deep to adipose tissue with a #15 scalpel blade. The skin margins were undermined to an appropriate distance in all directions utilizing iris scissors. Following this, the designed flap was advanced and carried over into the primary defect and sutured into place.

## 2023-09-07 ENCOUNTER — APPOINTMENT (OUTPATIENT)
Dept: ELECTROPHYSIOLOGY | Facility: CLINIC | Age: 78
End: 2023-09-07

## 2023-10-04 ENCOUNTER — APPOINTMENT (OUTPATIENT)
Dept: CARDIOLOGY | Facility: CLINIC | Age: 78
End: 2023-10-04
Payer: MEDICARE

## 2023-10-04 ENCOUNTER — NON-APPOINTMENT (OUTPATIENT)
Age: 78
End: 2023-10-04

## 2023-10-05 PROCEDURE — G2066: CPT

## 2023-10-05 PROCEDURE — 93298 REM INTERROG DEV EVAL SCRMS: CPT

## 2023-11-16 ENCOUNTER — NON-APPOINTMENT (OUTPATIENT)
Age: 78
End: 2023-11-16

## 2023-11-16 ENCOUNTER — APPOINTMENT (OUTPATIENT)
Dept: CARDIOLOGY | Facility: CLINIC | Age: 78
End: 2023-11-16
Payer: COMMERCIAL

## 2023-11-16 PROCEDURE — G2066: CPT | Mod: NC

## 2023-11-16 PROCEDURE — 93298 REM INTERROG DEV EVAL SCRMS: CPT

## 2023-12-01 ENCOUNTER — APPOINTMENT (OUTPATIENT)
Dept: CARDIOLOGY | Facility: CLINIC | Age: 78
End: 2023-12-01
Payer: MEDICARE

## 2023-12-01 VITALS
DIASTOLIC BLOOD PRESSURE: 62 MMHG | HEIGHT: 62 IN | WEIGHT: 183 LBS | HEART RATE: 96 BPM | BODY MASS INDEX: 33.68 KG/M2 | SYSTOLIC BLOOD PRESSURE: 128 MMHG

## 2023-12-01 DIAGNOSIS — R07.89 OTHER CHEST PAIN: ICD-10-CM

## 2023-12-01 PROCEDURE — 99214 OFFICE O/P EST MOD 30 MIN: CPT

## 2023-12-01 PROCEDURE — 93000 ELECTROCARDIOGRAM COMPLETE: CPT

## 2023-12-11 ENCOUNTER — OUTPATIENT (OUTPATIENT)
Dept: OUTPATIENT SERVICES | Facility: HOSPITAL | Age: 78
LOS: 1 days | End: 2023-12-11
Payer: MEDICARE

## 2023-12-11 ENCOUNTER — RESULT REVIEW (OUTPATIENT)
Age: 78
End: 2023-12-11

## 2023-12-11 DIAGNOSIS — Z98.890 OTHER SPECIFIED POSTPROCEDURAL STATES: Chronic | ICD-10-CM

## 2023-12-11 DIAGNOSIS — Z90.49 ACQUIRED ABSENCE OF OTHER SPECIFIED PARTS OF DIGESTIVE TRACT: Chronic | ICD-10-CM

## 2023-12-11 DIAGNOSIS — Z87.19 PERSONAL HISTORY OF OTHER DISEASES OF THE DIGESTIVE SYSTEM: Chronic | ICD-10-CM

## 2023-12-11 DIAGNOSIS — R07.89 OTHER CHEST PAIN: ICD-10-CM

## 2023-12-11 PROCEDURE — A9500: CPT

## 2023-12-11 PROCEDURE — 93018 CV STRESS TEST I&R ONLY: CPT

## 2023-12-11 PROCEDURE — 78452 HT MUSCLE IMAGE SPECT MULT: CPT

## 2023-12-11 PROCEDURE — 78452 HT MUSCLE IMAGE SPECT MULT: CPT | Mod: 26,MH

## 2023-12-12 DIAGNOSIS — R07.89 OTHER CHEST PAIN: ICD-10-CM

## 2023-12-21 ENCOUNTER — APPOINTMENT (OUTPATIENT)
Dept: CARDIOLOGY | Facility: CLINIC | Age: 78
End: 2023-12-21
Payer: MEDICARE

## 2023-12-21 ENCOUNTER — NON-APPOINTMENT (OUTPATIENT)
Age: 78
End: 2023-12-21

## 2023-12-22 PROCEDURE — 93298 REM INTERROG DEV EVAL SCRMS: CPT

## 2023-12-22 PROCEDURE — G2066: CPT

## 2024-01-11 ENCOUNTER — APPOINTMENT (OUTPATIENT)
Dept: ELECTROPHYSIOLOGY | Facility: CLINIC | Age: 79
End: 2024-01-11
Payer: MEDICARE

## 2024-01-11 VITALS
WEIGHT: 179 LBS | OXYGEN SATURATION: 97 % | BODY MASS INDEX: 32.94 KG/M2 | HEIGHT: 62 IN | DIASTOLIC BLOOD PRESSURE: 68 MMHG | HEART RATE: 86 BPM | SYSTOLIC BLOOD PRESSURE: 120 MMHG

## 2024-01-11 DIAGNOSIS — Z86.73 PERSONAL HISTORY OF TRANSIENT ISCHEMIC ATTACK (TIA), AND CEREBRAL INFARCTION W/OUT RESIDUAL DEFICITS: ICD-10-CM

## 2024-01-11 DIAGNOSIS — Z45.09 ENCOUNTER FOR ADJUSTMENT AND MANAGEMENT OF OTHER CARDIAC DEVICE: ICD-10-CM

## 2024-01-11 PROCEDURE — 99214 OFFICE O/P EST MOD 30 MIN: CPT

## 2024-01-11 RX ORDER — PROMETHAZINE HYDROCHLORIDE 6.25 MG/5ML
6.25 SOLUTION ORAL
Qty: 240 | Refills: 0 | Status: DISCONTINUED | COMMUNITY
Start: 2023-10-26 | End: 2024-01-11

## 2024-01-11 RX ORDER — PANTOPRAZOLE 40 MG/1
40 TABLET, DELAYED RELEASE ORAL DAILY
Qty: 30 | Refills: 6 | Status: DISCONTINUED | COMMUNITY
End: 2024-01-11

## 2024-01-11 RX ORDER — AZITHROMYCIN 250 MG/1
250 TABLET, FILM COATED ORAL
Qty: 6 | Refills: 0 | Status: DISCONTINUED | COMMUNITY
Start: 2023-10-26 | End: 2024-01-11

## 2024-01-17 PROBLEM — Z45.09 ENCOUNTER FOR LOOP RECORDER CHECK: Status: ACTIVE | Noted: 2022-07-01

## 2024-01-17 PROBLEM — Z86.73 HISTORY OF CVA (CEREBROVASCULAR ACCIDENT): Status: ACTIVE | Noted: 2022-07-01

## 2024-01-17 RX ORDER — FAMOTIDINE 40 MG/1
40 TABLET, FILM COATED ORAL TWICE DAILY
Refills: 0 | Status: ACTIVE | COMMUNITY

## 2024-01-17 NOTE — ASSESSMENT
[FreeTextEntry1] : #CVA s/p ILR (4/19/22)  - Device interrogation normal. I interrogated and reprogrammed the device as described above.  - The patient is on remote and transmitting.   #H/O DVT  - Eliquis was D/C'd by vascular/hem. Pt taking ASA 81mg QD. - F/U vascular.  #HTN  - BP at goal. - continue losartan 25mg - Proper nutrition plan encouraged, especially while receiving chemotherapy  RTO in 12 months and PRN.

## 2024-01-17 NOTE — HISTORY OF PRESENT ILLNESS
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  -Pt was admitted to the hospital in April of 2022 for B/L CVA with R hemiplegia (dominant), dysphagia, aphasia, dysarthria. -CTA showed a left M2 occlusion. The patient was given tPA and taken for an emergent mechanical thrombectomy, but this was not completed due to dislodgement of clot at the time of procedure. Patient's dysarthria and LLE weakness briefly worsened, but subsequently improved again. Repeat CTH did not reveal any hemorrhage, patient was receiving heparin drip for right sided lower DVT (R peroneal and R PT branch), and now switched to Eliquis. -EP was consulted and a loop recorder was placed on 4/19/22  Pt presents today for routine follow up.  The patient is feeling well and has no cardiac complaints. Denies CP, palpitations, SOB, dizziness, ALVAREZ, PND, syncope.   She is currently receiving two chemotherapies: Riabni-Rituximab and Treanda-Bendamustine for the next 6 months.   (PCP) Dr. Wei.

## 2024-01-17 NOTE — PROCEDURE
[See Device Printout] : See device printout [de-identified] : JONNY [de-identified] : [id number] [de-identified] : [id number] [de-identified] : 4/19/2022 [de-identified] : All episodes are false, oversensing and undersensing.

## 2024-02-15 ENCOUNTER — NON-APPOINTMENT (OUTPATIENT)
Age: 79
End: 2024-02-15

## 2024-02-15 ENCOUNTER — APPOINTMENT (OUTPATIENT)
Dept: CARDIOLOGY | Facility: CLINIC | Age: 79
End: 2024-02-15
Payer: MEDICARE

## 2024-02-16 ENCOUNTER — APPOINTMENT (OUTPATIENT)
Dept: CARDIOLOGY | Facility: CLINIC | Age: 79
End: 2024-02-16
Payer: MEDICARE

## 2024-02-16 PROCEDURE — 93298 REM INTERROG DEV EVAL SCRMS: CPT

## 2024-02-16 PROCEDURE — 93306 TTE W/DOPPLER COMPLETE: CPT

## 2024-03-04 ENCOUNTER — APPOINTMENT (OUTPATIENT)
Dept: NEUROLOGY | Facility: CLINIC | Age: 79
End: 2024-03-04
Payer: MEDICARE

## 2024-03-04 VITALS
DIASTOLIC BLOOD PRESSURE: 76 MMHG | HEART RATE: 77 BPM | BODY MASS INDEX: 30 KG/M2 | SYSTOLIC BLOOD PRESSURE: 138 MMHG | OXYGEN SATURATION: 99 % | WEIGHT: 163 LBS | RESPIRATION RATE: 17 BRPM | TEMPERATURE: 96.3 F | HEIGHT: 62 IN

## 2024-03-04 PROCEDURE — 99214 OFFICE O/P EST MOD 30 MIN: CPT

## 2024-03-04 NOTE — REVIEW OF SYSTEMS
[Feeling Tired] : feeling tired [Numbness] : numbness [Abnormal Sensation] : an abnormal sensation [As Noted in HPI] : as noted in HPI [Negative] : Cardiovascular

## 2024-03-04 NOTE — ASSESSMENT
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 NIHSS 1, mRS 3.   Plan: - F/u MRA head and neck. - Will check HbA1c, Lipid profile, CMP - Continue ASA 81 mg daily. - Continue Lipitor 10 mg daily.   RTC in 6 months.

## 2024-03-04 NOTE — HISTORY OF PRESENT ILLNESS
[FreeTextEntry1] : Interval Hx: Since last visit her lymphoma has relapsed. Currently on chemotherapy: Rituximab and Treanda. Treatment complicated by Leukopenia. From stroke perspective she has been stable, using a cane now instead of walker, denies any fall/near fall since last visit. Still has some numbness over right side of body. ILR interrogation was done recently: no event. Reports feeling exhausted, sleeping 12-13 hours a day.   Initial hx: Used to follow with CLARISA Waite. 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 Butler; etiology per Dr. Hedrick, ?cardioembolic vs less likely hypercoagulable state per Heme. However, since, ILR w/o events and she is off Eliquis as DVTs have resolved. She is doing well, using walker now and w increase RLE strength, NIHSS 1 for sensory deficit of R side.

## 2024-03-04 NOTE — PHYSICAL EXAM
[FreeTextEntry1] : Neurologic: -Mental status: Awake, alert, oriented to person, place, and time. Speech is mostly fluent with some impairment in naming but intact repetition, and comprehension, no dysarthria.  Follows commands. Attention/concentration intact.  -Cranial nerves:  II: Visual fields are full to confrontation. III, IV, VI: Extraocular movements are intact without nystagmus. Pupils equally round and reactive to light V:  Facial sensation V1-V3 : decreased on right side VII: Face is symmetric with normal eye closure and smile Motor: Normal bulk and tone. No pronator drift. Strength bilateral upper extremity 5/5, bilateral lower extremities 5/5. Rapid alternating movements intact and symmetric Sensation: decreased sensation over right side . No neglect or extinction on double simultaneous testing. Coordination: No dysmetria on finger-to-nose bilaterally Reflexes: Downgoing toes bilaterally

## 2024-03-08 ENCOUNTER — TRANSCRIPTION ENCOUNTER (OUTPATIENT)
Age: 79
End: 2024-03-08

## 2024-03-19 ENCOUNTER — APPOINTMENT (OUTPATIENT)
Dept: CARDIOLOGY | Facility: CLINIC | Age: 79
End: 2024-03-19
Payer: MEDICARE

## 2024-03-19 VITALS
SYSTOLIC BLOOD PRESSURE: 126 MMHG | HEART RATE: 99 BPM | DIASTOLIC BLOOD PRESSURE: 78 MMHG | HEIGHT: 62 IN | BODY MASS INDEX: 29.26 KG/M2 | WEIGHT: 159 LBS

## 2024-03-19 DIAGNOSIS — E11.9 TYPE 2 DIABETES MELLITUS W/OUT COMPLICATIONS: ICD-10-CM

## 2024-03-19 DIAGNOSIS — G47.33 OBSTRUCTIVE SLEEP APNEA (ADULT) (PEDIATRIC): ICD-10-CM

## 2024-03-19 DIAGNOSIS — I10 ESSENTIAL (PRIMARY) HYPERTENSION: ICD-10-CM

## 2024-03-19 DIAGNOSIS — C85.90 NON-HODGKIN LYMPHOMA, UNSPECIFIED, UNSPECIFIED SITE: ICD-10-CM

## 2024-03-19 DIAGNOSIS — I63.9 CEREBRAL INFARCTION, UNSPECIFIED: ICD-10-CM

## 2024-03-19 PROCEDURE — 93000 ELECTROCARDIOGRAM COMPLETE: CPT

## 2024-03-19 PROCEDURE — 99214 OFFICE O/P EST MOD 30 MIN: CPT

## 2024-03-19 RX ORDER — MOMETASONE FUROATE 1 MG/G
0.1 CREAM TOPICAL TWICE DAILY
Qty: 1 | Refills: 3 | Status: DISCONTINUED | COMMUNITY
Start: 2023-03-23 | End: 2024-03-19

## 2024-03-19 RX ORDER — LORAZEPAM 0.5 MG/1
0.5 TABLET ORAL
Qty: 1 | Refills: 0 | Status: DISCONTINUED | COMMUNITY
Start: 2024-03-04 | End: 2024-03-19

## 2024-03-19 NOTE — PHYSICAL EXAM
[Well Developed] : well developed [Well Nourished] : well nourished [Normal Conjunctiva] : normal conjunctiva [No Acute Distress] : no acute distress [No Carotid Bruit] : no carotid bruit [Normal Venous Pressure] : normal venous pressure [Normal S1, S2] : normal S1, S2 [No Murmur] : no murmur [No Rub] : no rub [No Gallop] : no gallop [Good Air Entry] : good air entry [Clear Lung Fields] : clear lung fields [No Respiratory Distress] : no respiratory distress  [Soft] : abdomen soft [Non Tender] : non-tender [No Masses/organomegaly] : no masses/organomegaly [Abnormal Gait] : abnormal gait [Normal Bowel Sounds] : normal bowel sounds [No Edema] : no edema [No Cyanosis] : no cyanosis [No Clubbing] : no clubbing [No Rash] : no rash [No Varicosities] : no varicosities [Moves all extremities] : moves all extremities [No Skin Lesions] : no skin lesions [No Focal Deficits] : no focal deficits [Normal Speech] : normal speech [Normal memory] : normal memory [Alert and Oriented] : alert and oriented

## 2024-03-19 NOTE — REVIEW OF SYSTEMS
[Limb Weakness (Paresis)] : limb weakness (Paresis) [Chest Discomfort] : chest discomfort [Negative] : Psychiatric

## 2024-03-20 ENCOUNTER — NON-APPOINTMENT (OUTPATIENT)
Age: 79
End: 2024-03-20

## 2024-03-20 PROBLEM — G47.33 OSA (OBSTRUCTIVE SLEEP APNEA): Status: ACTIVE | Noted: 2022-06-09

## 2024-03-20 PROBLEM — I10 BENIGN ESSENTIAL HTN: Status: ACTIVE | Noted: 2022-06-09

## 2024-03-20 PROBLEM — E11.9 DIABETES: Status: ACTIVE | Noted: 2022-06-23

## 2024-03-20 PROBLEM — C85.90 NON-HODGKIN'S LYMPHOMA, UNSPECIFIED BODY REGION, UNSPECIFIED NON-HODGKIN LYMPHOMA TYPE: Status: ACTIVE | Noted: 2022-06-09

## 2024-03-20 PROBLEM — I63.9 STROKE: Status: ACTIVE | Noted: 2022-06-23

## 2024-03-20 NOTE — ASSESSMENT
[FreeTextEntry1] : Ms. Armstrong is a 78-year-old woman with history of CVA (4/2022) s/p ILR, composite lymphoma on chemotherapy (2019, recurrence 2024), moderate MR, DVT, HTN, DM2, hypothyroidism, and GERD presenting to establish care with a primary cardiologist.  Impression: (1) Hx CVA 4/2022, s/p ILR, thus far negative for arrhythmia, ambulatory with hemiparesis (2) Hx of DVT, follows with vascular surgery, no longer on AC (3) Mitral regurgitation (mild on TTE, moderate on JOEL in 2022) (4) DM2, well controlled (5) HTN, well controlled (6) MICHAEL by chart history (7) Composite lymphoma, diagnosed 2019, recurrence 2024, on bendamustine/rituximab (8) Mild mitral stenosis  Plan: - NM stress results discussed in detail - Repeat TTE later this year - Continue aspirin and statin for secondary ASCVD prevention - Ongoing ILR checks. Follow up with EP for eventual ILR removal.  Follow up in 6 months, sooner as needed

## 2024-03-20 NOTE — HISTORY OF PRESENT ILLNESS
[FreeTextEntry1] : Ms. Armstrong is a 78-year-old woman with history of CVA (4/2022) s/p ILR, composite lymphoma (2019), moderate MR, DVT, HTN, DM2, hypothyroidism, and GERD presenting for follow up.  Patient follows with EP, last seen for device interrogation 1/2024, at that time had no evidence of arrhythmia on device interrogation (multiple false episodes from oversensing and undersensing).  Last seen by neurology 3/2024. Had recently been taken off of apixaban in light of normal ILR data.  Last visit with me described several episodes of chest discomfort over the past week. Initially after eating a big meal for Thanksgiving, but symptoms occurred primarily with climbing stairs on her way home after the meal and not at rest. NM stress 12/2023 was negative for ischemia.  Since then was diagnosed with recurrence of lymphoma and is currently on chemotherapy since 1/4/24 (rituximab, bendamustine).  TTE 4/2022 - Normal biventricular systolic function with G1DD, mild MR, sclerotic AoV TTE 2/2023 - Normal systolic function, G1DD, mild MS, no pHTN, no signif change from prior TTE 2/2024 - Normal systolic function, G1DD, mild MR, MAC  JOEL 4/2022 - Moderate MR, negative bubble study  LE Duplex 7/2022 - Negative for DVT  Labs: - , TG 81, HDL 52, LDL 36 - A1c 4.4%, TSH 1.0, pBNP 216, hsCRP 2.38  Meds: - ASA 81mg - Atorva 10mg - Losartan 25mg - Metformin 500mg daily - synthroid 112mcg daily - Duloxetine 30mg

## 2024-03-21 ENCOUNTER — APPOINTMENT (OUTPATIENT)
Dept: CARDIOLOGY | Facility: CLINIC | Age: 79
End: 2024-03-21
Payer: MEDICARE

## 2024-03-21 PROCEDURE — 93298 REM INTERROG DEV EVAL SCRMS: CPT

## 2024-03-25 ENCOUNTER — APPOINTMENT (OUTPATIENT)
Dept: OTOLARYNGOLOGY | Facility: CLINIC | Age: 79
End: 2024-03-25

## 2024-04-24 ENCOUNTER — NON-APPOINTMENT (OUTPATIENT)
Age: 79
End: 2024-04-24

## 2024-04-25 ENCOUNTER — APPOINTMENT (OUTPATIENT)
Dept: CARDIOLOGY | Facility: CLINIC | Age: 79
End: 2024-04-25
Payer: MEDICARE

## 2024-04-25 PROCEDURE — 93298 REM INTERROG DEV EVAL SCRMS: CPT

## 2024-05-18 NOTE — PATIENT PROFILE ADULT - SAFE PLACE TO LIVE
Pt care assumed 0930. Pt is A&Ox4 and is resting in bed. Pt arrives to ED c/o fevers 4x days. pt states he returned from [place] 1x week ago. Pt states he went to multiple countries and is concerned for having dengue. Pt denies rashes. Pt denies pain. Denies N/V/D. VSS no

## 2024-05-29 ENCOUNTER — NON-APPOINTMENT (OUTPATIENT)
Age: 79
End: 2024-05-29

## 2024-05-30 ENCOUNTER — APPOINTMENT (OUTPATIENT)
Dept: CARDIOLOGY | Facility: CLINIC | Age: 79
End: 2024-05-30
Payer: MEDICARE

## 2024-05-30 PROCEDURE — 93298 REM INTERROG DEV EVAL SCRMS: CPT

## 2024-06-20 ENCOUNTER — APPOINTMENT (OUTPATIENT)
Dept: NEUROLOGY | Facility: CLINIC | Age: 79
End: 2024-06-20

## 2024-07-05 ENCOUNTER — NON-APPOINTMENT (OUTPATIENT)
Age: 79
End: 2024-07-05

## 2024-07-05 ENCOUNTER — APPOINTMENT (OUTPATIENT)
Dept: CARDIOLOGY | Facility: CLINIC | Age: 79
End: 2024-07-05
Payer: MEDICARE

## 2024-07-05 PROCEDURE — 93298 REM INTERROG DEV EVAL SCRMS: CPT

## 2024-08-02 PROBLEM — Z85.71 HISTORY OF HODGKIN'S LYMPHOMA: Status: RESOLVED | Noted: 2022-06-09 | Resolved: 2024-08-02

## 2024-08-05 ENCOUNTER — NON-APPOINTMENT (OUTPATIENT)
Age: 79
End: 2024-08-05

## 2024-08-09 ENCOUNTER — APPOINTMENT (OUTPATIENT)
Dept: CARDIOLOGY | Facility: CLINIC | Age: 79
End: 2024-08-09

## 2024-08-09 PROCEDURE — 93298 REM INTERROG DEV EVAL SCRMS: CPT

## 2024-08-17 ENCOUNTER — RESULT REVIEW (OUTPATIENT)
Age: 79
End: 2024-08-17

## 2024-08-17 ENCOUNTER — OUTPATIENT (OUTPATIENT)
Dept: OUTPATIENT SERVICES | Facility: HOSPITAL | Age: 79
LOS: 1 days | End: 2024-08-17
Payer: MEDICARE

## 2024-08-17 DIAGNOSIS — Z00.8 ENCOUNTER FOR OTHER GENERAL EXAMINATION: ICD-10-CM

## 2024-08-17 DIAGNOSIS — R42 DIZZINESS AND GIDDINESS: ICD-10-CM

## 2024-08-17 DIAGNOSIS — Z87.19 PERSONAL HISTORY OF OTHER DISEASES OF THE DIGESTIVE SYSTEM: Chronic | ICD-10-CM

## 2024-08-17 DIAGNOSIS — Z90.49 ACQUIRED ABSENCE OF OTHER SPECIFIED PARTS OF DIGESTIVE TRACT: Chronic | ICD-10-CM

## 2024-08-17 DIAGNOSIS — Z98.890 OTHER SPECIFIED POSTPROCEDURAL STATES: Chronic | ICD-10-CM

## 2024-08-17 PROCEDURE — A9579: CPT

## 2024-08-17 PROCEDURE — 70548 MR ANGIOGRAPHY NECK W/DYE: CPT | Mod: 26,MH

## 2024-08-17 PROCEDURE — 70544 MR ANGIOGRAPHY HEAD W/O DYE: CPT

## 2024-08-17 PROCEDURE — 70548 MR ANGIOGRAPHY NECK W/DYE: CPT

## 2024-08-17 PROCEDURE — 70544 MR ANGIOGRAPHY HEAD W/O DYE: CPT | Mod: 26,MH

## 2024-08-18 DIAGNOSIS — R42 DIZZINESS AND GIDDINESS: ICD-10-CM

## 2024-08-21 ENCOUNTER — NON-APPOINTMENT (OUTPATIENT)
Age: 79
End: 2024-08-21

## 2024-09-13 ENCOUNTER — APPOINTMENT (OUTPATIENT)
Dept: CARDIOLOGY | Facility: CLINIC | Age: 79
End: 2024-09-13

## 2024-09-13 ENCOUNTER — NON-APPOINTMENT (OUTPATIENT)
Age: 79
End: 2024-09-13

## 2024-09-13 PROCEDURE — 93298 REM INTERROG DEV EVAL SCRMS: CPT

## 2024-09-17 ENCOUNTER — APPOINTMENT (OUTPATIENT)
Dept: CARDIOLOGY | Facility: CLINIC | Age: 79
End: 2024-09-17
Payer: MEDICARE

## 2024-09-17 VITALS
HEART RATE: 87 BPM | WEIGHT: 175 LBS | OXYGEN SATURATION: 97 % | DIASTOLIC BLOOD PRESSURE: 72 MMHG | HEIGHT: 62 IN | BODY MASS INDEX: 32.2 KG/M2 | SYSTOLIC BLOOD PRESSURE: 130 MMHG

## 2024-09-17 DIAGNOSIS — E11.9 TYPE 2 DIABETES MELLITUS W/OUT COMPLICATIONS: ICD-10-CM

## 2024-09-17 DIAGNOSIS — I63.9 CEREBRAL INFARCTION, UNSPECIFIED: ICD-10-CM

## 2024-09-17 DIAGNOSIS — I10 ESSENTIAL (PRIMARY) HYPERTENSION: ICD-10-CM

## 2024-09-17 DIAGNOSIS — I34.0 NONRHEUMATIC MITRAL (VALVE) INSUFFICIENCY: ICD-10-CM

## 2024-09-17 PROCEDURE — 93000 ELECTROCARDIOGRAM COMPLETE: CPT

## 2024-09-17 PROCEDURE — 99214 OFFICE O/P EST MOD 30 MIN: CPT

## 2024-09-17 PROCEDURE — G2211 COMPLEX E/M VISIT ADD ON: CPT

## 2024-09-17 RX ORDER — FOLIC ACID 1 MG/1
1 TABLET ORAL DAILY
Refills: 0 | Status: ACTIVE | COMMUNITY

## 2024-09-17 NOTE — HISTORY OF PRESENT ILLNESS
[FreeTextEntry1] : Ms. Armstrong is a 79-year-old woman with history of CVA (4/2022) s/p ILR, composite lymphoma (2019), moderate MR, DVT, HTN, DM2, hypothyroidism, and GERD presenting for follow up.  Patient follows with EP, last seen for device interrogation 1/2024, at that time had no evidence of arrhythmia on device interrogation (multiple false episodes from oversensing and undersensing). Last interrogation 8/2024 was unremarkable.  Last visit with me described several episodes of chest discomfort over the past week. Initially after eating a big meal for Thanksgiving, but symptoms occurred primarily with climbing stairs on her way home after the meal and not at rest. NM stress 12/2023 was negative for ischemia.  Since then was diagnosed with recurrence of lymphoma and is currently on chemotherapy since 1/4/24 (rituximab, bendamustine).  TTE 4/2022 - Normal biventricular systolic function with G1DD, mild MR, sclerotic AoV TTE 2/2023 - Normal systolic function, G1DD, mild MS, no pHTN, no signif change from prior TTE 2/2024 - Normal systolic function, G1DD, mild MR, MAC  JOEL 4/2022 - Moderate MR, negative bubble study  NM Stress 12/2023 - normal perfusion, no infarct or ischemia  LE Duplex 7/2022 - Negative for DVT  MRA H/N 8/2024: no major vascular stenosis or occlusion; previously seen L MCA M2 occlusion is not definitively visualized; this can be due to recanalization vs differences in modality  Labs: - , TG 51, HDL 60, LDL 56 - A1c 4.4%, TSH 1.0, pBNP 216, hsCRP 2.38 - WBC 0.9, Hgb 11.4, Plt 200  Meds: - ASA 81mg - Atorva 10mg - Losartan 25mg - Metformin 500mg daily - synthroid 112mcg daily - Duloxetine 30mg

## 2024-09-17 NOTE — ASSESSMENT
[FreeTextEntry1] : Ms. Armstrong is a 79-year-old woman with history of CVA (4/2022) s/p ILR, composite lymphoma on chemotherapy (2019, recurrence 2024), moderate MR, DVT, HTN, DM2, hypothyroidism, and GERD presenting to establish care with a primary cardiologist.  Impression: (1) Hx CVA 4/2022, s/p ILR, thus far negative for arrhythmia, ambulatory with hemiparesis (2) Hx of DVT, follows with vascular surgery, no longer on AC (3) Mitral regurgitation (mild on TTE, moderate on JOEL in 2022) (4) DM2, well controlled (5) HTN, well controlled (6) MICHAEL by chart history (7) Composite lymphoma, diagnosed 2019, recurrence 2024, on bendamustine/rituximab (8) Mild mitral stenosis  Plan: - NM stress results discussed in detail - Repeat TTE next year - Continue aspirin and statin for secondary ASCVD prevention - Ongoing ILR checks. Follow up with EP for eventual ILR removal.  Follow up in 6 months, sooner as needed

## 2024-09-17 NOTE — REVIEW OF SYSTEMS
[Chest Discomfort] : chest discomfort [Limb Weakness (Paresis)] : limb weakness (Paresis) [Negative] : Heme/Lymph

## 2024-09-27 ENCOUNTER — APPOINTMENT (OUTPATIENT)
Dept: ORTHOPEDIC SURGERY | Facility: CLINIC | Age: 79
End: 2024-09-27
Payer: MEDICARE

## 2024-09-27 VITALS — WEIGHT: 169 LBS | BODY MASS INDEX: 31.1 KG/M2 | HEIGHT: 62 IN

## 2024-09-27 DIAGNOSIS — M65.4 RADIAL STYLOID TENOSYNOVITIS [DE QUERVAIN]: ICD-10-CM

## 2024-09-27 PROCEDURE — 20550 NJX 1 TENDON SHEATH/LIGAMENT: CPT | Mod: LT

## 2024-09-27 PROCEDURE — 99202 OFFICE O/P NEW SF 15 MIN: CPT | Mod: 25

## 2024-09-27 NOTE — HISTORY OF PRESENT ILLNESS
[de-identified] : 79-year-old female left-sided pain and discomfort in the wrist.  Comes in the office today for evaluation.  She denies any trauma to the area.  But she has this pain discomfort in the wrist.

## 2024-09-27 NOTE — ASSESSMENT
[FreeTextEntry1] : Patient has left-sided dequervains tendinitis.  The plan will be cortisone injection of the wrist.  Postinjection she had excellent relief of symptoms.  Will see how the injection does.  She make arrangements to see us back in a month.  Month or now show if she is feeling well she will see us as needed possibility of surgical invention discussed

## 2024-09-27 NOTE — PHYSICAL EXAM
[de-identified] : Patient is swelling close to first dorsal compartment.  Positive Finkelstein's test.  No erythema ecchymoses or abrasions.  No masses.  No instability.

## 2024-09-30 ENCOUNTER — NON-APPOINTMENT (OUTPATIENT)
Age: 79
End: 2024-09-30

## 2024-10-18 ENCOUNTER — NON-APPOINTMENT (OUTPATIENT)
Age: 79
End: 2024-10-18

## 2024-10-18 ENCOUNTER — APPOINTMENT (OUTPATIENT)
Dept: CARDIOLOGY | Facility: CLINIC | Age: 79
End: 2024-10-18
Payer: MEDICARE

## 2024-10-18 PROCEDURE — 93298 REM INTERROG DEV EVAL SCRMS: CPT

## 2024-10-22 ENCOUNTER — APPOINTMENT (OUTPATIENT)
Dept: NEUROLOGY | Facility: CLINIC | Age: 79
End: 2024-10-22
Payer: MEDICARE

## 2024-10-22 VITALS
HEIGHT: 62 IN | DIASTOLIC BLOOD PRESSURE: 80 MMHG | RESPIRATION RATE: 18 BRPM | OXYGEN SATURATION: 99 % | WEIGHT: 177 LBS | HEART RATE: 88 BPM | SYSTOLIC BLOOD PRESSURE: 138 MMHG | BODY MASS INDEX: 32.57 KG/M2

## 2024-10-22 DIAGNOSIS — I63.9 CEREBRAL INFARCTION, UNSPECIFIED: ICD-10-CM

## 2024-10-22 PROCEDURE — 99214 OFFICE O/P EST MOD 30 MIN: CPT

## 2024-10-28 ENCOUNTER — APPOINTMENT (OUTPATIENT)
Dept: ORTHOPEDIC SURGERY | Facility: CLINIC | Age: 79
End: 2024-10-28
Payer: MEDICARE

## 2024-10-28 DIAGNOSIS — M65.4 RADIAL STYLOID TENOSYNOVITIS [DE QUERVAIN]: ICD-10-CM

## 2024-10-28 PROCEDURE — 99213 OFFICE O/P EST LOW 20 MIN: CPT

## 2024-11-12 ENCOUNTER — NON-APPOINTMENT (OUTPATIENT)
Age: 79
End: 2024-11-12

## 2024-11-14 ENCOUNTER — APPOINTMENT (OUTPATIENT)
Dept: ORTHOPEDIC SURGERY | Facility: CLINIC | Age: 79
End: 2024-11-14

## 2024-11-14 DIAGNOSIS — M65.4 RADIAL STYLOID TENOSYNOVITIS [DE QUERVAIN]: ICD-10-CM

## 2024-11-14 PROCEDURE — 20550 NJX 1 TENDON SHEATH/LIGAMENT: CPT | Mod: LT

## 2024-11-14 PROCEDURE — 99213 OFFICE O/P EST LOW 20 MIN: CPT | Mod: 25

## 2024-11-15 ENCOUNTER — APPOINTMENT (OUTPATIENT)
Dept: ORTHOPEDIC SURGERY | Facility: HOSPITAL | Age: 79
End: 2024-11-15

## 2024-11-21 ENCOUNTER — NON-APPOINTMENT (OUTPATIENT)
Age: 79
End: 2024-11-21

## 2024-11-21 ENCOUNTER — APPOINTMENT (OUTPATIENT)
Dept: CARDIOLOGY | Facility: CLINIC | Age: 79
End: 2024-11-21
Payer: MEDICARE

## 2024-11-21 PROCEDURE — 93298 REM INTERROG DEV EVAL SCRMS: CPT

## 2024-11-29 ENCOUNTER — APPOINTMENT (OUTPATIENT)
Dept: ORTHOPEDIC SURGERY | Facility: CLINIC | Age: 79
End: 2024-11-29

## 2024-12-26 ENCOUNTER — APPOINTMENT (OUTPATIENT)
Dept: CARDIOLOGY | Facility: CLINIC | Age: 79
End: 2024-12-26
Payer: MEDICARE

## 2024-12-26 ENCOUNTER — NON-APPOINTMENT (OUTPATIENT)
Age: 79
End: 2024-12-26

## 2024-12-26 PROCEDURE — 93298 REM INTERROG DEV EVAL SCRMS: CPT

## 2025-01-09 ENCOUNTER — APPOINTMENT (OUTPATIENT)
Dept: ELECTROPHYSIOLOGY | Facility: CLINIC | Age: 80
End: 2025-01-09

## 2025-01-14 ENCOUNTER — APPOINTMENT (OUTPATIENT)
Dept: ORTHOPEDIC SURGERY | Facility: CLINIC | Age: 80
End: 2025-01-14

## 2025-02-04 NOTE — H&P ADULT - DOES THIS PATIENT HAVE A HISTORY OF OR HAS BEEN DX WITH HEART FAILURE?
no flow noted in the medial left ankle region.    No definite focal bony uptake is seen but is difficult to exclude given the limitations as above.    IMPRESSION:    Limited examination due to excessive soft tissue uptake and body habitus.    Increased flow noted in medial left ankle region. This is of unclear significance and may indicate cellulitis.    Bones are poorly imaged but no definite focal uptake is identified. Consider MRI correlation.    Electronically Signed by: Peter Fuchs MD, 11/8/2024 3:17 PM  Images    Assessment   Impression: .    Encounter Diagnoses   Name Primary?    Sprain and strain of lumbosacral joint/ligament, sequela Yes    Hip strain, right, sequela     Chronic anticoagulation     Venous stasis dermatitis of both lower extremities     Chronic low back pain without sciatica, unspecified back pain laterality     Chronic right hip pain     Morbid obesity               Plan:       Advanced imaging CT : Lumbar spine and pelvis evaluate lumbar spine for high-grade stenosis/arthropathy evaluate right hip and left hip for high-grade arthropathy  Activity modification - hip, lumbar spine precautions and caution against overuse  Functional brace/splint/boot immobilization  Medical pain management: As per previous and maximize local measures for symptom control  continue supervised PT in the [facility] and progress as tolerated    The nature of the finding, probable diagnosis and likely treatment was thoroughly discussed with the patient. The options, risks, complications, alternative treatment as well as some of the differential diagnosis was discussed. The patient was thoroughly informed and all questions were answered. the patient indicated understanding and satisfaction with the discussion.      Orders:        Orders Placed This Encounter   Procedures    CT LUMBAR SPINE WO CONTRAST     Standing Status:   Future     Standing Expiration Date:   2/4/2026     Scheduling Instructions:      [facility], pt will

## 2025-02-13 ENCOUNTER — APPOINTMENT (OUTPATIENT)
Dept: CARDIOLOGY | Facility: CLINIC | Age: 80
End: 2025-02-13
Payer: COMMERCIAL

## 2025-02-13 ENCOUNTER — NON-APPOINTMENT (OUTPATIENT)
Age: 80
End: 2025-02-13

## 2025-02-13 PROCEDURE — 93298 REM INTERROG DEV EVAL SCRMS: CPT

## 2025-03-18 ENCOUNTER — APPOINTMENT (OUTPATIENT)
Dept: CARDIOLOGY | Facility: CLINIC | Age: 80
End: 2025-03-18
Payer: MEDICARE

## 2025-03-18 VITALS
HEART RATE: 81 BPM | BODY MASS INDEX: 33.68 KG/M2 | WEIGHT: 183 LBS | HEIGHT: 62 IN | SYSTOLIC BLOOD PRESSURE: 126 MMHG | DIASTOLIC BLOOD PRESSURE: 84 MMHG

## 2025-03-18 DIAGNOSIS — E11.9 TYPE 2 DIABETES MELLITUS W/OUT COMPLICATIONS: ICD-10-CM

## 2025-03-18 DIAGNOSIS — Z86.73 PERSONAL HISTORY OF TRANSIENT ISCHEMIC ATTACK (TIA), AND CEREBRAL INFARCTION W/OUT RESIDUAL DEFICITS: ICD-10-CM

## 2025-03-18 DIAGNOSIS — I34.0 NONRHEUMATIC MITRAL (VALVE) INSUFFICIENCY: ICD-10-CM

## 2025-03-18 DIAGNOSIS — I10 ESSENTIAL (PRIMARY) HYPERTENSION: ICD-10-CM

## 2025-03-18 PROCEDURE — 99214 OFFICE O/P EST MOD 30 MIN: CPT

## 2025-03-18 PROCEDURE — 93000 ELECTROCARDIOGRAM COMPLETE: CPT

## 2025-03-18 PROCEDURE — G2211 COMPLEX E/M VISIT ADD ON: CPT

## 2025-03-18 RX ORDER — MAGNESIUM OXIDE 241.3 MG/1000MG
400 TABLET ORAL DAILY
Refills: 0 | Status: ACTIVE | COMMUNITY

## 2025-03-20 ENCOUNTER — APPOINTMENT (OUTPATIENT)
Dept: CARDIOLOGY | Facility: CLINIC | Age: 80
End: 2025-03-20

## 2025-03-20 PROCEDURE — 93298 REM INTERROG DEV EVAL SCRMS: CPT

## 2025-04-24 ENCOUNTER — APPOINTMENT (OUTPATIENT)
Dept: CARDIOLOGY | Facility: CLINIC | Age: 80
End: 2025-04-24
Payer: MEDICARE

## 2025-04-24 ENCOUNTER — NON-APPOINTMENT (OUTPATIENT)
Age: 80
End: 2025-04-24

## 2025-04-24 PROCEDURE — 93298 REM INTERROG DEV EVAL SCRMS: CPT

## 2025-05-27 ENCOUNTER — APPOINTMENT (OUTPATIENT)
Dept: CARDIOLOGY | Facility: CLINIC | Age: 80
End: 2025-05-27

## 2025-05-27 PROCEDURE — 93298 REM INTERROG DEV EVAL SCRMS: CPT

## 2025-05-30 ENCOUNTER — APPOINTMENT (OUTPATIENT)
Dept: ORTHOPEDIC SURGERY | Facility: CLINIC | Age: 80
End: 2025-05-30
Payer: MEDICARE

## 2025-05-30 DIAGNOSIS — M65.4 RADIAL STYLOID TENOSYNOVITIS [DE QUERVAIN]: ICD-10-CM

## 2025-05-30 PROCEDURE — 99213 OFFICE O/P EST LOW 20 MIN: CPT

## 2025-05-30 RX ORDER — CEFADROXIL 500 MG/1
500 CAPSULE ORAL TWICE DAILY
Qty: 10 | Refills: 0 | Status: ACTIVE | COMMUNITY
Start: 2025-05-30 | End: 1900-01-01

## 2025-06-02 DIAGNOSIS — E87.5 HYPERKALEMIA: ICD-10-CM

## 2025-06-24 NOTE — ASU PATIENT PROFILE, ADULT - AS SC BRADEN FRICTION
This medical record reflects one or more clinical indicators suggestive of malnutrition and/or morbid obesity  Malnutrition Findings:   Adult Malnutrition type: Chronic illness  Adult Degree of Malnutrition: Other severe protein calorie malnutrition  Malnutrition Characteristics: Inadequate energy, Weight loss(Pt presents with severe protein calorie malnutriton as evidenced by 10% wt loss (19 lbs) in 5 months (11/17/20 88 7 kg, 4/7/20 80 2 kg ) and < 75% po intake > 1 month )     Treat with regular diet and supplements TID  BMI Findings: Body mass index is 30 35 kg/m²  See Nutrition note dated 04/7/21  for additional details  Completed nutrition assessment is viewable in the nutrition documentation  (3) no apparent problem

## 2025-06-25 ENCOUNTER — OUTPATIENT (OUTPATIENT)
Dept: OUTPATIENT SERVICES | Facility: HOSPITAL | Age: 80
LOS: 1 days | Discharge: HOME | End: 2025-06-25
Payer: MEDICARE

## 2025-06-25 ENCOUNTER — TRANSCRIPTION ENCOUNTER (OUTPATIENT)
Age: 80
End: 2025-06-25

## 2025-06-25 ENCOUNTER — APPOINTMENT (OUTPATIENT)
Dept: ORTHOPEDIC SURGERY | Facility: AMBULATORY SURGERY CENTER | Age: 80
End: 2025-06-25

## 2025-06-25 VITALS
OXYGEN SATURATION: 96 % | RESPIRATION RATE: 18 BRPM | HEIGHT: 62 IN | DIASTOLIC BLOOD PRESSURE: 65 MMHG | TEMPERATURE: 98 F | SYSTOLIC BLOOD PRESSURE: 149 MMHG | WEIGHT: 186.95 LBS | HEART RATE: 82 BPM

## 2025-06-25 VITALS
OXYGEN SATURATION: 97 % | DIASTOLIC BLOOD PRESSURE: 63 MMHG | RESPIRATION RATE: 18 BRPM | HEART RATE: 75 BPM | TEMPERATURE: 98 F | SYSTOLIC BLOOD PRESSURE: 145 MMHG

## 2025-06-25 DIAGNOSIS — Z98.890 OTHER SPECIFIED POSTPROCEDURAL STATES: Chronic | ICD-10-CM

## 2025-06-25 DIAGNOSIS — Z90.49 ACQUIRED ABSENCE OF OTHER SPECIFIED PARTS OF DIGESTIVE TRACT: Chronic | ICD-10-CM

## 2025-06-25 DIAGNOSIS — Z87.19 PERSONAL HISTORY OF OTHER DISEASES OF THE DIGESTIVE SYSTEM: Chronic | ICD-10-CM

## 2025-06-25 DIAGNOSIS — M65.4 RADIAL STYLOID TENOSYNOVITIS [DE QUERVAIN]: ICD-10-CM

## 2025-06-25 PROCEDURE — 25000 INCISION OF TENDON SHEATH: CPT | Mod: LT

## 2025-06-25 RX ORDER — ASPIRIN 325 MG
1 TABLET ORAL
Refills: 0 | DISCHARGE

## 2025-06-25 RX ORDER — IBUPROFEN 200 MG
1 TABLET ORAL
Qty: 20 | Refills: 0
Start: 2025-06-25

## 2025-06-25 NOTE — ASU DISCHARGE PLAN (ADULT/PEDIATRIC) - FINANCIAL ASSISTANCE
[facility] provides services at a reduced cost to those who are determined to be eligible through [facility]’s financial assistance program. Information regarding [facility]’s financial assistance program can be found by going to [email or web address] or by calling [phone].

## 2025-06-25 NOTE — ASU DISCHARGE PLAN (ADULT/PEDIATRIC) - NS MD DC FALL RISK RISK
For information on Fall & Injury Prevention, visit: [email or web address] OR  [email or web address] OR  https://www.cdc.gov/steadi/patient.html

## 2025-06-25 NOTE — ASU PREOP CHECKLIST - SKIN PREP
PAST SURGICAL HISTORY:  H/O shoulder surgery     S/P cataract surgery right    S/P colostomy 2/2024    S/P ORIF (open reduction internal fixation) fracture b/l legs    Suprapubic catheter      n/a

## 2025-06-27 ENCOUNTER — APPOINTMENT (OUTPATIENT)
Dept: CARDIOLOGY | Facility: CLINIC | Age: 80
End: 2025-06-27

## 2025-06-27 PROCEDURE — 93298 REM INTERROG DEV EVAL SCRMS: CPT

## 2025-07-01 DIAGNOSIS — M65.4 RADIAL STYLOID TENOSYNOVITIS [DE QUERVAIN]: ICD-10-CM

## 2025-07-07 ENCOUNTER — APPOINTMENT (OUTPATIENT)
Dept: ORTHOPEDIC SURGERY | Facility: CLINIC | Age: 80
End: 2025-07-07

## 2025-07-07 ENCOUNTER — APPOINTMENT (OUTPATIENT)
Dept: ORTHOPEDIC SURGERY | Facility: CLINIC | Age: 80
End: 2025-07-07
Payer: MEDICARE

## 2025-07-07 PROCEDURE — 99024 POSTOP FOLLOW-UP VISIT: CPT

## 2025-08-01 ENCOUNTER — APPOINTMENT (OUTPATIENT)
Dept: CARDIOLOGY | Facility: CLINIC | Age: 80
End: 2025-08-01

## 2025-09-05 ENCOUNTER — APPOINTMENT (OUTPATIENT)
Dept: CARDIOLOGY | Facility: CLINIC | Age: 80
End: 2025-09-05

## 2025-09-05 PROCEDURE — 93306 TTE W/DOPPLER COMPLETE: CPT

## 2025-09-10 ENCOUNTER — TRANSCRIPTION ENCOUNTER (OUTPATIENT)
Age: 80
End: 2025-09-10

## 2025-09-10 ENCOUNTER — OUTPATIENT (OUTPATIENT)
Dept: OUTPATIENT SERVICES | Facility: HOSPITAL | Age: 80
LOS: 1 days | Discharge: HOME | End: 2025-09-10
Payer: MEDICARE

## 2025-09-10 ENCOUNTER — APPOINTMENT (OUTPATIENT)
Dept: ELECTROPHYSIOLOGY | Facility: HOSPITAL | Age: 80
End: 2025-09-10

## 2025-09-10 DIAGNOSIS — Z98.890 OTHER SPECIFIED POSTPROCEDURAL STATES: Chronic | ICD-10-CM

## 2025-09-10 DIAGNOSIS — Z87.19 PERSONAL HISTORY OF OTHER DISEASES OF THE DIGESTIVE SYSTEM: Chronic | ICD-10-CM

## 2025-09-10 DIAGNOSIS — I63.9 CEREBRAL INFARCTION, UNSPECIFIED: ICD-10-CM

## 2025-09-10 DIAGNOSIS — Z90.49 ACQUIRED ABSENCE OF OTHER SPECIFIED PARTS OF DIGESTIVE TRACT: Chronic | ICD-10-CM

## 2025-09-10 PROCEDURE — 33286 RMVL SUBQ CAR RHYTHM MNTR: CPT

## 2025-09-10 RX ORDER — CEPHALEXIN 250 MG/1
500 CAPSULE ORAL ONCE
Refills: 0 | Status: COMPLETED | OUTPATIENT
Start: 2025-09-10 | End: 2025-09-10

## 2025-09-10 RX ADMIN — CEPHALEXIN 500 MILLIGRAM(S): 250 CAPSULE ORAL at 13:32

## 2025-09-11 DIAGNOSIS — I63.9 CEREBRAL INFARCTION, UNSPECIFIED: ICD-10-CM
